# Patient Record
Sex: MALE | Race: WHITE | Employment: UNEMPLOYED | URBAN - METROPOLITAN AREA
[De-identification: names, ages, dates, MRNs, and addresses within clinical notes are randomized per-mention and may not be internally consistent; named-entity substitution may affect disease eponyms.]

---

## 2017-04-24 ENCOUNTER — ED HISTORICAL/CONVERTED ENCOUNTER (OUTPATIENT)
Dept: OTHER | Age: 57
End: 2017-04-24

## 2017-04-25 ENCOUNTER — IP HISTORICAL/CONVERTED ENCOUNTER (OUTPATIENT)
Dept: OTHER | Age: 57
End: 2017-04-25

## 2017-04-30 ENCOUNTER — OP HISTORICAL/CONVERTED ENCOUNTER (OUTPATIENT)
Dept: OTHER | Age: 57
End: 2017-04-30

## 2017-05-03 ENCOUNTER — OP HISTORICAL/CONVERTED ENCOUNTER (OUTPATIENT)
Dept: OTHER | Age: 57
End: 2017-05-03

## 2017-06-06 ENCOUNTER — OFFICE VISIT (OUTPATIENT)
Dept: HEMATOLOGY | Age: 57
End: 2017-06-06

## 2017-06-06 VITALS
WEIGHT: 211.25 LBS | TEMPERATURE: 97.3 F | DIASTOLIC BLOOD PRESSURE: 50 MMHG | HEIGHT: 73 IN | HEART RATE: 61 BPM | SYSTOLIC BLOOD PRESSURE: 101 MMHG | OXYGEN SATURATION: 98 % | BODY MASS INDEX: 28 KG/M2

## 2017-06-06 DIAGNOSIS — K74.60 CIRRHOSIS OF LIVER WITHOUT ASCITES, UNSPECIFIED HEPATIC CIRRHOSIS TYPE (HCC): Primary | ICD-10-CM

## 2017-06-06 PROBLEM — Z90.49 S/P APPENDECTOMY: Status: ACTIVE | Noted: 2017-06-06

## 2017-06-06 PROBLEM — Z87.19 H/O UMBILICAL HERNIA REPAIR: Status: ACTIVE | Noted: 2017-06-06

## 2017-06-06 PROBLEM — Z98.890 H/O UMBILICAL HERNIA REPAIR: Status: ACTIVE | Noted: 2017-06-06

## 2017-06-06 PROBLEM — E11.9 DIABETES MELLITUS (HCC): Status: ACTIVE | Noted: 2017-06-06

## 2017-06-06 PROBLEM — I85.00 ESOPHAGEAL VARICES (HCC): Status: ACTIVE | Noted: 2017-06-06

## 2017-06-06 RX ORDER — SUCRALFATE 1 G/1
TABLET ORAL
Refills: 0 | Status: ON HOLD | COMMUNITY
Start: 2017-04-29 | End: 2018-05-10

## 2017-06-06 RX ORDER — METFORMIN HYDROCHLORIDE 500 MG/1
TABLET ORAL
Refills: 0 | Status: ON HOLD | COMMUNITY
Start: 2017-05-12 | End: 2018-05-10

## 2017-06-06 RX ORDER — LANOLIN ALCOHOL/MO/W.PET/CERES
325 CREAM (GRAM) TOPICAL
Qty: 180 TAB | Refills: 1 | Status: ON HOLD | OUTPATIENT
Start: 2017-06-06 | End: 2018-05-10

## 2017-06-06 RX ORDER — NADOLOL 20 MG/1
TABLET ORAL
Refills: 3 | Status: ON HOLD | COMMUNITY
Start: 2017-06-02 | End: 2018-05-10

## 2017-06-06 NOTE — PROGRESS NOTES
[address] [facility] MD Дмитрий, [address]   April KATELYNN Hurtado PA-C Eugenie Dale, NP        at [address], [address] Patricia Gonzalez  22.     [phone]     FAX: [phone]    at [facility], [address] [place] [address], [address] [phone]     FAX: [phone]         Patient Care Team:  Orlando Narvaez MD as PCP - General (Family Practice)  Lydia Moeller MD (Gastroenterology)      Problem List  Date Reviewed: 6/6/2017          Codes Class Noted    Cirrhosis of liver without ascites [facility]) ICD-10-CM: K74.60  ICD-9-CM: 571.5  6/6/2017        Esophageal varices ([facility].) ICD-10-CM: I85.00  ICD-9-CM: 456.1  6/6/2017        Diabetes mellitus ([facility].) ICD-10-CM: E11.9  ICD-9-CM: 250.00  4/5/9516        H/O umbilical hernia repair BDA-70-WC: V31.625, Z87.19  ICD-9-CM: V15.29  6/6/2017        S/P appendectomy ICD-10-CM: Z90.49  ICD-9-CM: V45.89  6/6/2017                The physicians listed above have asked me to see Snoia Lopez in consultation regarding management of cirrhosis of undefined cause. All previous medical records were reviewed. The patient is a 65 y.o.  male who was first found to have chronic liver disease and cirrhosis in 5/2017 when he was variceal bleeding. An assessment of liver fibrosis with biopsy or elastography has not been performed. The patient has not developed ascites. The patient has not developed edema. The patient has not developed hepatic encephalopathy. The patient has had prior variceal bleeding.   Varices have been treated with beta-blockers and band ligation     The most recent laboratory studies indicate that the liver transaminases are normal, ALP is normal, tests of hepatic synthetic and metabolic function are normal, and the platelet count is normal.    The patient notes fatigue,     The patient has limitations in functional activities secondary to these symptoms. The patient has not experienced problems concentrating, swelling of the abdomen, swelling of the lower extremities, hematemesis, hematochezia. ALLERGIES  Not on File    MEDICATIONS  Current Outpatient Prescriptions   Medication Sig    metFORMIN (GLUCOPHAGE) 500 mg tablet TK 1 T PO  BID WITH MEALS    sucralfate (CARAFATE) 1 gram tablet TK 1 T PO Q 6 H    nadolol (CORGARD) 20 mg tablet TK 1 T PO  QD     No current facility-administered medications for this visit. SYSTEM REVIEW NOT RELATED TO LIVER DISEASE OR REVIEWED ABOVE:  Constitution systems: Negative for fever, chills, weight gain, weight loss. Eyes: Negative for visual changes. ENT: Negative for sore throat, painful swallowing. Respiratory: Negative for cough, hemoptysis, SOB. Cardiology: Negative for chest pain, palpitations. GI:  Negative for constipation or diarrhea. : Negative for urinary frequency, dysuria, hematuria, nocturia. Skin: Negative for rash. Hematology: Negative for easy bruising, blood clots. Musculo-skelatal: Negative for back pain, muscle pain, weakness. Neurologic: Negative for headaches, dizziness, vertigo, memory problems not related to HE. Psychology: Negative for anxiety, depression. FAMILY HISTORY:  The father  of heart disease. The mother  of MVA but had elevated liver enzymes. There is no family history of liver disease. SOCIAL HISTORY:  The patient is . The patient has no children. The patient currently smokes 1 pack of tobacco daily. The patient has never consumed significant amounts of alcohol. 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 PHYSICAL EXAMINATION:  Visit Vitals    /50    Pulse 61    Temp 97.3 °F (36.3 °C) (Tympanic)    Ht 6' 1\" (1.854 m)    Wt 211 lb 4 oz (95.8 kg)    SpO2 98%    BMI 27.87 kg/m2     General: No acute distress.    Eyes: Sclera anicteric. ENT: No oral lesions. Thyroid normal.  Nodes: No adenopathy. Skin: No spider angiomata. No jaundice. No palmar erythema. Respiratory: Lungs clear to auscultation. Cardiovascular: Regular heart rate. No murmurs. No JVD. Abdomen: Soft non-tender. Liver size normal to percussion/palpation. Spleen not palpable. No obvious ascites. Extremities: No edema. No muscle wasting. No gross arthritic changes. Neurologic: Alert and oriented. Cranial nerves grossly intact. No asterixis. LABORATORY STUDIES:  [facility] [place] of [address] [place] [address] & Units 6/6/2017   WBC 3.4 - 10.8 x10E3/uL 6.3   ANC 1.4 - 7.0 x10E3/uL 2.4   HGB 12.6 - 17.7 g/dL 11.2 (L)    - 379 x10E3/uL 112 (L)   AST 0 - 40 IU/L 46 (H)   ALT 0 - 44 IU/L 29   Alk Phos 39 - 117 IU/L 103   Bili, Total 0.0 - 1.2 mg/dL 0.7   Bili, Direct 0.00 - 0.40 mg/dL 0.29   Albumin 3.5 - 5.5 g/dL 3.0 (L)   BUN 6 - 24 mg/dL 8   Creat 0.76 - 1.27 mg/dL 0.72 (L)   Na 134 - 144 mmol/L 144   K 3.5 - 5.2 mmol/L 4.3   Cl 96 - 106 mmol/L 108 (H)   CO2 18 - 29 mmol/L 24   Glucose 65 - 99 mg/dL 96     SEROLOGIES:  5/2017. HBsurface antigen negative, anti-HBcore negative, anti-HBsurface negative, HCV RNA negative, iron saturation 29%, ASMA negative. Serologies Latest Ref Rng & Units 6/6/2017   Ferritin 30 - 400 ng/mL 39   Alpha-1 antitrypsin level 90 - 200 mg/dL 87 (L)     LIVER HISTOLOGY:  Not available or performed    ENDOSCOPIC PROCEDURES:  Not available or performed    RADIOLOGY:  Not available or performed    OTHER TESTING:  Not available or performed    ASSESSMENT AND PLAN:  Cryptogenic cirrhosis     Liver transaminases are normal.  Alkaline phosphatase is normal.  Liver function is normal.  Serum albumin is slightly depressed. The platelet count is   depressed. Will perform laboratory testing to monitor liver function and degree of liver injury. This included BMP, hepatic panel, CBC with platelet count, INR.       The patient hasnormal liver function. The CTP is 6. Child class A. The MELD score is 12. The need to perform a liver biopsy to help determine the cause and severity of the liver test abnormalities was discussed. The risks of performing the liver biopsy including pain, puncture of the lung, gallbladder, intestine or kidney and bleeding were discussed. Will defer liver biopsy for now. The need to perform an assessment of liver fibrosis was discussed with the patient. The Fibroscan can assess liver fibrosis and determine if a patient has advanced fibrosis or cirrhosis without the need for liver biopsy. The Fibroscan is currently available at [facility]. This will be performed a tthe next office visit. Esophageal varices with prior bleeding. Varices have been banded. Will schedule for EGD to assess for varices and need for banding. The patient is on a beta-blocker to reduce the risk of variceal hemorrhage. Hepatic encephalopathy has not developed to date. There is no need for treatment with lactulose and/or Xifaxan at this time. No need to restrict dietary protein at this time. The patient was directed to continue all current medications at the current dosages. There are no contraindications for the patient to take any medications that are necessary for treatment of other medical issues. The patient was counseled regarding alcohol consumption. Thrombocytopenia secondary to cirrhosis. NO treatment is needed. Neutropenia secondary to cirrhosis. There is no evidence of infection. There is no indication for GCSF at this time. Anemia from chronic GI blood loss from portal hypertension and iron deficiency. Will obtain iron panel to assess for iron stores. Will schedule for EGD to assess for UGI blood loss. Osteoporosis is common in persons with cirrhosis. Bone density to assess for osteoporosis has not been performed.       Vaccination for viral hepatitis A and B is recommended since the patient has no serologic evidence of previous exposure or vaccination with immunity. [address] screening will be performed. AFP was ordered today. Ultrasound will be scheduled prior to or the same day as the next office visit. All of the above issues were discussed with the patient. All questions were answered. The patient expressed a clear understanding of the above. [facility] 87 in 4 weeks for Fibroscan and to review all data and determine the treatment plan.     Yue Mulligan MD  Liver [place] [address] 22.  [phone]

## 2017-06-06 NOTE — MR AVS SNAPSHOT
Visit Information Date & Time Provider Department Dept. Phone Encounter #  
 6/6/2017 12:30 PM Rashid Jones MD [facility] [address] [place] [address] [id number] Follow-up Instructions Return in about 2 weeks (around 6/20/2017) for FS with MLS. Upcoming Health Maintenance Date Due Hepatitis C Screening 1960 DTaP/Tdap/Td series (1 - Tdap) 3/25/1982 FOBT Q 1 YEAR AGE 50-75 3/25/2011 INFLUENZA AGE 9 TO ADULT 8/1/2017 Allergies as of 6/6/2017  Review Complete On: 6/6/2017 By: Emory Leslie LPN Not on File Current Immunizations  Never Reviewed No immunizations on file. Not reviewed this visit You Were Diagnosed With   
  
 Codes Comments Cirrhosis of liver without ascites, unspecified hepatic cirrhosis type ([facility]    -  Primary ICD-10-CM: K74.60 ICD-9-CM: 571.5 Vitals BP Pulse Temp Height(growth percentile) Weight(growth percentile) SpO2  
 101/50 61 97.3 °F (36.3 °C) (Tympanic) 6' 1\" (1.854 m) 211 lb 4 oz (95.8 kg) 98% BMI Smoking Status 27.87 kg/m2 Current Every Day Smoker BMI and BSA Data Body Mass Index Body Surface Area  
 27.87 kg/m 2 2.22 m 2 Preferred Pharmacy Pharmacy Name Phone [facility], [address] [place] [address] [phone] Your Updated Medication List  
  
   
This list is accurate as of: 6/6/17  1:04 PM.  Always use your most recent med list.  
  
  
  
  
 ferrous sulfate 325 mg (65 mg iron) tablet Take 1 Tab by mouth three (3) times daily (with meals). metFORMIN 500 mg tablet Commonly known as:  GLUCOPHAGE  
TK 1 T PO  BID WITH MEALS  
  
 nadolol 20 mg tablet Commonly known as:  CORGARD TK 1 T PO  QD  
  
 sucralfate 1 gram tablet Commonly known as:  [place] Earthly TK 1 T PO Q 6 H Prescriptions Sent to Pharmacy Refills ferrous sulfate 325 mg (65 mg iron) tablet 1 Sig: Take 1 Tab by mouth three (3) times daily (with meals). Class: Normal  
 Pharmacy: [facility] [address] [place] [address] AT [facility] of [facility] #: [phone] Route: Oral  
  
We Performed the Following AFP WITH AFP-L3% [[id number] Custom] ALPHA-1-ANTITRYPSIN, TOTAL [66991 CPT(R)] CBC WITH AUTOMATED DIFF [44575 CPT(R)] FERRITIN [33250 CPT(R)] HEPATIC FUNCTION PANEL [77905 CPT(R)] METABOLIC PANEL, BASIC [01961 CPT(R)] Follow-up Instructions Return in about 2 weeks (around 6/20/2017) for FS with MLS. Patient Instructions FIBROSCAN PATIENT INFORMATION What is Fibroscan:? 
 
Fibroscan is an ultrasound device that measures liver stiffness by sending a pulse of vibrations through the liver. This translated into an immediate result that can help your healthcare team determine the level of damage to the liver as well as monitor the condition of various liver diseases over time. Fibroscan is helpful in the evaluation of the following conditions: 
 
Chronic Hepatitis C Chronic Hepatitis B Fatty Liver Disease Alcohol Liver Disease Chronic Cholestatic Liver Diseases What happens During the Scan? Patients receiving this exam lie flat on an examination table and raise the right arm above the head. The skin over the right lower rib cage is exposed and the examiner locates the correct area to be scanned. The prove of the scanner is placed directly on the patient and triggered to start. This fells like a gentle flick against the skin and should not be uncomfortable. At least ten (10) readings are taken and the average is calculated to score the amount of liver stiffness or scar tissue. The exam should take 10-20 minutes. What do I need to do to prepare for the scan? Please do not eat or drink anything 2-4 hours  Before your Fibroscan.   You should continue taking any prescribed medication and can take small sips of water or clear fluid to do so,  But avoid drinking large amounts of fluid. Please dress comfortably in clothes that will allow for easy access to the right side of the abdomen. Women are discouraged from wearing a dress on the day of the exam. 
 
Are there any special precautions? Patients who are pregnant or have an implantable device (for example, pacemaker or defibrillator) should not have this exam performed. Patients with a significant amount of fat tissue in the area the probe is pressed may be unable to have test performed. Introducing [facility] [place] [facility]! Shabana Ramos introduces [employer] patient portal. Now you can access parts of your medical record, email your doctor's office, and request medication refills online. 1. In your internet browser, go to [email or web address] 2. Click on the First Time User? Click Here link in the Sign In box. You will see the New Member Sign Up page. 3. Enter your [employer] Access Code exactly as it appears below. You will not need to use this code after youve completed the sign-up process. If you do not sign up before the expiration date, you must request a new code. · [employer] Access Code: [email or web address] Expires: 9/4/2017  1:04 PM 
 
4. Enter the last four digits of your Social Security Number (xxxx) and Date of Birth (mm/dd/yyyy) as indicated and click Submit. You will be taken to the next sign-up page. 5. Create a [employer] ID. This will be your [employer] login ID and cannot be changed, so think of one that is secure and easy to remember. 6. Create a [employer] password. You can change your password at any time. 7. Enter your Password Reset Question and Answer. This can be used at a later time if you forget your password. 8. Enter your e-mail address. You will receive e-mail notification when new information is available in [address] 9. Click Sign Up. You can now view and download portions of your medical record. 10. Click the Download Summary menu link to download a portable copy of your medical information. If you have questions, please visit the Frequently Asked Questions section of the [employer] website. Remember, [employer] is NOT to be used for urgent needs. For medical emergencies, dial 911. Now available from your iPhone and Android! Please provide this summary of care documentation to your next provider. Your primary care clinician is listed as Chris James. If you have any questions after today's visit, please call [phone].

## 2017-06-07 LAB
A1AT SERPL-MCNC: 87 MG/DL (ref 90–200)
AFP L3 MFR SERPL: 9.2 % (ref 0–9.9)
AFP SERPL-MCNC: 4 NG/ML (ref 0–8)
ALBUMIN SERPL-MCNC: 3 G/DL (ref 3.5–5.5)
ALP SERPL-CCNC: 103 IU/L (ref 39–117)
ALT SERPL-CCNC: 29 IU/L (ref 0–44)
AST SERPL-CCNC: 46 IU/L (ref 0–40)
BASOPHILS # BLD AUTO: 0 X10E3/UL (ref 0–0.2)
BASOPHILS NFR BLD AUTO: 1 %
BILIRUB DIRECT SERPL-MCNC: 0.29 MG/DL (ref 0–0.4)
BILIRUB SERPL-MCNC: 0.7 MG/DL (ref 0–1.2)
BUN SERPL-MCNC: 8 MG/DL (ref 6–24)
BUN/CREAT SERPL: 11 (ref 9–20)
CALCIUM SERPL-MCNC: 8.7 MG/DL (ref 8.7–10.2)
CHLORIDE SERPL-SCNC: 108 MMOL/L (ref 96–106)
CO2 SERPL-SCNC: 24 MMOL/L (ref 18–29)
CREAT SERPL-MCNC: 0.72 MG/DL (ref 0.76–1.27)
EOSINOPHIL # BLD AUTO: 0.4 X10E3/UL (ref 0–0.4)
EOSINOPHIL NFR BLD AUTO: 7 %
ERYTHROCYTE [DISTWIDTH] IN BLOOD BY AUTOMATED COUNT: 14.4 % (ref 12.3–15.4)
FERRITIN SERPL-MCNC: 39 NG/ML (ref 30–400)
GLUCOSE SERPL-MCNC: 96 MG/DL (ref 65–99)
HCT VFR BLD AUTO: 34.4 % (ref 37.5–51)
HGB BLD-MCNC: 11.2 G/DL (ref 12.6–17.7)
IMM GRANULOCYTES # BLD: 0 X10E3/UL (ref 0–0.1)
IMM GRANULOCYTES NFR BLD: 0 %
LYMPHOCYTES # BLD AUTO: 2.7 X10E3/UL (ref 0.7–3.1)
LYMPHOCYTES NFR BLD AUTO: 41 %
MCH RBC QN AUTO: 31.6 PG (ref 26.6–33)
MCHC RBC AUTO-ENTMCNC: 32.6 G/DL (ref 31.5–35.7)
MCV RBC AUTO: 97 FL (ref 79–97)
MONOCYTES # BLD AUTO: 0.8 X10E3/UL (ref 0.1–0.9)
MONOCYTES NFR BLD AUTO: 13 %
NEUTROPHILS # BLD AUTO: 2.4 X10E3/UL (ref 1.4–7)
NEUTROPHILS NFR BLD AUTO: 38 %
PLATELET # BLD AUTO: 112 X10E3/UL (ref 150–379)
POTASSIUM SERPL-SCNC: 4.3 MMOL/L (ref 3.5–5.2)
PROT SERPL-MCNC: 6 G/DL (ref 6–8.5)
RBC # BLD AUTO: 3.54 X10E6/UL (ref 4.14–5.8)
SODIUM SERPL-SCNC: 144 MMOL/L (ref 134–144)
WBC # BLD AUTO: 6.3 X10E3/UL (ref 3.4–10.8)

## 2017-06-22 ENCOUNTER — OFFICE VISIT (OUTPATIENT)
Dept: HEMATOLOGY | Age: 57
End: 2017-06-22

## 2017-06-22 VITALS
HEART RATE: 63 BPM | SYSTOLIC BLOOD PRESSURE: 90 MMHG | HEIGHT: 73 IN | WEIGHT: 213.2 LBS | BODY MASS INDEX: 28.26 KG/M2 | TEMPERATURE: 99.3 F | OXYGEN SATURATION: 98 % | DIASTOLIC BLOOD PRESSURE: 47 MMHG

## 2017-06-22 DIAGNOSIS — K74.60 CIRRHOSIS OF LIVER WITHOUT ASCITES, UNSPECIFIED HEPATIC CIRRHOSIS TYPE (HCC): Primary | ICD-10-CM

## 2017-06-22 NOTE — PROGRESS NOTES
93 Fariba Sabillon MD, [address]   KATELYNN Toledo PA-C Eileen Mcbride, NP        at [address]     [address] [place] [address], [address] Patricia Gonzalez [address] 22.     [phone]     FAX: [phone]    at [address], [address] [place] [address], [address]     [phone]     FAX: [phone]         Patient Care Team:  Sotero Lang MD as PCP - General (Family Practice)  Marcos Thacker MD (Gastroenterology)      Problem List  Date Reviewed: 6/11/2017          Codes Class Noted    Cirrhosis of liver without ascites ([facility].) ICD-10-CM: K74.60  ICD-9-CM: 571.5  6/6/2017        Esophageal varices ([facility] ICD-10-CM: I85.00  ICD-9-CM: 456.1  6/6/2017        Diabetes mellitus ([facility] ICD-10-CM: E11.9  ICD-9-CM: 250.00  2/7/3379        H/O umbilical hernia repair OKW-66-SK: Z98.890, Z87.19  ICD-9-CM: V15.29  6/6/2017        S/P appendectomy ICD-10-CM: Z90.49  ICD-9-CM: V45.89  6/6/2017              Nery Black returns to the [facility] [place] for management of cryptogenic cirrhosis. The active problem list, all pertinent past medical history, medications, endoscopic studies, radiologic findings and laboratory findings related to the liver disorder were reviewed with the patient. The patient is a 65 y.o.  male who was first found to have chronic liver disease and cirrhosis in 5/2017 when he developed variceal bleeding. Assessment of liver fibrosis was performed in the office today with Fibroscan. The result was 72.1 kPa which correlates with cirrhosis. The patient has not developed ascites. The patient has not developed edema. The patient has not developed hepatic encephalopathy. The patient has had prior variceal bleeding in 5/2017. Varices have been treated with beta-blockers and band ligation in 5/2017.      The most recent laboratory studies indicate that the liver transaminases are normal, ALP is normal, tests of hepatic synthetic and metabolic function are normal, and the platelet count is normal.    The patient notes fatigue,     The patient has limitations in functional activities secondary to these symptoms. The patient has not experienced problems concentrating, swelling of the abdomen, swelling of the lower extremities, hematemesis, hematochezia. ALLERGIES  Allergies   Allergen Reactions    Shellfish Derived Hives       MEDICATIONS  Current Outpatient Prescriptions   Medication Sig    metFORMIN (GLUCOPHAGE) 500 mg tablet TK 1 T PO  BID WITH MEALS    sucralfate (CARAFATE) 1 gram tablet TK 1 T PO Q 6 H    nadolol (CORGARD) 20 mg tablet TK 1 T PO  QD    ferrous sulfate 325 mg (65 mg iron) tablet Take 1 Tab by mouth three (3) times daily (with meals). No current facility-administered medications for this visit. SYSTEM REVIEW NOT RELATED TO LIVER DISEASE OR REVIEWED ABOVE:  Constitution systems: Negative for fever, chills, weight gain, weight loss. Eyes: Negative for visual changes. ENT: Negative for sore throat, painful swallowing. Respiratory: Negative for cough, hemoptysis, SOB. Cardiology: Negative for chest pain, palpitations. GI:  Negative for constipation or diarrhea. : Negative for urinary frequency, dysuria, hematuria, nocturia. Skin: Negative for rash. Hematology: Negative for easy bruising, blood clots. Musculo-skelatal: Negative for back pain, muscle pain, weakness. Neurologic: Negative for headaches, dizziness, vertigo, memory problems not related to HE. Psychology: Negative for anxiety, depression. FAMILY HISTORY:  The father  of heart disease. The mother  of MVA but had elevated liver enzymes. There is no family history of liver disease. SOCIAL HISTORY:  The patient is . The patient has no children. The patient currently smokes 1 pack of tobacco daily.     The patient has never consumed significant amounts of alcohol. 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 PHYSICAL EXAMINATION:  Visit Vitals    BP 90/47 (BP 1 Location: Left arm, BP Patient Position: Sitting)    Pulse 63    Temp 99.3 °F (37.4 °C) (Tympanic)    Ht 6' 1\" (1.854 m)    Wt 213 lb 3.2 oz (96.7 kg)    SpO2 98%    BMI 28.13 kg/m2     General: No acute distress. Eyes: Sclera anicteric. ENT: No oral lesions. Thyroid normal.  Nodes: No adenopathy. Skin: No spider angiomata. No jaundice. No palmar erythema. Respiratory: Lungs clear to auscultation. Cardiovascular: Regular heart rate. No murmurs. No JVD. Abdomen: Soft non-tender. Liver size normal to percussion/palpation. Spleen not palpable. No obvious ascites. Extremities: No edema. No muscle wasting. No gross arthritic changes. Neurologic: Alert and oriented. Cranial nerves grossly intact. No asterixis. LABORATORY STUDIES:  [address] & Units 6/6/2017   WBC 3.4 - 10.8 x10E3/uL 6.3   ANC 1.4 - 7.0 x10E3/uL 2.4   HGB 12.6 - 17.7 g/dL 11.2 (L)    - 379 x10E3/uL 112 (L)   AST 0 - 40 IU/L 46 (H)   ALT 0 - 44 IU/L 29   Alk Phos 39 - 117 IU/L 103   Bili, Total 0.0 - 1.2 mg/dL 0.7   Bili, Direct 0.00 - 0.40 mg/dL 0.29   Albumin 3.5 - 5.5 g/dL 3.0 (L)   BUN 6 - 24 mg/dL 8   Creat 0.76 - 1.27 mg/dL 0.72 (L)   Na 134 - 144 mmol/L 144   K 3.5 - 5.2 mmol/L 4.3   Cl 96 - 106 mmol/L 108 (H)   CO2 18 - 29 mmol/L 24   Glucose 65 - 99 mg/dL 96     SEROLOGIES:  5/2017. HBsurface antigen negative, anti-HBcore negative, anti-HBsurface negative, HCV RNA negative, iron saturation 29%, ASMA negative. Serologies Latest Ref Rng & Units 6/6/2017   Ferritin 30 - 400 ng/mL 39   Alpha-1 antitrypsin level 90 - 200 mg/dL 87 (L)     LIVER HISTOLOGY:  6/2017. FibroScan performed at [address]. EkPa was 72.1. The results suggested a fibrosis level of F4. ENDOSCOPIC PROCEDURES:  5/2017. EGD by Dr Abbey Patterson. Esophageal varices. Banding performed. RADIOLOGY:  Not available or performed    OTHER TESTING:  Not available or performed    ASSESSMENT AND PLAN:  Cryptogenic cirrhosis     Liver transaminases are normal.  Alkaline phosphatase is normal.  Liver function is normal.  Serum albumin is slightly depressed. The platelet count is   depressed. The patient has normal liver function. The CTP is 6. Child class A. The MELD score is 12. The need to perform a liver biopsy to help determine the cause and severity of the liver test abnormalities was discussed. The risks of performing the liver biopsy including pain, puncture of the lung, gallbladder, intestine or kidney and bleeding were discussed. Will defer liver biopsy for now. The need to perform an assessment of liver fibrosis was discussed with the patient. The Fibroscan can assess liver fibrosis and determine if a patient has advanced fibrosis or cirrhosis without the need for liver biopsy. The Fibroscan is currently available at [facility]. This will be performed a tthe next office visit. Esophageal varices with prior bleeding. Varices have been banded. Will schedule for EGD to assess for varices and need for banding. The patient is on a beta-blocker to reduce the risk of variceal hemorrhage. Hepatic encephalopathy has not developed to date. There is no need for treatment with lactulose and/or Xifaxan at this time. No need to restrict dietary protein at this time. The patient was directed to continue all current medications at the current dosages. There are no contraindications for the patient to take any medications that are necessary for treatment of other medical issues. The patient was counseled regarding alcohol consumption. Thrombocytopenia secondary to cirrhosis. NO treatment is needed. Neutropenia secondary to cirrhosis. There is no evidence of infection.   There is no indication for GCSF at this time. Anemia from chronic GI blood loss from portal hypertension and iron deficiency. Will obtain iron panel to assess for iron stores. Will schedule for EGD to assess for UGI blood loss. Osteoporosis is common in persons with cirrhosis. Bone density to assess for osteoporosis has not been performed. Vaccination for viral hepatitis A and B is recommended since the patient has no serologic evidence of previous exposure or vaccination with immunity. [address] screening will be performed. AFP was ordered today. Ultrasound will be scheduled prior to or the same day as the next office visit. All of the above issues were discussed with the patient. All questions were answered. The patient expressed a clear understanding of the above. [facility] 87 in 3 months for monitoring cirrhosis.     Bobbi Castelan MD  Liver [place] of [address] [place] [address] 22.  [phone]

## 2017-06-27 ENCOUNTER — HOSPITAL ENCOUNTER (OUTPATIENT)
Age: 57
Setting detail: OUTPATIENT SURGERY
Discharge: HOME | End: 2017-06-27
Attending: INTERNAL MEDICINE | Admitting: INTERNAL MEDICINE
Payer: COMMERCIAL

## 2017-06-27 VITALS
WEIGHT: 213 LBS | DIASTOLIC BLOOD PRESSURE: 74 MMHG | SYSTOLIC BLOOD PRESSURE: 141 MMHG | HEART RATE: 74 BPM | TEMPERATURE: 97.8 F | RESPIRATION RATE: 16 BRPM | BODY MASS INDEX: 28.23 KG/M2 | HEIGHT: 73 IN | OXYGEN SATURATION: 96 %

## 2017-06-27 PROCEDURE — 76040000019: Performed by: INTERNAL MEDICINE

## 2017-06-27 PROCEDURE — 74011250636 HC RX REV CODE- 250/636: Performed by: INTERNAL MEDICINE

## 2017-06-27 PROCEDURE — 77030014243 HC BND LIG VRCES BSC -D: Performed by: INTERNAL MEDICINE

## 2017-06-27 PROCEDURE — 76060000031 HC ANESTHESIA FIRST 0.5 HR: Performed by: INTERNAL MEDICINE

## 2017-06-27 RX ORDER — FENTANYL CITRATE 50 UG/ML
50-200 INJECTION, SOLUTION INTRAMUSCULAR; INTRAVENOUS
Status: DISCONTINUED | OUTPATIENT
Start: 2017-06-27 | End: 2017-06-27 | Stop reason: HOSPADM

## 2017-06-27 RX ORDER — DEXTROMETHORPHAN/PSEUDOEPHED 2.5-7.5/.8
1.2 DROPS ORAL
Status: DISCONTINUED | OUTPATIENT
Start: 2017-06-27 | End: 2017-06-27 | Stop reason: HOSPADM

## 2017-06-27 RX ORDER — MIDAZOLAM HYDROCHLORIDE 1 MG/ML
5-10 INJECTION, SOLUTION INTRAMUSCULAR; INTRAVENOUS
Status: DISCONTINUED | OUTPATIENT
Start: 2017-06-27 | End: 2017-06-27 | Stop reason: HOSPADM

## 2017-06-27 RX ORDER — FLUMAZENIL 0.1 MG/ML
0.2 INJECTION INTRAVENOUS
Status: DISCONTINUED | OUTPATIENT
Start: 2017-06-27 | End: 2017-06-27 | Stop reason: HOSPADM

## 2017-06-27 RX ORDER — ATROPINE SULFATE 0.1 MG/ML
0.5 INJECTION INTRAVENOUS
Status: DISCONTINUED | OUTPATIENT
Start: 2017-06-27 | End: 2017-06-27 | Stop reason: HOSPADM

## 2017-06-27 RX ORDER — SODIUM CHLORIDE 9 MG/ML
50 INJECTION, SOLUTION INTRAVENOUS CONTINUOUS
Status: DISCONTINUED | OUTPATIENT
Start: 2017-06-27 | End: 2017-06-27 | Stop reason: HOSPADM

## 2017-06-27 RX ORDER — NALOXONE HYDROCHLORIDE 0.4 MG/ML
0.4 INJECTION, SOLUTION INTRAMUSCULAR; INTRAVENOUS; SUBCUTANEOUS
Status: DISCONTINUED | OUTPATIENT
Start: 2017-06-27 | End: 2017-06-27 | Stop reason: HOSPADM

## 2017-06-27 RX ORDER — SODIUM CHLORIDE 0.9 % (FLUSH) 0.9 %
5-10 SYRINGE (ML) INJECTION AS NEEDED
Status: DISCONTINUED | OUTPATIENT
Start: 2017-06-27 | End: 2017-06-27 | Stop reason: HOSPADM

## 2017-06-27 RX ORDER — SODIUM CHLORIDE 0.9 % (FLUSH) 0.9 %
5-10 SYRINGE (ML) INJECTION EVERY 8 HOURS
Status: DISCONTINUED | OUTPATIENT
Start: 2017-06-27 | End: 2017-06-27 | Stop reason: HOSPADM

## 2017-06-27 RX ORDER — EPINEPHRINE 0.1 MG/ML
1 INJECTION INTRACARDIAC; INTRAVENOUS
Status: DISCONTINUED | OUTPATIENT
Start: 2017-06-27 | End: 2017-06-27 | Stop reason: HOSPADM

## 2017-06-27 RX ADMIN — MIDAZOLAM HYDROCHLORIDE 1 MG: 1 INJECTION, SOLUTION INTRAMUSCULAR; INTRAVENOUS at 14:31

## 2017-06-27 RX ADMIN — FENTANYL CITRATE 25 MCG: 50 INJECTION, SOLUTION INTRAMUSCULAR; INTRAVENOUS at 14:33

## 2017-06-27 RX ADMIN — FENTANYL CITRATE 50 MCG: 50 INJECTION, SOLUTION INTRAMUSCULAR; INTRAVENOUS at 14:24

## 2017-06-27 RX ADMIN — MIDAZOLAM HYDROCHLORIDE 1 MG: 1 INJECTION, SOLUTION INTRAMUSCULAR; INTRAVENOUS at 14:34

## 2017-06-27 RX ADMIN — MIDAZOLAM HYDROCHLORIDE 2 MG: 1 INJECTION, SOLUTION INTRAMUSCULAR; INTRAVENOUS at 14:29

## 2017-06-27 RX ADMIN — MIDAZOLAM HYDROCHLORIDE 1 MG: 1 INJECTION, SOLUTION INTRAMUSCULAR; INTRAVENOUS at 14:27

## 2017-06-27 RX ADMIN — MIDAZOLAM HYDROCHLORIDE 2 MG: 1 INJECTION, SOLUTION INTRAMUSCULAR; INTRAVENOUS at 14:24

## 2017-06-27 RX ADMIN — FENTANYL CITRATE 25 MCG: 50 INJECTION, SOLUTION INTRAMUSCULAR; INTRAVENOUS at 14:35

## 2017-06-27 RX ADMIN — FENTANYL CITRATE 25 MCG: 50 INJECTION, SOLUTION INTRAMUSCULAR; INTRAVENOUS at 14:27

## 2017-06-27 RX ADMIN — FENTANYL CITRATE 25 MCG: 50 INJECTION, SOLUTION INTRAMUSCULAR; INTRAVENOUS at 14:29

## 2017-06-27 NOTE — DISCHARGE INSTRUCTIONS
[address] [facility] MD Todd, KATELYNN Vasquez PA-C Arland Spires, NP        at [address], [address] Patricia Gonzalez  22.     [phone]     FAX: [phone]    at [facility], [id number] [place] [address], [address]     [phone]     FAX: [phone]       ENDOSCOPY WITH BANDING DISCHARGE INSTRUCTIONS    Nicolasa Fuchs  1960  Date: 6/27/2017    DISCOMFORT:  Use lozenges or warm salt water gargle for sore thoat  Apply warm compress to IV site if red. If redness or soreness persists call the office. You may experience gas and bloating. Walking and belching will help relieve this. You may experience chest pain or discomfort or feel as though food is \"sticking\" in your food pipe for a few days after the procedure. This is a normal feeling after banding of esophageal varices. DIET:  Regular food may dislodge the bands placed on the varices. For this reason you should only have liquid for the rest of today. Eat only soft food that does not need to be chewed all day tomorrow. You may advance to your regular diet in 2 days. ACTIVITY:  Spend the remainder of the day resting. Avoid any strenuous activity. You may not operate a vehicle for 12 hours. You may not engage in an occupation involving machinery or appliances for rest of today. Avoid making any critical decisions for at least 24 hour. Call the [employer] [address] if you have any of the following:  Increasing chest or abdominal pain, nausea, vomiting, vomiting blood, abdominal distension or swelling, fever or chills, bloody discharge from nose or mouth or shortness of breath. Follow-up Instructions:  Call Dr. Olvin Carlin for any questions or problems at the phone number listed above.   If a biopsy was performed, you will be contacted by the office staff or  Harvey within 1 week. If you have not heard from us by then you may call the office at the phone number listed above to inquire about the results. ENDOSCOPY FINDINGS:  Multiple varices were found in the esophagus (food tube). 7 bands were placed to seal the varices and reduce the risk of bleeding. DISCHARGE SUMMARY from the Nurse: The following personal items collected during your admission are returned to you:   Dental Appliance: Dental Appliances: None  Vision: Visual Aid: None  Hearing Aid:    Jewelry:    Clothing:    Other Valuables:    Valuables sent to safe:             Esophageal Varices: Care Instructions  Your Care Instructions    Esophageal varices (say \"ee-sof-uh-TONIE-ul CUCN-rs-ejha\") are veins in your esophagus that are bigger than normal. Your esophagus is a tube. It carries food from your throat to your stomach. These veins get big because of extra pressure. This pressure makes the walls of the veins weak. Then they can rupture and cause very serious bleeding. This problem is usually found in people who have serious liver disease. Treatments include medicines and procedures to help lower the pressure in the veins. Talk with your doctor about the best treatment for you. If you have bleeding from this problem, there is a risk that it will happen again. In this case, it's important to go back and follow up with your doctor. Follow-up care is a key part of your treatment and safety. Be sure to make and go to all appointments, and call your doctor if you are having problems. It's also a good idea to know your test results and keep a list of the medicines you take. How can you care for yourself at home? · Be safe with medicines. Take your medicines exactly as prescribed. Call your doctor if you think you are having a problem with your medicine. You will get more details on the specific medicines your doctor prescribes. · Talk to your doctor before you take any other medicines.  These include over-the-counter medicines, vitamins, and herbal products. · Avoid aspirin, ibuprofen (Advil, Motrin), and naproxen (Aleve). These can cause sores in your stomach or esophagus. · Do not drink alcohol. It increases your risk of bleeding. It can also make liver damage worse. Tell your doctor if you need help to quit. Counseling, support groups, and sometimes medicines can help you stay sober. When should you call for help? Call 911 anytime you think you may need emergency care. For example, call if:  · You vomit blood or what looks like coffee grounds. · Your stools are maroon or very bloody. · You passed out (lost consciousness). · You feel very sleepy. Call your doctor now or seek immediate medical care if:  · You are dizzy or lightheaded, or you feel like you may faint. · Your stools are black and look like tar, or they have streaks of blood. · You have trouble breathing. Watch closely for changes in your health, and be sure to contact your doctor if you have any problems. Where can you learn more? Go to http://alesia-cooper.info/. Enter R861 in the search box to learn more about \"Esophageal Varices: Care Instructions. \"  Current as of: August 9, 2016  Content Version: 11.3  © 8104-5797 [employer]. Care instructions adapted under license by [employer] (which disclaims liability or warranty for this information). If you have questions about a medical condition or this instruction, always ask your healthcare professional. [place] [address] any warranty or liability for your use of this information.

## 2017-06-27 NOTE — PROCEDURES
[address], MD, KATELYNN Andres PA-C Eileen Mcbride, NP        at [address], [address] Patricia Bryson  22.     [phone]     FAX: [phone]    at [address], [address] [place] [address], [address]     [phone]     FAX: [phone]       UPPER ENDOSCOPY PROCEDURE NOTE    Hershal Slider  1960    INDICATION: Cirrhosis. Screening for esophageal varices with variceal ligation if indicated. : Amberly Garsia MD    ANESTHESIA/SEDATION: Versed 6 mg and Fentanyl 125 mcg      PROCEDURE DESCRIPTION:  Infomed consent was obtained from the patient for the procedure. All risks and benefits of the procedure explained. The patient was taken to the endoscopy suite and placed in the left lateral decubitus position. Following sequential administration of sedation to doses as indicated above the endoscope was inserted into the mouth and advanced under direct vision to the second portion of the duodenum. Careful inspection of upper gastrointestinal tract was made as the endoscope was inserted and withdrawn. Retroflexion of the endoscope to view of the cardia of the stomach was performed. After withdrawing the endoscope the banding devise was placed on the tip of the endoscope. The scope was then reinserted under direct inspection and advanced to the esophagus. Banding of esophageal varices was performed as described below. The scope was then removed. FINDINGS:  Esophagus:    Multiple large esophageal varix was identified. Banding of esophageal varices was performed. Excellent hemostasis was achieved after banding. Stomach: Moderate portal hypertensive gastropathy of the body of the stomach. No gastric varicies identified.     Duodenum:   Normal bulb and second portion    INTERVENTION:   7 bands placed were placed on esophageal varices. COMPLICATIONS: None. The patient tolerated the procedure well. EBL: Negligible. RECOMMENDATIONS:  Observe until discharge parameters are achieved. Liquid diet today. Soft food tomorrow. Resume general diet thereafter. Repeat endoscopy to reassess varices and need for additional banding in 4-6 weeks. Follow-up Liver [facility] office as scheduled.       Donavon Vazquez MD  6/27/2017  2:41 PM

## 2017-06-27 NOTE — H&P
[address] MD, KATELYNN Bruno PA-C Raeford Azure, NP        at [address], [address] Patricia Gonzalez [address].     [phone]     FAX: [phone]    at [address], [address] [place] [address], [address]     [phone]     FAX: [phone]       PRE-PROCEDURE NOTE - EGD    H and P from last office visit reviewed. Allergies reviewed. Out-patient medicaton list reviewed. Patient Active Problem List   Diagnosis Code    Cirrhosis of liver without ascites ([facility] K74.60    Esophageal varices (HCC) I85.00    [facility] G62.1    H/O umbilical hernia repair R55.113, Z87.19    S/P appendectomy Z90.49       Allergies   Allergen Reactions    Shellfish Derived Hives       No current facility-administered medications on file prior to encounter. Current Outpatient Prescriptions on File Prior to Encounter   Medication Sig Dispense Refill    metFORMIN (GLUCOPHAGE) 500 mg tablet TK 1 T PO  BID WITH MEALS  0    sucralfate (CARAFATE) 1 gram tablet TK 1 T PO Q 6 H  0    nadolol (CORGARD) 20 mg tablet TK 1 T PO  QD  3    ferrous sulfate 325 mg (65 mg iron) tablet Take 1 Tab by mouth three (3) times daily (with meals). 180 Tab 1       For EGD to assess for esophageal and gastric varices. Plan to perform banding if indicated based upon variceal size and appearance. The risks of the procedure were discussed with the patient. These included reaction to anesthesia, pain, perforation and bleeding. All questions were answered. The patient wishes to proceed with the procedure. PHYSICAL EXAMINATION:  VS: per nursing note  General: No acute distress. Eyes: Sclera anicteric. ENT: No oral lesions. Thyroid normal.  Nodes: No adenopathy. Skin: No spider angiomata. No jaundice. No palmar erythema.   Respiratory: Lungs clear to auscultation. Cardiovascular: Regular heart rate. No murmurs. No JVD. Abdomen: Soft non-tender, liver size normal to percussion/palpation. Spleen not palpable. No obvious ascites. Extremities: No edema. No muscle wasting. No gross arthritic changes. Neurologic: Alert and oriented. Cranial nerves grossly intact. No asterixis. MOST RECENT LABORATORY STUDIES:  Lab Results   Component Value Date/Time    WBC 6.3 06/06/2017 01:10 PM    HGB 11.2 06/06/2017 01:10 PM    HCT 34.4 06/06/2017 01:10 PM    PLATELET 735 97/87/5175 01:10 PM    MCV 97 06/06/2017 01:10 PM     No results found for: INR, PTMR, PTP, PT1, PT2    ASSESSMENT AND PLAN:  EGD to assess for esophageal and/or gastric varices. Conscious sedation with fentanyl and versed.     Zoe Gomez MD  Liver [facility] of [address] [place], [place] [address]Patricia  22.  [phone]

## 2017-06-27 NOTE — PROGRESS NOTES
Feli Briones  1960  [id number]    Situation:  Verbal report received from: TIP ALCAZAR RN  Procedure: Procedure(s):  ESOPHAGOGASTRODUODENOSCOPY (EGD)  ENDOSCOPIC BANDING OR LIGATION    Background:    Preoperative diagnosis: CIRRHOSIS OF LIVER WITHOUT ASCITES  Postoperative diagnosis: Portal gastropathy  esophageal varies    :  Dr. Bryon Ortega  Assistant(s): Endoscopy Technician-1: Salome Agrawal  Endoscopy RN-1: Trip Meléndez RN    Specimens: * No specimens in log *  H. Pylori  no    Assessment:  Intra-procedure medications   Versed  yes7 mg  Fentanyl **yes*175 mcg      Anesthesia gave intra-procedure sedation and medications, see anesthesia flow sheet no    Intravenous fluids: NS@ KVO     Vital signs stable YES    Abdominal assessment: round and soft YES    Recommendation:  Discharge patient per MD order YES.   Return to floor N/A  Family or Friend YES  Permission to share finding with family or friend yes

## 2017-08-31 ENCOUNTER — HOSPITAL ENCOUNTER (OUTPATIENT)
Age: 57
Setting detail: OUTPATIENT SURGERY
Discharge: HOME | End: 2017-08-31
Attending: INTERNAL MEDICINE | Admitting: INTERNAL MEDICINE
Payer: COMMERCIAL

## 2017-08-31 ENCOUNTER — ANESTHESIA (OUTPATIENT)
Dept: ENDOSCOPY | Age: 57
End: 2017-08-31
Payer: COMMERCIAL

## 2017-08-31 ENCOUNTER — ANESTHESIA EVENT (OUTPATIENT)
Dept: ENDOSCOPY | Age: 57
End: 2017-08-31
Payer: COMMERCIAL

## 2017-08-31 VITALS
RESPIRATION RATE: 19 BRPM | OXYGEN SATURATION: 94 % | TEMPERATURE: 98.1 F | WEIGHT: 209 LBS | BODY MASS INDEX: 27.7 KG/M2 | HEIGHT: 73 IN | SYSTOLIC BLOOD PRESSURE: 99 MMHG | DIASTOLIC BLOOD PRESSURE: 61 MMHG | HEART RATE: 86 BPM

## 2017-08-31 LAB
GLUCOSE BLD STRIP.AUTO-MCNC: 90 MG/DL (ref 65–100)
SERVICE CMNT-IMP: NORMAL

## 2017-08-31 PROCEDURE — 74011250636 HC RX REV CODE- 250/636

## 2017-08-31 PROCEDURE — 74011000250 HC RX REV CODE- 250

## 2017-08-31 PROCEDURE — 76040000019: Performed by: INTERNAL MEDICINE

## 2017-08-31 PROCEDURE — 74011000258 HC RX REV CODE- 258

## 2017-08-31 PROCEDURE — 74011250636 HC RX REV CODE- 250/636: Performed by: INTERNAL MEDICINE

## 2017-08-31 PROCEDURE — 76060000031 HC ANESTHESIA FIRST 0.5 HR: Performed by: INTERNAL MEDICINE

## 2017-08-31 PROCEDURE — 82962 GLUCOSE BLOOD TEST: CPT

## 2017-08-31 RX ORDER — MIDAZOLAM HYDROCHLORIDE 1 MG/ML
5-10 INJECTION, SOLUTION INTRAMUSCULAR; INTRAVENOUS
Status: DISCONTINUED | OUTPATIENT
Start: 2017-08-31 | End: 2017-08-31 | Stop reason: HOSPADM

## 2017-08-31 RX ORDER — ATROPINE SULFATE 0.1 MG/ML
0.5 INJECTION INTRAVENOUS
Status: DISCONTINUED | OUTPATIENT
Start: 2017-08-31 | End: 2017-08-31 | Stop reason: HOSPADM

## 2017-08-31 RX ORDER — LIDOCAINE HYDROCHLORIDE 20 MG/ML
INJECTION, SOLUTION EPIDURAL; INFILTRATION; INTRACAUDAL; PERINEURAL AS NEEDED
Status: DISCONTINUED | OUTPATIENT
Start: 2017-08-31 | End: 2017-08-31 | Stop reason: HOSPADM

## 2017-08-31 RX ORDER — SODIUM CHLORIDE 0.9 % (FLUSH) 0.9 %
5-10 SYRINGE (ML) INJECTION AS NEEDED
Status: DISCONTINUED | OUTPATIENT
Start: 2017-08-31 | End: 2017-08-31 | Stop reason: HOSPADM

## 2017-08-31 RX ORDER — FENTANYL CITRATE 50 UG/ML
50-200 INJECTION, SOLUTION INTRAMUSCULAR; INTRAVENOUS
Status: DISCONTINUED | OUTPATIENT
Start: 2017-08-31 | End: 2017-08-31 | Stop reason: HOSPADM

## 2017-08-31 RX ORDER — PROPOFOL 10 MG/ML
INJECTION, EMULSION INTRAVENOUS AS NEEDED
Status: DISCONTINUED | OUTPATIENT
Start: 2017-08-31 | End: 2017-08-31 | Stop reason: HOSPADM

## 2017-08-31 RX ORDER — SODIUM CHLORIDE 9 MG/ML
INJECTION, SOLUTION INTRAVENOUS
Status: DISCONTINUED | OUTPATIENT
Start: 2017-08-31 | End: 2017-08-31 | Stop reason: HOSPADM

## 2017-08-31 RX ORDER — SODIUM CHLORIDE 9 MG/ML
50 INJECTION, SOLUTION INTRAVENOUS CONTINUOUS
Status: DISCONTINUED | OUTPATIENT
Start: 2017-08-31 | End: 2017-08-31 | Stop reason: HOSPADM

## 2017-08-31 RX ORDER — FLUMAZENIL 0.1 MG/ML
0.2 INJECTION INTRAVENOUS
Status: DISCONTINUED | OUTPATIENT
Start: 2017-08-31 | End: 2017-08-31 | Stop reason: HOSPADM

## 2017-08-31 RX ORDER — NALOXONE HYDROCHLORIDE 0.4 MG/ML
0.4 INJECTION, SOLUTION INTRAMUSCULAR; INTRAVENOUS; SUBCUTANEOUS
Status: DISCONTINUED | OUTPATIENT
Start: 2017-08-31 | End: 2017-08-31 | Stop reason: HOSPADM

## 2017-08-31 RX ORDER — EPINEPHRINE 0.1 MG/ML
1 INJECTION INTRACARDIAC; INTRAVENOUS
Status: DISCONTINUED | OUTPATIENT
Start: 2017-08-31 | End: 2017-08-31 | Stop reason: HOSPADM

## 2017-08-31 RX ORDER — DEXTROMETHORPHAN/PSEUDOEPHED 2.5-7.5/.8
1.2 DROPS ORAL
Status: DISCONTINUED | OUTPATIENT
Start: 2017-08-31 | End: 2017-08-31 | Stop reason: HOSPADM

## 2017-08-31 RX ORDER — SODIUM CHLORIDE 0.9 % (FLUSH) 0.9 %
5-10 SYRINGE (ML) INJECTION EVERY 8 HOURS
Status: DISCONTINUED | OUTPATIENT
Start: 2017-08-31 | End: 2017-08-31 | Stop reason: HOSPADM

## 2017-08-31 RX ADMIN — LIDOCAINE HYDROCHLORIDE 100 MG: 20 INJECTION, SOLUTION EPIDURAL; INFILTRATION; INTRACAUDAL; PERINEURAL at 13:25

## 2017-08-31 RX ADMIN — PROPOFOL 50 MG: 10 INJECTION, EMULSION INTRAVENOUS at 13:27

## 2017-08-31 RX ADMIN — PROPOFOL 75 MG: 10 INJECTION, EMULSION INTRAVENOUS at 13:26

## 2017-08-31 RX ADMIN — PROPOFOL 25 MG: 10 INJECTION, EMULSION INTRAVENOUS at 13:31

## 2017-08-31 RX ADMIN — PROPOFOL 25 MG: 10 INJECTION, EMULSION INTRAVENOUS at 13:28

## 2017-08-31 RX ADMIN — PROPOFOL 25 MG: 10 INJECTION, EMULSION INTRAVENOUS at 13:29

## 2017-08-31 RX ADMIN — SODIUM CHLORIDE 500 ML: 900 INJECTION, SOLUTION INTRAVENOUS at 13:01

## 2017-08-31 RX ADMIN — SODIUM CHLORIDE: 9 INJECTION, SOLUTION INTRAVENOUS at 12:41

## 2017-08-31 RX ADMIN — PROPOFOL 25 MG: 10 INJECTION, EMULSION INTRAVENOUS at 13:30

## 2017-08-31 NOTE — PROGRESS NOTES
Anesthesia reports 225mg Propofol, 100mg Lidocaine and 200mL NS given during procedure. Received report from anesthesia staff on vital signs and status of patient.

## 2017-08-31 NOTE — ANESTHESIA POSTPROCEDURE EVALUATION
Post-Anesthesia Evaluation and Assessment    Patient: Eve Lyons MRN: [id number]  SSN: [id number]    YOB: 1960  Age: 65 y.o. Sex: male       Cardiovascular Function/Vital Signs  Visit Vitals    BP 99/61    Pulse 86    Temp 36.7 °C (98.1 °F)    Resp 19    Ht 6' 1\" (1.854 m)    Wt 94.8 kg (209 lb)    SpO2 94%    BMI 27.57 kg/m2       Patient is status post MAC anesthesia for Procedure(s):  ESOPHAGOGASTRODUODENOSCOPY (EGD). Nausea/Vomiting: None    Postoperative hydration reviewed and adequate. Pain:  Pain Scale 1: Numeric (0 - 10) (08/31/17 1400)  Pain Intensity 1: 0 (08/31/17 1400)   Managed    Neurological Status: At baseline    Mental Status and Level of Consciousness: Arousable    Pulmonary Status:   O2 Device: Room air (08/31/17 1400)   Adequate oxygenation and airway patent    Complications related to anesthesia: None    Post-anesthesia assessment completed.  No concerns    Signed By: Alen Escobar MD     August 31, 2017

## 2017-08-31 NOTE — DISCHARGE INSTRUCTIONS
[address] [place], [address] [address], [facility] [place], [place]       KATELYNN Cullen PA-C Katharine Keepers, [facility]   KATELYNN Garcia NP        at [address], [address] [facility]     [facility], Patricia [address].     [phone]     FAX: [phone]    at [address], [address] [facility], [address]     [phone]     FAX: [phone]       ENDOSCOPY DISCHARGE INSTRUCTIONS      Bereket Clarke  1960  Date: 8/31/2017    DISCOMFORT:  Use lozenges or warm salt water gargle for sore thoat  Apply warm compress to IV site if red. If redness or soreness persists call the office. You may experience gas and bloating. Walking and belching will help relieve this. You may experience chest discomfort or pressure especially if banding of esophageal varices was performed. DIET:  You may resume your normal diet. ACTIVITY:  Spend the remainder of the day resting. Avoid any strenuous activity. You may not operate a vehicle for 12 hours. You may not engage in an occupation involving machinery or appliances for rest of today. Avoid making any critical or financial decisions for at least 24 hour. Call the [address] [address] [facility] if you have any of the following:  Increasing chest or abdominal pain, nausea, vomiting, vomiting blood, abdominal distension or swelling, fever or chills, bloody discharge from nose or mouth or shortness of breath. Follow-up Instructions:  Call Dr. Nati Peterson for any questions or problems at the phone number listed above. If a biopsy was performed, you will be contacted by the office staff or Dr Nati Peterson within 1 week. If you have not heard from us by then you may call the office at the phone number listed above to inquire about the results. ENDOSCOPY FINDINGS:  Your endoscopy demonstrated small varcies.   Mild swelling of the stomach from cirrhosis    DISCHARGE SUMMARY from the Nurse:   The following personal items collected during your admission are returned to you:   Dental Appliance: Dental Appliances: None  Vision: Visual Aid: None  Hearing Aid:    Jewelry:    Clothing:    Other Valuables:    Valuables sent to safe:

## 2017-08-31 NOTE — IP AVS SNAPSHOT
[address] 
[phone] Patient: Jaynie Canavan MRN: [id number] :1960 You are allergic to the following Allergen Reactions Shellfish Derived Hives Recent Documentation Height Weight BMI Smoking Status 1.854 m 94.8 kg 27.57 kg/m2 Current Every Day Smoker Emergency Contacts Name Discharge Info Relation Home Work Mobile Noemi Baird  Spouse [3] [phone] About your hospitalization You were admitted on:  2017 You last received care in the:  [facility] ENDOSCOPY You were discharged on:  2017 Unit phone number:  [phone] Why you were hospitalized Your primary diagnosis was:  Not on File Providers Seen During Your Hospitalizations Provider Role Specialty Primary office phone Brandon Ward MD Attending Provider Hepatology [phone] Your Primary Care Physician (PCP) Primary Care Physician Office Phone Office Fax Igor Bacon [phone] Follow-up Information Follow up With Details Comments Contact Info Mariah Fofana MD   [address] [place] [address] 
[phone] Your Appointments 2017  9:00 AM EDT Follow Up with DASHA Lewis Liver [address] ([facility]) [address] [place] [address] [phone] [address] [place] [address]  
[phone] Current Discharge Medication List  
  
CONTINUE these medications which have NOT CHANGED Dose & Instructions Dispensing Information Comments Morning Noon Evening Bedtime  
 ferrous sulfate 325 mg (65 mg iron) tablet Your last dose was: Your next dose is:    
   
   
 Dose:  325 mg Take 1 Tab by mouth three (3) times daily (with meals). Quantity:  180 Tab Refills:  1  
     
   
   
   
  
 metFORMIN 500 mg tablet Commonly known as:  GLUCOPHAGE Your last dose was: Your next dose is:    
   
   
 TK 1 T PO  BID WITH MEALS Refills:  0  
     
   
   
   
  
 nadolol 20 mg tablet Commonly known as:  CORGARD Your last dose was: Your next dose is:    
   
   
 TK 1 T PO  QD Refills:  3  
     
   
   
   
  
 sucralfate 1 gram tablet Commonly known as:  Sonja Geller Your last dose was: Your next dose is:    
   
   
 TK 1 T PO Q 6 H Refills:  0 Discharge Instructions [address] [facility] MD Danielle, MARIETTA Cates NP [address], PA-C Janeann Shay, [facility]   KATELYNN Shah NP  
 
   at [address] [place] [address], [facility] Tiffanieestefani [place] hernandez  22. 
   [phone] FAX: [phone]    at [address], [address] 
   [phone] FAX: [phone] ENDOSCOPY DISCHARGE INSTRUCTIONS Lisa Rakesh 1960 Date: 8/31/2017 DISCOMFORT: 
Use lozenges or warm salt water gargle for sore thoat Apply warm compress to IV site if red. If redness or soreness persists call the office. You may experience gas and bloating. Walking and belching will help relieve this. You may experience chest discomfort or pressure especially if banding of esophageal varices was performed. DIET: 
You may resume your normal diet. ACTIVITY: 
Spend the remainder of the day resting. Avoid any strenuous activity. You may not operate a vehicle for 12 hours. You may not engage in an occupation involving machinery or appliances for rest of today. Avoid making any critical or financial decisions for at least 24 hour. Call the [employer] [employer] if you have any of the following: Increasing chest or abdominal pain, nausea, vomiting, vomiting blood, abdominal distension or swelling, fever or chills, bloody discharge from nose or mouth or shortness of breath. Follow-up Instructions: 
Call Dr. Elias Araiza for any questions or problems at the phone number listed above. If a biopsy was performed, you will be contacted by the office staff or Dr Elias Araiza within 1 week. If you have not heard from us by then you may call the office at the phone number listed above to inquire about the results. ENDOSCOPY FINDINGS: 
Your endoscopy demonstrated small varcies. Mild swelling of the stomach from cirrhosis DISCHARGE SUMMARY from the Nurse: The following personal items collected during your admission are returned to you:  
Dental Appliance: Dental Appliances: None Vision: Visual Aid: None Hearing Aid:   
Jewelry:   
Clothing:   
Other Valuables:   
Valuables sent to safe:   
 
 
Discharge Orders None Introducing [facility]! Che [place] introduces [email or web address] patient portal. Now you can access parts of your medical record, email your doctor's office, and request medication refills online. 1. In your internet browser, go to [email or web address] 2. Click on the First Time User? Click Here link in the Sign In box. You will see the New Member Sign Up page. 3. Enter your JuMei.[employer] Access Code exactly as it appears below. You will not need to use this code after youve completed the sign-up process. If you do not sign up before the expiration date, you must request a new code. · [email or web address] Access Code: [email or web address] Expires: 9/4/2017  1:04 PM 
 
4. Enter the last four digits of your Social Security Number (xxxx) and Date of Birth (mm/dd/yyyy) as indicated and click Submit. You will be taken to the next sign-up page. 5. Create a JuMei.com ID. This will be your JuMei.com login ID and cannot be changed, so think of one that is secure and easy to remember. 6. Create a [employer] password. You can change your password at any time. 7. Enter your Password Reset Question and Answer. This can be used at a later time if you forget your password. 8. Enter your e-mail address. You will receive e-mail notification when new information is available in [address] 9. Click Sign Up. You can now view and download portions of your medical record. 10. Click the Download Summary menu link to download a portable copy of your medical information. If you have questions, please visit the Frequently Asked Questions section of the [employer] website. Remember, [employer] is NOT to be used for urgent needs. For medical emergencies, dial 911. Now available from your iPhone and Android! General Information Please provide this summary of care documentation to your next provider. Patient Signature:  ____________________________________________________________ Date:  ____________________________________________________________  
  
Milton Sewell Provider Signature:  ____________________________________________________________ Date:  ____________________________________________________________

## 2017-08-31 NOTE — PROCEDURES
[address], [address], [place], [place]       KATELYNN Yeung PA-C Alvira Homestead, [facility]   KATELYNN Ballard NP        at [address], [address] Patricia Gonzalez [address].     [phone]     FAX: [phone]    at [address], [address] [facility], [address]     [phone]     FAX: [phone]       UPPER ENDOSCOPY PROCEDURE NOTE    [place]  1960    INDICATION: Cirrhosis. Screening for esophageal varices with variceal ligation if  indicated. : Aaron Boothe MD    ANESTHESIA/SEDATION: Propofol was administered by anesthesia      PROCEDURE DESCRIPTION:  Infomed consent was obtained from the patient for the procedure. All risks and benefits of the procedure explained. The patient was taken to the endoscopy suite and placed in the left lateral decubitus position. Following sequential administration of sedation to doses as indicated above the endoscope was inserted into the mouth and advanced under direct vision to the second portion of the duodenum. Careful inspection of upper gastrointestinal tract was made as the endoscope was inserted and withdrawn. Retroflexion of the endoscope to view of the cardia of the stomach was performed. The findings and interventions are described below. FINDINGS:  Esophagus:    Small esophageal varices. No banding performed. Stomach:   Mild portal hypertensive gastropathy of the body of the stomach. No gastric varices identified. Duodenum:   Normal bulb and second portion    INTERVENTION:   None    COMPLICATIONS: None. The patient tolerated the procedure well. EBL: Negligible. RECOMMENDATIONS:  Observe until discharge parameters are achieved. May resume previous diet.   Repeat endoscopy to reassess varices and need for banding in 6 months. Follow-up Liver [facility] office as scheduled.       Yordy Boothe MD  8/31/2017  1:33 PM

## 2017-08-31 NOTE — ANESTHESIA PREPROCEDURE EVALUATION
Anesthetic History   No history of anesthetic complications            Review of Systems / Medical History  Patient summary reviewed, nursing notes reviewed and pertinent labs reviewed    Pulmonary  Within defined limits                 Neuro/Psych   Within defined limits           Cardiovascular                  Exercise tolerance: >4 METS     GI/Hepatic/Renal           Liver disease    Comments: Cirrhosis  Varices  Endo/Other    Diabetes         Other Findings            Physical Exam    Airway  Mallampati: I  TM Distance: > 6 cm  Neck ROM: normal range of motion   Mouth opening: Normal     Cardiovascular    Rhythm: regular  Rate: normal         Dental  No notable dental hx       Pulmonary                 Abdominal         Other Findings            Anesthetic Plan    ASA: 3  Anesthesia type: MAC          Induction: Intravenous  Anesthetic plan and risks discussed with: Patient

## 2017-08-31 NOTE — H&P
[address] MD [place], [address], [facility] [place], [place]       KATELYNN Guillen PA-C Rodolph Pair, [facility]   KATELYNN Arora NP        at [address]     [address] [place] [address], [address] [facility]     [address], [place] [address].     [phone]     FAX: [phone]    at [address], [address] [place] [address], [address] [phone]     FAX: [phone]       PRE-PROCEDURE NOTE - EGD    H and P from last office visit reviewed. Allergies reviewed. Out-patient medicaton list reviewed. Patient Active Problem List   Diagnosis Code    Cirrhosis of liver without ascites ([facility] K74.60    Esophageal varices (HCC) I85.00    Diabetes mellitus ([facility] S51.8    H/O umbilical hernia repair R87.446, Z87.19    S/P appendectomy Z90.49       Allergies   Allergen Reactions    Shellfish Derived Hives       No current facility-administered medications on file prior to encounter. Current Outpatient Prescriptions on File Prior to Encounter   Medication Sig Dispense Refill    sucralfate (CARAFATE) 1 gram tablet TK 1 T PO Q 6 H  0    nadolol (CORGARD) 20 mg tablet TK 1 T PO  QD  3    metFORMIN (GLUCOPHAGE) 500 mg tablet TK 1 T PO  BID WITH MEALS  0    ferrous sulfate 325 mg (65 mg iron) tablet Take 1 Tab by mouth three (3) times daily (with meals). 180 Tab 1       For EGD to assess for esophageal and gastric varices. Plan to perform banding if indicated based upon variceal size and appearance. The risks of the procedure were discussed with the patient. These included reaction to anesthesia, pain, perforation and bleeding. All questions were answered. The patient wishes to proceed with the procedure. PHYSICAL EXAMINATION:  VS: per nursing note  General: No acute distress. Eyes: Sclera anicteric. ENT: No oral lesions. Thyroid normal.  Nodes: No adenopathy.    Skin: No spider angiomata. No jaundice. No palmar erythema. Respiratory: Lungs clear to auscultation. Cardiovascular: Regular heart rate. No murmurs. No JVD. Abdomen: Soft non-tender, liver size normal to percussion/palpation. Spleen not palpable. No obvious ascites. Extremities: No edema. No muscle wasting. No gross arthritic changes. Neurologic: Alert and oriented. Cranial nerves grossly intact. No asterixis. MOST RECENT LABORATORY STUDIES:  Lab Results   Component Value Date/Time    WBC 6.3 06/06/2017 01:10 PM    HGB 11.2 06/06/2017 01:10 PM    HCT 34.4 06/06/2017 01:10 PM    PLATELET 283 38/17/5582 01:10 PM    MCV 97 06/06/2017 01:10 PM     No results found for: INR, PTMR, PTP, PT1, PT2    ASSESSMENT AND PLAN:  EGD to assess for esophageal and/or gastric varices. Conscious sedation with fentanyl and versed.     Nati Vale MD  Liver [place] [address] 22.  [phone]

## 2017-09-06 ENCOUNTER — OP HISTORICAL/CONVERTED ENCOUNTER (OUTPATIENT)
Dept: OTHER | Age: 57
End: 2017-09-06

## 2017-09-25 ENCOUNTER — OFFICE VISIT (OUTPATIENT)
Dept: HEMATOLOGY | Age: 57
End: 2017-09-25

## 2017-09-25 VITALS
HEART RATE: 84 BPM | DIASTOLIC BLOOD PRESSURE: 43 MMHG | SYSTOLIC BLOOD PRESSURE: 100 MMHG | WEIGHT: 210 LBS | BODY MASS INDEX: 27.71 KG/M2 | TEMPERATURE: 97.9 F

## 2017-09-25 DIAGNOSIS — K74.60 CIRRHOSIS OF LIVER WITHOUT ASCITES, UNSPECIFIED HEPATIC CIRRHOSIS TYPE (HCC): Primary | ICD-10-CM

## 2017-09-25 RX ORDER — SERTRALINE HYDROCHLORIDE 50 MG/1
50 TABLET, FILM COATED ORAL DAILY
Refills: 2 | Status: ON HOLD | COMMUNITY
Start: 2017-09-07 | End: 2018-05-10

## 2017-09-25 RX ORDER — METHOCARBAMOL 500 MG/1
500 TABLET, FILM COATED ORAL 2 TIMES DAILY
Refills: 1 | Status: ON HOLD | COMMUNITY
Start: 2017-09-07 | End: 2018-05-10

## 2017-09-25 RX ORDER — ALBUTEROL SULFATE 90 UG/1
2 AEROSOL, METERED RESPIRATORY (INHALATION)
Refills: 3 | COMMUNITY
Start: 2017-08-30 | End: 2019-05-03

## 2017-09-25 RX ORDER — ALPRAZOLAM 0.5 MG/1
0.5 TABLET ORAL
Refills: 0 | COMMUNITY
Start: 2017-09-07 | End: 2019-05-03

## 2017-09-25 NOTE — PROGRESS NOTES
[address] MD, [facility]   KATELYNN Winchester PA-C Wilfredo Carp, NP        at [address], [address] [address]  22.     [phone]     FAX: [phone]    at [address], [address] [place] [address], [address]     [phone]     FAX: [phone]       Patient Care Team:  Yin Leonard MD as PCP - General (Family Practice)  La Nena Morris MD (Gastroenterology)      Problem List  Date Reviewed: 6/11/2017          Codes Class Noted    Cirrhosis of liver without ascites ([facility] ICD-10-CM: K74.60  ICD-9-CM: 571.5  6/6/2017        Esophageal varices ([facility] ICD-10-CM: I85.00  ICD-9-CM: 456.1  6/6/2017        Diabetes mellitus ([facility] ICD-10-CM: E11.9  ICD-9-CM: 250.00  1/8/3336        H/O umbilical hernia repair [id number]: Z98.890, Z87.19  ICD-9-CM: V15.29  6/6/2017        S/P appendectomy ICD-10-CM: Z90.49  ICD-9-CM: V45.89  6/6/2017              Feli Pierce returns to the [address] for management of cryptogenic cirrhosis. The active problem list, all pertinent past medical history, medications, endoscopic studies, radiologic findings and laboratory findings related to the liver disorder were reviewed with the patient. The patient is a 65 y.o.  male who was first found to have chronic liver disease and cirrhosis in 5/2017 when he developed variceal bleeding, he has undergone additional banding. Assessment of liver fibrosis was performed at a past office today with Fibroscan. The result was 72.1 kPa which correlates with cirrhosis. The patient has not developed ascites. The patient has not developed edema. The patient has not developed overt hepatic encephalopathy. He has had some increased confusion and difficulty with focus. The patient has had prior variceal bleeding in 5/2017.   Varices have been treated with beta-blockers and band ligation in 5/2017. Reassessment in 8/2017 showed small varices without need for banding. Patient has had no overt evidence of bleeding. The most recent laboratory studies indicate that the liver transaminases are normal, ALP is normal, tests of hepatic synthetic and metabolic function are normal, and the platelet count is normal.    The patient notes fatigue, and weakness. He is concerned that he has anemia again. He has been supplementing with 1-2 oral iron daily. The patient has limitations in functional activities secondary to these symptoms. The patient has not experienced problems concentrating, swelling of the abdomen, swelling of the lower extremities, hematemesis, hematochezia. ALLERGIES  Allergies   Allergen Reactions    Shellfish Derived Hives       MEDICATIONS  Current Outpatient Prescriptions   Medication Sig    VENTOLIN HFA 90 mcg/actuation inhaler Take 2 Puffs by mouth every six (6) hours as needed.  ALPRAZolam (XANAX) 0.5 mg tablet Take 0.5 mg by mouth Before breakfast, lunch, and dinner.  methocarbamol (ROBAXIN) 500 mg tablet Take 500 mg by mouth two (2) times a day.  sertraline (ZOLOFT) 50 mg tablet Take 50 mg by mouth daily.  metFORMIN (GLUCOPHAGE) 500 mg tablet TK 1 T PO  BID WITH MEALS    nadolol (CORGARD) 20 mg tablet TK 1 T PO  QD    ferrous sulfate 325 mg (65 mg iron) tablet Take 1 Tab by mouth three (3) times daily (with meals).  sucralfate (CARAFATE) 1 gram tablet TK 1 T PO Q 6 H     No current facility-administered medications for this visit. SYSTEM REVIEW NOT RELATED TO LIVER DISEASE OR REVIEWED ABOVE:  Constitution systems: Negative for fever, chills, weight gain, weight loss. Eyes: Negative for visual changes. ENT: Negative for sore throat, painful swallowing. Respiratory: Negative for cough, hemoptysis, SOB. Cardiology: Negative for chest pain, palpitations. GI:  Negative for constipation or diarrhea.   : Negative for urinary frequency, dysuria, hematuria, nocturia. Skin: Negative for rash. Hematology: Negative for easy bruising, blood clots. Musculo-skelatal: Negative for back pain, muscle pain, weakness. Neurologic: Negative for headaches, dizziness, vertigo, memory problems not related to HE. Psychology: Negative for anxiety, depression. FAMILY HISTORY:  The father  of heart disease. The mother  of MVA but had elevated liver enzymes. There is no family history of liver disease. SOCIAL HISTORY:  The patient is . The patient has no children. The patient currently smokes 1 pack of tobacco daily. The patient has never consumed significant amounts of alcohol. 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 PHYSICAL EXAMINATION:  Visit Vitals    /43    Pulse 84    Temp 97.9 °F (36.6 °C) (Tympanic)    Wt 210 lb (95.3 kg)    BMI 27.71 kg/m2     General: No acute distress. Eyes: Sclera anicteric. ENT: No oral lesions. Thyroid normal.  Nodes: No adenopathy. Skin: No spider angiomata. No jaundice. No palmar erythema. Respiratory: Lungs clear to auscultation. Cardiovascular: Regular heart rate. No murmurs. No JVD. Abdomen: Soft, mild tender to deep palpation of the RUQ. Liver edge firm. Spleen not palpable. No obvious ascites. Extremities: No edema. No muscle wasting. No gross arthritic changes. Neurologic: Alert and oriented. Cranial nerves grossly intact. No asterixis.     LABORATORY STUDIES:  [facility] of [address] [place] [address] & Units 2017   WBC 3.4 - 10.8 x10E3/uL 6.3   ANC 1.4 - 7.0 x10E3/uL 2.4   HGB 12.6 - 17.7 g/dL 11.2 (L)    - 379 x10E3/uL 112 (L)   AST 0 - 40 IU/L 46 (H)   ALT 0 - 44 IU/L 29   Alk Phos 39 - 117 IU/L 103   Bili, Total 0.0 - 1.2 mg/dL 0.7   Bili, Direct 0.00 - 0.40 mg/dL 0.29   Albumin 3.5 - 5.5 g/dL 3.0 (L)   BUN 6 - 24 mg/dL 8   Creat 0.76 - 1.27 mg/dL 0.72 (L)   Na 134 - 144 mmol/L 144   K 3.5 - 5.2 mmol/L 4.3   Cl 96 - 106 mmol/L 108 (H)   CO2 18 - 29 mmol/L 24   Glucose 65 - 99 mg/dL 96     Cancer Screening Latest Ref Rng & Units 6/6/2017   AFP, Serum 0.0 - 8.0 ng/mL 4.0   AFP-L3% 0.0 - 9.9 % 9.2   Additional lab values drawn at today's office visit are pending at the time of documentation. SEROLOGIES:  5/2017. HBsurface antigen negative, anti-HBcore negative, anti-HBsurface negative, HCV RNA negative, iron saturation 29%, ASMA negative. Serologies Latest Ref Rng & Units 6/6/2017   Ferritin 30 - 400 ng/mL 39   Alpha-1 antitrypsin level 90 - 200 mg/dL 87 (L)     LIVER HISTOLOGY:  6/2017. FibroScan performed at [address] EkPa was 72.1. The results suggested a fibrosis level of F4. ENDOSCOPIC PROCEDURES:  5/2017. EGD by Dr Corey Kelley. Esophageal varices. Banding performed. 8/2017. EGD performed by Dr Sandie Hernandez. Small esophageal varices. No banding performed. No gastric varices. Mild portal gastropathy. RADIOLOGY:  Not available or performed    OTHER TESTING:  Not available or performed    ASSESSMENT AND PLAN:  Cryptogenic cirrhosis. Liver transaminases are normal.  Alkaline phosphatase is normal.  Liver function is normal.  Serum albumin is slightly depressed. The platelet count is   depressed. The patient has normal liver function. The CTP is 6. Child class A. The MELD score is 7. This will be recalculated on the basis of his current lab studies. Patient is not presently in need of transplant evaluation. Esophageal varices with prior bleeding. Varices have been banded in the past. Dr Sandie Hernandez recently repeated EGD to assess for varices and need for banding in 8/2017 and recommends repeat procedure in 2/2018. The patient is on a beta-blocker to reduce the risk of variceal hemorrhage. He continues to have severe fatigue. I have drawn iron indices and will make determination if iron infusion is indicated.   Patient to continue with oral iron supplementation for the time being. Overt hepatic encephalopathy has not developed to date. There is no need for treatment with lactulose and/or Xifaxan at this time. No need to restrict dietary protein at this time. I have advised him to increase the frequency of his bowel output with use of Miralax. The patient was directed to continue all current medications at the current dosages. There are no contraindications for the patient to take any medications that are necessary for treatment of other medical issues. The patient was counseled regarding alcohol consumption. Thrombocytopenia secondary to cirrhosis. NO treatment is needed. Neutropenia secondary to cirrhosis. There is no evidence of infection. There is no indication for GCSF at this time. Anemia from chronic GI blood loss from portal hypertension and iron deficiency. Will obtain iron panel to assess for iron stores. As above, may need to infuse iron if he remains low as he is currently taking oral iron. Vaccination for viral hepatitis A and B is recommended since the patient has no serologic evidence of previous exposure or vaccination with immunity. [facility] screening will be performed. AFP was ordered today. Ultrasound will be scheduled prior to or the same day as the next office visit. All of the above issues were discussed with the patient. All questions were answered. The patient expressed a clear understanding of the above. [facility] 87 in 3 months for monitoring cirrhosis, patient to have [address] [facility] [facility] in the meantime. Anushka Staff, PA-C  Liver [place] of [address] [facility] [address] [place] [address], [address] [address]  [address]  22.  [phone]    11/22/2017. US of abdomen. No liver mass/lesion.

## 2017-09-25 NOTE — MR AVS SNAPSHOT
Visit Information Date & Time Provider Department Dept. Phone Encounter #  
 9/25/2017  3:00 PM Petrona Roth, [address] [address] [facility] of [place] [address] [id number] Your Appointments 1/4/2018  2:30 PM  
Follow Up with DASHA Arenasde 75 ([address]) Appt Note: Follow up [address] [facility] [phone] [facility] [address]  
[address] [facility] Charan [address] Upcoming Health Maintenance Date Due HEMOGLOBIN A1C Q6M 1960 LIPID PANEL Q1 1961 FOOT EXAM Q1 3/25/1971 MICROALBUMIN Q1 3/25/1971 EYE EXAM RETINAL OR DILATED Q1 3/25/1971 Pneumococcal 19-64 Medium Risk (1 of 1 - PPSV23) 3/25/1980 DTaP/Tdap/Td series (1 - Tdap) 3/25/1982 FOBT Q 1 YEAR AGE 50-75 3/25/2011 INFLUENZA AGE 9 TO ADULT 8/1/2017 Allergies as of 9/25/2017  Review Complete On: 9/25/2017 By: Hilario Woods Severity Noted Reaction Type Reactions Shellfish Derived High 06/22/2017    Hives Current Immunizations  Never Reviewed No immunizations on file. Not reviewed this visit You Were Diagnosed With   
  
 Codes Comments Cirrhosis of liver without ascites, unspecified hepatic cirrhosis type ([facility]    -  Primary ICD-10-CM: K74.60 ICD-9-CM: 571.5 Vitals BP Pulse Temp Weight(growth percentile) BMI Smoking Status 100/43 84 97.9 °F (36.6 °C) (Tympanic) 210 lb (95.3 kg) 27.71 kg/m2 Current Every Day Smoker BMI and BSA Data Body Mass Index Body Surface Area  
 27.71 kg/m 2 2.22 m 2 Preferred Pharmacy Pharmacy Name Phone [facility], [address] [phone] Your Updated Medication List  
  
   
This list is accurate as of: 9/25/17  3:46 PM.  Always use your most recent med list.  
  
  
  
  
 ALPRAZolam 0.5 mg tablet Commonly known as:  Diane Ream Take 0.5 mg by mouth Before breakfast, lunch, and dinner. ferrous sulfate 325 mg (65 mg iron) tablet Take 1 Tab by mouth three (3) times daily (with meals). metFORMIN 500 mg tablet Commonly known as:  GLUCOPHAGE  
TK 1 T PO  BID WITH MEALS  
  
 methocarbamol 500 mg tablet Commonly known as:  ROBAXIN Take 500 mg by mouth two (2) times a day. nadolol 20 mg tablet Commonly known as:  CORGARD TK 1 T PO  QD  
  
 sertraline 50 mg tablet Commonly known as:  ZOLOFT Take 50 mg by mouth daily. sucralfate 1 gram tablet Commonly known as:  Ryann Constant TK 1 T PO Q 6 H  
  
 VENTOLIN HFA 90 mcg/actuation inhaler Generic drug:  albuterol Take 2 Puffs by mouth every six (6) hours as needed. We Performed the Following AFP WITH AFP-L3% [[id number] Custom] CBC W/O DIFF [44146 CPT(R)] HEPATIC FUNCTION PANEL (6) [[id number] Custom] IRON PROFILE [id number] CPT(R)] METABOLIC PANEL, BASIC [81451 CPT(R)] PROTHROMBIN TIME + INR [72243 CPT(R)] To-Do List   
 10/16/2017 Imaging:  US ABD COMP Introducing [facility] & HEALTH SERVICES! Kel Almaraz introduces Modanisa patient portal. Now you can access parts of your medical record, email your doctor's office, and request medication refills online. 1. In your internet browser, go to [email or web address] 2. Click on the First Time User? Click Here link in the Sign In box. You will see the New Member Sign Up page. 3. Enter your Modanisa Access Code exactly as it appears below. You will not need to use this code after youve completed the sign-up process. If you do not sign up before the expiration date, you must request a new code. · Modanisa Access Code: [email or web address] Expires: 12/2/2017  5:09 PM 
 
4. Enter the last four digits of your Social Security Number (xxxx) and Date of Birth (mm/dd/yyyy) as indicated and click Submit. You will be taken to the next sign-up page. 5. Create a Kids Write Network ID. This will be your Kids Write Network login ID and cannot be changed, so think of one that is secure and easy to remember. 6. Create a Kids Write Network password. You can change your password at any time. 7. Enter your Password Reset Question and Answer. This can be used at a later time if you forget your password. 8. Enter your e-mail address. You will receive e-mail notification when new information is available in [address] 9. Click Sign Up. You can now view and download portions of your medical record. 10. Click the Download Summary menu link to download a portable copy of your medical information. If you have questions, please visit the Frequently Asked Questions section of the Kids [employer] Network website. Remember, [employer] Network is NOT to be used for urgent needs. For medical emergencies, dial 911. Now available from your iPhone and Android! Please provide this summary of care documentation to your next provider. Your primary care clinician is listed as Car Campos. If you have any questions after today's visit, please call [phone].

## 2017-09-26 LAB
AFP L3 MFR SERPL: NORMAL % (ref 0–9.9)
AFP SERPL-MCNC: 3.4 NG/ML (ref 0–8)
ALBUMIN SERPL-MCNC: 3.1 G/DL (ref 3.5–5.5)
ALP SERPL-CCNC: 111 IU/L (ref 39–117)
ALT SERPL-CCNC: 32 IU/L (ref 0–44)
AST SERPL-CCNC: 41 IU/L (ref 0–40)
BILIRUB DIRECT SERPL-MCNC: 0.41 MG/DL (ref 0–0.4)
BILIRUB SERPL-MCNC: 1.2 MG/DL (ref 0–1.2)
BUN SERPL-MCNC: 7 MG/DL (ref 6–24)
BUN/CREAT SERPL: 11 (ref 9–20)
CALCIUM SERPL-MCNC: 8.6 MG/DL (ref 8.7–10.2)
CHLORIDE SERPL-SCNC: 107 MMOL/L (ref 96–106)
CO2 SERPL-SCNC: 25 MMOL/L (ref 18–29)
CREAT SERPL-MCNC: 0.66 MG/DL (ref 0.76–1.27)
ERYTHROCYTE [DISTWIDTH] IN BLOOD BY AUTOMATED COUNT: 17.1 % (ref 12.3–15.4)
GLUCOSE SERPL-MCNC: 91 MG/DL (ref 65–99)
HCT VFR BLD AUTO: 38.5 % (ref 37.5–51)
HGB BLD-MCNC: 13 G/DL (ref 12.6–17.7)
INR PPP: 1.3 (ref 0.8–1.2)
IRON SATN MFR SERPL: 40 % (ref 15–55)
IRON SERPL-MCNC: 92 UG/DL (ref 38–169)
MCH RBC QN AUTO: 32.7 PG (ref 26.6–33)
MCHC RBC AUTO-ENTMCNC: 33.8 G/DL (ref 31.5–35.7)
MCV RBC AUTO: 97 FL (ref 79–97)
PLATELET # BLD AUTO: 107 X10E3/UL (ref 150–379)
POTASSIUM SERPL-SCNC: 4 MMOL/L (ref 3.5–5.2)
PROTHROMBIN TIME: 13.8 SEC (ref 9.1–12)
RBC # BLD AUTO: 3.97 X10E6/UL (ref 4.14–5.8)
SODIUM SERPL-SCNC: 143 MMOL/L (ref 134–144)
TIBC SERPL-MCNC: 231 UG/DL (ref 250–450)
UIBC SERPL-MCNC: 139 UG/DL (ref 111–343)
WBC # BLD AUTO: 5.7 X10E3/UL (ref 3.4–10.8)

## 2017-09-26 NOTE — PROGRESS NOTES
Pt notified of stable lab functions, anemia resolved. Advised patient to withhold nadolol for improvement of energy.

## 2017-11-02 ENCOUNTER — TELEPHONE (OUTPATIENT)
Dept: HEMATOLOGY | Age: 57
End: 2017-11-02

## 2017-11-02 NOTE — TELEPHONE ENCOUNTER
Called in need of the patients return to work date. This is needed as soon as possible. If no release next appointment info needs to be called in to [facility].

## 2018-01-09 ENCOUNTER — OFFICE VISIT (OUTPATIENT)
Dept: HEMATOLOGY | Age: 58
End: 2018-01-09

## 2018-01-09 VITALS
BODY MASS INDEX: 28.84 KG/M2 | OXYGEN SATURATION: 98 % | HEART RATE: 91 BPM | DIASTOLIC BLOOD PRESSURE: 66 MMHG | TEMPERATURE: 97.8 F | SYSTOLIC BLOOD PRESSURE: 126 MMHG | WEIGHT: 218.6 LBS

## 2018-01-09 DIAGNOSIS — K74.60 CIRRHOSIS OF LIVER WITHOUT ASCITES, UNSPECIFIED HEPATIC CIRRHOSIS TYPE (HCC): Primary | ICD-10-CM

## 2018-01-09 RX ORDER — LACTULOSE 10 G/15ML
30 SOLUTION ORAL; RECTAL 2 TIMES DAILY
Qty: 1000 ML | Refills: 3 | Status: SHIPPED | OUTPATIENT
Start: 2018-01-09 | End: 2018-05-24 | Stop reason: SDUPTHER

## 2018-01-09 NOTE — MR AVS SNAPSHOT
Visit Information Date & Time Provider Department Dept. Phone Encounter #  
 1/9/2018  8:30 AM Aj Mejia, [address] of [place] [address] [id number] Upcoming Health Maintenance Date Due HEMOGLOBIN A1C Q6M 1960 LIPID PANEL Q1 1961 FOOT EXAM Q1 3/25/1971 MICROALBUMIN Q1 3/25/1971 EYE EXAM RETINAL OR DILATED Q1 3/25/1971 Pneumococcal 19-64 Medium Risk (1 of 1 - PPSV23) 3/25/1980 DTaP/Tdap/Td series (1 - Tdap) 3/25/1982 FOBT Q 1 YEAR AGE 50-75 3/25/2011 Influenza Age 5 to Adult 8/1/2017 Allergies as of 1/9/2018  Review Complete On: 1/9/2018 By: Rj Olson Severity Noted Reaction Type Reactions Shellfish Derived High 06/22/2017    Hives Current Immunizations  Never Reviewed No immunizations on file. Not reviewed this visit You Were Diagnosed With   
  
 Codes Comments Cirrhosis of liver without ascites, unspecified hepatic cirrhosis type ([facility]    -  Primary ICD-10-CM: K74.60 ICD-9-CM: 571.5 Vitals BP Pulse Temp Weight(growth percentile) SpO2 BMI  
 126/66 (BP 1 Location: Left arm, BP Patient Position: Sitting) 91 97.8 °F (36.6 °C) (Tympanic) 218 lb 9.6 oz (99.2 kg) 98% 28.84 kg/m2 Smoking Status Current Every Day Smoker Vitals History BMI and BSA Data Body Mass Index Body Surface Area  
 28.84 kg/m 2 2.26 m 2 Preferred Pharmacy Pharmacy Name Phone 99 [facility], [address] [place] [address] [phone] Your Updated Medication List  
  
   
This list is accurate as of: 1/9/18  9:14 AM.  Always use your most recent med list.  
  
  
  
  
 ALPRAZolam 0.5 mg tablet Commonly known as:  Shandra Budge Take 0.5 mg by mouth Before breakfast, lunch, and dinner. ferrous sulfate 325 mg (65 mg iron) tablet Take 1 Tab by mouth three (3) times daily (with meals). lactulose 10 gram/15 mL solution Commonly known as:  Holland Pallas Take 45 mL by mouth two (2) times a day. metFORMIN 500 mg tablet Commonly known as:  GLUCOPHAGE  
TK 1 T PO  BID WITH MEALS  
  
 methocarbamol 500 mg tablet Commonly known as:  ROBAXIN Take 500 mg by mouth two (2) times a day. nadolol 20 mg tablet Commonly known as:  CORGARD TK 1 T PO  QD  
  
 sertraline 50 mg tablet Commonly known as:  ZOLOFT Take 50 mg by mouth daily. sucralfate 1 gram tablet Commonly known as:  Carmelia Gavel TK 1 T PO Q 6 H  
  
 VENTOLIN HFA 90 mcg/actuation inhaler Generic drug:  albuterol Take 2 Puffs by mouth every six (6) hours as needed. Prescriptions Sent to Pharmacy Refills  
 lactulose (CHRONULAC) 10 gram/15 mL solution 3 Sig: Take 45 mL by mouth two (2) times a day. Class: Normal  
 Pharmacy: [facility] [address] [place] [address] AT [facility] of [address] [facility] Ph #: [phone] Route: Oral  
  
We Performed the Following AFP WITH AFP-L3% [[id number] Custom] AMMONIA [id number] CPT(R)] CBC W/O DIFF [40080 CPT(R)] FERRITIN [59431 CPT(R)] HEMOGLOBIN A1C WITH EAG [45184 CPT(R)] HEPATIC FUNCTION PANEL (6) [[id number] Custom] IRON PROFILE [id number] CPT(R)] METABOLIC PANEL, BASIC [06096 CPT(R)] PROTHROMBIN TIME + INR [67492 CPT(R)] TSH 3RD GENERATION [52672 CPT(R)] UPPER GI ENDOSCOPY,DIAGNOSIS [28557 CPT(R)] Comments:  
 Schedule with Dr. Derik Negron Introducing [facility] & HEALTH SERVICES! Judy Fuentes introduces KOEZY patient portal. Now you can access parts of your medical record, email your doctor's office, and request medication refills online. 1. In your internet browser, go to [email or web address] 2. Click on the First Time User? Click Here link in the Sign In box. You will see the New Member Sign Up page. 3. Enter your Yodle Access Code exactly as it appears below. You will not need to use this code after youve completed the sign-up process. If you do not sign up before the expiration date, you must request a new code. · Yodle Access Code: [email or web address] Expires: 4/9/2018  9:14 AM 
 
4. Enter the last four digits of your Social Security Number (xxxx) and Date of Birth (mm/dd/yyyy) as indicated and click Submit. You will be taken to the next sign-up page. 5. Create a Yodle ID. This will be your Yodle login ID and cannot be changed, so think of one that is secure and easy to remember. 6. Create a Yodle password. You can change your password at any time. 7. Enter your Password Reset Question and Answer. This can be used at a later time if you forget your password. 8. Enter your e-mail address. You will receive e-mail notification when new information is available in [address] 9. Click Sign Up. You can now view and download portions of your medical record. 10. Click the Download Summary menu link to download a portable copy of your medical information. If you have questions, please visit the Frequently Asked Questions section of the Yodle website. Remember, Yodle is NOT to be used for urgent needs. For medical emergencies, dial 911. Now available from your iPhone and Android! Please provide this summary of care documentation to your next provider. Your primary care clinician is listed as Lexy Tong. If you have any questions after today's visit, please call [phone].

## 2018-01-09 NOTE — PROGRESS NOTES
1. Have you been to the ER, urgent care clinic since your last visit? Hospitalized since your last visit? No    2. Have you seen or consulted any other health care providers outside of the [address] [place] [address] since your last visit? Include any pap smears or colon screening.  No   Chief Complaint   Patient presents with    Follow-up     Visit Vitals    /66 (BP 1 Location: Left arm, BP Patient Position: Sitting)    Pulse 91    Temp 97.8 °F (36.6 °C) (Tympanic)    Wt 218 lb 9.6 oz (99.2 kg)    SpO2 98%    BMI 28.84 kg/m2     PHQ over the last two weeks 1/9/2018   Little interest or pleasure in doing things Not at all   Feeling down, depressed or hopeless Not at all   Total Score PHQ 2 0     Learning Assessment 1/9/2018   PRIMARY LEARNER Patient   PRIMARY LANGUAGE ENGLISH   LEARNER PREFERENCE PRIMARY LISTENING   ANSWERED BY patient    RELATIONSHIP SELF

## 2018-01-10 LAB
AFP L3 MFR SERPL: 8 % (ref 0–9.9)
AFP SERPL-MCNC: 3.8 NG/ML (ref 0–8)
ALBUMIN SERPL-MCNC: 3.1 G/DL (ref 3.5–5.5)
ALP SERPL-CCNC: 107 IU/L (ref 39–117)
ALT SERPL-CCNC: 33 IU/L (ref 0–44)
AMMONIA PLAS-MCNC: NORMAL UG/DL (ref 27–102)
AST SERPL-CCNC: 48 IU/L (ref 0–40)
BILIRUB DIRECT SERPL-MCNC: 0.43 MG/DL (ref 0–0.4)
BILIRUB SERPL-MCNC: 1.1 MG/DL (ref 0–1.2)
BUN SERPL-MCNC: 10 MG/DL (ref 6–24)
BUN/CREAT SERPL: 13 (ref 9–20)
CALCIUM SERPL-MCNC: 8.4 MG/DL (ref 8.7–10.2)
CHLORIDE SERPL-SCNC: 107 MMOL/L (ref 96–106)
CO2 SERPL-SCNC: 21 MMOL/L (ref 18–29)
CREAT SERPL-MCNC: 0.77 MG/DL (ref 0.76–1.27)
ERYTHROCYTE [DISTWIDTH] IN BLOOD BY AUTOMATED COUNT: 16 % (ref 12.3–15.4)
EST. AVERAGE GLUCOSE BLD GHB EST-MCNC: 105 MG/DL
FERRITIN SERPL-MCNC: 93 NG/ML (ref 30–400)
GLUCOSE SERPL-MCNC: 253 MG/DL (ref 65–99)
HBA1C MFR BLD: 5.3 % (ref 4.8–5.6)
HCT VFR BLD AUTO: 39 % (ref 37.5–51)
HGB BLD-MCNC: 12.7 G/DL (ref 13–17.7)
INR PPP: 1.3 (ref 0.8–1.2)
IRON SATN MFR SERPL: 45 % (ref 15–55)
IRON SERPL-MCNC: 90 UG/DL (ref 38–169)
MCH RBC QN AUTO: 32.9 PG (ref 26.6–33)
MCHC RBC AUTO-ENTMCNC: 32.6 G/DL (ref 31.5–35.7)
MCV RBC AUTO: 101 FL (ref 79–97)
PLATELET # BLD AUTO: 106 X10E3/UL (ref 150–379)
POTASSIUM SERPL-SCNC: 4 MMOL/L (ref 3.5–5.2)
PROTHROMBIN TIME: 13.8 SEC (ref 9.1–12)
RBC # BLD AUTO: 3.86 X10E6/UL (ref 4.14–5.8)
SODIUM SERPL-SCNC: 142 MMOL/L (ref 134–144)
TIBC SERPL-MCNC: 202 UG/DL (ref 250–450)
TSH SERPL DL<=0.005 MIU/L-ACNC: 1.74 UIU/ML (ref 0.45–4.5)
UIBC SERPL-MCNC: 112 UG/DL (ref 111–343)
WBC # BLD AUTO: 5.4 X10E3/UL (ref 3.4–10.8)

## 2018-01-31 ENCOUNTER — ANESTHESIA EVENT (OUTPATIENT)
Dept: ENDOSCOPY | Age: 58
End: 2018-01-31
Payer: COMMERCIAL

## 2018-02-01 ENCOUNTER — HOSPITAL ENCOUNTER (OUTPATIENT)
Age: 58
Setting detail: OUTPATIENT SURGERY
Discharge: HOME | End: 2018-02-01
Attending: INTERNAL MEDICINE | Admitting: INTERNAL MEDICINE
Payer: COMMERCIAL

## 2018-02-01 ENCOUNTER — ANESTHESIA (OUTPATIENT)
Dept: ENDOSCOPY | Age: 58
End: 2018-02-01
Payer: COMMERCIAL

## 2018-02-01 VITALS
WEIGHT: 218 LBS | HEIGHT: 73 IN | BODY MASS INDEX: 28.89 KG/M2 | SYSTOLIC BLOOD PRESSURE: 153 MMHG | OXYGEN SATURATION: 100 % | RESPIRATION RATE: 13 BRPM | TEMPERATURE: 97.6 F | DIASTOLIC BLOOD PRESSURE: 77 MMHG | HEART RATE: 76 BPM

## 2018-02-01 PROCEDURE — 76060000031 HC ANESTHESIA FIRST 0.5 HR: Performed by: INTERNAL MEDICINE

## 2018-02-01 PROCEDURE — 74011250636 HC RX REV CODE- 250/636: Performed by: INTERNAL MEDICINE

## 2018-02-01 PROCEDURE — 74011000258 HC RX REV CODE- 258

## 2018-02-01 PROCEDURE — 74011250636 HC RX REV CODE- 250/636

## 2018-02-01 PROCEDURE — 76040000019: Performed by: INTERNAL MEDICINE

## 2018-02-01 PROCEDURE — 77030014243 HC BND LIG VRCES BSC -D: Performed by: INTERNAL MEDICINE

## 2018-02-01 RX ORDER — FLUMAZENIL 0.1 MG/ML
0.2 INJECTION INTRAVENOUS
Status: CANCELLED | OUTPATIENT
Start: 2018-02-01 | End: 2018-02-01

## 2018-02-01 RX ORDER — ATROPINE SULFATE 0.1 MG/ML
0.5 INJECTION INTRAVENOUS
Status: CANCELLED | OUTPATIENT
Start: 2018-02-01 | End: 2018-02-01

## 2018-02-01 RX ORDER — EPINEPHRINE 0.1 MG/ML
1 INJECTION INTRACARDIAC; INTRAVENOUS
Status: CANCELLED | OUTPATIENT
Start: 2018-02-01 | End: 2018-02-01

## 2018-02-01 RX ORDER — SODIUM CHLORIDE 9 MG/ML
50 INJECTION, SOLUTION INTRAVENOUS CONTINUOUS
Status: CANCELLED | OUTPATIENT
Start: 2018-02-01 | End: 2018-02-01

## 2018-02-01 RX ORDER — DEXTROMETHORPHAN/PSEUDOEPHED 2.5-7.5/.8
1.2 DROPS ORAL
Status: CANCELLED | OUTPATIENT
Start: 2018-02-01

## 2018-02-01 RX ORDER — NALOXONE HYDROCHLORIDE 0.4 MG/ML
0.4 INJECTION, SOLUTION INTRAMUSCULAR; INTRAVENOUS; SUBCUTANEOUS
Status: CANCELLED | OUTPATIENT
Start: 2018-02-01 | End: 2018-02-01

## 2018-02-01 RX ORDER — SODIUM CHLORIDE 0.9 % (FLUSH) 0.9 %
5-10 SYRINGE (ML) INJECTION AS NEEDED
Status: CANCELLED | OUTPATIENT
Start: 2018-02-01 | End: 2018-02-01

## 2018-02-01 RX ORDER — SODIUM CHLORIDE 9 MG/ML
INJECTION, SOLUTION INTRAVENOUS
Status: DISCONTINUED | OUTPATIENT
Start: 2018-02-01 | End: 2018-02-01 | Stop reason: HOSPADM

## 2018-02-01 RX ORDER — PROPOFOL 10 MG/ML
INJECTION, EMULSION INTRAVENOUS AS NEEDED
Status: DISCONTINUED | OUTPATIENT
Start: 2018-02-01 | End: 2018-02-01 | Stop reason: HOSPADM

## 2018-02-01 RX ORDER — SODIUM CHLORIDE 0.9 % (FLUSH) 0.9 %
5-10 SYRINGE (ML) INJECTION EVERY 8 HOURS
Status: CANCELLED | OUTPATIENT
Start: 2018-02-01 | End: 2018-02-01

## 2018-02-01 RX ORDER — MIDAZOLAM HYDROCHLORIDE 1 MG/ML
5-10 INJECTION, SOLUTION INTRAMUSCULAR; INTRAVENOUS
Status: CANCELLED | OUTPATIENT
Start: 2018-02-01 | End: 2018-02-01

## 2018-02-01 RX ORDER — FENTANYL CITRATE 50 UG/ML
25 INJECTION, SOLUTION INTRAMUSCULAR; INTRAVENOUS
Status: DISCONTINUED | OUTPATIENT
Start: 2018-02-01 | End: 2018-02-01 | Stop reason: HOSPADM

## 2018-02-01 RX ORDER — FENTANYL CITRATE 50 UG/ML
50-200 INJECTION, SOLUTION INTRAMUSCULAR; INTRAVENOUS
Status: CANCELLED | OUTPATIENT
Start: 2018-02-01 | End: 2018-02-01

## 2018-02-01 RX ADMIN — PROPOFOL 50 MG: 10 INJECTION, EMULSION INTRAVENOUS at 14:32

## 2018-02-01 RX ADMIN — PROPOFOL 100 MG: 10 INJECTION, EMULSION INTRAVENOUS at 14:27

## 2018-02-01 RX ADMIN — PROPOFOL 50 MG: 10 INJECTION, EMULSION INTRAVENOUS at 14:30

## 2018-02-01 RX ADMIN — PROPOFOL 50 MG: 10 INJECTION, EMULSION INTRAVENOUS at 14:28

## 2018-02-01 RX ADMIN — PROPOFOL 50 MG: 10 INJECTION, EMULSION INTRAVENOUS at 14:29

## 2018-02-01 RX ADMIN — PROPOFOL 50 MG: 10 INJECTION, EMULSION INTRAVENOUS at 14:35

## 2018-02-01 RX ADMIN — FENTANYL CITRATE 25 MCG: 50 INJECTION, SOLUTION INTRAMUSCULAR; INTRAVENOUS at 15:05

## 2018-02-01 RX ADMIN — SODIUM CHLORIDE: 9 INJECTION, SOLUTION INTRAVENOUS at 14:21

## 2018-02-01 NOTE — ROUTINE PROCESS
Anna Reyes  1960  [id number]    Situation:  Verbal report received from: Michael Mayberry RN  Procedure: Procedure(s):  ESOPHAGOGASTRODUODENOSCOPY (EGD)  ENDOSCOPIC BANDING OR LIGATION    Background:    Preoperative diagnosis: Paulette Clifton aneurysm with cirrhosis, pulmonary emphysema, and cerebral calcification ([facility] [I67.1, K74.60, J43.9, G93.89]  Postoperative diagnosis: Portal Gastropathy  Esophageal varices    :  Dr. Toribio Boateng  Assistant(s): Endoscopy Technician-1: Rory Agrawal  Endoscopy RN-1: Roselia Zhao RN    Specimens: * No specimens in log *  H. Pylori  no    Assessment:  Intra-procedure medications       Anesthesia gave intra-procedure sedation and medications, see anesthesia flow sheet yes    Intravenous fluids: NS@ KVO     Vital signs stable     Abdominal assessment: round and soft    Recommendation:  Discharge patient per MD order  Family or Friend  Pt wife  Permission to share finding with family or friend yes

## 2018-02-01 NOTE — IP AVS SNAPSHOT
[address] [facility] [address] [place] 
[phone] Patient: Winston Diaz MRN: [id number] :1960 About your hospitalization You were admitted on:  2018 You last received care in the:  [facility] ENDOSCOPY You were discharged on:  2018 Why you were hospitalized Your primary diagnosis was:  Not on File Follow-up Information Follow up With Details Comments Contact Info Tito Dykes MD   [address] [facility] [address] [place] [address] 
[phone] Your Scheduled Appointments   1:30 PM EST Follow Up with DASHA Shore 75 ([facility]) [address] [place] [address] At [facility] [phone] [address] [place] [address]  
[phone] Discharge Orders None A check muna indicates which time of day the medication should be taken. My Medications CONTINUE taking these medications Instructions Each Dose to Equal  
 Morning Noon Evening Bedtime ALPRAZolam 0.5 mg tablet Commonly known as:  Diana Bull Your last dose was: Your next dose is: Take 0.5 mg by mouth Before breakfast, lunch, and dinner. 0.5 mg  
    
   
   
   
  
 ferrous sulfate 325 mg (65 mg iron) tablet Your last dose was: Your next dose is: Take 1 Tab by mouth three (3) times daily (with meals). 325 mg  
    
   
   
   
  
 lactulose 10 gram/15 mL solution Commonly known as:  Wilmon Raudel Your last dose was: Your next dose is: Take 45 mL by mouth two (2) times a day. 30 g  
    
   
   
   
  
 metFORMIN 500 mg tablet Commonly known as:  GLUCOPHAGE Your last dose was: Your next dose is:    
   
   
 TK 1 T PO  BID WITH MEALS  
     
   
   
   
  
 methocarbamol 500 mg tablet Commonly known as:  ROBAXIN Your last dose was: Your next dose is: Take 500 mg by mouth two (2) times a day. 500 mg  
    
   
   
   
  
 nadolol 20 mg tablet Commonly known as:  CORGARD Your last dose was: Your next dose is:    
   
   
 TK 1 T PO  QD  
     
   
   
   
  
 sertraline 50 mg tablet Commonly known as:  ZOLOFT Your last dose was: Your next dose is: Take 50 mg by mouth daily. 50 mg  
    
   
   
   
  
 sucralfate 1 gram tablet Commonly known as:  Chet Estrella Your last dose was: Your next dose is:    
   
   
 TK 1 T PO Q 6 H  
     
   
   
   
  
 VENTOLIN HFA 90 mcg/actuation inhaler Generic drug:  albuterol Your last dose was: Your next dose is: Take 2 Puffs by mouth every six (6) hours as needed. 2 Puff Discharge Instructions [address] [facility],[address] Alanis Dozier MD, MARIETTA Cedeno \[facility] [place] [facility]\"", NP Chelsey Mcwilliams, PAOCTAVIO Brewster, ACNP-BC   Bakari [employer]aning, NP Tiffany Lechuga, KATELYNN Abraham [facility] [place] [address] 
  at [address], [address] [place], [place] [address]Patricia Nazario  22. 
  [phone] FAX: [address] [place] [address] 
  [address], [address] [facility], [address] 
  [phone] FAX: [phone] ENDOSCOPY WITH BANDING DISCHARGE INSTRUCTIONS Thom Burr 1960 Date: 2/1/2018 DISCOMFORT: 
Use lozenges or warm salt water gargle for sore thoat Apply warm compress to IV site if red. If redness or soreness persists call the office. You may experience gas and bloating. Walking and belching will help relieve this. You may experience chest pain or discomfort or feel as though food is \"sticking\" in your food pipe for a few days after the procedure.  This is a normal feeling after banding of esophageal varices. DIET: 
Regular food may dislodge the bands placed on the varices. For this reason you should only have liquid for the rest of today. Eat only soft food that does not need to be chewed all day tomorrow. You may advance to your regular diet in 2 days. ACTIVITY: 
Spend the remainder of the day resting. Avoid any strenuous activity. You may not operate a vehicle for 12 hours. You may not engage in an occupation involving machinery or appliances for rest of today. Avoid making any critical decisions for at least 24 hour. Call the [employer] [facility] [address] [email or web address] if you have any of the following: 
Increasing chest or abdominal pain, nausea, vomiting, vomiting blood, abdominal distension or swelling, fever or chills, bloody discharge from nose or mouth or shortness of breath. Follow-up Instructions: 
Call Dr. Opal Barraza for any questions or problems at the phone number listed above. If a biopsy was performed, you will be contacted by the office staff or Dr Opal Barraza within 1 week. If you have not heard from us by then you may call the office at the phone number listed above to inquire about the results. ENDOSCOPY FINDINGS: 
Multiple varices were found in the esophagus (food tube). 4 bands were placed to seal the varices and reduce the risk of bleeding. DISCHARGE SUMMARY from the Nurse: The following personal items collected during your admission are returned to you:  
Dental Appliance: Dental Appliances: None Vision: Visual Aid: None Hearing Aid:   
Jewelry:   
Clothing:   
Other Valuables:   
Valuables sent to safe:   
 
 
  
  
  
Introducing [facility]! Min Lo introduces [employer] patient portal. Now you can access parts of your medical record, email your doctor's office, and request medication refills online. 1. In your internet browser, go to [email or web address] 2. Click on the First Time User? Click Here link in the Sign In box. You will see the New Member Sign Up page. 3. Enter your [employer] Access Code exactly as it appears below. You will not need to use this code after youve completed the sign-up process. If you do not sign up before the expiration date, you must request a new code. · [employer] Access Code: [email or web address] Expires: 4/9/2018  9:14 AM 
 
4. Enter the last four digits of your Social Security Number (xxxx) and Date of Birth (mm/dd/yyyy) as indicated and click Submit. You will be taken to the next sign-up page. 5. Create a [employer] Near Me ID. This will be your [employer] Me login ID and cannot be changed, so think of one that is secure and easy to remember. 6. Create a [employer] password. You can change your password at any time. 7. Enter your Password Reset Question and Answer. This can be used at a later time if you forget your password. 8. Enter your e-mail address. You will receive e-mail notification when new information is available in [address] 9. Click Sign Up. You can now view and download portions of your medical record. 10. Click the Download Summary menu link to download a portable copy of your medical information. If you have questions, please visit the Frequently Asked Questions section of the [employer] website. Remember, [employer] is NOT to be used for urgent needs. For medical emergencies, dial 911. Now available from your iPhone and Android! Providers Seen During Your Hospitalization Provider Specialty Primary office phone Benjy Haque MD Hepatology [phone] Your Primary Care Physician (PCP) Primary Care Physician Office Phone Office Fax Igor Burks [phone] You are allergic to the following Allergen Reactions Shellfish Derived Hives Recent Documentation Height Weight BMI Smoking Status 1.854 m 98.9 kg 28.76 kg/m2 Current Every Day Smoker Emergency Contacts Name Discharge Info Relation Home Work Mobile Noemi Baird DISCHARGE CAREGIVER [3] Spouse [3] [phone] Patient Belongings The following personal items are in your possession at time of discharge: 
  Dental Appliances: None  Visual Aid: None Please provide this summary of care documentation to your next provider. Signatures-by signing, you are acknowledging that this After Visit Summary has been reviewed with you and you have received a copy. Patient Signature:  ____________________________________________________________ Date:  ____________________________________________________________  
  
[facility] Provider Signature:  ____________________________________________________________ Date:  ____________________________________________________________

## 2018-02-01 NOTE — ANESTHESIA POSTPROCEDURE EVALUATION
Post-Anesthesia Evaluation and Assessment    Patient: Rocio Mcclellan MRN: [id number]  SSN: [id number]    YOB: 1960  Age: 65 y.o. Sex: male       Cardiovascular Function/Vital Signs  Visit Vitals    /85    Pulse 98    Resp 14    Ht 6' 1\" (1.854 m)    Wt 98.9 kg (218 lb)    SpO2 97%    BMI 28.76 kg/m2       Patient is status post MAC anesthesia for Procedure(s):  ESOPHAGOGASTRODUODENOSCOPY (EGD)  ENDOSCOPIC BANDING OR LIGATION. Nausea/Vomiting: None    Postoperative hydration reviewed and adequate. Pain:  Pain Scale 1: Numeric (0 - 10) (02/01/18 1412)  Pain Intensity 1: 0 (02/01/18 1412)   Managed    Neurological Status: At baseline    Mental Status and Level of Consciousness: Arousable    Pulmonary Status:   O2 Device: CO2 nasal cannula (02/01/18 1439)   Adequate oxygenation and airway patent    Complications related to anesthesia: None    Post-anesthesia assessment completed.  No concerns    Signed By: Hollie Sam MD     February 1, 2018

## 2018-02-01 NOTE — H&P
70 Dc Bahena MD, FACP, Cite Cailin Cohn, Brandyn Nash, SYLVIE Lim, ACNP-BC   Gwendolyn Scott, KATELYNN Silva [facility] [address]    at [facility]    [address], [address] [place]  22.    [phone]    FAX: [address] [place] [address], [address]    [phone]    FAX: [phone]       PRE-PROCEDURE NOTE - EGD    H and P from last office visit reviewed. Allergies reviewed. Out-patient medicaton list reviewed. Patient Active Problem List   Diagnosis Code    Cirrhosis of liver without ascites ([facility] K74.60    Esophageal varices (HCC) I85.00    [facility] mellitus [facility] N38.1    H/O umbilical hernia repair C17.813, Z87.19    S/P appendectomy Z90.49       Allergies   Allergen Reactions    Shellfish Derived Hives       No current facility-administered medications on file prior to encounter. Current Outpatient Prescriptions on File Prior to Encounter   Medication Sig Dispense Refill    lactulose (CHRONULAC) 10 gram/15 mL solution Take 45 mL by mouth two (2) times a day. 1000 mL 3    VENTOLIN HFA 90 mcg/actuation inhaler Take 2 Puffs by mouth every six (6) hours as needed. 3    ALPRAZolam (XANAX) 0.5 mg tablet Take 0.5 mg by mouth Before breakfast, lunch, and dinner. 0    methocarbamol (ROBAXIN) 500 mg tablet Take 500 mg by mouth two (2) times a day. 1    sertraline (ZOLOFT) 50 mg tablet Take 50 mg by mouth daily.   2    metFORMIN (GLUCOPHAGE) 500 mg tablet TK 1 T PO  BID WITH MEALS  0    sucralfate (CARAFATE) 1 gram tablet TK 1 T PO Q 6 H  0    nadolol (CORGARD) 20 mg tablet TK 1 T PO  QD  3    ferrous sulfate 325 mg (65 mg iron) tablet Take 1 Tab by mouth three (3) times daily (with meals). 180 Tab 1       For EGD to assess for esophageal and gastric varices. Plan to perform banding if indicated based upon variceal size and appearance. The risks of the procedure were discussed with the patient. These included reaction to anesthesia, pain, perforation and bleeding. All questions were answered. The patient wishes to proceed with the procedure. PHYSICAL EXAMINATION:  VS: per nursing note  General: No acute distress. Eyes: Sclera anicteric. ENT: No oral lesions. Thyroid normal.  Nodes: No adenopathy. Skin: No spider angiomata. No jaundice. No palmar erythema. Respiratory: Lungs clear to auscultation. Cardiovascular: Regular heart rate. No murmurs. No JVD. Abdomen: Soft non-tender, liver size normal to percussion/palpation. Spleen not palpable. No obvious ascites. Extremities: No edema. No muscle wasting. No gross arthritic changes. Neurologic: Alert and oriented. Cranial nerves grossly intact. No asterixis. MOST RECENT LABORATORY STUDIES:  Lab Results   Component Value Date/Time    WBC 5.4 01/09/2018 09:06 AM    HGB 12.7 01/09/2018 09:06 AM    HCT 39.0 01/09/2018 09:06 AM    PLATELET 924 58/14/0809 09:06 AM     01/09/2018 09:06 AM     Lab Results   Component Value Date/Time    INR 1.3 01/09/2018 09:06 AM    INR 1.3 09/25/2017 12:00 PM    Prothrombin time 13.8 01/09/2018 09:06 AM    Prothrombin time 13.8 09/25/2017 12:00 PM       ASSESSMENT AND PLAN:  EGD to assess for esophageal and/or gastric varices. Conscious sedation with fentanyl and versed.     Matty Redmond MD  [facility] [employer] [address] [facility] [address] [facility] [address] 22.  [phone]

## 2018-02-01 NOTE — PROGRESS NOTES
\"The pain feels like bad indigestion\" Dr Sharlene Lozada was aware of pt complaint of pain when he came to assess pt

## 2018-02-01 NOTE — DISCHARGE INSTRUCTIONS
70 Dc Bahena MD, [address], [facility]ite [facility] [place]       KATELYNN Tadeo Si, PA-C Zipporah Ellen, REGINA-BC   KATELYNN De Luna NP [address] [facility] [address]    at [address], [address] [place]  22.    [phone]    FAX: [address] [place] [address]    at [address], [address] [facility], [address]    [phone]    FAX: [phone]       ENDOSCOPY WITH BANDING DISCHARGE INSTRUCTIONS    Adrianna Arnett  1960  Date: 2/1/2018    DISCOMFORT:  Use lozenges or warm salt water gargle for sore thoat  Apply warm compress to IV site if red. If redness or soreness persists call the office. You may experience gas and bloating. Walking and belching will help relieve this. You may experience chest pain or discomfort or feel as though food is \"sticking\" in your food pipe for a few days after the procedure. This is a normal feeling after banding of esophageal varices. DIET:  Regular food may dislodge the bands placed on the varices. For this reason you should only have liquid for the rest of today. Eat only soft food that does not need to be chewed all day tomorrow. You may advance to your regular diet in 2 days. ACTIVITY:  Spend the remainder of the day resting. Avoid any strenuous activity. You may not operate a vehicle for 12 hours. You may not engage in an occupation involving machinery or appliances for rest of today. Avoid making any critical decisions for at least 24 hour.     Call the [facility] [address] if you have any of the following:  Increasing chest or abdominal pain, nausea, vomiting, vomiting blood, abdominal distension or swelling, fever or chills, bloody discharge from nose or mouth or shortness of breath. Follow-up Instructions:  Call Dr. Damion Ponce for any questions or problems at the phone number listed above. If a biopsy was performed, you will be contacted by the office staff or Dr Damion Ponce within 1 week. If you have not heard from us by then you may call the office at the phone number listed above to inquire about the results. ENDOSCOPY FINDINGS:  Multiple varices were found in the esophagus (food tube). 4 bands were placed to seal the varices and reduce the risk of bleeding. DISCHARGE SUMMARY from the Nurse:   The following personal items collected during your admission are returned to you:   Dental Appliance: Dental Appliances: None  Vision: Visual Aid: None  Hearing Aid:    Jewelry:    Clothing:    Other Valuables:    Valuables sent to safe:

## 2018-02-01 NOTE — PERIOP NOTES

## 2018-02-01 NOTE — PROCEDURES
[address] Ai Hill MD, [address], Cite [facility], [place]       Ronnie Mcwilliams, SYLVIE Hernandez, ALFIEP-BC   KATELYNN Jennings NP [address] [facility] 136    at [address]    [address] [place] [address], [address] [place]  22.    [phone]    FAX: [address] [place] [address], [address] [place] [address], [address] [facility] [address]    [phone]    FAX: [phone]       UPPER ENDOSCOPY PROCEDURE NOTE    Marc Araujo  1960    INDICATION: Cirrhosis. Screening for esophageal varices with variceal ligation if indicated. : Vladislav Holman MD    ANESTHESIA/SEDATION: Propofol was administered by anesthesia      PROCEDURE DESCRIPTION:  [employer] consent was obtained from the patient for the procedure. All risks and benefits of the procedure explained. The patient was taken to the endoscopy suite and placed in the left lateral decubitus position. Following sequential administration of sedation to doses as indicated above the endoscope was inserted into the mouth and advanced under direct vision to the second portion of the duodenum. Careful inspection of upper gastrointestinal tract was made as the endoscope was inserted and withdrawn. Retroflexion of the endoscope to view of the cardia of the stomach was performed. After withdrawing the endoscope the banding devise was placed on the tip of the endoscope. The scope was then reinserted under direct inspection and advanced to the esophagus. Banding of esophageal varices was performed as described below. The scope was then removed. FINDINGS:  Esophagus:    Multiple medium esophageal varices were identified. Banding of esophageal varices was performed.   Excellent hemostasis was achieved after banding. Stomach: Moderate portal hypertensive gastropathy of the body of the, stomach. No gastric varicies identified. Duodenum:   Normal bulb and second portion    INTERVENTION:   4 bands placed were placed on esophageal varices. COMPLICATIONS: None. The patient tolerated the procedure well. EBL: Negligible. RECOMMENDATIONS:  Observe until discharge parameters are achieved. Liquid diet today. Soft food tomorrow. Resume general diet thereafter. Repeat endoscopy to reassess varices and need for additional banding in 3 months. Follow-up Liver [facility] office as scheduled.       Obdulia Thomas MD  2/1/2018  2:43 PM

## 2018-02-02 LAB — AMMONIA PLAS-MCNC: 125 UG/DL (ref 27–102)

## 2018-02-22 ENCOUNTER — OFFICE VISIT (OUTPATIENT)
Dept: HEMATOLOGY | Age: 58
End: 2018-02-22

## 2018-02-22 VITALS
TEMPERATURE: 97.9 F | OXYGEN SATURATION: 98 % | SYSTOLIC BLOOD PRESSURE: 140 MMHG | DIASTOLIC BLOOD PRESSURE: 56 MMHG | WEIGHT: 217.8 LBS | BODY MASS INDEX: 28.74 KG/M2 | HEART RATE: 82 BPM

## 2018-02-22 DIAGNOSIS — K74.60 CIRRHOSIS OF LIVER WITHOUT ASCITES, UNSPECIFIED HEPATIC CIRRHOSIS TYPE (HCC): Primary | ICD-10-CM

## 2018-02-22 NOTE — PROGRESS NOTES
1. Have you been to the ER, urgent care clinic since your last visit? Hospitalized since your last visit? No    2. Have you seen or consulted any other health care providers outside of the [address] [place] [address] since your last visit? Include any pap smears or colon screening.  No   Chief Complaint   Patient presents with    Follow-up     Visit Vitals    /56 (BP 1 Location: Left arm, BP Patient Position: Sitting)    Pulse 82    Temp 97.9 °F (36.6 °C) (Tympanic)    Wt 217 lb 12.8 oz (98.8 kg)    SpO2 98%    BMI 28.74 kg/m2     PHQ over the last two weeks 2/22/2018   Little interest or pleasure in doing things Not at all   Feeling down, depressed or hopeless Not at all   Total Score PHQ 2 0     Learning Assessment 2/22/2018   PRIMARY LEARNER Patient   PRIMARY LANGUAGE ENGLISH   LEARNER PREFERENCE PRIMARY LISTENING   ANSWERED BY patient    RELATIONSHIP SELF

## 2018-02-22 NOTE — PROGRESS NOTES
[address] [facility] MD Дмитрий, FACP, Cite Mongi Slim Tenna Sever, NP Magda Kocher, PA-C Versa Darting, NP        at [address]     [address] [place] [address], [address] Patricia Gonzalez  22.     [phone]     FAX: [phone]    at [facility], [address] [place] [address], [address]     [phone]     FAX: [phone]       Patient Care Team:  Eloise Roy MD as PCP - General (Family Practice)  Mukesh Smiley MD (Gastroenterology)      Problem List  Date Reviewed: 1/9/2018          Codes Class Noted    Cirrhosis of liver without ascites [facility]) ICD-10-CM: K74.60  ICD-9-CM: 571.5  6/6/2017        Esophageal varices ([facility] 75.) ICD-10-CM: I85.00  ICD-9-CM: 456.1  6/6/2017        Diabetes mellitus ([facility] ICD-10-CM: E11.9  ICD-9-CM: 250.00  6/9/0920        H/O umbilical hernia repair JCM-06-FC: Z98.890, Z87.19  ICD-9-CM: V15.29  6/6/2017        S/P appendectomy ICD-10-CM: Z90.49  ICD-9-CM: V45.89  6/6/2017              Vasquez Toney returns to the [address] for management of cryptogenic cirrhosis. The active problem list, all pertinent past medical history, medications, endoscopic studies, radiologic findings and laboratory findings related to the liver disorder were reviewed with the patient. The patient is a 65 y.o.  male who was first found to have chronic liver disease and cirrhosis in 5/2017 when he developed variceal bleeding, he has undergone additional banding on several occasions. Last EGD in 2/2018 showed varices and 4 additional bands were placed. In 9/2017 we had discontinued use of beta-blocker in an effort to improve his energy levels, he states that he has had no such improvement and continues to have significant limitation in stamina. Assessment of liver fibrosis was performed with Fibroscan. The result was 72.1 kPa which correlates with cirrhosis.     The patient has not developed ascites. The patient has not developed edema. The patient has not developed early hepatic encephalopathy signs over the course of the past year. He has had some increased confusion, poor memory and difficulty with focus. He reports some issues with day-night reversal and insomnia. At his last office visit, I had started him on a new prescription for lactulose. He is taking between 5-15 mL daily. Patient has had looser stool but not more volume or frequency. He is still having significant issues with memory and concentration. The most recent laboratory studies indicate that the liver transaminases are normal, ALP is normal, tests of hepatic synthetic and metabolic function are normal (slight reduction in albumin), and the platelet count is depressed. The patient notes fatigue, and weakness. He had been on iron supplementation but states that he had significant constipation with this and discontinued in 9/2017. The patient has limitations in functional activities secondary to these symptoms and has applied for disability. The patient has not experienced problems concentrating, swelling of the abdomen, swelling of the lower extremities, hematemesis, hematochezia. Of note, patient discontinued use of metformin as of ~10/2017 as he attributed abdominal discomfort and GI upset to ongoing use of this medication. He states that his GI symptoms have been better, he has not been checking his glucose levels and is not taking any other medication for management. He has no pending appointment with his PCP for DM management. ALLERGIES  Allergies   Allergen Reactions    Shellfish Derived Hives       MEDICATIONS  Current Outpatient Prescriptions   Medication Sig    lactulose (CHRONULAC) 10 gram/15 mL solution Take 45 mL by mouth two (2) times a day.  VENTOLIN HFA 90 mcg/actuation inhaler Take 2 Puffs by mouth every six (6) hours as needed.     ALPRAZolam (XANAX) 0.5 mg tablet Take 0.5 mg by mouth Before breakfast, lunch, and dinner.  methocarbamol (ROBAXIN) 500 mg tablet Take 500 mg by mouth two (2) times a day.  sertraline (ZOLOFT) 50 mg tablet Take 50 mg by mouth daily.  metFORMIN (GLUCOPHAGE) 500 mg tablet TK 1 T PO  BID WITH MEALS    sucralfate (CARAFATE) 1 gram tablet TK 1 T PO Q 6 H    nadolol (CORGARD) 20 mg tablet TK 1 T PO  QD    ferrous sulfate 325 mg (65 mg iron) tablet Take 1 Tab by mouth three (3) times daily (with meals). No current facility-administered medications for this visit. SYSTEM REVIEW NOT RELATED TO LIVER DISEASE OR REVIEWED ABOVE:  Constitution systems: Negative for fever, chills. Weight gain of 8-9# in the past 6 months. Eyes: Negative for visual changes. ENT: Negative for sore throat, painful swallowing. Respiratory: Negative for cough, hemoptysis. Positive for SOB, patient is inconsistent with administration of Ventolin. Cardiology: Negative for chest pain, palpitations. GI:  Negative for constipation or diarrhea. Occasional complaint of RUQ dull achy bruise feeling, helped with changes in position. : Negative for urinary frequency, dysuria, hematuria, nocturia. Skin: Negative for rash. Hematology: Negative for easy bruising, blood clots. Musculo-skeletal: Negative for back pain, muscle pain, weakness. Neurologic: Negative for headaches, dizziness, vertigo. Memory problems ? related to HE. Psychology: Negative for anxiety, depression. FAMILY HISTORY:  The father  of heart disease. The mother  of MVA but had elevated liver enzymes. There is no family history of liver disease. SOCIAL HISTORY:  The patient is . The patient has no children. The patient currently smokes 1 pack of tobacco daily. The patient has never consumed significant amounts of alcohol. 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       PHYSICAL EXAMINATION:  Visit Vitals    BP 140/56 (BP 1 Location: Left arm, BP Patient Position: Sitting)    Pulse 82    Temp 97.9 °F (36.6 °C) (Tympanic)    Wt 217 lb 12.8 oz (98.8 kg)    SpO2 98%    BMI 28.74 kg/m2     General: No acute distress. Eyes: Sclera anicteric. ENT: No oral lesions. Thyroid normal.  Nodes: No adenopathy. Skin: No spider angiomata. No jaundice. No palmar erythema. Respiratory: Lungs clear to auscultation. Cardiovascular: Regular heart rate. No murmurs. No JVD. Abdomen: Soft, mild tender to deep palpation of the LUQ, spleen tip palpable. Liver edge firm, easily palpable 2-3 CM BCM nontender. No obvious ascites. Extremities: No edema. No muscle wasting. No gross arthritic changes. Neurologic: Alert and oriented. Cranial nerves grossly intact. No asterixis.     LABORATORY STUDIES:  [facility] of [address] [place] [address] & Units 1/9/2018 9/25/2017   WBC 3.4 - 10.8 x10E3/uL 5.4 5.7   ANC 1.4 - 7.0 x10E3/uL     HGB 13.0 - 17.7 g/dL 12.7 (L) 13.0    - 379 x10E3/uL 106 (L) 107 (L)   INR 0.8 - 1.2 1.3 (H) 1.3 (H)   AST 0 - 40 IU/L 48 (H) 41 (H)   ALT 0 - 44 IU/L 33 32   Alk Phos 39 - 117 IU/L 107 111   Bili, Total 0.0 - 1.2 mg/dL 1.1 1.2   Bili, Direct 0.00 - 0.40 mg/dL 0.43 (H) 0.41 (H)   Albumin 3.5 - 5.5 g/dL 3.1 (L) 3.1 (L)   BUN 6 - 24 mg/dL 10 7   Creat 0.76 - 1.27 mg/dL 0.77 0.66 (L)   Na 134 - 144 mmol/L 142 143   K 3.5 - 5.2 mmol/L 4.0 4.0   Cl 96 - 106 mmol/L 107 (H) 107 (H)   CO2 18 - 29 mmol/L 21 25   Glucose 65 - 99 mg/dL 253 (H) 91   Ammonia 27 - 102 ug/dL CANCELED      [facility] [place] of [address] [place] [address] Ref Rng & Units 6/6/2017   WBC 3.4 - 10.8 x10E3/uL 6.3   ANC 1.4 - 7.0 x10E3/uL 2.4   HGB 13.0 - 17.7 g/dL 11.2 (L)    - 379 x10E3/uL 112 (L)   INR 0.8 - 1.2    AST 0 - 40 IU/L 46 (H)   ALT 0 - 44 IU/L 29   Alk Phos 39 - 117 IU/L 103   Bili, Total 0.0 - 1.2 mg/dL 0.7   Bili, Direct 0.00 - 0.40 mg/dL 0.29   Albumin 3.5 - 5.5 g/dL 3.0 (L)   BUN 6 - 24 mg/dL 8   Creat 0.76 - 1.27 mg/dL 0.72 (L)   Na 134 - 144 mmol/L 144   K 3.5 - 5.2 mmol/L 4.3   Cl 96 - 106 mmol/L 108 (H)   CO2 18 - 29 mmol/L 24   Glucose 65 - 99 mg/dL 96   Ammonia 27 - 102 ug/dL      Cancer Screening Latest Ref Rng & Units 1/9/2018 9/25/2017 6/6/2017   AFP, Serum 0.0 - 8.0 ng/mL 3.8 3.4 4.0   AFP-L3% 0.0 - 9.9 % 8.0 Comment 9.2     Additional lab values drawn at today's office visit are pending at the time of documentation. SEROLOGIES:  5/2017. HBsurface antigen negative, anti-HBcore negative, anti-HBsurface negative, HCV RNA negative, iron saturation 29%, ASMA negative. Serologies Latest Ref Rng & Units 6/6/2017   Ferritin 30 - 400 ng/mL 39   Alpha-1 antitrypsin level 90 - 200 mg/dL 87 (L)     LIVER HISTOLOGY:  6/2017. [facility]. EkPa was 72.1. The results suggested a fibrosis level of F4. ENDOSCOPIC PROCEDURES:  5/2017. EGD by Dr Abida Brooks. Esophageal varices. Banding performed. 8/2017. EGD performed by Dr Toribio Boateng. Small esophageal varices. No banding performed. No gastric varices. Mild portal gastropathy. Repeat in 6 months.  2/2018. EGD performed by Dr Toribio Boateng. Medium esophageal varices. Banding performed x 4. No gastric varices. Moderate portal gastropathy. RADIOLOGY:  11/22/2017. US of abdomen. No liver mass/lesion. OTHER TESTING:  Not available or performed    ASSESSMENT AND PLAN:  Cryptogenic cirrhosis. Liver transaminases are variable/normal.  AST has been high. Alkaline phosphatase is normal.  Liver function is normal.  Serum albumin is slightly depressed. The platelet count is depressed. The patient has stable but depressed liver function. The CTP is 7-8. Child class B. The MELD score is 10. This will be recalculated on the basis of his current lab studies. Patient is not presently in need of transplant evaluation.   I have reviewed in detail the natural history and progression of cirrhosis and the goals of follow-up and monitoring studies. Esophageal varices with prior bleeding. Varices have been banded in the past. Dr Aniceto Mcwilliams recently repeated EGD to assess for varices and need for banding in 2/2018 and recommends repeat procedure in 5/2018. We had stopped the beta-blocker to reduce the symptoms of fatigue, patient has not noted much improvement in this regard. He denies evidence of bleeding at this time. Minimal hepatic encephalopathy has developed over the course of the past year. I have discussed with him the relationship to bowel output and we will start a trial of lactulose to titrate a goal of 2-3 easy to pass bowel movements daily. He is not taking enough of a dose to achieve this output and will increase his dosing. He reports generally has been having <1. No need to restrict dietary protein at this time. DM. Patient was advised that he will need to reconnect with his PCP for alternative medication if metformin is not tolerable. I am concerned that some of his ongoing fatigue and stamina issues may be related to uncontrolled glucose levels. I have drawn a HGB A 1C and will forward this result to the patient and PCP for evaluation. COPD. Patient is inconsistent with inhaler use and continues to smoke 1 PPD. I have asked him to discuss with PCP and stressed the importance of optimizing his other medical issues in the setting of his chronic liver disease. The patient was directed to continue all current medications at the current dosages. There are no contraindications for the patient to take any medications that are necessary for treatment of other medical issues. The patient was counseled regarding alcohol consumption. Thrombocytopenia secondary to cirrhosis. No treatment is needed. Anemia from chronic GI blood loss from portal hypertension and iron deficiency. Psat iron stores were adequate and we will continue to monitor CBC.     Vaccination for viral hepatitis A and B is recommended since the patient has no serologic evidence of previous exposure or vaccination with immunity. [address] screening will continue to be performed. AFP was ordered today. Ultrasound will be scheduled again in 5/2018 in conjunction with his next office visit. All of the above issues were discussed with the patient. All questions were answered. The patient expressed a clear understanding of the above. [facility] 87 in 3-4 months for monitoring cirrhosis with same day US of the liver. EGD for surveillance in the meantime.     Iain Stratton PA-C  Liver [place] [address], [address] Patricia Gonzalez  22.  [phone]

## 2018-02-22 NOTE — MR AVS SNAPSHOT
[address] [facility] Charan .[phone] [address] [place] [address] 
[phone] Patient: Cuba Corral MRN: [id number] :1960 Visit Information Date & Time Provider Department Dept. Phone Encounter #  
 2018  1:30 PM Eamon Romero, [address] [facility] [address] of [place] [address] [id number] Follow-up Instructions Return in about 3 months (around 2018) for Surya Tsai. Your Appointments 5/10/2018  1:45 PM  
PROCEDURE with MD Ross Rodrigues 75 [id number]) Appt Note: EGD  
 [address] [place] [facility] .[phone] [facility] [address]  
[address] [facility] [address] Upcoming Health Maintenance Date Due  
 LIPID PANEL Q1 1960 FOOT EXAM Q1 3/25/1971 MICROALBUMIN Q1 3/25/1971 EYE EXAM RETINAL OR DILATED Q1 3/25/1971 Pneumococcal 19-64 Medium Risk (1 of 1 - PPSV23) 3/25/1980 DTaP/Tdap/Td series (1 - Tdap) 3/25/1982 FOBT Q 1 YEAR AGE 50-75 3/25/2011 Influenza Age 5 to Adult 2017 HEMOGLOBIN A1C Q6M 2018 Allergies as of 2018  Review Complete On: 2018 By: Randi Flanagan Severity Noted Reaction Type Reactions Shellfish Derived High 2017    Hives Current Immunizations  Never Reviewed No immunizations on file. Not reviewed this visit You Were Diagnosed With   
  
 Codes Comments Cirrhosis of liver without ascites, unspecified hepatic cirrhosis type ([facility]    -  Primary ICD-10-CM: K74.60 ICD-9-CM: 571.5 Vitals BP Pulse Temp Weight(growth percentile) SpO2 BMI  
 140/56 (BP 1 Location: Left arm, BP Patient Position: Sitting) 82 97.9 °F (36.6 °C) (Tympanic) 217 lb 12.8 oz (98.8 kg) 98% 28.74 kg/m2 Smoking Status Current Every Day Smoker BMI and BSA Data Body Mass Index Body Surface Area 28.74 kg/m 2 2.26 m 2 Preferred Pharmacy Pharmacy Name Phone [address] [place] [address], [address] [place] [address] Skye Melendez [phone] Your Updated Medication List  
  
   
This list is accurate as of 2/22/18  2:15 PM.  Always use your most recent med list.  
  
  
  
  
 ALPRAZolam 0.5 mg tablet Commonly known as:  Elray Bald Take 0.5 mg by mouth Before breakfast, lunch, and dinner. ferrous sulfate 325 mg (65 mg iron) tablet Take 1 Tab by mouth three (3) times daily (with meals). lactulose 10 gram/15 mL solution Commonly known as:  Samuel Mixer Take 45 mL by mouth two (2) times a day. metFORMIN 500 mg tablet Commonly known as:  GLUCOPHAGE  
TK 1 T PO  BID WITH MEALS  
  
 methocarbamol 500 mg tablet Commonly known as:  ROBAXIN Take 500 mg by mouth two (2) times a day. nadolol 20 mg tablet Commonly known as:  CORGARD TK 1 T PO  QD  
  
 sertraline 50 mg tablet Commonly known as:  ZOLOFT Take 50 mg by mouth daily. sucralfate 1 gram tablet Commonly known as:  [place] TK 1 T PO Q 6 H  
  
 VENTOLIN HFA 90 mcg/actuation inhaler Generic drug:  albuterol Take 2 Puffs by mouth every six (6) hours as needed. We Performed the Following AFP WITH AFP-L3% [[id number] Custom] AMMONIA E050420 CPT(R)] CBC W/O DIFF [99872 CPT(R)] HEMOGLOBIN A1C WITH EAG [55743 CPT(R)] HEPATIC FUNCTION PANEL (6) [[id number] Custom] METABOLIC PANEL, BASIC [49894 CPT(R)] PROTHROMBIN TIME + INR [37401 CPT(R)] UPPER GI ENDOSCOPY,DIAGNOSIS [20110 CPT(R)] Comments:  
 Schedule with Dr. Tr Wallace in May or June 2018 Follow-up Instructions Return in about 3 months (around 5/22/2018) for Malou Gomez. To-Do List   
 05/22/2018 Imaging:  US ABD COMP Introducing hospitals & HEALTH SERVICES!    
 [address] introduces [employer] patient portal. Now you can access parts of your medical record, email your doctor's office, and request medication refills online. 1. In your internet browser, go to [email or web address] 2. Click on the First Time User? Click Here link in the Sign In box. You will see the New Member Sign Up page. 3. Enter your [employer] Access Code exactly as it appears below. You will not need to use this code after youve completed the sign-up process. If you do not sign up before the expiration date, you must request a new code. · [employer] Access Code: [email or web address] Expires: 4/9/2018  9:14 AM 
 
4. Enter the last four digits of your Social Security Number (xxxx) and Date of Birth (mm/dd/yyyy) as indicated and click Submit. You will be taken to the next sign-up page. 5. Create a [employer] ID. This will be your [employer] login ID and cannot be changed, so think of one that is secure and easy to remember. 6. Create a [employer] password. You can change your password at any time. 7. Enter your Password Reset Question and Answer. This can be used at a later time if you forget your password. 8. Enter your e-mail address. You will receive e-mail notification when new information is available in [address] 9. Click Sign Up. You can now view and download portions of your medical record. 10. Click the Download Summary menu link to download a portable copy of your medical information. If you have questions, please visit the Frequently Asked Questions section of the [employer] website. Remember, [employer] is NOT to be used for urgent needs. For medical emergencies, dial 911. Now available from your iPhone and Android! Please provide this summary of care documentation to your next provider. Your primary care clinician is listed as Vilma Varela. If you have any questions after today's visit, please call [phone].

## 2018-02-23 LAB
AFP L3 MFR SERPL: 8.8 % (ref 0–9.9)
AFP SERPL-MCNC: 4.3 NG/ML (ref 0–8)
ALBUMIN SERPL-MCNC: 3.1 G/DL (ref 3.5–5.5)
ALP SERPL-CCNC: 106 IU/L (ref 39–117)
ALT SERPL-CCNC: 32 IU/L (ref 0–44)
AMMONIA PLAS-MCNC: 179 UG/DL (ref 27–102)
AST SERPL-CCNC: 52 IU/L (ref 0–40)
BILIRUB DIRECT SERPL-MCNC: 0.45 MG/DL (ref 0–0.4)
BILIRUB SERPL-MCNC: 1.1 MG/DL (ref 0–1.2)
BUN SERPL-MCNC: 10 MG/DL (ref 6–24)
BUN/CREAT SERPL: 13 (ref 9–20)
CALCIUM SERPL-MCNC: 8.6 MG/DL (ref 8.7–10.2)
CHLORIDE SERPL-SCNC: 108 MMOL/L (ref 96–106)
CO2 SERPL-SCNC: 20 MMOL/L (ref 18–29)
CREAT SERPL-MCNC: 0.77 MG/DL (ref 0.76–1.27)
ERYTHROCYTE [DISTWIDTH] IN BLOOD BY AUTOMATED COUNT: 15.6 % (ref 12.3–15.4)
EST. AVERAGE GLUCOSE BLD GHB EST-MCNC: 105 MG/DL
GFR SERPLBLD CREATININE-BSD FMLA CKD-EPI: 101 ML/MIN/{1.73_M2}
GFR SERPLBLD CREATININE-BSD FMLA CKD-EPI: 117 ML/MIN/{1.73_M2}
GLUCOSE SERPL-MCNC: 119 MG/DL (ref 65–99)
HBA1C MFR BLD: 5.3 % (ref 4.8–5.6)
HCT VFR BLD AUTO: 38.8 % (ref 37.5–51)
HGB BLD-MCNC: 13.1 G/DL (ref 13–17.7)
INR PPP: 1.2 (ref 0.8–1.2)
MCH RBC QN AUTO: 33 PG (ref 26.6–33)
MCHC RBC AUTO-ENTMCNC: 33.8 G/DL (ref 31.5–35.7)
MCV RBC AUTO: 98 FL (ref 79–97)
MORPHOLOGY BLD-IMP: ABNORMAL
PLATELET # BLD AUTO: 89 X10E3/UL (ref 150–379)
POTASSIUM SERPL-SCNC: 4.3 MMOL/L (ref 3.5–5.2)
PROTHROMBIN TIME: 12.8 SEC (ref 9.1–12)
RBC # BLD AUTO: 3.97 X10E6/UL (ref 4.14–5.8)
SODIUM SERPL-SCNC: 138 MMOL/L (ref 134–144)
WBC # BLD AUTO: 5 X10E3/UL (ref 3.4–10.8)

## 2018-02-26 NOTE — PROGRESS NOTES
Pt notified of stable liver functions, elevation of ammonia level. Reminded him of need to increase dosing of lactulose for management as discussed. Follow-up as scheduled.

## 2018-05-10 ENCOUNTER — HOSPITAL ENCOUNTER (OUTPATIENT)
Age: 58
Setting detail: OUTPATIENT SURGERY
Discharge: HOME | End: 2018-05-10
Attending: INTERNAL MEDICINE | Admitting: INTERNAL MEDICINE
Payer: COMMERCIAL

## 2018-05-10 ENCOUNTER — ANESTHESIA (OUTPATIENT)
Dept: ENDOSCOPY | Age: 58
End: 2018-05-10
Payer: COMMERCIAL

## 2018-05-10 ENCOUNTER — ANESTHESIA EVENT (OUTPATIENT)
Dept: ENDOSCOPY | Age: 58
End: 2018-05-10
Payer: COMMERCIAL

## 2018-05-10 VITALS
HEART RATE: 99 BPM | WEIGHT: 217 LBS | SYSTOLIC BLOOD PRESSURE: 127 MMHG | OXYGEN SATURATION: 97 % | DIASTOLIC BLOOD PRESSURE: 75 MMHG | TEMPERATURE: 97.6 F | BODY MASS INDEX: 28.63 KG/M2 | RESPIRATION RATE: 12 BRPM

## 2018-05-10 PROCEDURE — 76040000019: Performed by: INTERNAL MEDICINE

## 2018-05-10 PROCEDURE — 74011000250 HC RX REV CODE- 250

## 2018-05-10 PROCEDURE — 74011250636 HC RX REV CODE- 250/636

## 2018-05-10 PROCEDURE — 76060000031 HC ANESTHESIA FIRST 0.5 HR: Performed by: INTERNAL MEDICINE

## 2018-05-10 RX ORDER — NALOXONE HYDROCHLORIDE 0.4 MG/ML
0.4 INJECTION, SOLUTION INTRAMUSCULAR; INTRAVENOUS; SUBCUTANEOUS
Status: DISCONTINUED | OUTPATIENT
Start: 2018-05-10 | End: 2018-05-10 | Stop reason: HOSPADM

## 2018-05-10 RX ORDER — SODIUM CHLORIDE 0.9 % (FLUSH) 0.9 %
5-10 SYRINGE (ML) INJECTION EVERY 8 HOURS
Status: DISCONTINUED | OUTPATIENT
Start: 2018-05-10 | End: 2018-05-10 | Stop reason: HOSPADM

## 2018-05-10 RX ORDER — FENTANYL CITRATE 50 UG/ML
50-200 INJECTION, SOLUTION INTRAMUSCULAR; INTRAVENOUS
Status: DISCONTINUED | OUTPATIENT
Start: 2018-05-10 | End: 2018-05-10 | Stop reason: HOSPADM

## 2018-05-10 RX ORDER — PROPOFOL 10 MG/ML
INJECTION, EMULSION INTRAVENOUS AS NEEDED
Status: DISCONTINUED | OUTPATIENT
Start: 2018-05-10 | End: 2018-05-10 | Stop reason: HOSPADM

## 2018-05-10 RX ORDER — MIDAZOLAM HYDROCHLORIDE 1 MG/ML
5-10 INJECTION, SOLUTION INTRAMUSCULAR; INTRAVENOUS
Status: DISCONTINUED | OUTPATIENT
Start: 2018-05-10 | End: 2018-05-10 | Stop reason: HOSPADM

## 2018-05-10 RX ORDER — EPINEPHRINE 0.1 MG/ML
1 INJECTION INTRACARDIAC; INTRAVENOUS
Status: DISCONTINUED | OUTPATIENT
Start: 2018-05-10 | End: 2018-05-10 | Stop reason: HOSPADM

## 2018-05-10 RX ORDER — DEXTROMETHORPHAN/PSEUDOEPHED 2.5-7.5/.8
1.2 DROPS ORAL
Status: DISCONTINUED | OUTPATIENT
Start: 2018-05-10 | End: 2018-05-10 | Stop reason: HOSPADM

## 2018-05-10 RX ORDER — LIDOCAINE HYDROCHLORIDE 20 MG/ML
INJECTION, SOLUTION EPIDURAL; INFILTRATION; INTRACAUDAL; PERINEURAL AS NEEDED
Status: DISCONTINUED | OUTPATIENT
Start: 2018-05-10 | End: 2018-05-10 | Stop reason: HOSPADM

## 2018-05-10 RX ORDER — SODIUM CHLORIDE 0.9 % (FLUSH) 0.9 %
5-10 SYRINGE (ML) INJECTION AS NEEDED
Status: DISCONTINUED | OUTPATIENT
Start: 2018-05-10 | End: 2018-05-10 | Stop reason: HOSPADM

## 2018-05-10 RX ORDER — SODIUM CHLORIDE 9 MG/ML
50 INJECTION, SOLUTION INTRAVENOUS CONTINUOUS
Status: DISCONTINUED | OUTPATIENT
Start: 2018-05-10 | End: 2018-05-10 | Stop reason: HOSPADM

## 2018-05-10 RX ORDER — SODIUM CHLORIDE 9 MG/ML
INJECTION, SOLUTION INTRAVENOUS
Status: DISCONTINUED | OUTPATIENT
Start: 2018-05-10 | End: 2018-05-10 | Stop reason: HOSPADM

## 2018-05-10 RX ORDER — FLUMAZENIL 0.1 MG/ML
0.2 INJECTION INTRAVENOUS
Status: DISCONTINUED | OUTPATIENT
Start: 2018-05-10 | End: 2018-05-10 | Stop reason: HOSPADM

## 2018-05-10 RX ORDER — CHOLECALCIFEROL (VITAMIN D3) 125 MCG
CAPSULE ORAL
COMMUNITY
End: 2019-05-03

## 2018-05-10 RX ORDER — ATROPINE SULFATE 0.1 MG/ML
0.5 INJECTION INTRAVENOUS
Status: DISCONTINUED | OUTPATIENT
Start: 2018-05-10 | End: 2018-05-10 | Stop reason: HOSPADM

## 2018-05-10 RX ADMIN — SODIUM CHLORIDE: 9 INJECTION, SOLUTION INTRAVENOUS at 13:14

## 2018-05-10 RX ADMIN — PROPOFOL 100 MG: 10 INJECTION, EMULSION INTRAVENOUS at 13:29

## 2018-05-10 RX ADMIN — LIDOCAINE HYDROCHLORIDE 100 MG: 20 INJECTION, SOLUTION EPIDURAL; INFILTRATION; INTRACAUDAL; PERINEURAL at 13:29

## 2018-05-10 RX ADMIN — PROPOFOL 50 MG: 10 INJECTION, EMULSION INTRAVENOUS at 13:31

## 2018-05-10 NOTE — IP AVS SNAPSHOT
[address] 
[phone] Patient: Shlomo Mcwilliams MRN: [id number] :1960 About your hospitalization You were admitted on:  May 10, 2018 You last received care in the:  [facility] ENDOSCOPY You were discharged on:  May 10, 2018 Why you were hospitalized Your primary diagnosis was:  Not on File Follow-up Information Follow up With Details Comments Contact Info Renaldo Tolentino MD   [address] [address] 
[phone] Your Scheduled Appointments Thursday May 10, 2018 ESOPHAGOGASTRODUODENOSCOPY (EGD) with Rancho Escobar MD  
[facility] ENDOSCOPY (RI OR PRE ASSESSMENT) [address]  
[phone] OP Registration: [facility] Telephone: [phone] Fax: [phone] ** Pt will need to arrive 30 min prior unless appointment prep instructions indicate otherwise** **** Send All ENDO pt to the MAIN Admitting Department, off the [facility] main lobby at [place]. ****  
  
    
OP Registration: [facility] Telephone: [phone] Fax: [phone] ** Pt will need to arrive 30 min prior unless appointment prep instructions indicate otherwise** **** Send All ENDO pt to the MAIN Admitting Department, off the [facility] main lobby at [facility]. **** Thursday May 24, 2018 11:00 AM EDT  
US ABD COMP with [facility] OP 1 [address] Department ([address]) [address]  
[phone] General  NPO DIET RESTICTIONS Please be NPO (nothing by mouth) for 6- 8 hours prior to procedure. GENERAL INSTRUCTIONS 1. Bring any non [facility] facility films/reports pertaining to the area being studied with you on the day of appointment. 2. A written order with a valid diagnosis and Physicians signature is required for all scheduled tests.  3. Check in at registration 30 minutes before your appointment time unless you were instructed to do otherwise. Patient should report to outpatient registration ([address] [place] [address]) 30 minutes prior to the appointment time unless instructed otherwise. (NOT FOR MRI) Thursday May 24, 2018  2:30 PM EDT Follow Up with DASHA Lundberg 75 ([facility]) 15[facility] [phone] [address] [place] [address]  
[phone] Discharge Orders None A check muna indicates which time of day the medication should be taken. My Medications CONTINUE taking these medications Instructions Each Dose to Equal  
 Morning Noon Evening Bedtime ALPRAZolam 0.5 mg tablet Commonly known as:  Sean Tuttle Your last dose was: Your next dose is: Take 0.5 mg by mouth Before breakfast, lunch, and dinner. 0.5 mg  
    
   
   
   
  
 lactulose 10 gram/15 mL solution Commonly known as:  Maria Del Carmen Keiry Your last dose was: Your next dose is: Take 45 mL by mouth two (2) times a day. 30 g VENTOLIN HFA 90 mcg/actuation inhaler Generic drug:  albuterol Your last dose was: Your next dose is: Take 2 Puffs by mouth every six (6) hours as needed. 2 Puff VITAMIN D3 2,000 unit Tab Generic drug:  cholecalciferol (vitamin D3) Your last dose was: Your next dose is: Take  by mouth. Discharge Instructions [facility] 59 5[address] [facility] Floor Shwetha Thomas MD, Ivett Concepcion, MICHELLELD KATELYNN Donato PA-C Willodean Belch, ACNP-KATELYNN Denton NP [facility] 
  at [address] 
 217 [facility], [address] [facility] [address] 
  [phone] FAX: [address] [place] [address] 
  [address], [address] 98 Jennifer Wu, [address] 
  [phone] FAX: [phone] ENDOSCOPY DISCHARGE INSTRUCTIONS Mann Ricketts 1960 Date: 5/10/2018 DISCOMFORT: 
Use lozenges or warm salt water gargle for sore thoat Apply warm compress to IV site if red. If redness or soreness persists call the office. You may experience gas and bloating. Walking and belching will help relieve this. You may experience chest discomfort or pressure especially if banding of esophageal varices was performed. DIET: 
You may resume your normal diet. ACTIVITY: 
Spend the remainder of the day resting. Avoid any strenuous activity. You may not operate a vehicle for 12 hours. You may not engage in an occupation involving machinery or appliances for rest of today. Avoid making any critical or financial decisions for at least 24 hour. Call the [employer] [place] [address] [facility] if you have any of the following: 
Increasing chest or abdominal pain, nausea, vomiting, vomiting blood, abdominal distension or swelling, fever or chills, bloody discharge from nose or mouth or shortness of breath. Follow-up Instructions: 
Call Dr. Ginger Castle for any questions or problems at the phone number listed above. If a biopsy was performed, you will be contacted by the office staff or Dr Ginger Castle within 1 week. If you have not heard from us by then you may call the office at the phone number listed above to inquire about the results. ENDOSCOPY FINDINGS: 
Your endoscopy demonstrated swelling of the stomach from cirrhosis No more esophageal varices DISCHARGE SUMMARY from the Nurse: The following personal items collected during your admission are returned to you:  
Dental Appliance: Dental Appliances: None Vision: Visual Aid: Glasses Hearing Aid:   
Jewelry:   
Clothing:   
Other Valuables:   
Valuables sent to safe:   
 
 
  
  
  
Introducing [facility]! [employer] introduces Feedbooks patient portal. Now you can access parts of your medical record, email your doctor's office, and request medication refills online. 1. In your internet browser, go to [email or web address] 2. Click on the First Time User? Click Here link in the Sign In box. You will see the New Member Sign Up page. 3. Enter your Feedbooks Access Code exactly as it appears below. You will not need to use this code after youve completed the sign-up process. If you do not sign up before the expiration date, you must request a new code. · Feedbooks Access Code: [email or web address] Expires: 8/8/2018  1:46 PM 
 
4. Enter the last four digits of your Social Security Number (xxxx) and Date of Birth (mm/dd/yyyy) as indicated and click Submit. You will be taken to the next sign-up page. 5. Create a Feedbooks ID. This will be your Feedbooks login ID and cannot be changed, so think of one that is secure and easy to remember. 6. Create a Feedbooks password. You can change your password at any time. 7. Enter your Password Reset Question and Answer. This can be used at a later time if you forget your password. 8. Enter your e-mail address. You will receive e-mail notification when new information is available in [address] 9. Click Sign Up. You can now view and download portions of your medical record. 10. Click the Download Summary menu link to download a portable copy of your medical information. If you have questions, please visit the Frequently Asked Questions section of the Feedbooks website. Remember, Feedbooks is NOT to be used for urgent needs. For medical emergencies, dial 911. Now available from your iPhone and Android! Introducing [facility] As a [facility] patient, I wanted to make you aware of our electronic visit tool called River Barrientosfin. [facility] 24/Moolta allows you to connect within minutes with a medical provider 24 hours a day, seven days a week via a mobile device or tablet or logging into a secure website from your computer. You can access River NanoRacksfabiennefin from anywhere in the [place]. A virtual visit might be right for you when you have a simple condition and feel like you just dont want to get out of bed, or cant get away from work for an appointment, when your regular [facility] provider is not available (evenings, weekends or holidays), or when youre out of town and need minor care. Electronic visits cost only $49 and if the [facility] imSpendCrowd/7 provider determines a prescription is needed to treat your condition, one can be electronically transmitted to a nearby pharmacy*. Please take a moment to enroll today if you have not already done so. The enrollment process is free and takes just a few minutes. To enroll, please download the [facility] 24/Moolta myriam to your tablet or phone, or visit [email or web address] to enroll on your computer. And, as an [address] [place] [address] patient with a [employer] account, the results of your visits will be scanned into your electronic medical record and your primary care provider will be able to view the scanned results. We urge you to continue to see your regular [facility] provider for your ongoing medical care. And while your primary care provider may not be the one available when you seek a [facility] virtual visit, the peace of mind you get from getting a real diagnosis real time can be priceless. For more information on River Deanfabiennefin, view our Frequently Asked Questions (FAQs) at [email or web address]. Sincerely, 
 
Misa Bartlett MD 
Chief Medical Officer [facility] *:  certain medications cannot be prescribed via [facility] Providers Seen During Your Hospitalization Provider Specialty Primary office phone Nicole Smith MD Hepatology [phone] Your Primary Care Physician (PCP) Primary Care Physician Office Phone Office Fax Igor Burris [phone] You are allergic to the following Allergen Reactions Shellfish Derived Hives Recent Documentation Weight BMI Smoking Status 98.4 kg 28.63 kg/m2 Current Every Day Smoker Emergency Contacts Name Discharge Info Relation Home Work Mobile Noemi Baird DISCHARGE CAREGIVER [3] Spouse [3] [phone] Patient Belongings The following personal items are in your possession at time of discharge: 
  Dental Appliances: None  Visual Aid: Glasses Please provide this summary of care documentation to your next provider. Signatures-by signing, you are acknowledging that this After Visit Summary has been reviewed with you and you have received a copy. Patient Signature:  ____________________________________________________________ Date:  ____________________________________________________________  
  
Tari Santana Provider Signature:  ____________________________________________________________ Date:  ____________________________________________________________

## 2018-05-10 NOTE — ANESTHESIA PREPROCEDURE EVALUATION
Anesthetic History   No history of anesthetic complications            Review of Systems / Medical History  Patient summary reviewed, nursing notes reviewed and pertinent labs reviewed    Pulmonary  Within defined limits  COPD               Neuro/Psych   Within defined limits           Cardiovascular  Within defined limits                     GI/Hepatic/Renal  Within defined limits         Liver disease     Endo/Other  Within defined limits  Diabetes         Other Findings              Physical Exam    Airway  Mallampati: II  TM Distance: > 6 cm  Neck ROM: normal range of motion   Mouth opening: Normal     Cardiovascular  Regular rate and rhythm,  S1 and S2 normal,  no murmur, click, rub, or gallop             Dental  No notable dental hx       Pulmonary  Breath sounds clear to auscultation               Abdominal  GI exam deferred       Other Findings            Anesthetic Plan    ASA: 3  Anesthesia type: MAC          Induction: Intravenous  Anesthetic plan and risks discussed with: Patient

## 2018-05-10 NOTE — PROCEDURES
[address] Ai Hill MD, Katalina Pizarro, Radha Tera Lalit, [place]       KATELYNN Guillen PA-C Rodolph Pair, [facility]   KATELYNN Arora NP [facility]    at [facility]    217 [facility], [address] [facility]    1400 [facility] 22.    [phone]    FAX: [address], [address] [facility], [address] [facility] [address]    [phone]    FAX: [phone]       UPPER ENDOSCOPY PROCEDURE NOTE    Agnieszka Flores  1960    INDICATION: Cirrhosis. Screening for esophageal varices with variceal ligation if  indicated. : Yordy Boothe MD    ANESTHESIA/SEDATION: Propofol was administered by anesthesia      PROCEDURE DESCRIPTION:  Infomed consent was obtained from the patient for the procedure. All risks and benefits of the procedure explained. The patient was taken to the endoscopy suite and placed in the left lateral decubitus position. Following sequential administration of sedation to doses as indicated above the endoscope was inserted into the mouth and advanced under direct vision to the second portion of the duodenum. Careful inspection of upper gastrointestinal tract was made as the endoscope was inserted and withdrawn. Retroflexion of the endoscope to view of the cardia of the stomach was performed. The findings and interventions are described below. FINDINGS:  Esophagus:    No esophageal varices. Scars of prior banding    Stomach: Moderate portal hypertensive gastropathy of the body of the stomach. No gastric varices identified. Duodenum:   Normal bulb and second portion    INTERVENTION:   None    COMPLICATIONS: None. The patient tolerated the procedure well. EBL: Negligible. RECOMMENDATIONS:  Observe until discharge parameters are achieved. May resume previous diet. Repeat endoscopy to reassess varices and need for banding in 1 year. Follow-up [facility] office as scheduled.       Brandon Ward MD  5/10/2018  1:36 PM

## 2018-05-10 NOTE — ANESTHESIA POSTPROCEDURE EVALUATION
Post-Anesthesia Evaluation and Assessment    Patient: Dillon Mann MRN: [id number]  SSN: [id number]    YOB: 1960  Age: 63 y.o. Sex: male       Cardiovascular Function/Vital Signs  Visit Vitals    /75    Pulse 99    Temp 36.4 °C (97.6 °F)    Resp 12    Wt 98.4 kg (217 lb)    SpO2 97%    BMI 28.63 kg/m2       Patient is status post MAC anesthesia for Procedure(s):  ESOPHAGOGASTRODUODENOSCOPY (EGD). Nausea/Vomiting: None    Postoperative hydration reviewed and adequate. Pain:  Pain Scale 1: Numeric (0 - 10) (05/10/18 1356)  Pain Intensity 1: 0 (05/10/18 1356)   Managed    Neurological Status: At baseline    Mental Status and Level of Consciousness: Arousable    Pulmonary Status:   O2 Device: Room air (05/10/18 1356)   Adequate oxygenation and airway patent    Complications related to anesthesia: None    Post-anesthesia assessment completed.  No concerns    Signed By: Ton Hale MD     May 10, 2018

## 2018-05-10 NOTE — PERIOP NOTES

## 2018-05-10 NOTE — DISCHARGE INSTRUCTIONS
[address] MD, Sydnie Martinez, Cite Tera Lalit, [place]       Arlene Marina, KATELYNN Palomino, SYLVIE Simpson, [facility]   KATELYNN Headley NP [address] [facility] 136    at [address], [address] Patricia Gonzalez  22.    [phone]    FAX: [address] [place] [address], [address]    [phone]    FAX: [phone]       ENDOSCOPY DISCHARGE INSTRUCTIONS      Kyle Delacruz  1960  Date: 5/10/2018    DISCOMFORT:  Use lozenges or warm salt water gargle for sore thoat  Apply warm compress to IV site if red. If redness or soreness persists call the office. You may experience gas and bloating. Walking and belching will help relieve this. You may experience chest discomfort or pressure especially if banding of esophageal varices was performed. DIET:  You may resume your normal diet. ACTIVITY:  Spend the remainder of the day resting. Avoid any strenuous activity. You may not operate a vehicle for 12 hours. You may not engage in an occupation involving machinery or appliances for rest of today. Avoid making any critical or financial decisions for at least 24 hour. Call the [address] [facility] if you have any of the following:  Increasing chest or abdominal pain, nausea, vomiting, vomiting blood, abdominal distension or swelling, fever or chills, bloody discharge from nose or mouth or shortness of breath. Follow-up Instructions:  Call Dr. Elizabeth Maria for any questions or problems at the phone number listed above. If a biopsy was performed, you will be contacted by the office staff or Dr Elizabeth Maria within 1 week.    If you have not heard from us by then you may call the office at the phone number listed above to inquire about the results. ENDOSCOPY FINDINGS:  Your endoscopy demonstrated swelling of the stomach from cirrhosis  No more esophageal varices    DISCHARGE SUMMARY from the Nurse:   The following personal items collected during your admission are returned to you:   Dental Appliance: Dental Appliances: None  Vision: Visual Aid: Glasses  Hearing Aid:    Jewelry:    Clothing:    Other Valuables:    Valuables sent to safe:

## 2018-05-10 NOTE — PROGRESS NOTES
[address] Bahena MD, FACP, Cite Tera Sheng, [place]       KATELYNN Valencia PA-C Recardo Lorenzo, ACNP-BC   KATELYNN Villasenor NP [address] [facility] 136    at [address], [address] Patricia Gonzalez  22.    [phone]    FAX: [address] [place] [address]    at [address], [address]    [phone]    FAX: [phone]       PRE-PROCEDURE NOTE - EGD    H and P from last office visit reviewed. Allergies reviewed. Out-patient medicaton list reviewed. Patient Active Problem List   Diagnosis Code    Cirrhosis of liver without ascites ([facility] [address] [facility] K74.60    Esophageal varices (HCC) I85.00    Diabetes mellitus ([facility] [address] S66.5    H/O umbilical hernia repair S88.980, Z87.19    S/P appendectomy Z90.49       Allergies   Allergen Reactions    Shellfish Derived Hives       No current facility-administered medications on file prior to encounter. Current Outpatient Prescriptions on File Prior to Encounter   Medication Sig Dispense Refill    lactulose (CHRONULAC) 10 gram/15 mL solution Take 45 mL by mouth two (2) times a day. 1000 mL 3    VENTOLIN HFA 90 mcg/actuation inhaler Take 2 Puffs by mouth every six (6) hours as needed. 3    ALPRAZolam (XANAX) 0.5 mg tablet Take 0.5 mg by mouth Before breakfast, lunch, and dinner. 0       For EGD to assess for esophageal and gastric varices. Plan to perform banding if indicated based upon variceal size and appearance. The risks of the procedure were discussed with the patient. These included reaction to anesthesia, pain, perforation and bleeding. All questions were answered. The patient wishes to proceed with the procedure.     PHYSICAL EXAMINATION:  VS: per nursing note  General: No acute distress. Eyes: Sclera anicteric. ENT: No oral lesions. Thyroid normal.  Nodes: No adenopathy. Skin: No spider angiomata. No jaundice. No palmar erythema. Respiratory: Lungs clear to auscultation. Cardiovascular: Regular heart rate. No murmurs. No JVD. Abdomen: Soft non-tender, liver size normal to percussion/palpation. Spleen not palpable. No obvious ascites. Extremities: No edema. No muscle wasting. No gross arthritic changes. Neurologic: Alert and oriented. Cranial nerves grossly intact. No asterixis. MOST RECENT LABORATORY STUDIES:  Lab Results   Component Value Date/Time    WBC 5.0 02/22/2018 12:00 AM    HGB 13.1 02/22/2018 12:00 AM    HCT 38.8 02/22/2018 12:00 AM    PLATELET 89 (LL) 92/02/9571 12:00 AM    MCV 98 (H) 02/22/2018 12:00 AM     Lab Results   Component Value Date/Time    INR 1.2 02/22/2018 12:00 AM    INR 1.3 (H) 01/09/2018 09:06 AM    INR 1.3 (H) 09/25/2017 12:00 PM    Prothrombin time 12.8 (H) 02/22/2018 12:00 AM    Prothrombin time 13.8 (H) 01/09/2018 09:06 AM    Prothrombin time 13.8 (H) 09/25/2017 12:00 PM       ASSESSMENT AND PLAN:  EGD to assess for esophageal and/or gastric varices. Conscious sedation with fentanyl and versed.     Mayco Gar MD  Liver [place] [address] 22. [phone]

## 2018-05-10 NOTE — ROUTINE PROCESS
Mansoor Antrim  1960  [id number]    Situation:  Verbal report received from: Renaldo Mahan RN  Procedure: Procedure(s):  ESOPHAGOGASTRODUODENOSCOPY (EGD)    Background:    Preoperative diagnosis: Advanced cirrhosis of liver ([facility] [K74.60]  Postoperative diagnosis: Portal Gastropathy    :  Dr. Michelle Matos  Assistant(s): Endoscopy Technician-1: Remi Mahan IV  Endoscopy RN-1: Luis Brown RN    Specimens: * No specimens in log *  H. Pylori  no    Assessment:  Intra-procedure medications         Anesthesia gave intra-procedure sedation and medications, see anesthesia flow sheet yes    Intravenous fluids: NS@ KVO     Vital signs stable     Abdominal assessment: round and soft     Recommendation:  Discharge patient per MD order.     Family or Friend   Permission to share finding with family or friend yes

## 2018-05-24 ENCOUNTER — OFFICE VISIT (OUTPATIENT)
Dept: HEMATOLOGY | Age: 58
End: 2018-05-24

## 2018-05-24 ENCOUNTER — HOSPITAL ENCOUNTER (OUTPATIENT)
Dept: ULTRASOUND IMAGING | Age: 58
Discharge: HOME | End: 2018-05-24
Attending: PHYSICIAN ASSISTANT
Payer: COMMERCIAL

## 2018-05-24 VITALS
BODY MASS INDEX: 30.35 KG/M2 | HEIGHT: 73 IN | RESPIRATION RATE: 14 BRPM | TEMPERATURE: 97.4 F | HEART RATE: 87 BPM | DIASTOLIC BLOOD PRESSURE: 61 MMHG | SYSTOLIC BLOOD PRESSURE: 142 MMHG | OXYGEN SATURATION: 99 % | WEIGHT: 229 LBS

## 2018-05-24 DIAGNOSIS — K74.60 CIRRHOSIS OF LIVER WITHOUT ASCITES, UNSPECIFIED HEPATIC CIRRHOSIS TYPE (HCC): ICD-10-CM

## 2018-05-24 DIAGNOSIS — K74.60 CIRRHOSIS OF LIVER WITHOUT ASCITES, UNSPECIFIED HEPATIC CIRRHOSIS TYPE (HCC): Primary | ICD-10-CM

## 2018-05-24 PROCEDURE — 76700 US EXAM ABDOM COMPLETE: CPT

## 2018-05-24 RX ORDER — SPIRONOLACTONE 50 MG/1
50 TABLET, FILM COATED ORAL DAILY
Qty: 30 TAB | Refills: 5 | Status: SHIPPED | OUTPATIENT
Start: 2018-05-24 | End: 2018-09-12 | Stop reason: SDUPTHER

## 2018-05-24 RX ORDER — FUROSEMIDE 20 MG/1
20 TABLET ORAL DAILY
Qty: 30 TAB | Refills: 5 | Status: SHIPPED | OUTPATIENT
Start: 2018-05-24 | End: 2018-09-12 | Stop reason: SDUPTHER

## 2018-05-24 RX ORDER — FUROSEMIDE 20 MG/1
20 TABLET ORAL DAILY
Qty: 30 TAB | Refills: 5 | Status: SHIPPED | OUTPATIENT
Start: 2018-05-24 | End: 2018-05-24 | Stop reason: SDUPTHER

## 2018-05-24 RX ORDER — LACTULOSE 10 G/15ML
30 SOLUTION ORAL; RECTAL 2 TIMES DAILY
Qty: 1000 ML | Refills: 11 | Status: SHIPPED | OUTPATIENT
Start: 2018-05-24 | End: 2018-09-12 | Stop reason: SDUPTHER

## 2018-05-24 RX ORDER — SPIRONOLACTONE 50 MG/1
50 TABLET, FILM COATED ORAL DAILY
Qty: 30 TAB | Refills: 5 | Status: SHIPPED | OUTPATIENT
Start: 2018-05-24 | End: 2018-05-24 | Stop reason: SDUPTHER

## 2018-05-24 NOTE — PROGRESS NOTES
1. Have you been to the ER, urgent care clinic since your last visit? Hospitalized since your last visit? No    2. Have you seen or consulted any other health care providers outside of the [address] [place] [address] since your last visit? Include any pap smears or colon screening.  No     Chief Complaint   Patient presents with    Follow-up

## 2018-05-24 NOTE — MR AVS SNAPSHOT
[address] [facility] [phone] Casey Escobar 13 
[phone] Patient: Sudheer Saxena MRN: [id number] :1960 Visit Information Date & Time Provider Department Dept. Phone Encounter #  
 2018  2:30 PM Abelardo Arreola, [address] [facility] [address] [place] [address] [id number] Follow-up Instructions Return in about 3 months (around 2018) for Slick Simple. Upcoming Health Maintenance Date Due  
 LIPID PANEL Q1 1960 FOOT EXAM Q1 3/25/1971 MICROALBUMIN Q1 3/25/1971 EYE EXAM RETINAL OR DILATED Q1 3/25/1971 Pneumococcal 19-64 Medium Risk (1 of 1 - PPSV23) 3/25/1980 DTaP/Tdap/Td series (1 - Tdap) 3/25/1982 FOBT Q 1 YEAR AGE 50-75 3/25/2011 Influenza Age 5 to Adult 2018 HEMOGLOBIN A1C Q6M 2018 Allergies as of 2018  Review Complete On: 2018 By: [place] Severity Noted Reaction Type Reactions Shellfish Derived High 2017    Hives Current Immunizations  Never Reviewed No immunizations on file. Not reviewed this visit You Were Diagnosed With   
  
 Codes Comments Cirrhosis of liver without ascites, unspecified hepatic cirrhosis type ([facility]    -  Primary ICD-10-CM: K74.60 ICD-9-CM: 571.5 Vitals BP Pulse Temp Resp Height(growth percentile) 142/61 (BP 1 Location: Left arm, BP Patient Position: Sitting) 87 97.4 °F (36.3 °C) (Tympanic) 14 6' 1\" (1.854 m) Weight(growth percentile) SpO2 BMI Smoking Status 229 lb (103.9 kg) 99% 30.21 kg/m2 Current Every Day Smoker Vitals History BMI and BSA Data Body Mass Index Body Surface Area  
 30.21 kg/m 2 2.31 m 2 Preferred Pharmacy Pharmacy Name Phone [facility], [address] [place] [address] [phone] Your Updated Medication List  
  
   
 This list is accurate as of 5/24/18  3:19 PM.  Always use your most recent med list.  
  
  
  
  
 ALPRAZolam 0.5 mg tablet Commonly known as:  Cinda Corti Take 0.5 mg by mouth Before breakfast, lunch, and dinner. furosemide 20 mg tablet Commonly known as:  LASIX Take 1 Tab by mouth daily. lactulose 10 gram/15 mL solution Commonly known as:  Mandi Jungling Take 45 mL by mouth two (2) times a day. spironolactone 50 mg tablet Commonly known as:  ALDACTONE Take 1 Tab by mouth daily. VENTOLIN HFA 90 mcg/actuation inhaler Generic drug:  albuterol Take 2 Puffs by mouth every six (6) hours as needed. VITAMIN D3 2,000 unit Tab Generic drug:  cholecalciferol (vitamin D3) Take  by mouth. Prescriptions Printed Refills  
 lactulose (CHRONULAC) 10 gram/15 mL solution 11 Sig: Take 45 mL by mouth two (2) times a day. Class: Print Route: Oral  
 furosemide (LASIX) 20 mg tablet 5 Sig: Take 1 Tab by mouth daily. Class: Print Route: Oral  
 spironolactone (ALDACTONE) 50 mg tablet 5 Sig: Take 1 Tab by mouth daily. Class: Print Route: Oral  
  
We Performed the Following AFP WITH AFP-L3% [[id number] Custom] CBC W/O DIFF [96082 CPT(R)] HEPATIC FUNCTION PANEL (6) [[id number] Custom] METABOLIC PANEL, BASIC [05164 CPT(R)] PROTHROMBIN TIME + INR [11424 CPT(R)] Follow-up Instructions Return in about 3 months (around 8/24/2018) for Marion Bailey. Introducing [facility]! Sveta Love introduces [employer] patient portal. Now you can access parts of your medical record, email your doctor's office, and request medication refills online. 1. In your internet browser, go to [email or web address] 2. Click on the First Time User? Click Here link in the Sign In box. You will see the New Member Sign Up page. 3. Enter your [employer] Access Code exactly as it appears below.  You will not need to use this code after youve completed the sign-up process. If you do not sign up before the expiration date, you must request a new code. · [employer] Access Code: [email or web address] Expires: 8/8/2018  1:46 PM 
 
4. Enter the last four digits of your Social Security Number (xxxx) and Date of Birth (mm/dd/yyyy) as indicated and click Submit. You will be taken to the next sign-up page. 5. Create a [employer] ID. This will be your [employer] login ID and cannot be changed, so think of one that is secure and easy to remember. 6. Create a [employer] password. You can change your password at any time. 7. Enter your Password Reset Question and Answer. This can be used at a later time if you forget your password. 8. Enter your e-mail address. You will receive e-mail notification when new information is available in [address] 9. Click Sign Up. You can now view and download portions of your medical record. 10. Click the Download Summary menu link to download a portable copy of your medical information. If you have questions, please visit the Frequently Asked Questions section of the [employer] website. Remember, [employer] is NOT to be used for urgent needs. For medical emergencies, dial 911. Now available from your iPhone and Android! Please provide this summary of care documentation to your next provider. Your primary care clinician is listed as Renetta Crowder. If you have any questions after today's visit, please call [phone].

## 2018-05-24 NOTE — PROGRESS NOTES
[address] [facility] MD Дмитрий, FACP, KATELYNN Cain PA-C Gerarda Mani, NP        at [address], [address] [facility], [address]     [phone]     FAX: [phone]    at [address], [address], [address]     [phone]     FAX: [phone]       Patient Care Team:  Marie Rogers MD as PCP - General (Family Practice)  Fermin Dillon MD (Gastroenterology)      Problem List  Date Reviewed: 2/22/2018          Codes Class Noted    Cirrhosis of liver without ascites [facility]) ICD-10-CM: K74.60  ICD-9-CM: 571.5  6/6/2017        Esophageal varices ([facility] ICD-10-CM: I85.00  ICD-9-CM: 456.1  6/6/2017        Diabetes mellitus ([facility] ICD-10-CM: E11.9  ICD-9-CM: 250.00  0/2/5534        H/O umbilical hernia repair VFR-54-LJ: Z98.890, Z87.19  ICD-9-CM: V15.29  6/6/2017        S/P appendectomy ICD-10-CM: Z90.49  ICD-9-CM: V45.89  6/6/2017              Elza Jenkins returns to the [address] for management of cryptogenic cirrhosis. The active problem list, all pertinent past medical history, medications, endoscopic studies, radiologic findings and laboratory findings related to the liver disorder were reviewed with the patient. The patient is a 63 y.o.  male who was first found to have chronic liver disease and cirrhosis in 5/2017 when he developed variceal bleeding, he has undergone additional banding on several occasions. Last EGD in 5/2018 showed no additional varices and no additional bands were placed. Assessment of liver fibrosis was performed with Fibroscan. The result was 72.1 kPa which correlates with cirrhosis. Patient has had routine screening ultrasound of the liver this morning and was found to have trace ascites around the liver. He has also had ongoing recent swelling of the LE.     The patient has also developed early hepatic encephalopathy signs over the course of the past year. He has had some increased confusion, poor memory and difficulty with focus. He reports some issues with day-night reversal and insomnia. Earlier this year, I had started him on a new prescription for lactulose. He is taking between 30-45 mL daily. Patient has had looser stool with output of 1+bowel movements daily. He is still having some issues with memory and concentration. The most recent laboratory studies indicate that the liver transaminases are normal, ALP is normal, tests of hepatic synthetic and metabolic function are normal (slight reduction in albumin), and the platelet count is depressed. The patient notes fatigue, and weakness. The patient has limitations in functional activities secondary to these symptoms and has applied for disability. The patient has not experienced problems concentrating, swelling of the abdomen, swelling of the lower extremities, hematemesis, hematochezia. Of note, patient discontinued use of metformin as of ~10/2017 as he attributed abdominal discomfort and GI upset to ongoing use of this medication. He states that his GI symptoms have been better, he has not been checking his glucose levels but recent A1c with his PCP was 5.5-6% and no additional medications for DM have been started. ALLERGIES  Allergies   Allergen Reactions    Shellfish Derived Hives       MEDICATIONS  Current Outpatient Prescriptions   Medication Sig    cholecalciferol, vitamin D3, (VITAMIN D3) 2,000 unit tab Take  by mouth.  lactulose (CHRONULAC) 10 gram/15 mL solution Take 45 mL by mouth two (2) times a day.  VENTOLIN HFA 90 mcg/actuation inhaler Take 2 Puffs by mouth every six (6) hours as needed.  ALPRAZolam (XANAX) 0.5 mg tablet Take 0.5 mg by mouth Before breakfast, lunch, and dinner. No current facility-administered medications for this visit.         SYSTEM REVIEW NOT RELATED TO LIVER DISEASE OR REVIEWED ABOVE:  Constitution systems: Negative for fever, chills. Weight gain of 8-9# in the past 4 months. Eyes: Negative for visual changes. ENT: Negative for sore throat, painful swallowing. Respiratory: Negative for cough, hemoptysis. Positive for SOB, patient is inconsistent with administration of Ventolin. Cardiology: Negative for chest pain, palpitations. GI:  Negative for constipation or diarrhea. Occasional complaint of RUQ dull achy bruise feeling, helped with changes in position. Occasional post-prandial nausea  : Negative for urinary frequency, dysuria, hematuria, nocturia. Skin: Negative for rash. Hematology: Negative for easy bruising, blood clots. Musculo-skeletal: Negative for back pain, muscle pain, weakness. Neurologic: Negative for headaches, dizziness, vertigo. Memory problems ? related to HE. Psychology: Negative for anxiety, depression. FAMILY HISTORY:  The father  of heart disease. The mother  of MVA but had elevated liver enzymes. There is no family history of liver disease. SOCIAL HISTORY:  The patient is . The patient has no children. The patient currently smokes 1 pack of tobacco daily. The patient has never consumed significant amounts of alcohol. 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 PHYSICAL EXAMINATION:  Visit Vitals    /61 (BP 1 Location: Left arm, BP Patient Position: Sitting)    Pulse 87    Temp 97.4 °F (36.3 °C) (Tympanic)    Resp 14    Ht 6' 1\" (1.854 m)    Wt 229 lb (103.9 kg)    SpO2 99%    BMI 30.21 kg/m2     General: No acute distress. Eyes: Sclera anicteric. ENT: No oral lesions. Thyroid normal.  Nodes: No adenopathy. Skin: No spider angiomata. No jaundice. No palmar erythema. Respiratory: Lungs clear to auscultation. Cardiovascular: Regular heart rate. No murmurs. No JVD.   Abdomen: Soft, mild tender to deep palpation of the LUQ, spleen tip palpable. Liver edge firm, easily palpable 2-3 CM BCM nontender. No obvious ascites. Extremities: Trace to 1+ edema. No muscle wasting. No gross arthritic changes. Neurologic: Alert and oriented. Cranial nerves grossly intact. No asterixis.     LABORATORY STUDIES:  [facility] [place] of [address] [place] [address] & Units 2/22/2018   WBC 3.4 - 10.8 x10E3/uL 5.0   ANC 1.4 - 7.0 x10E3/uL    HGB 13.0 - 17.7 g/dL 13.1    - 379 x10E3/uL 89 (LL)   INR 0.8 - 1.2 1.2   AST 0 - 40 IU/L 52 (H)   ALT 0 - 44 IU/L 32   Alk Phos 39 - 117 IU/L 106   Bili, Total 0.0 - 1.2 mg/dL 1.1   Bili, Direct 0.00 - 0.40 mg/dL 0.45 (H)   Albumin 3.5 - 5.5 g/dL 3.1 (L)   BUN 6 - 24 mg/dL 10   Creat 0.76 - 1.27 mg/dL 0.77   Na 134 - 144 mmol/L 138   K 3.5 - 5.2 mmol/L 4.3   Cl 96 - 106 mmol/L 108 (H)   CO2 18 - 29 mmol/L 20   Glucose 65 - 99 mg/dL 119 (H)   Ammonia 27 - 102 ug/dL 179 (HH)     [facility] [place] of [address] [place] [address] Ref Rng & Units 1/9/2018 9/25/2017   WBC 3.4 - 10.8 x10E3/uL 5.4 5.7   ANC 1.4 - 7.0 x10E3/uL     HGB 13.0 - 17.7 g/dL 12.7 (L) 13.0    - 379 x10E3/uL 106 (L) 107 (L)   INR 0.8 - 1.2 1.3 (H) 1.3 (H)   AST 0 - 40 IU/L 48 (H) 41 (H)   ALT 0 - 44 IU/L 33 32   Alk Phos 39 - 117 IU/L 107 111   Bili, Total 0.0 - 1.2 mg/dL 1.1 1.2   Bili, Direct 0.00 - 0.40 mg/dL 0.43 (H) 0.41 (H)   Albumin 3.5 - 5.5 g/dL 3.1 (L) 3.1 (L)   BUN 6 - 24 mg/dL 10 7   Creat 0.76 - 1.27 mg/dL 0.77 0.66 (L)   Na 134 - 144 mmol/L 142 143   K 3.5 - 5.2 mmol/L 4.0 4.0   Cl 96 - 106 mmol/L 107 (H) 107 (H)   CO2 18 - 29 mmol/L 21 25   Glucose 65 - 99 mg/dL 253 (H) 91   Ammonia 27 - 102 ug/dL CANCELED      [facility] [place] [facility] [address] [place] [address] Ref Rng & Units 6/6/2017   WBC 3.4 - 10.8 x10E3/uL 6.3   ANC 1.4 - 7.0 x10E3/uL 2.4   HGB 13.0 - 17.7 g/dL 11.2 (L)    - 379 x10E3/uL 112 (L)   INR 0.8 - 1.2    AST 0 - 40 IU/L 46 (H)   ALT 0 - 44 IU/L 29   Alk Phos 39 - 117 IU/L 103   Bili, Total 0.0 - 1.2 mg/dL 0.7   Bili, Direct 0.00 - 0.40 mg/dL 0.29   Albumin 3.5 - 5.5 g/dL 3.0 (L)   BUN 6 - 24 mg/dL 8   Creat 0.76 - 1.27 mg/dL 0.72 (L)   Na 134 - 144 mmol/L 144   K 3.5 - 5.2 mmol/L 4.3   Cl 96 - 106 mmol/L 108 (H)   CO2 18 - 29 mmol/L 24   Glucose 65 - 99 mg/dL 96   Ammonia 27 - 102 ug/dL      Cancer Screening Latest Ref Rng & Units 2/22/2018 1/9/2018 9/25/2017   AFP, Serum 0.0 - 8.0 ng/mL 4.3 3.8 3.4   AFP-L3% 0.0 - 9.9 % 8.8 8.0 Comment     Additional lab values drawn at today's office visit are pending at the time of documentation. SEROLOGIES:  5/2017. HBsurface antigen negative, anti-HBcore negative, anti-HBsurface negative, HCV RNA negative, iron saturation 29%, ASMA negative. Serologies Latest Ref Rng & Units 6/6/2017   Ferritin 30 - 400 ng/mL 39   Alpha-1 antitrypsin level 90 - 200 mg/dL 87 (L)     LIVER HISTOLOGY:  6/2017. [facility]. EkPa was 72.1. The results suggested a fibrosis level of F4. ENDOSCOPIC PROCEDURES:  5/2017. EGD by Dr Krystle Ruff. Esophageal varices. Banding performed. 8/2017. EGD performed by Dr Melchor Durbin. Small esophageal varices. No banding performed. No gastric varices. Mild portal gastropathy. Repeat in 6 months.  2/2018. EGD performed by Dr Melchor Durbin. Medium esophageal varices. Banding performed x 4. No gastric varices. Moderate portal gastropathy. 5/2018. EGD performed by Dr Melchor Durbin. No esophageal varices. No gastric varices. Moderate portal gastropathy. Repeat 5/2019. RADIOLOGY:  11/22/2017. US of abdomen. No liver mass/lesion. 5/2018. Ultrasound of liver. Echogenic consistent with cirrhosis. No liver mass lesions. No dilated bile ducts. No ascites. OTHER TESTING:  Not available or performed    ASSESSMENT AND PLAN:  Cryptogenic cirrhosis. Liver transaminases are variable/normal.  AST has been high. Alkaline phosphatase is normal.  Liver function is normal.  Serum albumin is slightly depressed.   The platelet count is depressed. The patient has stable but depressed liver function. The CTP is 7-8. Child class B. The MELD score is 10. This will be recalculated on the basis of his current lab studies. Patient is not presently in need of transplant evaluation. I have reviewed in detail the natural history and progression of cirrhosis and the goals of follow-up and monitoring studies. Esophageal varices with prior bleeding. Varices have been banded in the past. Dr Paresh Arroyo recently repeated EGD to assess for varices and need for banding in 5/2018 and recommends repeat procedure in 5/2019. We had stopped the beta-blocker to reduce the symptoms of fatigue, patient has not noted much improvement in this regard. He denies evidence of bleeding at this time. Minimal hepatic encephalopathy has developed over the course of the past year. I have discussed with him the relationship to bowel output and he will continue with lactulose to titrate a goal of 2+ easy to pass bowel movements daily. Edema and radiologic trace ascites. Patient's weight is up and there is defiinately trace to 1+ LE edema. I have reviewed the trace ascites on US today and he does complain of abdominal distention. I have given him new prescription for furosemide 20 mg and spironolactone 50 mg for management of fluid overloads and we will continue to monitor. I have also suggested a low-sodium diet as there appears to be significant room for change in this regard after discussing his dietary habits. DM. Patient was advised that he will need to reconnect with his PCP for alternative medication if metformin is not tolerable. I am concerned that some of his ongoing fatigue and stamina issues may be related to uncontrolled glucose levels. I have drawn a HGB A 1C and will forward this result to the patient and PCP for evaluation. COPD. Patient is inconsistent with inhaler use and continues to smoke 1 PPD.   I have asked him to discuss with PCP and stressed the importance of optimizing his other medical issues in the setting of his chronic liver disease. The patient was directed to continue all current medications at the current dosages. There are no contraindications for the patient to take any medications that are necessary for treatment of other medical issues. The patient was counseled regarding alcohol consumption. Thrombocytopenia secondary to cirrhosis. No treatment is needed. Anemia from chronic GI blood loss from portal hypertension and iron deficiency. Psat iron stores were adequate and we will continue to monitor CBC. Vaccination for viral hepatitis A and B is recommended since the patient has no serologic evidence of previous exposure or vaccination with immunity. [address] screening will continue to be performed. AFP was ordered today. Ultrasound will be scheduled again in 11/2018. All of the above issues were discussed with the patient. All questions were answered. The patient expressed a clear understanding of the above. [address] [place] [address] in 3-4 months for monitoring cirrhosis.     Jenna Pineda PA-C  [facility] [place] [address], [address] [place]  [address].  [phone]

## 2018-05-25 LAB
ALBUMIN SERPL-MCNC: 3.1 G/DL (ref 3.5–5.5)
ALP SERPL-CCNC: 145 IU/L (ref 39–117)
ALT SERPL-CCNC: 42 IU/L (ref 0–44)
AST SERPL-CCNC: 66 IU/L (ref 0–40)
BILIRUB DIRECT SERPL-MCNC: 0.48 MG/DL (ref 0–0.4)
BILIRUB SERPL-MCNC: 1.2 MG/DL (ref 0–1.2)
BUN SERPL-MCNC: 8 MG/DL (ref 6–24)
BUN/CREAT SERPL: 11 (ref 9–20)
CALCIUM SERPL-MCNC: 8.6 MG/DL (ref 8.7–10.2)
CHLORIDE SERPL-SCNC: 111 MMOL/L (ref 96–106)
CO2 SERPL-SCNC: 24 MMOL/L (ref 18–29)
CREAT SERPL-MCNC: 0.75 MG/DL (ref 0.76–1.27)
ERYTHROCYTE [DISTWIDTH] IN BLOOD BY AUTOMATED COUNT: 15.2 % (ref 12.3–15.4)
GFR SERPLBLD CREATININE-BSD FMLA CKD-EPI: 102 ML/MIN/1.73
GFR SERPLBLD CREATININE-BSD FMLA CKD-EPI: 118 ML/MIN/1.73
GLUCOSE SERPL-MCNC: 235 MG/DL (ref 65–99)
HCT VFR BLD AUTO: 37.7 % (ref 37.5–51)
HGB BLD-MCNC: 12.4 G/DL (ref 13–17.7)
INR PPP: 1.3 (ref 0.8–1.2)
MCH RBC QN AUTO: 33 PG (ref 26.6–33)
MCHC RBC AUTO-ENTMCNC: 32.9 G/DL (ref 31.5–35.7)
MCV RBC AUTO: 100 FL (ref 79–97)
MORPHOLOGY BLD-IMP: ABNORMAL
PLATELET # BLD AUTO: 93 X10E3/UL (ref 150–379)
POTASSIUM SERPL-SCNC: 4.6 MMOL/L (ref 3.5–5.2)
PROTHROMBIN TIME: 13.7 SEC (ref 9.1–12)
RBC # BLD AUTO: 3.76 X10E6/UL (ref 4.14–5.8)
SODIUM SERPL-SCNC: 144 MMOL/L (ref 134–144)
WBC # BLD AUTO: 5.1 X10E3/UL (ref 3.4–10.8)

## 2018-05-29 LAB
AFP L3 MFR SERPL: 9.9 % (ref 0–9.9)
AFP SERPL-MCNC: 3.9 NG/ML (ref 0–8)

## 2018-05-29 NOTE — PROGRESS NOTES
Pt notified of findings, stable liver and continued variation in glucose which he is following carefully. Follow-up as scheduled.

## 2018-09-12 ENCOUNTER — OFFICE VISIT (OUTPATIENT)
Dept: HEMATOLOGY | Age: 58
End: 2018-09-12

## 2018-09-12 VITALS
DIASTOLIC BLOOD PRESSURE: 58 MMHG | SYSTOLIC BLOOD PRESSURE: 128 MMHG | OXYGEN SATURATION: 98 % | WEIGHT: 224 LBS | BODY MASS INDEX: 29.55 KG/M2 | TEMPERATURE: 97.4 F | HEART RATE: 87 BPM

## 2018-09-12 DIAGNOSIS — K74.60 CIRRHOSIS OF LIVER WITHOUT ASCITES, UNSPECIFIED HEPATIC CIRRHOSIS TYPE (HCC): Primary | ICD-10-CM

## 2018-09-12 RX ORDER — FUROSEMIDE 20 MG/1
20 TABLET ORAL DAILY
Qty: 30 TAB | Refills: 5 | Status: SHIPPED | OUTPATIENT
Start: 2018-09-12 | End: 2019-02-22 | Stop reason: SDUPTHER

## 2018-09-12 RX ORDER — LACTULOSE 10 G/15ML
30 SOLUTION ORAL; RECTAL 2 TIMES DAILY
Qty: 1000 ML | Refills: 11 | Status: SHIPPED | OUTPATIENT
Start: 2018-09-12 | End: 2019-06-13 | Stop reason: DRUGHIGH

## 2018-09-12 RX ORDER — SPIRONOLACTONE 50 MG/1
50 TABLET, FILM COATED ORAL DAILY
Qty: 30 TAB | Refills: 5 | Status: SHIPPED | OUTPATIENT
Start: 2018-09-12 | End: 2019-02-22 | Stop reason: SDUPTHER

## 2018-09-12 NOTE — PROGRESS NOTES
1. Have you been to the ER, urgent care clinic since your last visit? Hospitalized since your last visit? No    2. Have you seen or consulted any other health care providers outside of the [address] [place] [address] since your last visit? Include any pap smears or colon screening. No     Chief Complaint   Patient presents with    Follow-up     Visit Vitals    /58 (BP 1 Location: Left arm, BP Patient Position: Sitting)    Pulse 87    Temp 97.4 °F (36.3 °C) (Tympanic)    Wt 224 lb (101.6 kg)    SpO2 98%    BMI 29.55 kg/m2     PHQ over the last two weeks 9/12/2018   Little interest or pleasure in doing things Several days   Feeling down, depressed, irritable, or hopeless Several days   Total Score PHQ 2 2     Learning Assessment 5/24/2018   PRIMARY LEARNER Patient   PRIMARY LANGUAGE ENGLISH   LEARNER PREFERENCE PRIMARY DEMONSTRATION   ANSWERED BY patient   RELATIONSHIP SELF     Abuse Screening Questionnaire 9/12/2018   Do you ever feel afraid of your partner? N   Are you in a relationship with someone who physically or mentally threatens you? N   Is it safe for you to go home?  Santos Gerardo

## 2018-09-12 NOTE — MR AVS SNAPSHOT
[address] [facility] Charan .[phone] [facility] 
[phone] Patient: Edith Moralez MRN: [id number] :1960 Visit Information Date & Time Provider Department Dept. Phone Encounter #  
 2018 10:00 AM Roger Pichardo, [address] [place] [address] [id number] Follow-up Instructions Return in about 3 months (around 2018) for Rahul Zheng. Your Appointments 2019 12:45 PM  
Follow Up with DASHA Titus 75 ([facility]) Appt Note: f/up 6 months; Follow up [address] [facility] .[phone] [facility] [address]  
[address] [facility] [address] Upcoming Health Maintenance Date Due  
 LIPID PANEL Q1 1960 FOOT EXAM Q1 3/25/1971 MICROALBUMIN Q1 3/25/1971 EYE EXAM RETINAL OR DILATED Q1 3/25/1971 Pneumococcal 19-64 Medium Risk (1 of 1 - PPSV23) 3/25/1980 DTaP/Tdap/Td series (1 - Tdap) 3/25/1982 FOBT Q 1 YEAR AGE 50-75 3/25/2011 Influenza Age 5 to Adult 2018 HEMOGLOBIN A1C Q6M 2018 Allergies as of 2018  Review Complete On: 2018 By: Pieter Villanueva Severity Noted Reaction Type Reactions Shellfish Derived High 2017    Hives Current Immunizations  Never Reviewed No immunizations on file. Not reviewed this visit You Were Diagnosed With   
  
 Codes Comments Cirrhosis of liver without ascites, unspecified hepatic cirrhosis type ([facility] [address]    -  Primary ICD-10-CM: K74.60 ICD-9-CM: 571.5 Vitals BP Pulse Temp Weight(growth percentile) SpO2 BMI  
 128/58 (BP 1 Location: Left arm, BP Patient Position: Sitting) 87 97.4 °F (36.3 °C) (Tympanic) 224 lb (101.6 kg) 98% 29.55 kg/m2 Smoking Status Current Every Day Smoker BMI and BSA Data Body Mass Index Body Surface Area  
 29.55 kg/m 2 2.29 m 2 Preferred Pharmacy Pharmacy Name Phone [facility], [address] [place] [address] [phone] Your Updated Medication List  
  
   
This list is accurate as of 9/12/18 10:44 AM.  Always use your most recent med list.  
  
  
  
  
 ALPRAZolam 0.5 mg tablet Commonly known as:  Micheal Seagraves Take 0.5 mg by mouth Before breakfast, lunch, and dinner. furosemide 20 mg tablet Commonly known as:  LASIX Take 1 Tab by mouth daily. lactulose 10 gram/15 mL solution Commonly known as:  Kang Quiver Take 45 mL by mouth two (2) times a day. spironolactone 50 mg tablet Commonly known as:  ALDACTONE Take 1 Tab by mouth daily. VENTOLIN HFA 90 mcg/actuation inhaler Generic drug:  albuterol Take 2 Puffs by mouth every six (6) hours as needed. VITAMIN D3 2,000 unit Tab Generic drug:  cholecalciferol (vitamin D3) Take  by mouth. Prescriptions Sent to Pharmacy Refills  
 lactulose (CHRONULAC) 10 gram/15 mL solution 11 Sig: Take 45 mL by mouth two (2) times a day. Class: Normal  
 Pharmacy: [facility] [address], [place] [address] Ph #: [phone] Route: Oral  
 furosemide (LASIX) 20 mg tablet 5 Sig: Take 1 Tab by mouth daily. Class: Normal  
 Pharmacy: [facility] [address], [place] [address] Ph #: [phone] Route: Oral  
 spironolactone (ALDACTONE) 50 mg tablet 5 Sig: Take 1 Tab by mouth daily. Class: Normal  
 Pharmacy: [facility] [address], [place] [address] Ph #: [phone] Route: Oral  
  
We Performed the Following AFP WITH AFP-L3% [[id number] Custom] CBC W/O DIFF [54723 CPT(R)] HEPATIC FUNCTION PANEL (6) [[id number] Custom] METABOLIC PANEL, BASIC [12937 CPT(R)] PROTHROMBIN TIME + INR [55613 CPT(R)] Follow-up Instructions Return in about 3 months (around 12/12/2018) for Uma Valentin. To-Do List   
 12/12/2018 Imaging:  US ABD COMP Introducing [facility] & HEALTH SERVICES! [facility] introduces [employer] patient portal. Now you can access parts of your medical record, email your doctor's office, and request medication refills online. 1. In your internet browser, go to [email or web address] 2. Click on the First Time User? Click Here link in the Sign In box. You will see the New Member Sign Up page. 3. Enter your [employer] Access Code exactly as it appears below. You will not need to use this code after youve completed the sign-up process. If you do not sign up before the expiration date, you must request a new code. · [employer] Access Code: [email or web address] Expires: 12/11/2018 10:44 AM 
 
4. Enter the last four digits of your Social Security Number (xxxx) and Date of Birth (mm/dd/yyyy) as indicated and click Submit. You will be taken to the next sign-up page. 5. Create a [employer] ID. This will be your [employer] login ID and cannot be changed, so think of one that is secure and easy to remember. 6. Create a [employer] password. You can change your password at any time. 7. Enter your Password Reset Question and Answer. This can be used at a later time if you forget your password. 8. Enter your e-mail address. You will receive e-mail notification when new information is available in [address] 9. Click Sign Up. You can now view and download portions of your medical record. 10. Click the Download Summary menu link to download a portable copy of your medical information. If you have questions, please visit the Frequently Asked Questions section of the [employer] website. Remember, [employer] is NOT to be used for urgent needs. For medical emergencies, dial 911. Now available from your iPhone and Android! Please provide this summary of care documentation to your next provider. Your primary care clinician is listed as Yusra Kimball. If you have any questions after today's visit, please call [phone].

## 2018-09-12 NOTE — PROGRESS NOTES
[address] [facility] MD Дмитрий, FACP, KATELYNN Sandy PA-C Valri Hughs, NP        at [address], [address] [address]     [phone]     FAX: [phone]    at [address], [address]     [phone]     FAX: [phone]       Patient Care Team:  Mariaa Mendoza MD as PCP - General (Family Practice)  Evelyne Soriano MD (Gastroenterology)      Problem List  Date Reviewed: 5/24/2018          Codes Class Noted    Cirrhosis of liver without ascites [facility]) ICD-10-CM: K74.60  ICD-9-CM: 571.5  6/6/2017        Esophageal varices ([facility] ICD-10-CM: I85.00  ICD-9-CM: 456.1  6/6/2017        Diabetes mellitus ([facility] ICD-10-CM: E11.9  ICD-9-CM: 250.00  8/9/6036        H/O umbilical hernia repair INB-42-RITA: Z98.890, Z87.19  ICD-9-CM: V15.29  6/6/2017        S/P appendectomy ICD-10-CM: Z90.49  ICD-9-CM: V45.89  6/6/2017              Li Elias returns to the [address] for management of cryptogenic cirrhosis. The active problem list, all pertinent past medical history, medications, endoscopic studies, radiologic findings and laboratory findings related to the liver disorder were reviewed with the patient. The patient is a 63 y.o.  male who was first found to have chronic liver disease and cirrhosis in 5/2017 when he developed variceal bleeding, he has undergone additional banding on several occasions. Last EGD in 5/2018 showed no additional varices and no additional bands were placed. He has had no additional bleeding signs. Assessment of liver fibrosis was performed with Fibroscan. The result was 72.1 kPa which correlates with cirrhosis. Patient has had last routine screening ultrasound of the liver 5/2018 and was found to have trace ascites around the liver.       At his last office visit, he had developed ongoing recent swelling of the LE. We had started him on 1/2 step diuretics and he has responded well to these medications  He has had much less swelling and is continuing to wear compression hose. Patient has had increase in cramping symptoms and this is his greatest ongoing complaint in addition to ongoing fatigue. The patient has also developed early hepatic encephalopathy signs over the course of the past year. He has had some increased confusion, poor memory and difficulty with focus. He reports some issues with day-night reversal and insomnia. Earlier this year, I had started him on a new prescription for lactulose. He is taking between 30-45 mL daily. Patient has had looser stool with output of 1+bowel movements daily. He is still having some issues with memory and concentration. The most recent laboratory studies indicate that the liver transaminases are normal, ALP is normal, tests of hepatic synthetic and metabolic function are normal (slight reduction in albumin), and the platelet count is depressed. The patient notes fatigue, and weakness. The patient has limitations in functional activities secondary to these symptoms and has applied for disability. The patient has not experienced problems concentrating, swelling of the abdomen, swelling of the lower extremities, hematemesis, hematochezia. Of note, patient discontinued use of metformin as of ~10/2017 as he attributed abdominal discomfort and GI upset to ongoing use of this medication. He states that his GI symptoms have been better, he has not been checking his glucose levels and reports that he is soon to see his PCP for additional evaluation. Also of note, patient is now without health insurance and is concerned regarding future coverage as he waits for disability determination.     ALLERGIES  Allergies   Allergen Reactions    Shellfish Derived Hives       MEDICATIONS  Current Outpatient Prescriptions   Medication Sig    lactulose (CHRONULAC) 10 gram/15 mL solution Take 45 mL by mouth two (2) times a day.  furosemide (LASIX) 20 mg tablet Take 1 Tab by mouth daily.  spironolactone (ALDACTONE) 50 mg tablet Take 1 Tab by mouth daily.  cholecalciferol, vitamin D3, (VITAMIN D3) 2,000 unit tab Take  by mouth.  VENTOLIN HFA 90 mcg/actuation inhaler Take 2 Puffs by mouth every six (6) hours as needed.  ALPRAZolam (XANAX) 0.5 mg tablet Take 0.5 mg by mouth Before breakfast, lunch, and dinner. No current facility-administered medications for this visit. SYSTEM REVIEW NOT RELATED TO LIVER DISEASE OR REVIEWED ABOVE:  Constitution systems: Negative for fever, chills. Weight loss of 5 # in the past 3 months. Eyes: Negative for visual changes. ENT: Negative for sore throat, painful swallowing. Respiratory: Negative for cough, hemoptysis. Positive for SOB, patient is inconsistent with administration of Ventolin. Cardiology: Negative for chest pain, palpitations. GI:  Negative for constipation or diarrhea. Occasional complaint of RUQ dull achy bruise feeling, helped with changes in position. Occasional post-prandial nausea  : Negative for urinary frequency, dysuria, hematuria, nocturia. Skin: Negative for rash. Hematology: Negative for easy bruising, blood clots. Musculo-skeletal: Negative for back pain, muscle pain, weakness. Neurologic: Negative for headaches, dizziness, vertigo. Memory problems ? related to HE. Psychology: Negative for anxiety, depression. FAMILY HISTORY:  The father  of heart disease. The mother  of MVA but had elevated liver enzymes. There is no family history of liver disease. SOCIAL HISTORY:  The patient is . The patient has no children. The patient currently smokes 1 pack of tobacco daily. The patient has never consumed significant amounts of alcohol.     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 PHYSICAL EXAMINATION:  Visit Vitals    /58 (BP 1 Location: Left arm, BP Patient Position: Sitting)    Pulse 87    Temp 97.4 °F (36.3 °C) (Tympanic)    Wt 224 lb (101.6 kg)    SpO2 98%    BMI 29.55 kg/m2     General: No acute distress. Eyes: Sclera anicteric. ENT: No oral lesions. Thyroid normal.  Nodes: No adenopathy. Skin: No spider angiomata. No jaundice. Positive for palmar erythema. Respiratory: Lungs clear to auscultation. Cardiovascular: Regular heart rate. No murmurs. No JVD. Abdomen: Soft, mild tender to deep palpation of the LUQ, spleen tip palpable. Liver edge firm, easily palpable 2-3 CM BCM nontender. No obvious ascites. Extremities: Trace edema. No muscle wasting. No gross arthritic changes. Neurologic: Alert and oriented. Cranial nerves grossly intact. Fine asterixis. LABORATORY STUDIES:  [facility] [place] of [address] Units 5/24/2018 2/22/2018   WBC 3.4 - 10.8 x10E3/uL 5.1 5.0   ANC 1.4 - 7.0 x10E3/uL     HGB 13.0 - 17.7 g/dL 12.4 (L) 13.1    - 379 x10E3/uL 93 (LL) 89 (LL)   INR 0.8 - 1.2 1.3 (H) 1.2   AST 0 - 40 IU/L 66 (H) 52 (H)   ALT 0 - 44 IU/L 42 32   Alk Phos 39 - 117 IU/L 145 (H) 106   Bili, Total 0.0 - 1.2 mg/dL 1.2 1.1   Bili, Direct 0.00 - 0.40 mg/dL 0.48 (H) 0.45 (H)   Albumin 3.5 - 5.5 g/dL 3.1 (L) 3.1 (L)   BUN 6 - 24 mg/dL 8 10   Creat 0.76 - 1.27 mg/dL 0.75 (L) 0.77   Na 134 - 144 mmol/L 144 138   K 3.5 - 5.2 mmol/L 4.6 4.3   Cl 96 - 106 mmol/L 111 (H) 108 (H)   CO2 18 - 29 mmol/L 24 20   Glucose 65 - 99 mg/dL 235 (H) 119 (H)   Ammonia 27 - 102 ug/dL  179 (HH)     Cancer Screening Latest Ref Rng & Units 5/24/2018 2/22/2018 1/9/2018   AFP, Serum 0.0 - 8.0 ng/mL 3.9 4.3 3.8   AFP-L3% 0.0 - 9.9 % 9.9 8.8 8.0   Additional lab values drawn at today's office visit are pending at the time of documentation. SEROLOGIES:  5/2017.   HBsurface antigen negative, anti-HBcore negative, anti-HBsurface negative, HCV RNA negative, iron saturation 29%, ASMA negative. Serologies Latest Ref Rng & Units 6/6/2017   Ferritin 30 - 400 ng/mL 39   Alpha-1 antitrypsin level 90 - 200 mg/dL 87 (L)     LIVER HISTOLOGY:  6/2017. [facility]. EkPa was 72.1. The results suggested a fibrosis level of F4. ENDOSCOPIC PROCEDURES:  5/2017. EGD by Dr Lauren Martinez. Esophageal varices. Banding performed. 8/2017. EGD performed by Dr Veronica Jerry. Small esophageal varices. No banding performed. No gastric varices. Mild portal gastropathy. Repeat in 6 months.  2/2018. EGD performed by Dr Veronica Jerry. Medium esophageal varices. Banding performed x 4. No gastric varices. Moderate portal gastropathy. 5/2018. EGD performed by Dr Veronica Jerry. No esophageal varices. No gastric varices. Moderate portal gastropathy. Repeat 5/2019. RADIOLOGY:  11/22/2017. US of abdomen. No liver mass/lesion. 5/2018. Ultrasound of liver. Echogenic consistent with cirrhosis. No liver mass lesions. No dilated bile ducts. No ascites. OTHER TESTING:  Not available or performed    ASSESSMENT AND PLAN:  Cryptogenic cirrhosis. Liver transaminases are variable/normal.  AST has been high. Alkaline phosphatase is normal.  Liver function is normal.  Serum albumin is slightly depressed. The platelet count is depressed. The patient has stable but depressed liver function. The CTP is 7-8. Child class B. The MELD score is 10. This will be recalculated on the basis of his current lab studies. Patient is not presently in need of transplant evaluation. I have reviewed in detail the natural history and progression of cirrhosis and the goals of follow-up and monitoring studies. Esophageal varices with prior bleeding. Varices have been banded in the past. Dr Veronica Jerry recently repeated EGD to assess for varices and need for banding in 5/2018 and recommends repeat procedure in 5/2019.   We had stopped the beta-blocker to reduce the symptoms of fatigue, patient has not noted much improvement in this regard. He denies evidence of bleeding at this time. Minimal hepatic encephalopathy has developed over the course of the past year. I have discussed with him the relationship to bowel output and he will continue with lactulose to titrate a goal of 2+ easy to pass bowel movements daily. He has not been having anywhere near this amount of output. I have asked him to really target this as a goal as I think that some of his mentation, somnolence, muscular pain/cramping may be due to elevation in ammonia levels. Edema and radiologic trace ascites. Patient's weight is down and he has responded well to 1/2 step diuretics. I will have him continue with this administration and continue to watch his intake of sodium. Will monitor his electrolytes for assessment of his cramping and I have advised him to take a trial of tonic water containing quinine. DM. Patient was advised that he will need to reconnect with his PCP for monitoring. He has not been on any medication for management of DM, he is due to follow with his PCP in the near future. COPD. Patient is inconsistent with inhaler use and continues to smoke 1 PPD. I have asked him to discuss with PCP and stressed the importance of optimizing his other medical issues in the setting of his chronic liver disease. The patient was directed to continue all current medications at the current dosages. There are no contraindications for the patient to take any medications that are necessary for treatment of other medical issues. The patient was counseled regarding alcohol consumption. Thrombocytopenia secondary to cirrhosis. No treatment is needed. Anemia from chronic GI blood loss from portal hypertension and iron deficiency. Past iron stores were adequate and we will continue to monitor CBC.     Vaccination for viral hepatitis A and B is recommended since the patient has no serologic evidence of previous exposure or vaccination with immunity. [address] screening will continue to be performed. AFP was ordered today. Ultrasound will be scheduled again in 12/2018 in conjunction with his next office visit. All of the above issues were discussed with the patient. All questions were answered. The patient expressed a clear understanding of the above. [address] [place] [address] in 3 months with same day US of the liver.      Jacques Squires PA-C  [facility] [place] [address], [address] [place]  [address].  [phone]

## 2018-09-13 LAB
AFP L3 MFR SERPL: 8.4 % (ref 0–9.9)
AFP SERPL-MCNC: 4.3 NG/ML (ref 0–8)
ALBUMIN SERPL-MCNC: 3 G/DL (ref 3.5–5.5)
ALP SERPL-CCNC: 145 IU/L (ref 39–117)
ALT SERPL-CCNC: 39 IU/L (ref 0–44)
AST SERPL-CCNC: 65 IU/L (ref 0–40)
BILIRUB DIRECT SERPL-MCNC: 0.6 MG/DL (ref 0–0.4)
BILIRUB SERPL-MCNC: 2.1 MG/DL (ref 0–1.2)
BUN SERPL-MCNC: 9 MG/DL (ref 6–24)
BUN/CREAT SERPL: 11 (ref 9–20)
CALCIUM SERPL-MCNC: 8.6 MG/DL (ref 8.7–10.2)
CHLORIDE SERPL-SCNC: 107 MMOL/L (ref 96–106)
CO2 SERPL-SCNC: 20 MMOL/L (ref 20–29)
CREAT SERPL-MCNC: 0.81 MG/DL (ref 0.76–1.27)
ERYTHROCYTE [DISTWIDTH] IN BLOOD BY AUTOMATED COUNT: 14.8 % (ref 12.3–15.4)
GLUCOSE SERPL-MCNC: 118 MG/DL (ref 65–99)
HCT VFR BLD AUTO: 39.6 % (ref 37.5–51)
HGB BLD-MCNC: 13.3 G/DL (ref 13–17.7)
INR PPP: 1.4 (ref 0.8–1.2)
MCH RBC QN AUTO: 33.7 PG (ref 26.6–33)
MCHC RBC AUTO-ENTMCNC: 33.6 G/DL (ref 31.5–35.7)
MCV RBC AUTO: 100 FL (ref 79–97)
MORPHOLOGY BLD-IMP: ABNORMAL
PLATELET # BLD AUTO: 88 X10E3/UL (ref 150–379)
POTASSIUM SERPL-SCNC: 4.1 MMOL/L (ref 3.5–5.2)
PROTHROMBIN TIME: 14 SEC (ref 9.1–12)
RBC # BLD AUTO: 3.95 X10E6/UL (ref 4.14–5.8)
SODIUM SERPL-SCNC: 141 MMOL/L (ref 134–144)
WBC # BLD AUTO: 5.2 X10E3/UL (ref 3.4–10.8)

## 2018-09-17 DIAGNOSIS — K74.60 CIRRHOSIS OF LIVER WITHOUT ASCITES, UNSPECIFIED HEPATIC CIRRHOSIS TYPE (HCC): Primary | ICD-10-CM

## 2018-09-17 NOTE — PROGRESS NOTES
TC placed to pt LVM. Labs stable with exception of bili - new calculated MELD is 13. plan repeat labs locally in one month, lab req sent to patient.

## 2018-10-26 ENCOUNTER — OP HISTORICAL/CONVERTED ENCOUNTER (OUTPATIENT)
Dept: OTHER | Age: 58
End: 2018-10-26

## 2018-12-31 ENCOUNTER — TELEPHONE (OUTPATIENT)
Dept: HEMATOLOGY | Age: 58
End: 2018-12-31

## 2019-01-01 ENCOUNTER — APPOINTMENT (OUTPATIENT)
Dept: MAMMOGRAPHY | Age: 59
End: 2019-01-01
Attending: INTERNAL MEDICINE
Payer: MEDICARE

## 2019-01-01 ENCOUNTER — TELEPHONE (OUTPATIENT)
Dept: HEMATOLOGY | Age: 59
End: 2019-01-01

## 2019-01-01 ENCOUNTER — OFFICE VISIT (OUTPATIENT)
Dept: HEMATOLOGY | Age: 59
End: 2019-01-01

## 2019-01-01 ENCOUNTER — HOSPITAL ENCOUNTER (OUTPATIENT)
Dept: MRI IMAGING | Age: 59
Discharge: HOME | End: 2019-11-08
Attending: INTERNAL MEDICINE
Payer: MEDICARE

## 2019-01-01 ENCOUNTER — HOSPITAL ENCOUNTER (OUTPATIENT)
Dept: PULMONOLOGY | Age: 59
Discharge: HOME | End: 2019-10-29
Attending: INTERNAL MEDICINE
Payer: MEDICARE

## 2019-01-01 ENCOUNTER — HOSPITAL ENCOUNTER (OUTPATIENT)
Dept: MAMMOGRAPHY | Age: 59
Discharge: HOME | End: 2019-11-08
Attending: INTERNAL MEDICINE
Payer: MEDICARE

## 2019-01-01 ENCOUNTER — HOSPITAL ENCOUNTER (OUTPATIENT)
Dept: NON INVASIVE DIAGNOSTICS | Age: 59
Discharge: HOME | End: 2019-11-12
Attending: INTERNAL MEDICINE
Payer: MEDICARE

## 2019-01-01 ENCOUNTER — HOSPITAL ENCOUNTER (OUTPATIENT)
Dept: NUCLEAR MEDICINE | Age: 59
Discharge: HOME | End: 2019-11-12
Attending: INTERNAL MEDICINE
Payer: MEDICARE

## 2019-01-01 VITALS
TEMPERATURE: 99 F | HEIGHT: 73 IN | WEIGHT: 198.8 LBS | SYSTOLIC BLOOD PRESSURE: 115 MMHG | HEART RATE: 77 BPM | BODY MASS INDEX: 26.35 KG/M2 | DIASTOLIC BLOOD PRESSURE: 51 MMHG | OXYGEN SATURATION: 99 %

## 2019-01-01 VITALS
DIASTOLIC BLOOD PRESSURE: 56 MMHG | OXYGEN SATURATION: 99 % | WEIGHT: 183 LBS | SYSTOLIC BLOOD PRESSURE: 117 MMHG | HEIGHT: 73 IN | BODY MASS INDEX: 24.25 KG/M2 | HEART RATE: 80 BPM | RESPIRATION RATE: 16 BRPM | TEMPERATURE: 97.8 F

## 2019-01-01 VITALS
SYSTOLIC BLOOD PRESSURE: 132 MMHG | DIASTOLIC BLOOD PRESSURE: 57 MMHG | TEMPERATURE: 97.4 F | HEIGHT: 73 IN | HEART RATE: 81 BPM | BODY MASS INDEX: 26.24 KG/M2 | RESPIRATION RATE: 16 BRPM | OXYGEN SATURATION: 99 % | WEIGHT: 198 LBS

## 2019-01-01 VITALS — BODY MASS INDEX: 24.25 KG/M2 | WEIGHT: 183 LBS | HEIGHT: 73 IN

## 2019-01-01 DIAGNOSIS — K74.60 CIRRHOSIS OF LIVER WITHOUT ASCITES, UNSPECIFIED HEPATIC CIRRHOSIS TYPE (HCC): ICD-10-CM

## 2019-01-01 DIAGNOSIS — R35.1 NOCTURIA: ICD-10-CM

## 2019-01-01 DIAGNOSIS — Z01.810 ENCOUNTER FOR PREPROCEDURAL CARDIOVASCULAR EXAMINATION: ICD-10-CM

## 2019-01-01 DIAGNOSIS — K70.30 ALCOHOLIC CIRRHOSIS OF LIVER WITHOUT ASCITES (HCC): Primary | ICD-10-CM

## 2019-01-01 DIAGNOSIS — K74.60 CIRRHOSIS OF LIVER WITHOUT ASCITES, UNSPECIFIED HEPATIC CIRRHOSIS TYPE (HCC): Primary | ICD-10-CM

## 2019-01-01 DIAGNOSIS — M85.9 DISORDER OF BONE DENSITY AND STRUCTURE, UNSPECIFIED: ICD-10-CM

## 2019-01-01 LAB
ABO GROUP BLD: NORMAL
AFP L3 MFR SERPL: 9.9 % (ref 0–9.9)
AFP L3 MFR SERPL: NORMAL % (ref 0–9.9)
AFP SERPL-MCNC: 2.5 NG/ML (ref 0–8)
AFP SERPL-MCNC: 3.6 NG/ML (ref 0–8)
ALBUMIN SERPL-MCNC: 2.7 G/DL (ref 3.5–5.5)
ALBUMIN SERPL-MCNC: 2.7 G/DL (ref 3.5–5.5)
ALBUMIN SERPL-MCNC: 3.4 G/DL (ref 3.5–5.5)
ALP SERPL-CCNC: 138 IU/L (ref 39–117)
ALP SERPL-CCNC: 150 IU/L (ref 39–117)
ALP SERPL-CCNC: 151 IU/L (ref 39–117)
ALT SERPL-CCNC: 32 IU/L (ref 0–44)
ALT SERPL-CCNC: 33 IU/L (ref 0–44)
ALT SERPL-CCNC: 39 IU/L (ref 0–44)
ARTERIAL PATENCY WRIST A: YES
ARTERIAL PATENCY WRIST A: YES
AST SERPL-CCNC: 53 IU/L (ref 0–40)
AST SERPL-CCNC: 54 IU/L (ref 0–40)
AST SERPL-CCNC: 59 IU/L (ref 0–40)
BASE DEFICIT BLD-SCNC: 1 MMOL/L
BASE EXCESS BLD CALC-SCNC: 1 MMOL/L
BASOPHILS # BLD AUTO: 0 X10E3/UL (ref 0–0.2)
BASOPHILS # BLD AUTO: 0 X10E3/UL (ref 0–0.2)
BASOPHILS # BLD AUTO: 0.1 X10E3/UL (ref 0–0.2)
BASOPHILS NFR BLD AUTO: 1 %
BDY SITE: ABNORMAL
BDY SITE: NORMAL
BILIRUB DIRECT SERPL-MCNC: 0.9 MG/DL (ref 0–0.4)
BILIRUB DIRECT SERPL-MCNC: 0.95 MG/DL (ref 0–0.4)
BILIRUB DIRECT SERPL-MCNC: 1.03 MG/DL (ref 0–0.4)
BILIRUB SERPL-MCNC: 2.2 MG/DL (ref 0–1.2)
BILIRUB SERPL-MCNC: 2.3 MG/DL (ref 0–1.2)
BILIRUB SERPL-MCNC: 2.9 MG/DL (ref 0–1.2)
BUN SERPL-MCNC: 10 MG/DL (ref 6–24)
BUN SERPL-MCNC: 11 MG/DL (ref 6–24)
BUN SERPL-MCNC: 12 MG/DL (ref 6–24)
BUN/CREAT SERPL: 11 (ref 9–20)
BUN/CREAT SERPL: 12 (ref 9–20)
BUN/CREAT SERPL: 14 (ref 9–20)
CALCIUM SERPL-MCNC: 10.3 MG/DL (ref 8.7–10.2)
CALCIUM SERPL-MCNC: 8.4 MG/DL (ref 8.7–10.2)
CALCIUM SERPL-MCNC: 8.5 MG/DL (ref 8.7–10.2)
CHLORIDE SERPL-SCNC: 106 MMOL/L (ref 96–106)
CHLORIDE SERPL-SCNC: 107 MMOL/L (ref 96–106)
CHLORIDE SERPL-SCNC: 108 MMOL/L (ref 96–106)
CMV IGG SERPL IA-ACNC: 1.7 U/ML (ref 0–0.59)
CMV IGM SERPL IA-ACNC: <30 AU/ML (ref 0–29.9)
CO2 SERPL-SCNC: 22 MMOL/L (ref 20–29)
CO2 SERPL-SCNC: 23 MMOL/L (ref 20–29)
CO2 SERPL-SCNC: 26 MMOL/L (ref 20–29)
CREAT SERPL-MCNC: 0.81 MG/DL (ref 0.76–1.27)
CREAT SERPL-MCNC: 0.87 MG/DL (ref 0.76–1.27)
CREAT SERPL-MCNC: 0.98 MG/DL (ref 0.76–1.27)
EBV EA IGG SER-ACNC: <9 U/ML (ref 0–8.9)
EBV NA IGG SER IA-ACNC: >600 U/ML (ref 0–17.9)
EBV VCA IGG SER IA-ACNC: >600 U/ML (ref 0–17.9)
EBV VCA IGM SER IA-ACNC: <36 U/ML (ref 0–35.9)
EOSINOPHIL # BLD AUTO: 0.4 X10E3/UL (ref 0–0.4)
EOSINOPHIL NFR BLD AUTO: 7 %
ERYTHROCYTE [DISTWIDTH] IN BLOOD BY AUTOMATED COUNT: 14 % (ref 12.3–15.4)
ERYTHROCYTE [DISTWIDTH] IN BLOOD BY AUTOMATED COUNT: 14.6 % (ref 12.3–15.4)
ERYTHROCYTE [DISTWIDTH] IN BLOOD BY AUTOMATED COUNT: 15.7 % (ref 12.3–15.4)
ETHANOL BLD GC-MCNC: NEGATIVE %
GAMMA INTERFERON BACKGROUND BLD IA-ACNC: 0.03 IU/ML
GAS FLOW.O2 O2 DELIVERY SYS: ABNORMAL L/MIN
GAS FLOW.O2 O2 DELIVERY SYS: NORMAL L/MIN
GAS FLOW.O2 SETTING OXYMISER: 12 L/M
GLUCOSE SERPL-MCNC: 145 MG/DL (ref 65–99)
GLUCOSE SERPL-MCNC: 170 MG/DL (ref 65–99)
GLUCOSE SERPL-MCNC: 73 MG/DL (ref 65–99)
HCO3 BLD-SCNC: 23.3 MMOL/L (ref 22–26)
HCO3 BLD-SCNC: 25.4 MMOL/L (ref 22–26)
HCT VFR BLD AUTO: 32.3 % (ref 37.5–51)
HCT VFR BLD AUTO: 32.8 % (ref 37.5–51)
HCT VFR BLD AUTO: 34.1 % (ref 37.5–51)
HGB BLD-MCNC: 11.3 G/DL (ref 13–17.7)
HGB BLD-MCNC: 11.4 G/DL (ref 13–17.7)
HGB BLD-MCNC: 12.4 G/DL (ref 13–17.7)
HIV 1+2 AB+HIV1 P24 AG SERPL QL IA: NON REACTIVE
IMM GRANULOCYTES # BLD AUTO: 0 X10E3/UL (ref 0–0.1)
IMM GRANULOCYTES NFR BLD AUTO: 0 %
INR PPP: 1.4 (ref 0.8–1.2)
INR PPP: 1.4 (ref 0.8–1.2)
INR PPP: 1.5 (ref 0.8–1.2)
LYMPHOCYTES # BLD AUTO: 1.5 X10E3/UL (ref 0.7–3.1)
LYMPHOCYTES # BLD AUTO: 1.6 X10E3/UL (ref 0.7–3.1)
LYMPHOCYTES # BLD AUTO: 1.7 X10E3/UL (ref 0.7–3.1)
LYMPHOCYTES NFR BLD AUTO: 27 %
LYMPHOCYTES NFR BLD AUTO: 29 %
LYMPHOCYTES NFR BLD AUTO: 29 %
M TB IFN-G BLD-IMP: NEGATIVE
M TB IFN-G CD4+ BCKGRND COR BLD-ACNC: 0.04 IU/ML
MCH RBC QN AUTO: 34 PG (ref 26.6–33)
MCH RBC QN AUTO: 34.3 PG (ref 26.6–33)
MCH RBC QN AUTO: 34.7 PG (ref 26.6–33)
MCHC RBC AUTO-ENTMCNC: 34.5 G/DL (ref 31.5–35.7)
MCHC RBC AUTO-ENTMCNC: 35.3 G/DL (ref 31.5–35.7)
MCHC RBC AUTO-ENTMCNC: 36.4 G/DL (ref 31.5–35.7)
MCV RBC AUTO: 95 FL (ref 79–97)
MCV RBC AUTO: 98 FL (ref 79–97)
MCV RBC AUTO: 99 FL (ref 79–97)
MITOGEN IGNF BLD-ACNC: >10 IU/ML
MONOCYTES # BLD AUTO: 0.7 X10E3/UL (ref 0.1–0.9)
MONOCYTES # BLD AUTO: 0.8 X10E3/UL (ref 0.1–0.9)
MONOCYTES # BLD AUTO: 0.9 X10E3/UL (ref 0.1–0.9)
MONOCYTES NFR BLD AUTO: 12 %
MONOCYTES NFR BLD AUTO: 14 %
MONOCYTES NFR BLD AUTO: 16 %
MORPHOLOGY BLD-IMP: ABNORMAL
NEUTROPHILS # BLD AUTO: 2.6 X10E3/UL (ref 1.4–7)
NEUTROPHILS # BLD AUTO: 2.9 X10E3/UL (ref 1.4–7)
NEUTROPHILS # BLD AUTO: 2.9 X10E3/UL (ref 1.4–7)
NEUTROPHILS NFR BLD AUTO: 47 %
NEUTROPHILS NFR BLD AUTO: 51 %
NEUTROPHILS NFR BLD AUTO: 51 %
O2/TOTAL GAS SETTING VFR VENT: 100 %
O2/TOTAL GAS SETTING VFR VENT: 21 %
PCO2 BLD: 35.5 MMHG (ref 35–45)
PCO2 BLD: 39.7 MMHG (ref 35–45)
PH BLD: 7.41 [PH] (ref 7.35–7.45)
PH BLD: 7.43 [PH] (ref 7.35–7.45)
PLATELET # BLD AUTO: 101 X10E3/UL (ref 150–450)
PLATELET # BLD AUTO: 74 X10E3/UL (ref 150–450)
PLATELET # BLD AUTO: 77 X10E3/UL (ref 150–450)
PO2 BLD: 125 MMHG (ref 80–100)
PO2 BLD: 83 MMHG (ref 80–100)
POTASSIUM SERPL-SCNC: 4 MMOL/L (ref 3.5–5.2)
POTASSIUM SERPL-SCNC: 4.5 MMOL/L (ref 3.5–5.2)
POTASSIUM SERPL-SCNC: 4.7 MMOL/L (ref 3.5–5.2)
PROT SERPL-MCNC: 5.3 G/DL (ref 6–8.5)
PROT SERPL-MCNC: 5.4 G/DL (ref 6–8.5)
PROT SERPL-MCNC: 5.9 G/DL (ref 6–8.5)
PROTHROMBIN TIME: 14.3 SEC (ref 9.1–12)
PROTHROMBIN TIME: 14.7 SEC (ref 9.1–12)
PROTHROMBIN TIME: 14.8 SEC (ref 9.1–12)
PSA SERPL-MCNC: 0.1 NG/ML (ref 0–4)
QUANTIFERON INCUBATION, QF1T: NORMAL
QUANTIFERON TB2 AG: 0.04 IU/ML
RBC # BLD AUTO: 3.29 X10E6/UL (ref 4.14–5.8)
RBC # BLD AUTO: 3.32 X10E6/UL (ref 4.14–5.8)
RBC # BLD AUTO: 3.61 X10E6/UL (ref 4.14–5.8)
RH BLD: POSITIVE
SAO2 % BLD: 97 % (ref 92–97)
SAO2 % BLD: 99 % (ref 92–97)
SERVICE CMNT-IMP: ABNORMAL
SERVICE CMNT-IMP: NORMAL
SODIUM SERPL-SCNC: 139 MMOL/L (ref 134–144)
SODIUM SERPL-SCNC: 140 MMOL/L (ref 134–144)
SODIUM SERPL-SCNC: 143 MMOL/L (ref 134–144)
SPECIMEN TYPE: ABNORMAL
SPECIMEN TYPE: NORMAL
STRESS BASELINE HR: 79 BPM
STRESS ESTIMATED WORKLOAD: 1 METS
STRESS EXERCISE DUR MIN: NORMAL
STRESS PEAK DIAS BP: 65 MMHG
STRESS PEAK SYS BP: 132 MMHG
STRESS PERCENT HR ACHIEVED: 58 %
STRESS POST PEAK HR: 94 BPM
STRESS RATE PRESSURE PRODUCT: NORMAL BPM*MMHG
STRESS ST DEPRESSION: 0 MM
STRESS ST ELEVATION: 0 MM
STRESS TARGET HR: 162 BPM
TOTAL RESP. RATE, ITRR: 12
TOTAL RESP. RATE, ITRR: 12
WBC # BLD AUTO: 5.6 X10E3/UL (ref 3.4–10.8)
WBC # BLD AUTO: 5.6 X10E3/UL (ref 3.4–10.8)
WBC # BLD AUTO: 5.9 X10E3/UL (ref 3.4–10.8)

## 2019-01-01 PROCEDURE — A9585 GADOBUTROL INJECTION: HCPCS | Performed by: INTERNAL MEDICINE

## 2019-01-01 PROCEDURE — 36600 WITHDRAWAL OF ARTERIAL BLOOD: CPT

## 2019-01-01 PROCEDURE — 78452 HT MUSCLE IMAGE SPECT MULT: CPT

## 2019-01-01 PROCEDURE — 94750 HC PULMONARY COMPLIANCE STUDY: CPT

## 2019-01-01 PROCEDURE — 77080 DXA BONE DENSITY AXIAL: CPT

## 2019-01-01 PROCEDURE — 94010 BREATHING CAPACITY TEST: CPT

## 2019-01-01 PROCEDURE — 74183 MRI ABD W/O CNTR FLWD CNTR: CPT

## 2019-01-01 PROCEDURE — 74011250636 HC RX REV CODE- 250/636: Performed by: INTERNAL MEDICINE

## 2019-01-01 PROCEDURE — 93017 CV STRESS TEST TRACING ONLY: CPT

## 2019-01-01 PROCEDURE — 82803 BLOOD GASES ANY COMBINATION: CPT

## 2019-01-01 PROCEDURE — 74011000258 HC RX REV CODE- 258: Performed by: INTERNAL MEDICINE

## 2019-01-01 RX ORDER — FUROSEMIDE 40 MG/1
TABLET ORAL
Refills: 4 | COMMUNITY
Start: 2019-01-01 | End: 2020-01-01 | Stop reason: SDUPTHER

## 2019-01-01 RX ORDER — IBUPROFEN 200 MG
TABLET ORAL
Qty: 42 PATCH | Refills: 0 | Status: SHIPPED | OUTPATIENT
Start: 2019-01-01

## 2019-01-01 RX ORDER — NICOTINE 7MG/24HR
PATCH, TRANSDERMAL 24 HOURS TRANSDERMAL
Qty: 7 PATCH | Refills: 0 | Status: SHIPPED | OUTPATIENT
Start: 2019-01-01

## 2019-01-01 RX ORDER — SODIUM CHLORIDE 0.9 % (FLUSH) 0.9 %
20 SYRINGE (ML) INJECTION
Status: COMPLETED | OUTPATIENT
Start: 2019-01-01 | End: 2019-01-01

## 2019-01-01 RX ORDER — SPIRONOLACTONE 100 MG/1
TABLET, FILM COATED ORAL
Refills: 4 | COMMUNITY
Start: 2019-01-01 | End: 2020-01-01 | Stop reason: SDUPTHER

## 2019-01-01 RX ORDER — SODIUM CHLORIDE 0.9 % (FLUSH) 0.9 %
10 SYRINGE (ML) INJECTION
Status: COMPLETED | OUTPATIENT
Start: 2019-01-01 | End: 2019-01-01

## 2019-01-01 RX ADMIN — GADOBUTROL 8.5 ML: 604.72 INJECTION INTRAVENOUS at 11:35

## 2019-01-01 RX ADMIN — Medication 20 ML: at 09:53

## 2019-01-01 RX ADMIN — Medication 10 ML: at 11:35

## 2019-01-01 RX ADMIN — REGADENOSON 0.4 MG: 0.08 INJECTION, SOLUTION INTRAVENOUS at 09:53

## 2019-01-01 RX ADMIN — SODIUM CHLORIDE 100 ML: 900 INJECTION, SOLUTION INTRAVENOUS at 11:35

## 2019-02-22 ENCOUNTER — OFFICE VISIT (OUTPATIENT)
Dept: HEMATOLOGY | Age: 59
End: 2019-02-22

## 2019-02-22 VITALS
WEIGHT: 227 LBS | DIASTOLIC BLOOD PRESSURE: 67 MMHG | SYSTOLIC BLOOD PRESSURE: 140 MMHG | HEART RATE: 98 BPM | TEMPERATURE: 98 F | BODY MASS INDEX: 29.95 KG/M2 | OXYGEN SATURATION: 98 %

## 2019-02-22 DIAGNOSIS — K74.60 CIRRHOSIS OF LIVER WITHOUT ASCITES, UNSPECIFIED HEPATIC CIRRHOSIS TYPE (HCC): Primary | ICD-10-CM

## 2019-02-22 RX ORDER — SPIRONOLACTONE 50 MG/1
50 TABLET, FILM COATED ORAL DAILY
Qty: 30 TAB | Refills: 5 | Status: SHIPPED | OUTPATIENT
Start: 2019-02-22 | End: 2019-03-22 | Stop reason: SDUPTHER

## 2019-02-22 RX ORDER — FUROSEMIDE 20 MG/1
20 TABLET ORAL DAILY
Qty: 30 TAB | Refills: 5 | Status: SHIPPED | OUTPATIENT
Start: 2019-02-22 | End: 2019-03-22 | Stop reason: SDUPTHER

## 2019-02-22 NOTE — PROGRESS NOTES
1. Have you been to the ER, urgent care clinic since your last visit? Hospitalized since your last visit? No    2. Have you seen or consulted any other health care providers outside of the [address] [place] [address] since your last visit? Include any pap smears or colon screening. No   Chief Complaint   Patient presents with    Follow-up     Visit Vitals  /67 (BP 1 Location: Left arm, BP Patient Position: Sitting)   Pulse 98   Temp 98 °F (36.7 °C) (Tympanic)   Wt 227 lb (103 kg)   SpO2 98%   BMI 29.95 kg/m²     3 most recent PHQ Screens 2/22/2019   Little interest or pleasure in doing things -   Feeling down, depressed, irritable, or hopeless Not at all   Total Score PHQ 2 -     Learning Assessment 2/22/2019   PRIMARY LEARNER Patient   BARRIERS PRIMARY LEARNER NONE   CO-LEARNER CAREGIVER No   PRIMARY LANGUAGE ENGLISH   LEARNER PREFERENCE PRIMARY LISTENING   ANSWERED BY patient    RELATIONSHIP SELF     Abuse Screening Questionnaire 2/22/2019   Do you ever feel afraid of your partner? N   Are you in a relationship with someone who physically or mentally threatens you? N   Is it safe for you to go home? Y     Fall Risk Assessment, last 12 mths 2/22/2019   Able to walk? Yes   Fall in past 12 months?  No

## 2019-02-22 NOTE — PROGRESS NOTES
[address] [facility] MD Дмитрий, FACP, KATELYNN Smallwood PA-C Marlea Campanile, NP        at [address], [address] Patricia Gonzalez  22.     [phone]     FAX: [phone]    at [address], [address]     [phone]     FAX: [phone]       Patient Care Team:  Nina Del Rio MD as PCP - General (Family Practice)  Pinead Schafer MD (Gastroenterology)      Problem List  Date Reviewed: 2/22/2019          Codes Class Noted    Cirrhosis of liver without ascites [facility]) ICD-10-CM: K74.60  ICD-9-CM: 571.5  6/6/2017        Esophageal varices ([facility].) ICD-10-CM: I85.00  ICD-9-CM: 456.1  6/6/2017        Diabetes mellitus ([facility] ICD-10-CM: E11.9  ICD-9-CM: 250.00  4/2/2435        H/O umbilical hernia repair [id number]: Z98.890, Z87.19  ICD-9-CM: V15.29  6/6/2017        S/P appendectomy ICD-10-CM: Z90.49  ICD-9-CM: V45.89  6/6/2017              Jaynie Canavan returns to the [facility] for management of cryptogenic cirrhosis. The active problem list, all pertinent past medical history, medications, endoscopic studies, radiologic findings and laboratory findings related to the liver disorder were reviewed with the patient. The patient is a 63 y.o.  male who was first found to have chronic liver disease and cirrhosis in 5/2017 when he developed variceal bleeding, he has undergone additional banding on several occasions. Last EGD in 5/2018 showed no additional varices and no additional bands were placed. He has had no additional bleeding signs. Assessment of liver fibrosis was performed with Fibroscan. The result was 72.1 kPa which correlates with cirrhosis. Patient has had last routine screening ultrasound of the liver 5/2018 and was found to have trace ascites around the liver.   We have ordered additional studies, this has not been scheduled. Over the past year, he has developed ongoing recent swelling of the LE. We had started him on 1/2 step diuretics and he has responded well to these medications  He has had much less swelling and is continuing to wear compression hose. The patient has also developed early hepatic encephalopathy signs over the course of the past year. He has had some increased confusion, poor memory and difficulty with focus. He reports some issues with day-night reversal and insomnia. Earlier this year, I had started him on a new prescription for lactulose. He is taking ~120 mL daily. Patient reports that this is just causing \"grumbling\" of the stomach without much output and he has largely stopped taking this. He has a bowel movement once every 3-4 days. He is still having some issues with memory and concentration. The most recent laboratory studies indicate that the liver transaminases are normal, ALP is normal, tests of hepatic synthetic and metabolic function are normal (slight reduction in albumin), and the platelet count is depressed. The patient notes fatigue, and weakness. The patient has limitations in functional activities secondary to these symptoms and has applied for disability. The patient has not experienced problems concentrating, swelling of the abdomen, swelling of the lower extremities, hematemesis, hematochezia. Of note, patient discontinued use of metformin as of ~10/2017 as he attributed abdominal discomfort and GI upset to ongoing use of this medication. He states that his GI symptoms have been better, he has not been checking his glucose levels. He has had increasing vision issues and significant fatigue. He has very little physical activity in a day. Also of note, patient is without health insurance and is concerned regarding future coverage.  He has been granted disability and has made application for financial assistance with the hospital. ALLERGIES  Allergies   Allergen Reactions    Shellfish Derived Hives       MEDICATIONS  Current Outpatient Medications   Medication Sig    lactulose (CHRONULAC) 10 gram/15 mL solution Take 45 mL by mouth two (2) times a day.  furosemide (LASIX) 20 mg tablet Take 1 Tab by mouth daily.  spironolactone (ALDACTONE) 50 mg tablet Take 1 Tab by mouth daily.  cholecalciferol, vitamin D3, (VITAMIN D3) 2,000 unit tab Take  by mouth.  VENTOLIN HFA 90 mcg/actuation inhaler Take 2 Puffs by mouth every six (6) hours as needed.  ALPRAZolam (XANAX) 0.5 mg tablet Take 0.5 mg by mouth Before breakfast, lunch, and dinner. No current facility-administered medications for this visit. SYSTEM REVIEW NOT RELATED TO LIVER DISEASE OR REVIEWED ABOVE:  Constitution systems: Negative for fever, chills. Weight stable since last office visit. Eyes: Negative for visual changes. ENT: Negative for sore throat, painful swallowing. Respiratory: Negative for cough, hemoptysis. Positive for SOB, patient is inconsistent with administration of Ventolin. Cardiology: Negative for chest pain, palpitations. GI:  Negative for constipation or diarrhea. Occasional complaint of RUQ dull achy bruise feeling, helped with changes in position. Occasional post-prandial nausea  : Negative for urinary frequency, dysuria, hematuria, nocturia. Skin: Negative for rash. Hematology: Negative for easy bruising, blood clots. Musculo-skeletal: Negative for back pain, muscle pain, weakness. Neurologic: Negative for headaches, dizziness, vertigo. Memory problems ? related to HE. Psychology: Negative for anxiety, depression. FAMILY HISTORY:  The father  of heart disease. The mother  of MVA but had elevated liver enzymes. There is no family history of liver disease. SOCIAL HISTORY:  The patient is . The patient has no children. The patient currently smokes 1 pack of tobacco daily.     The patient has never consumed significant amounts of alcohol. 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 PHYSICAL EXAMINATION:  Visit Vitals  /67 (BP 1 Location: Left arm, BP Patient Position: Sitting)   Pulse 98   Temp 98 °F (36.7 °C) (Tympanic)   Wt 227 lb (103 kg)   SpO2 98%   BMI 29.95 kg/m²     General: No acute distress. Eyes: Sclera anicteric. ENT: No oral lesions. Thyroid normal.  Nodes: No adenopathy. Skin: No spider angiomata. No jaundice. Positive for palmar erythema. Respiratory: Lungs clear to auscultation. Cardiovascular: Regular heart rate. No murmurs. No JVD. Abdomen: Soft, mild tender to deep palpation of the LUQ, spleen tip palpable. Liver edge firm, easily palpable 2-3 CM BCM nontender. No obvious ascites. Extremities: Trace edema. No muscle wasting. No gross arthritic changes. Neurologic: Alert and oriented. Cranial nerves grossly intact. Fine asterixis.     LABORATORY STUDIES:  [facility] [place] of [address] [place] [address] & Units 9/12/2018 5/24/2018   WBC 3.4 - 10.8 x10E3/uL 5.2 5.1   ANC 1.4 - 7.0 x10E3/uL     HGB 13.0 - 17.7 g/dL 13.3 12.4 (L)    - 379 x10E3/uL 88 (LL) 93 (LL)   INR 0.8 - 1.2 1.4 (H) 1.3 (H)   AST 0 - 40 IU/L 65 (H) 66 (H)   ALT 0 - 44 IU/L 39 42   Alk Phos 39 - 117 IU/L 145 (H) 145 (H)   Bili, Total 0.0 - 1.2 mg/dL 2.1 (H) 1.2   Bili, Direct 0.00 - 0.40 mg/dL 0.60 (H) 0.48 (H)   Albumin 3.5 - 5.5 g/dL 3.0 (L) 3.1 (L)   BUN 6 - 24 mg/dL 9 8   Creat 0.76 - 1.27 mg/dL 0.81 0.75 (L)   Na 134 - 144 mmol/L 141 144   K 3.5 - 5.2 mmol/L 4.1 4.6   Cl 96 - 106 mmol/L 107 (H) 111 (H)   CO2 20 - 29 mmol/L 20 24   Glucose 65 - 99 mg/dL 118 (H) 235 (H)   Ammonia 27 - 102 ug/dL       [facility] Latest Ref Rng & Units 2/22/2018   WBC 3.4 - 10.8 x10E3/uL 5.0   ANC 1.4 - 7.0 x10E3/uL    HGB 13.0 - 17.7 g/dL 13.1    - 379 x10E3/uL 89 (LL)   INR 0.8 - 1.2 1.2   AST 0 - 40 IU/L 52 (H)   ALT 0 - 44 IU/L 32   Alk Phos 39 - 117 IU/L 106   Bili, Total 0.0 - 1.2 mg/dL 1.1   Bili, Direct 0.00 - 0.40 mg/dL 0.45 (H)   Albumin 3.5 - 5.5 g/dL 3.1 (L)   BUN 6 - 24 mg/dL 10   Creat 0.76 - 1.27 mg/dL 0.77   Na 134 - 144 mmol/L 138   K 3.5 - 5.2 mmol/L 4.3   Cl 96 - 106 mmol/L 108 (H)   CO2 20 - 29 mmol/L 20   Glucose 65 - 99 mg/dL 119 (H)   Ammonia 27 - 102 ug/dL 179 (HH)     Cancer Screening Latest Ref Rng & Units 9/12/2018 5/24/2018 2/22/2018   AFP, Serum 0.0 - 8.0 ng/mL 4.3 3.9 4.3   AFP-L3% 0.0 - 9.9 % 8.4 9.9 8.8     Additional lab values drawn at today's office visit are pending at the time of documentation. SEROLOGIES:  5/2017. HBsurface antigen negative, anti-HBcore negative, anti-HBsurface negative, HCV RNA negative, iron saturation 29%, ASMA negative. Serologies Latest Ref Rng & Units 6/6/2017   Ferritin 30 - 400 ng/mL 39   Alpha-1 antitrypsin level 90 - 200 mg/dL 87 (L)     LIVER HISTOLOGY:  6/2017. FibroScan performed at [address]. EkPa was 72.1. The results suggested a fibrosis level of F4. ENDOSCOPIC PROCEDURES:  5/2017. EGD by Dr Anita Ramos. Esophageal varices. Banding performed. 8/2017. EGD performed by Dr Mickeal Aase. Small esophageal varices. No banding performed. No gastric varices. Mild portal gastropathy. Repeat in 6 months.  2/2018. EGD performed by Dr Mickeal Aase. Medium esophageal varices. Banding performed x 4. No gastric varices. Moderate portal gastropathy. 5/2018. EGD performed by Dr Mickeal Aase. No esophageal varices. No gastric varices. Moderate portal gastropathy. Repeat 5/2019. RADIOLOGY:  11/22/2017. US of abdomen. No liver mass/lesion. 5/2018. Ultrasound of liver. Echogenic consistent with cirrhosis. No liver mass lesions. No dilated bile ducts. No ascites. OTHER TESTING:  Not available or performed    ASSESSMENT AND PLAN:  Cryptogenic cirrhosis. Liver transaminases are variable/normal.  AST has been high.   Alkaline phosphatase is normal.  Liver function is normal.  Serum albumin is slightly depressed. The platelet count is depressed. The patient has stable but depressed liver function. The CTP is 7-8. Child class B. The last calculated MELD score was 13 in 9/2018. This will be recalculated on the basis of his current lab studies. Patient is not presently in need of transplant evaluation. I have reviewed in detail the natural history and progression of cirrhosis and the goals of follow-up and monitoring studies. Esophageal varices with prior bleeding. Varices have been banded in the past. Dr Karol Diaz recently repeated EGD to assess for varices and need for banding in 5/2018 and recommends repeat procedure in 5/2019. We had stopped the beta-blocker to reduce the symptoms of fatigue, patient has not noted much improvement in this regard. He denies evidence of bleeding at this time and I have ordered this repeat study. Minimal hepatic encephalopathy has developed over the course of the past year. I have discussed with him the relationship to bowel output and he will continue with lactulose to titrate a goal of at least one bowel movement daily. He has not been having anywhere near this amount of output. I have asked him to really target this as a goal as I think that some of his mentation, somnolence, muscular pain/cramping may be due to elevation in ammonia levels and we have discussed how to take smaller, more frequent doses of lactulose to try to achieve this. Edema and radiologic trace ascites. Patient's weight is down and he has responded well to 1/2 step diuretics. I will have him continue with this administration and continue to watch his intake of sodium. Will monitor his electrolytes for assessment of his cramping and I have advised him to take a trial of tonic water containing quinine. DM. Will obtain Hgb A1c in the office today and forward to PCP.   Patient was advised that he will need to reconnect with his PCP for monitoring. He has not been on any medication for management of DM, he is due to follow with his PCP in the near future. COPD. Patient is inconsistent with inhaler use and continues to smoke 1 PPD. I have asked him to discuss with PCP and stressed the importance of optimizing his other medical issues in the setting of his chronic liver disease. The patient was directed to continue all current medications at the current dosages. There are no contraindications for the patient to take any medications that are necessary for treatment of other medical issues. The patient was counseled regarding alcohol consumption. Thrombocytopenia secondary to cirrhosis. No treatment is needed. Anemia from chronic GI blood loss from portal hypertension and iron deficiency. Past iron stores were adequate and we will continue to monitor CBC. Vaccination for viral hepatitis A and B is recommended since the patient has no serologic evidence of previous exposure or vaccination with immunity. [facility]. screening will continue to be performed. AFP was ordered today. Ultrasound has been recommended and patient provided with contact information to schedule this procedure. All of the above issues were discussed with the patient. All questions were answered. The patient expressed a clear understanding of the above. [facility] 87 in 4 months with US of the liver and EGD in the meantime.     Jenna Pineda PA-C  [facility] [place] of [facility], [address] Patricia Gonzalez  22.  [phone]

## 2019-02-23 LAB
ALBUMIN SERPL-MCNC: 2.8 G/DL (ref 3.5–5.5)
ALP SERPL-CCNC: 139 IU/L (ref 39–117)
ALT SERPL-CCNC: 31 IU/L (ref 0–44)
AMMONIA PLAS-MCNC: 212 UG/DL (ref 27–102)
AST SERPL-CCNC: 53 IU/L (ref 0–40)
BILIRUB DIRECT SERPL-MCNC: 0.94 MG/DL (ref 0–0.4)
BILIRUB SERPL-MCNC: 2.7 MG/DL (ref 0–1.2)
BUN SERPL-MCNC: 6 MG/DL (ref 6–24)
BUN/CREAT SERPL: 7 (ref 9–20)
CALCIUM SERPL-MCNC: 8.4 MG/DL (ref 8.7–10.2)
CHLORIDE SERPL-SCNC: 107 MMOL/L (ref 96–106)
CO2 SERPL-SCNC: 21 MMOL/L (ref 20–29)
CREAT SERPL-MCNC: 0.91 MG/DL (ref 0.76–1.27)
ERYTHROCYTE [DISTWIDTH] IN BLOOD BY AUTOMATED COUNT: 15.8 % (ref 12.3–15.4)
EST. AVERAGE GLUCOSE BLD GHB EST-MCNC: 94 MG/DL
GLUCOSE SERPL-MCNC: 155 MG/DL (ref 65–99)
HBA1C MFR BLD: 4.9 % (ref 4.8–5.6)
HCT VFR BLD AUTO: 37.8 % (ref 37.5–51)
HGB BLD-MCNC: 13.2 G/DL (ref 13–17.7)
INR PPP: 1.5 (ref 0.8–1.2)
MCH RBC QN AUTO: 33.4 PG (ref 26.6–33)
MCHC RBC AUTO-ENTMCNC: 34.9 G/DL (ref 31.5–35.7)
MCV RBC AUTO: 96 FL (ref 79–97)
MORPHOLOGY BLD-IMP: ABNORMAL
PLATELET # BLD AUTO: 97 X10E3/UL (ref 150–379)
POTASSIUM SERPL-SCNC: 3.9 MMOL/L (ref 3.5–5.2)
PROTHROMBIN TIME: 15.2 SEC (ref 9.1–12)
RBC # BLD AUTO: 3.95 X10E6/UL (ref 4.14–5.8)
SODIUM SERPL-SCNC: 141 MMOL/L (ref 134–144)
WBC # BLD AUTO: 5.4 X10E3/UL (ref 3.4–10.8)

## 2019-02-25 LAB
AFP L3 MFR SERPL: 8.3 % (ref 0–9.9)
AFP SERPL-MCNC: 3.6 NG/ML (ref 0–8)

## 2019-02-25 NOTE — PROGRESS NOTES
Pt notified of elevations in bili and ALP, urged him to call for scheduling of imaging study, ultrasound - as previously scheduled. Also significant elevation of ammonia, advised increased frequency of bowel output and titration of lactulose dosing to achieve.

## 2019-03-22 ENCOUNTER — DOCUMENTATION ONLY (OUTPATIENT)
Dept: HEMATOLOGY | Age: 59
End: 2019-03-22

## 2019-03-22 DIAGNOSIS — K74.60 CIRRHOSIS OF LIVER WITHOUT ASCITES, UNSPECIFIED HEPATIC CIRRHOSIS TYPE (HCC): ICD-10-CM

## 2019-03-22 RX ORDER — FUROSEMIDE 40 MG/1
40 TABLET ORAL DAILY
Qty: 30 TAB | Refills: 6 | Status: SHIPPED | OUTPATIENT
Start: 2019-03-22 | End: 2019-04-18 | Stop reason: SDUPTHER

## 2019-03-22 RX ORDER — SPIRONOLACTONE 100 MG/1
100 TABLET, FILM COATED ORAL DAILY
Qty: 30 TAB | Refills: 6 | Status: SHIPPED | OUTPATIENT
Start: 2019-03-22 | End: 2019-04-18

## 2019-03-22 NOTE — PROGRESS NOTES
Pt called in to complain of increased fluid retention of the legs, he has been taking higher dosing of the diuretics and requests if this could be elevated. Will increase from 1/2 step to full step diuretics and this was sent into his local pharmacy.

## 2019-04-10 ENCOUNTER — DOCUMENTATION ONLY (OUTPATIENT)
Dept: HEMATOLOGY | Age: 59
End: 2019-04-10

## 2019-04-10 NOTE — PROGRESS NOTES
Rec'd call from pt he was taken by EMS to [facility] for 10 day admission for mental status changes. Found to have HE and ascites. Underwent LVP of 4.5 L and then 1.5 L. Patient was discharged on furosemide 120 mg, spironolactone 100 mg, and KCl 20 mEq. He is still having LE edema and was not able to afford $2000 OOP cost of prescribed rifaxamin. I have advised that we will contact hospital for records of his stay, have him return to office next week for assessment and adjustment in medications, and I have requested help from pharmacy in securing assistance for cost of rifaxamin. In meantime, patient advised to increase dosing of lactulose to achieve 2-3 bowel movements daily.

## 2019-04-18 ENCOUNTER — OFFICE VISIT (OUTPATIENT)
Dept: HEMATOLOGY | Age: 59
End: 2019-04-18

## 2019-04-18 VITALS
BODY MASS INDEX: 26.9 KG/M2 | HEART RATE: 104 BPM | OXYGEN SATURATION: 97 % | RESPIRATION RATE: 16 BRPM | WEIGHT: 203 LBS | HEIGHT: 73 IN | SYSTOLIC BLOOD PRESSURE: 117 MMHG | DIASTOLIC BLOOD PRESSURE: 58 MMHG | TEMPERATURE: 98.2 F

## 2019-04-18 DIAGNOSIS — R18.8 CIRRHOSIS OF LIVER WITH ASCITES, UNSPECIFIED HEPATIC CIRRHOSIS TYPE (HCC): Primary | ICD-10-CM

## 2019-04-18 DIAGNOSIS — K74.60 CIRRHOSIS OF LIVER WITH ASCITES, UNSPECIFIED HEPATIC CIRRHOSIS TYPE (HCC): Primary | ICD-10-CM

## 2019-04-18 DIAGNOSIS — K74.60 CIRRHOSIS OF LIVER WITHOUT ASCITES, UNSPECIFIED HEPATIC CIRRHOSIS TYPE (HCC): ICD-10-CM

## 2019-04-18 RX ORDER — POTASSIUM CHLORIDE 20 MEQ/1
TABLET, EXTENDED RELEASE ORAL
Refills: 0 | COMMUNITY
Start: 2019-04-08 | End: 2019-05-03

## 2019-04-18 RX ORDER — ONDANSETRON 4 MG/1
4 TABLET, ORALLY DISINTEGRATING ORAL
Qty: 90 TAB | Refills: 6 | Status: SHIPPED | OUTPATIENT
Start: 2019-04-18 | End: 2020-01-01 | Stop reason: SDUPTHER

## 2019-04-18 RX ORDER — SPIRONOLACTONE 100 MG/1
200 TABLET, FILM COATED ORAL DAILY
Qty: 60 TAB | Refills: 6 | Status: SHIPPED | OUTPATIENT
Start: 2019-04-18 | End: 2019-06-13 | Stop reason: DRUGHIGH

## 2019-04-18 RX ORDER — FUROSEMIDE 40 MG/1
80 TABLET ORAL DAILY
Qty: 60 TAB | Refills: 6 | Status: SHIPPED | OUTPATIENT
Start: 2019-04-18 | End: 2019-06-13 | Stop reason: DRUGHIGH

## 2019-04-18 NOTE — PROGRESS NOTES
Rosa Bland is a 63 y.o. male    Chief Complaint   Patient presents with   [facility] Follow Up     [facility]  3/31/19-4/6/19 - high ammonia levels, confusion. 1. Have you been to the ER, urgent care clinic since your last visit? Hospitalized since your last visit? See Chief complaint. 2. Have you seen or consulted any other health care providers outside of the [address] [place] [address] since your last visit? Include any pap smears or colon screening.  No     Visit Vitals  /58 (BP 1 Location: Left arm, BP Patient Position: Sitting)   Pulse (!) 104   Temp 98.2 °F (36.8 °C) (Oral)   Resp 16   Ht 6' 1\" (1.854 m)   Wt 203 lb (92.1 kg)   SpO2 97%   BMI 26.78 kg/m²     Agustina Bauer, LPN

## 2019-04-18 NOTE — PROGRESS NOTES
[address] [facility], MD, [address], [place], [place]      SYLVIE Orellana, [facility]     April Slava Bro, KATELYNN Guardado, NP Cosme Duverney, NP [address] [facility] 136    at [address], 81 [facility], [facility] 22.    [phone]    FAX: [address] [place] [address]    at [address], [address], [address]    [phone]    FAX: [phone]       Patient Care Team:  Jaclyn Mak MD as PCP - [facility])  Ghassan Fischer MD (Gastroenterology)      Problem List  Date Reviewed: 2/22/2019          Codes Class Noted    Cirrhosis of liver without ascites [facility]) ICD-10-CM: K74.60  ICD-9-CM: 571.5  6/6/2017        Esophageal varices ([facility] 75.) ICD-10-CM: I85.00  ICD-9-CM: 456.1  6/6/2017        Diabetes mellitus ([facility] ICD-10-CM: E11.9  ICD-9-CM: 250.00  1/1/0956        H/O umbilical hernia repair H-62-ZO: D17.520, Z87.19  ICD-9-CM: V15.29  6/6/2017        S/P appendectomy ICD-10-CM: Z90.49  ICD-9-CM: V45.89  6/6/2017              Henry Almeida returns to the [address] for management of cryptogenic cirrhosis. The active problem list, all pertinent past medical history, medications, endoscopic studies, radiologic findings and laboratory findings related to the liver disorder were reviewed with the patient. The patient is a 63 y.o.  male who was found to have chronic liver disease and cirrhosis in 5/2017 when he developed variceal bleeding. The patient has developed the following complications of cirrhosis: esophageal varices, variceal bleeding, edema,   hepatic encephalopathy,     Since the last office appointment the patient has:  Been hospitalization at [facility] for SOB.   Lasix was changed to TID    The patient notes fatigue, swelling of the abdomen,     The patient has not experienced yellowing of the eyes or skin, problems concentrating, swelling of the abdomen,   hematemesis, hematochezia. The patient has limitations in functional activities which can be attributed to the liver disease and to other medical problems that are not related to the liver disease. ASSESSMENT AND PLAN:  Cirrhosis  Cryptogenic cirrhosis. The diagnosis of cirrhosis is based upon Fiboscan, imaging, laboratory studies, complications of cirrhosis. AST is elevated. ALT is normal.  ALP is elevated. Liver function is normal.  The platelet count is depressed. The CTP is 7. Child class B. The MELD score is 16. Lower extremity edema  Edema has resolved with current dose of diuretics. He had been taking lasix 40 mg TID and aldactone 100 mg every day after discharge. Will change the current dose of diuretics to step 2. The renal function is normal and edema should be able to be controled with diuretics. Screening for Esophageal varices   The patient has esophageal varices with prior bleeding. Varices have been banded   The last EGD to assess for varices was performed in 5/218. Will schedule for EGD to assess for varices and need for banding in 5/2019. Hepatic encephalopathy   The patient may have had covert HE in the past.  He was on lactulose in the past but has not taken this recently with no decline in mental status. There is no need to restrict dietary protein at this time. Thrombocytopenia   This is secondary to cirrhosis. There is no evidence of overt bleeding. No treatment is required. The platelet count is adequate for the patient to undergo procedures without the need for platelet transfusion or platelet growth factors. Screening for Hepatocellular Carcinoma  HCC screening has recently been performed and does not suggest [address]   The next liver imaging study will be performed in 9/2019. DM. This is being managed by his PCP. COPD. Patient is inconsistent with inhaler use and continues to smoke 1 PPD. Treatment of other medical problems in patients with chronic liver disease  There are no contraindications for the patient to take most medications that are necessary for treatment of other medical issues. The patient has cirrhrosis and should avoid the following medications:  NSAIDs which are associated with a higher rate of developing ZOEY. Counseling for alcohol in patients with chronic liver disease  The patient was counseled regarding alcohol consumption and the effect of alcohol on chronic liver disease. The patient does not consume any significant amount of alcohol. Vaccinations   Vaccination for viral hepatitis A and B is recommended since the patient has no serologic evidence of previous exposure or vaccination with immunity. Routine vaccinations against other bacterial and viral agents can be performed as indicated. Annual flu vaccination should be administered if indicated. ALLERGIES  Allergies   Allergen Reactions    Shellfish Derived Hives       MEDICATIONS  Current Outpatient Medications   Medication Sig    potassium chloride (K-DUR, KLOR-CON) 20 mEq tablet TAKE ONE TABLET BY MOUTH EVERY DAY    furosemide (LASIX) 40 mg tablet Take 1 Tab by mouth daily. (Patient taking differently: Take 40 mg by mouth three (3) times daily.)    spironolactone (ALDACTONE) 100 mg tablet Take 1 Tab by mouth daily.  lactulose (CHRONULAC) 10 gram/15 mL solution Take 45 mL by mouth two (2) times a day.  rifAXIMin (XIFAXAN) 550 mg tablet Take 1 Tab by mouth two (2) times a day.  cholecalciferol, vitamin D3, (VITAMIN D3) 2,000 unit tab Take  by mouth.  VENTOLIN HFA 90 mcg/actuation inhaler Take 2 Puffs by mouth every six (6) hours as needed.  ALPRAZolam (XANAX) 0.5 mg tablet Take 0.5 mg by mouth Before breakfast, lunch, and dinner.      No current facility-administered medications for this visit. SYSTEM REVIEW NOT RELATED TO LIVER DISEASE OR REVIEWED ABOVE:  Constitution systems: Negative for fever, chills. Weight stable since last office visit. Eyes: Negative for visual changes. ENT: Negative for sore throat, painful swallowing. Respiratory: Negative for cough, hemoptysis. Positive for SOB, patient is inconsistent with administration of Ventolin. Cardiology: Negative for chest pain, palpitations. GI:  Negative for constipation or diarrhea. Occasional complaint of RUQ dull achy bruise feeling, helped with changes in position. Occasional post-prandial nausea  : Negative for urinary frequency, dysuria, hematuria, nocturia. Skin: Negative for rash. Hematology: Negative for easy bruising, blood clots. Musculo-skeletal: Negative for back pain, muscle pain, weakness. Neurologic: Negative for headaches, dizziness, vertigo. Memory problems ? related to HE. Psychology: Negative for anxiety, depression. FAMILY HISTORY:  The father  of heart disease. The mother  of MVA but had elevated liver enzymes. There is no family history of liver disease. SOCIAL HISTORY:  The patient is . The patient has no children. The patient currently smokes 1 pack of tobacco daily. The patient has never consumed significant amounts of alcohol. 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 PHYSICAL EXAMINATION:  Visit Vitals  /58 (BP 1 Location: Left arm, BP Patient Position: Sitting)   Pulse (!) 104   Temp 98.2 °F (36.8 °C) (Oral)   Resp 16   Ht 6' 1\" (1.854 m)   Wt 203 lb (92.1 kg)   SpO2 97%   BMI 26.78 kg/m²     General: No acute distress. Eyes: Sclera anicteric. ENT: No oral lesions. Thyroid normal.  Nodes: No adenopathy. Skin: No spider angiomata. No jaundice. Positive for palmar erythema. Respiratory: Lungs clear to auscultation.    Cardiovascular: Regular heart rate. No murmurs. No JVD. Abdomen: Soft, mild tender to deep palpation of the LUQ, spleen tip palpable. Liver edge firm, easily palpable 2-3 CM BCM nontender. No obvious ascites. Extremities: Trace edema. No muscle wasting. No gross arthritic changes. Neurologic: Alert and oriented. Cranial nerves grossly intact. Fine asterixis. LABORATORY STUDIES:  Liver [place] of [address] & Units 9/12/2018 5/24/2018   WBC 3.4 - 10.8 x10E3/uL 5.2 5.1   ANC 1.4 - 7.0 x10E3/uL     HGB 13.0 - 17.7 g/dL 13.3 12.4 (L)    - 379 x10E3/uL 88 (LL) 93 (LL)   INR 0.8 - 1.2 1.4 (H) 1.3 (H)   AST 0 - 40 IU/L 65 (H) 66 (H)   ALT 0 - 44 IU/L 39 42   Alk Phos 39 - 117 IU/L 145 (H) 145 (H)   Bili, Total 0.0 - 1.2 mg/dL 2.1 (H) 1.2   Bili, Direct 0.00 - 0.40 mg/dL 0.60 (H) 0.48 (H)   Albumin 3.5 - 5.5 g/dL 3.0 (L) 3.1 (L)   BUN 6 - 24 mg/dL 9 8   Creat 0.76 - 1.27 mg/dL 0.81 0.75 (L)   Na 134 - 144 mmol/L 141 144   K 3.5 - 5.2 mmol/L 4.1 4.6   Cl 96 - 106 mmol/L 107 (H) 111 (H)   CO2 20 - 29 mmol/L 20 24   Glucose 65 - 99 mg/dL 118 (H) 235 (H)   Ammonia 27 - 102 ug/dL       Cancer Screening Latest Ref Rng & Units 5/3/2019 2/22/2019 9/12/2018   AFP, Serum 0.0 - 8.0 ng/mL 3.4 3.6 4.3   AFP-L3% 0.0 - 9.9 % 9.7 8.3 8.4     SEROLOGIES:  5/2017. HBsurface antigen negative, anti-HBcore negative, anti-HBsurface negative, HCV RNA negative, iron saturation 29%, ASMA negative. Serologies Latest Ref Rng & Units 6/6/2017   Ferritin 30 - 400 ng/mL 39   Alpha-1 antitrypsin level 90 - 200 mg/dL 87 (L)     LIVER HISTOLOGY:  6/2017. [facility]. EkPa was 72.1. The results suggested a fibrosis level of F4. ENDOSCOPIC PROCEDURES:  5/2017. EGD by Dr Trudy Nielsen. Esophageal varices. Banding performed. 8/2017. EGD performed by Dr Vikki Anthony. Small esophageal varices. No banding performed. No gastric varices. Mild portal gastropathy. Repeat in 6 months.  2/2018.   EGD performed by Dr Rhonda Newton. Medium esophageal varices. Banding performed x 4. No gastric varices. Moderate portal gastropathy. 5/2018. EGD performed by Dr Rhonda Newton. No esophageal varices. No gastric varices. Moderate portal gastropathy. Repeat 5/2019. RADIOLOGY:  11/22/2017. US of abdomen. No liver mass/lesion. 5/2018. Ultrasound of liver. Echogenic consistent with cirrhosis. No liver mass lesions. No dilated bile ducts. No ascites. OTHER TESTING:  Not available or performed    FOLLOW-UP:  All of the issues listed above in the Assessment and Plan were discussed with the patient. All questions were answered. The patient expressed a clear understanding of the above. [address] [place] [address] in 8 weeks for routine monitoring.       Louann Geiger MD  [address] [place] [address] 22.  [phone]  [address] [place] [address]

## 2019-04-18 NOTE — Clinical Note
6/1/19 Patient: Lady Wilhelm YOB: 1960 Date of Visit: 4/18/2019 Gabe Araujo MD 
[address] [address] VIA Facsimile: [phone] Dear Gabe Araujo MD, Thank you for referring Mr. Estelle Alvarado to [address] [place] [address] for evaluation. My notes for this consultation are attached. If you have questions, please do not hesitate to call me. I look forward to following your patient along with you. Sincerely, Watson Mendosa MD

## 2019-04-19 ENCOUNTER — TELEPHONE (OUTPATIENT)
Dept: HEMATOLOGY | Age: 59
End: 2019-04-19

## 2019-04-22 ENCOUNTER — TELEPHONE (OUTPATIENT)
Dept: HEMATOLOGY | Age: 59
End: 2019-04-22

## 2019-04-22 NOTE — TELEPHONE ENCOUNTER
----- Message from Alida Baig sent at 4/19/2019  9:59 AM EDT -----  Regarding: FW: Dr Paul serra      ----- Message -----  From: Cali Carpenter  Sent: 4/19/2019   8:32 AM  To: [facility] Office  Subject: Dr Gabriel/ ponce                                Ian Lorenz, wife, is requesting a callback to verify if the pt is supposed to take all of the \"spironolactone\" and \"furosemide\" this morning or on separate days.      Best contact number is [phone]

## 2019-04-22 NOTE — TELEPHONE ENCOUNTER
Spoke with Mrs Maria Victoria Ty (on [facility]). Informed Ms. Hydee Dwayne about the [facility] Patient Assistance form for [place]. Mrs. Baird said they will be at the hospital on 4/25/19 and will pick the forms at that time. Informed Ms. Baird that if they had time to complete the forms in the office. If they are not able to, the form will be with the .   Mrs. Baird repeated back information and had no further questions.

## 2019-04-22 NOTE — TELEPHONE ENCOUNTER
Informed Ms. Baird that Furosemide and Spironolactone are to be taken on a daily basis. Ms. Arlen Centeno expressed an understanding and had no further questions.

## 2019-04-25 ENCOUNTER — HOSPITAL ENCOUNTER (OUTPATIENT)
Dept: ULTRASOUND IMAGING | Age: 59
Discharge: HOME | End: 2019-04-25
Attending: INTERNAL MEDICINE
Payer: SELF-PAY

## 2019-04-25 VITALS
BODY MASS INDEX: 26.9 KG/M2 | OXYGEN SATURATION: 100 % | HEIGHT: 73 IN | DIASTOLIC BLOOD PRESSURE: 49 MMHG | WEIGHT: 203 LBS | SYSTOLIC BLOOD PRESSURE: 140 MMHG | HEART RATE: 90 BPM | RESPIRATION RATE: 16 BRPM

## 2019-04-25 DIAGNOSIS — K74.60 CIRRHOSIS OF LIVER WITH ASCITES, UNSPECIFIED HEPATIC CIRRHOSIS TYPE (HCC): ICD-10-CM

## 2019-04-25 DIAGNOSIS — R18.8 CIRRHOSIS OF LIVER WITH ASCITES, UNSPECIFIED HEPATIC CIRRHOSIS TYPE (HCC): ICD-10-CM

## 2019-04-25 PROCEDURE — 76705 ECHO EXAM OF ABDOMEN: CPT

## 2019-04-25 RX ORDER — SODIUM BICARBONATE 42 MG/ML
INJECTION, SOLUTION INTRAVENOUS
Status: DISCONTINUED
Start: 2019-04-25 | End: 2019-04-25 | Stop reason: WASHOUT

## 2019-04-25 RX ORDER — SODIUM BICARBONATE 42 MG/ML
2 INJECTION, SOLUTION INTRAVENOUS
Status: DISCONTINUED | OUTPATIENT
Start: 2019-04-25 | End: 2019-04-26 | Stop reason: HOSPADM

## 2019-04-25 NOTE — PROGRESS NOTES
Mark Jerez in to talk with pt. And family regarding abdominal ultrasound showing \"not enough fluid to drain\".  called and notified. Pt. Discharged to home, transported to parking [facility] via w/c.

## 2019-05-03 ENCOUNTER — OFFICE VISIT (OUTPATIENT)
Dept: HEMATOLOGY | Age: 59
End: 2019-05-03

## 2019-05-03 ENCOUNTER — DOCUMENTATION ONLY (OUTPATIENT)
Dept: HEMATOLOGY | Age: 59
End: 2019-05-03

## 2019-05-03 VITALS
OXYGEN SATURATION: 96 % | HEART RATE: 105 BPM | BODY MASS INDEX: 25.37 KG/M2 | SYSTOLIC BLOOD PRESSURE: 132 MMHG | TEMPERATURE: 97.7 F | DIASTOLIC BLOOD PRESSURE: 68 MMHG | WEIGHT: 191.4 LBS | HEIGHT: 73 IN

## 2019-05-03 DIAGNOSIS — K74.60 CIRRHOSIS OF LIVER WITHOUT ASCITES, UNSPECIFIED HEPATIC CIRRHOSIS TYPE (HCC): Primary | ICD-10-CM

## 2019-05-03 RX ORDER — PROCHLORPERAZINE MALEATE 10 MG
5 TABLET ORAL
Qty: 60 TAB | Refills: 2 | Status: SHIPPED | OUTPATIENT
Start: 2019-05-03 | End: 2019-05-18

## 2019-05-03 NOTE — Clinical Note
6/1/19 Patient: Alka Fitzpatrick YOB: 1960 Date of Visit: 5/3/2019 Darío Lay MD 
[address] [address] VIA Facsimile: [phone] Dear Darío Lay MD, Thank you for referring Mr. Vasquez Jimenez to [address] [place] [address] for evaluation. My notes for this consultation are attached. If you have questions, please do not hesitate to call me. I look forward to following your patient along with you. Sincerely, Lorie Riggins MD

## 2019-05-03 NOTE — PROGRESS NOTES
Chief Complaint   Patient presents with    Follow-up     Patient states he has nausea/vomiting usually after he eats. He has not been able to eat much over the past few weeks. \"The pills Dr. Young Goes gave me are not working. \"    Visit Vitals  /68 (BP 1 Location: Left arm, BP Patient Position: Sitting)   Pulse (!) 105   Temp 97.7 °F (36.5 °C) (Tympanic)   Ht 6' 1\" (1.854 m)   Wt 191 lb 6.4 oz (86.8 kg)   SpO2 96%   BMI 25.25 kg/m²     3 most recent PHQ Screens 2/22/2019   Little interest or pleasure in doing things -   Feeling down, depressed, irritable, or hopeless Not at all   Total Score PHQ 2 -     Abuse Screening Questionnaire 2/22/2019   Do you ever feel afraid of your partner? N   Are you in a relationship with someone who physically or mentally threatens you? N   Is it safe for you to go home? Y     1. Have you been to the ER, urgent care clinic since your last visit? Hospitalized since your last visit? No    2. Have you seen or consulted any other health care providers outside of the [address] [place] [address] since your last visit? Include any pap smears or colon screening.  No

## 2019-05-03 NOTE — PROGRESS NOTES
Patient brought in completed paperwork for Malini KAMARA. Dr. Nalini Hussein discontinued Xifaxan. Paperwork placed in shred box. Patient made aware.

## 2019-05-04 LAB
ALBUMIN SERPL-MCNC: 2.9 G/DL (ref 3.5–5.5)
ALP SERPL-CCNC: 143 IU/L (ref 39–117)
ALT SERPL-CCNC: 42 IU/L (ref 0–44)
AST SERPL-CCNC: 61 IU/L (ref 0–40)
BASOPHILS # BLD AUTO: 0 X10E3/UL (ref 0–0.2)
BASOPHILS NFR BLD AUTO: 1 %
BILIRUB DIRECT SERPL-MCNC: 1.19 MG/DL (ref 0–0.4)
BILIRUB SERPL-MCNC: 2.8 MG/DL (ref 0–1.2)
BUN SERPL-MCNC: 12 MG/DL (ref 6–24)
BUN/CREAT SERPL: 10 (ref 9–20)
CALCIUM SERPL-MCNC: 9.4 MG/DL (ref 8.7–10.2)
CHLORIDE SERPL-SCNC: 99 MMOL/L (ref 96–106)
CO2 SERPL-SCNC: 24 MMOL/L (ref 20–29)
CREAT SERPL-MCNC: 1.22 MG/DL (ref 0.76–1.27)
EOSINOPHIL # BLD AUTO: 0.1 X10E3/UL (ref 0–0.4)
EOSINOPHIL NFR BLD AUTO: 2 %
ERYTHROCYTE [DISTWIDTH] IN BLOOD BY AUTOMATED COUNT: 16.7 % (ref 12.3–15.4)
GLUCOSE SERPL-MCNC: 138 MG/DL (ref 65–99)
HCT VFR BLD AUTO: 37.4 % (ref 37.5–51)
HGB BLD-MCNC: 13.1 G/DL (ref 13–17.7)
IMM GRANULOCYTES # BLD AUTO: 0 X10E3/UL (ref 0–0.1)
IMM GRANULOCYTES NFR BLD AUTO: 0 %
INR PPP: 1.4 (ref 0.8–1.2)
LYMPHOCYTES # BLD AUTO: 1.7 X10E3/UL (ref 0.7–3.1)
LYMPHOCYTES NFR BLD AUTO: 23 %
MCH RBC QN AUTO: 32.9 PG (ref 26.6–33)
MCHC RBC AUTO-ENTMCNC: 35 G/DL (ref 31.5–35.7)
MCV RBC AUTO: 94 FL (ref 79–97)
MONOCYTES # BLD AUTO: 1.6 X10E3/UL (ref 0.1–0.9)
MONOCYTES NFR BLD AUTO: 20 %
NEUTROPHILS # BLD AUTO: 4.2 X10E3/UL (ref 1.4–7)
NEUTROPHILS NFR BLD AUTO: 54 %
PLATELET # BLD AUTO: 117 X10E3/UL (ref 150–379)
POTASSIUM SERPL-SCNC: 3.9 MMOL/L (ref 3.5–5.2)
PROT SERPL-MCNC: 6.6 G/DL (ref 6–8.5)
PROTHROMBIN TIME: 14.6 SEC (ref 9.1–12)
RBC # BLD AUTO: 3.98 X10E6/UL (ref 4.14–5.8)
SODIUM SERPL-SCNC: 137 MMOL/L (ref 134–144)
WBC # BLD AUTO: 7.7 X10E3/UL (ref 3.4–10.8)

## 2019-05-06 LAB
AFP L3 MFR SERPL: 9.7 % (ref 0–9.9)
AFP SERPL-MCNC: 3.4 NG/ML (ref 0–8)

## 2019-05-26 ENCOUNTER — IP HISTORICAL/CONVERTED ENCOUNTER (OUTPATIENT)
Dept: OTHER | Age: 59
End: 2019-05-26

## 2019-06-01 NOTE — PROGRESS NOTES
[address] [facility], MD, [address], [place], [place]      SYLVIE Last, [facility]-BC     April Christiana Ledbetter, KATELYNN Leal NP [address] [facility] 136    at [address], [address] [place] [address], [facility] 22.    [phone]    FAX: [address] [place] [address]    at [address], [address]    [phone]    FAX: [phone]       Patient Care Team:  Catalina Burkett MD as PCP - [facility])  Kayleen Cruz MD (Gastroenterology)      Problem List  Date Reviewed: 6/1/2019          Codes Class Noted    Cirrhosis of liver without ascites [facility]) ICD-10-CM: K74.60  ICD-9-CM: 571.5  6/6/2017        Esophageal varices ([facility] ICD-10-CM: I85.00  ICD-9-CM: 456.1  6/6/2017        Diabetes mellitus ([facility] ICD-10-CM: E11.9  ICD-9-CM: 250.00  2/7/7155        H/O umbilical hernia repair VUX-55-EO: K24.623, Z87.19  ICD-9-CM: V15.29  6/6/2017        S/P appendectomy ICD-10-CM: Z90.49  ICD-9-CM: V45.89  6/6/2017              Rosa Bland returns to the [address] for management of cryptogenic cirrhosis. The active problem list, all pertinent past medical history, medications, endoscopic studies, radiologic findings and laboratory findings related to the liver disorder were reviewed with the patient. The patient is a 63 y.o.  male who was found to have chronic liver disease and cirrhosis in 5/2017 when he developed variceal bleeding.     The patient has developed the following complications of cirrhosis: esophageal varices, variceal bleeding, edema,   hepatic encephalopathy,     The patient notes fatigue, swelling of the abdomen, increased confusion    The patient has not experienced yellowing of the eyes or skin, problems concentrating, swelling of the abdomen,   hematemesis, hematochezia. The patient has limitations in functional activities which can be attributed to the liver disease and to other medical problems that are not related to the liver disease. ASSESSMENT AND PLAN:  Cirrhosis  Cryptogenic cirrhosis. The diagnosis of cirrhosis is based upon Fiboscan, imaging, laboratory studies, complications of cirrhosis. AST is elevated. ALT is normal.  ALP is elevated. Liver function is normal.  The platelet count is depressed. The CTP is 7. Child class B. The MELD score is 16. Lower extremity edema  Edema has resolved with current dose of diuretics. Will continue the current dose of diuretics at step 2. The renal function is normal and edema should be able to be controled with diuretics. Screening for Esophageal varices   The patient has esophageal varices with prior bleeding. Varices have been banded   The last EGD to assess for varices was performed in 5/218. Will schedule for EGD to assess for varices and need for banding in 5/2019. Hepatic encephalopathy   The patient has developed mild intermittent HE. He was on lactulose in the past but will not tke this again because of diarrhea. Will start Xifaxan 550 BID. Will complete patient assistance forms. There is no need to restrict dietary protein at this time. Thrombocytopenia   This is secondary to cirrhosis. There is no evidence of overt bleeding. No treatment is required. The platelet count is adequate for the patient to undergo procedures without the need for platelet transfusion or platelet growth factors. Screening for Hepatocellular Carcinoma  HCC screening has recently been performed and does not suggest [address] The next liver imaging study will be performed in 9/2019. DM. This is being managed by his PCP. COPD. Patient is inconsistent with inhaler use and continues to smoke 1 PPD.       Treatment of other medical problems in patients with chronic liver disease  There are no contraindications for the patient to take most medications that are necessary for treatment of other medical issues. The patient has cirrhrosis and should avoid the following medications:  NSAIDs which are associated with a higher rate of developing ZOEY. Counseling for alcohol in patients with chronic liver disease  The patient was counseled regarding alcohol consumption and the effect of alcohol on chronic liver disease. The patient does not consume any significant amount of alcohol. Vaccinations   Vaccination for viral hepatitis A and B is recommended since the patient has no serologic evidence of previous exposure or vaccination with immunity. Routine vaccinations against other bacterial and viral agents can be performed as indicated. Annual flu vaccination should be administered if indicated. ALLERGIES  Allergies   Allergen Reactions    Shellfish Derived Hives       MEDICATIONS  Current Outpatient Medications   Medication Sig    furosemide (LASIX) 40 mg tablet Take 2 Tabs by mouth daily.  spironolactone (ALDACTONE) 100 mg tablet Take 2 Tabs by mouth daily.  ondansetron (ZOFRAN ODT) 4 mg disintegrating tablet Take 1 Tab by mouth every eight (8) hours as needed for Nausea.  lactulose (CHRONULAC) 10 gram/15 mL solution Take 45 mL by mouth two (2) times a day. No current facility-administered medications for this visit. SYSTEM REVIEW NOT RELATED TO LIVER DISEASE OR REVIEWED ABOVE:  Constitution systems: Negative for fever, chills. Weight stable since last office visit. Eyes: Negative for visual changes. ENT: Negative for sore throat, painful swallowing. Respiratory: Negative for cough, hemoptysis. Positive for SOB, patient is inconsistent with administration of Ventolin. Cardiology: Negative for chest pain, palpitations. GI:  Negative for constipation or diarrhea.   Occasional complaint of RUQ dull achy bruise feeling, helped with changes in position. Occasional post-prandial nausea  : Negative for urinary frequency, dysuria, hematuria, nocturia. Skin: Negative for rash. Hematology: Negative for easy bruising, blood clots. Musculo-skeletal: Negative for back pain, muscle pain, weakness. Neurologic: Negative for headaches, dizziness, vertigo. Memory problems ? related to HE. Psychology: Negative for anxiety, depression. FAMILY HISTORY:  The father  of heart disease. The mother  of MVA but had elevated liver enzymes. There is no family history of liver disease. SOCIAL HISTORY:  The patient is . The patient has no children. The patient currently smokes 1 pack of tobacco daily. The patient has never consumed significant amounts of alcohol. 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 PHYSICAL EXAMINATION:  Visit Vitals  /68 (BP 1 Location: Left arm, BP Patient Position: Sitting)   Pulse (!) 105   Temp 97.7 °F (36.5 °C) (Tympanic)   Ht 6' 1\" (1.854 m)   Wt 191 lb 6.4 oz (86.8 kg)   SpO2 96%   BMI 25.25 kg/m²     General: No acute distress. Eyes: Sclera anicteric. ENT: No oral lesions. Thyroid normal.  Nodes: No adenopathy. Skin: No spider angiomata. No jaundice. Positive for palmar erythema. Respiratory: Lungs clear to auscultation. Cardiovascular: Regular heart rate. No murmurs. No JVD. Abdomen: Soft, mild tender to deep palpation of the LUQ, spleen tip palpable. Liver edge firm, easily palpable 2-3 CM BCM nontender. No obvious ascites. Extremities: Trace edema. No muscle wasting. No gross arthritic changes. Neurologic: Alert and oriented. Cranial nerves grossly intact. Fine asterixis.     LABORATORY STUDIES:  Liver [place] of [address] Units 5/3/2019 2019   WBC 3.4 - 10.8 x10E3/uL 7.7 5.4   ANC 1.4 - 7.0 x10E3/uL 4.2    HGB 13.0 - 17.7 g/dL 13.1 13.2    - 379 x10E3/uL 117 (L) 97 (LL)   INR 0.8 - 1.2 1.4 (H) 1.5 (H)   AST 0 - 40 IU/L 61 (H) 53 (H)   ALT 0 - 44 IU/L 42 31   Alk Phos 39 - 117 IU/L 143 (H) 139 (H)   Bili, Total 0.0 - 1.2 mg/dL 2.8 (H) 2.7 (H)   Bili, Direct 0.00 - 0.40 mg/dL 1.19 (H) 0.94 (H)   Albumin 3.5 - 5.5 g/dL 2.9 (L) 2.8 (L)   BUN 6 - 24 mg/dL 12 6   Creat 0.76 - 1.27 mg/dL 1.22 0.91   Na 134 - 144 mmol/L 137 141   K 3.5 - 5.2 mmol/L 3.9 3.9   Cl 96 - 106 mmol/L 99 107 (H)   CO2 20 - 29 mmol/L 24 21   Glucose 65 - 99 mg/dL 138 (H) 155 (H)   Ammonia 27 - 102 ug/dL  212 (HH)     Cancer Screening Latest Ref Rng & Units 5/3/2019 2/22/2019 9/12/2018   AFP, Serum 0.0 - 8.0 ng/mL 3.4 3.6 4.3   AFP-L3% 0.0 - 9.9 % 9.7 8.3 8.4     SEROLOGIES:  5/2017. HBsurface antigen negative, anti-HBcore negative, anti-HBsurface negative, HCV RNA negative, iron saturation 29%, ASMA negative. Serologies Latest Ref Rng & Units 6/6/2017   Ferritin 30 - 400 ng/mL 39   Alpha-1 antitrypsin level 90 - 200 mg/dL 87 (L)     LIVER HISTOLOGY:  6/2017. FibroScan performed at [facility]. EkPa was 72.1. The results suggested a fibrosis level of F4. ENDOSCOPIC PROCEDURES:  5/2017. EGD by Dr Chavo Hinson. Esophageal varices. Banding performed. 8/2017. EGD performed by Dr Khushboo Best. Small esophageal varices. No banding performed. No gastric varices. Mild portal gastropathy. Repeat in 6 months.  2/2018. EGD performed by Dr Khushboo Best. Medium esophageal varices. Banding performed x 4. No gastric varices. Moderate portal gastropathy. 5/2018. EGD performed by Dr Khushboo Best. No esophageal varices. No gastric varices. Moderate portal gastropathy. Repeat 5/2019. RADIOLOGY:  11/22/2017. US of abdomen. No liver mass/lesion. 5/2018. Ultrasound of liver. Echogenic consistent with cirrhosis. No liver mass lesions. No dilated bile ducts. No ascites.     OTHER TESTING:  Not available or performed    FOLLOW-UP:  All of the issues listed above in the Assessment and Plan were discussed with the patient. All questions were answered. The patient expressed a clear understanding of the above. [address] [place] [address] in 4 weeks for routine monitoring.       Watson Mendosa MD  [address] 22.  [phone]  [address] [place] [address]

## 2019-06-13 ENCOUNTER — APPOINTMENT (OUTPATIENT)
Dept: CT IMAGING | Age: 59
DRG: 433 | End: 2019-06-13
Attending: EMERGENCY MEDICINE
Payer: SELF-PAY

## 2019-06-13 ENCOUNTER — APPOINTMENT (OUTPATIENT)
Dept: GENERAL RADIOLOGY | Age: 59
DRG: 433 | End: 2019-06-13
Attending: EMERGENCY MEDICINE
Payer: SELF-PAY

## 2019-06-13 ENCOUNTER — HOSPITAL ENCOUNTER (INPATIENT)
Age: 59
LOS: 3 days | Discharge: HOME | DRG: 433 | End: 2019-06-16
Attending: EMERGENCY MEDICINE | Admitting: HOSPITALIST
Payer: SELF-PAY

## 2019-06-13 DIAGNOSIS — I85.10 SECONDARY ESOPHAGEAL VARICES WITHOUT BLEEDING (HCC): ICD-10-CM

## 2019-06-13 DIAGNOSIS — J18.9 COMMUNITY ACQUIRED PNEUMONIA OF RIGHT LOWER LOBE OF LUNG: ICD-10-CM

## 2019-06-13 DIAGNOSIS — J90 PLEURAL EFFUSION: Primary | ICD-10-CM

## 2019-06-13 DIAGNOSIS — K76.9 PLEURAL EFFUSION ASSOCIATED WITH HEPATIC DISORDER: ICD-10-CM

## 2019-06-13 DIAGNOSIS — R18.8 OTHER ASCITES: ICD-10-CM

## 2019-06-13 DIAGNOSIS — J91.8 PLEURAL EFFUSION ASSOCIATED WITH HEPATIC DISORDER: ICD-10-CM

## 2019-06-13 DIAGNOSIS — K76.82 HEPATIC ENCEPHALOPATHY: ICD-10-CM

## 2019-06-13 DIAGNOSIS — D69.6 THROMBOCYTOPENIA (HCC): ICD-10-CM

## 2019-06-13 DIAGNOSIS — N17.9 AKI (ACUTE KIDNEY INJURY) (HCC): ICD-10-CM

## 2019-06-13 DIAGNOSIS — K74.60 CIRRHOSIS OF LIVER WITH ASCITES, UNSPECIFIED HEPATIC CIRRHOSIS TYPE (HCC): ICD-10-CM

## 2019-06-13 DIAGNOSIS — R18.8 CIRRHOSIS OF LIVER WITH ASCITES, UNSPECIFIED HEPATIC CIRRHOSIS TYPE (HCC): ICD-10-CM

## 2019-06-13 LAB
ALBUMIN SERPL-MCNC: 2.4 G/DL (ref 3.5–5)
ALBUMIN/GLOB SERPL: 0.6 {RATIO} (ref 1.1–2.2)
ALP SERPL-CCNC: 142 U/L (ref 45–117)
ALT SERPL-CCNC: 57 U/L (ref 12–78)
ANION GAP SERPL CALC-SCNC: 6 MMOL/L (ref 5–15)
AST SERPL-CCNC: 59 U/L (ref 15–37)
ATRIAL RATE: 88 BPM
BASOPHILS # BLD: 0 K/UL (ref 0–0.1)
BASOPHILS NFR BLD: 0 % (ref 0–1)
BILIRUB SERPL-MCNC: 3.6 MG/DL (ref 0.2–1)
BNP SERPL-MCNC: 118 PG/ML
BUN SERPL-MCNC: 16 MG/DL (ref 6–20)
BUN/CREAT SERPL: 13 (ref 12–20)
CALCIUM SERPL-MCNC: 8.5 MG/DL (ref 8.5–10.1)
CALCULATED P AXIS, ECG09: 50 DEGREES
CALCULATED R AXIS, ECG10: 1 DEGREES
CALCULATED T AXIS, ECG11: 0 DEGREES
CHLORIDE SERPL-SCNC: 105 MMOL/L (ref 97–108)
CO2 SERPL-SCNC: 24 MMOL/L (ref 21–32)
COMMENT, HOLDF: NORMAL
CREAT SERPL-MCNC: 1.27 MG/DL (ref 0.7–1.3)
DIAGNOSIS, 93000: NORMAL
DIFFERENTIAL METHOD BLD: ABNORMAL
EOSINOPHIL # BLD: 0.2 K/UL (ref 0–0.4)
EOSINOPHIL NFR BLD: 2 % (ref 0–7)
ERYTHROCYTE [DISTWIDTH] IN BLOOD BY AUTOMATED COUNT: 16 % (ref 11.5–14.5)
GLOBULIN SER CALC-MCNC: 3.8 G/DL (ref 2–4)
GLUCOSE SERPL-MCNC: 109 MG/DL (ref 65–100)
HCT VFR BLD AUTO: 36.4 % (ref 36.6–50.3)
HGB BLD-MCNC: 12.4 G/DL (ref 12.1–17)
IMM GRANULOCYTES # BLD AUTO: 0 K/UL (ref 0–0.04)
IMM GRANULOCYTES NFR BLD AUTO: 0 % (ref 0–0.5)
INR PPP: 1.4 (ref 0.9–1.1)
LIPASE SERPL-CCNC: 267 U/L (ref 73–393)
LYMPHOCYTES # BLD: 1.8 K/UL (ref 0.8–3.5)
LYMPHOCYTES NFR BLD: 22 % (ref 12–49)
MCH RBC QN AUTO: 33.2 PG (ref 26–34)
MCHC RBC AUTO-ENTMCNC: 34.1 G/DL (ref 30–36.5)
MCV RBC AUTO: 97.3 FL (ref 80–99)
MONOCYTES # BLD: 1.6 K/UL (ref 0–1)
MONOCYTES NFR BLD: 20 % (ref 5–13)
NEUTS SEG # BLD: 4.5 K/UL (ref 1.8–8)
NEUTS SEG NFR BLD: 55 % (ref 32–75)
NRBC # BLD: 0 K/UL (ref 0–0.01)
NRBC BLD-RTO: 0 PER 100 WBC
P-R INTERVAL, ECG05: 134 MS
PLATELET # BLD AUTO: 110 K/UL (ref 150–400)
PMV BLD AUTO: 10.5 FL (ref 8.9–12.9)
POTASSIUM SERPL-SCNC: 4 MMOL/L (ref 3.5–5.1)
PROT SERPL-MCNC: 6.2 G/DL (ref 6.4–8.2)
PROTHROMBIN TIME: 13.8 SEC (ref 9–11.1)
Q-T INTERVAL, ECG07: 400 MS
QRS DURATION, ECG06: 76 MS
QTC CALCULATION (BEZET), ECG08: 484 MS
RBC # BLD AUTO: 3.74 M/UL (ref 4.1–5.7)
SAMPLES BEING HELD,HOLD: NORMAL
SODIUM SERPL-SCNC: 135 MMOL/L (ref 136–145)
TROPONIN I SERPL-MCNC: <0.05 NG/ML
VENTRICULAR RATE, ECG03: 88 BPM
WBC # BLD AUTO: 8.2 K/UL (ref 4.1–11.1)

## 2019-06-13 PROCEDURE — 84484 ASSAY OF TROPONIN QUANT: CPT

## 2019-06-13 PROCEDURE — 77030029684 HC NEB SM VOL KT MONA -A

## 2019-06-13 PROCEDURE — 94664 DEMO&/EVAL PT USE INHALER: CPT

## 2019-06-13 PROCEDURE — 99285 EMERGENCY DEPT VISIT HI MDM: CPT

## 2019-06-13 PROCEDURE — 71046 X-RAY EXAM CHEST 2 VIEWS: CPT

## 2019-06-13 PROCEDURE — 85610 PROTHROMBIN TIME: CPT

## 2019-06-13 PROCEDURE — 74011000258 HC RX REV CODE- 258: Performed by: EMERGENCY MEDICINE

## 2019-06-13 PROCEDURE — 74011250636 HC RX REV CODE- 250/636: Performed by: EMERGENCY MEDICINE

## 2019-06-13 PROCEDURE — 83690 ASSAY OF LIPASE: CPT

## 2019-06-13 PROCEDURE — 74011000258 HC RX REV CODE- 258: Performed by: RADIOLOGY

## 2019-06-13 PROCEDURE — 74011250637 HC RX REV CODE- 250/637: Performed by: HOSPITALIST

## 2019-06-13 PROCEDURE — 93005 ELECTROCARDIOGRAM TRACING: CPT

## 2019-06-13 PROCEDURE — 80053 COMPREHEN METABOLIC PANEL: CPT

## 2019-06-13 PROCEDURE — 71275 CT ANGIOGRAPHY CHEST: CPT

## 2019-06-13 PROCEDURE — 94640 AIRWAY INHALATION TREATMENT: CPT

## 2019-06-13 PROCEDURE — 74011000250 HC RX REV CODE- 250: Performed by: HOSPITALIST

## 2019-06-13 PROCEDURE — 83880 ASSAY OF NATRIURETIC PEPTIDE: CPT

## 2019-06-13 PROCEDURE — 74011636320 HC RX REV CODE- 636/320: Performed by: RADIOLOGY

## 2019-06-13 PROCEDURE — 94760 N-INVAS EAR/PLS OXIMETRY 1: CPT

## 2019-06-13 PROCEDURE — 85025 COMPLETE CBC W/AUTO DIFF WBC: CPT

## 2019-06-13 PROCEDURE — 65270000029 HC RM PRIVATE

## 2019-06-13 RX ORDER — SPIRONOLACTONE 100 MG/1
100 TABLET, FILM COATED ORAL DAILY
COMMUNITY
End: 2019-07-23 | Stop reason: SDUPTHER

## 2019-06-13 RX ORDER — ZOLPIDEM TARTRATE 5 MG/1
5 TABLET ORAL
Status: CANCELLED | OUTPATIENT
Start: 2019-06-13

## 2019-06-13 RX ORDER — FUROSEMIDE 10 MG/ML
40 INJECTION INTRAMUSCULAR; INTRAVENOUS EVERY 12 HOURS
Status: DISCONTINUED | OUTPATIENT
Start: 2019-06-13 | End: 2019-06-14

## 2019-06-13 RX ORDER — LACTULOSE 10 G/15ML
30 SOLUTION ORAL; RECTAL 3 TIMES DAILY
COMMUNITY
End: 2019-06-16

## 2019-06-13 RX ORDER — FUROSEMIDE 40 MG/1
40 TABLET ORAL 2 TIMES DAILY
Status: ON HOLD | COMMUNITY
End: 2019-06-16 | Stop reason: SDUPTHER

## 2019-06-13 RX ORDER — LEVOFLOXACIN 5 MG/ML
750 INJECTION, SOLUTION INTRAVENOUS
Status: COMPLETED | OUTPATIENT
Start: 2019-06-13 | End: 2019-06-13

## 2019-06-13 RX ORDER — ALBUTEROL SULFATE 90 UG/1
1 AEROSOL, METERED RESPIRATORY (INHALATION)
COMMUNITY

## 2019-06-13 RX ORDER — FUROSEMIDE 40 MG/1
40 TABLET ORAL 2 TIMES DAILY
Status: DISCONTINUED | OUTPATIENT
Start: 2019-06-13 | End: 2019-06-13 | Stop reason: ALTCHOICE

## 2019-06-13 RX ORDER — ALBUMIN HUMAN 250 G/1000ML
12.5 SOLUTION INTRAVENOUS EVERY 12 HOURS
Status: DISCONTINUED | OUTPATIENT
Start: 2019-06-13 | End: 2019-06-13

## 2019-06-13 RX ORDER — SPIRONOLACTONE 100 MG/1
100 TABLET, FILM COATED ORAL DAILY
Status: DISCONTINUED | OUTPATIENT
Start: 2019-06-14 | End: 2019-06-14

## 2019-06-13 RX ORDER — IPRATROPIUM BROMIDE AND ALBUTEROL SULFATE 2.5; .5 MG/3ML; MG/3ML
3 SOLUTION RESPIRATORY (INHALATION)
Status: DISCONTINUED | OUTPATIENT
Start: 2019-06-13 | End: 2019-06-14

## 2019-06-13 RX ORDER — SODIUM CHLORIDE 0.9 % (FLUSH) 0.9 %
5-40 SYRINGE (ML) INJECTION AS NEEDED
Status: DISCONTINUED | OUTPATIENT
Start: 2019-06-13 | End: 2019-06-16 | Stop reason: HOSPADM

## 2019-06-13 RX ORDER — ALBUMIN HUMAN 250 G/1000ML
25 SOLUTION INTRAVENOUS
Status: DISCONTINUED | OUTPATIENT
Start: 2019-06-13 | End: 2019-06-14

## 2019-06-13 RX ORDER — SODIUM CHLORIDE 0.9 % (FLUSH) 0.9 %
10 SYRINGE (ML) INJECTION
Status: COMPLETED | OUTPATIENT
Start: 2019-06-13 | End: 2019-06-13

## 2019-06-13 RX ORDER — NALOXONE HYDROCHLORIDE 0.4 MG/ML
0.4 INJECTION, SOLUTION INTRAMUSCULAR; INTRAVENOUS; SUBCUTANEOUS AS NEEDED
Status: DISCONTINUED | OUTPATIENT
Start: 2019-06-13 | End: 2019-06-16 | Stop reason: HOSPADM

## 2019-06-13 RX ORDER — SODIUM CHLORIDE 0.9 % (FLUSH) 0.9 %
5-40 SYRINGE (ML) INJECTION EVERY 8 HOURS
Status: DISCONTINUED | OUTPATIENT
Start: 2019-06-13 | End: 2019-06-16 | Stop reason: HOSPADM

## 2019-06-13 RX ORDER — ONDANSETRON 2 MG/ML
4 INJECTION INTRAMUSCULAR; INTRAVENOUS
Status: DISCONTINUED | OUTPATIENT
Start: 2019-06-13 | End: 2019-06-16 | Stop reason: HOSPADM

## 2019-06-13 RX ADMIN — LEVOFLOXACIN 750 MG: 5 INJECTION, SOLUTION INTRAVENOUS at 15:50

## 2019-06-13 RX ADMIN — IPRATROPIUM BROMIDE AND ALBUTEROL SULFATE 3 ML: .5; 3 SOLUTION RESPIRATORY (INHALATION) at 19:19

## 2019-06-13 RX ADMIN — LACTULOSE 45 ML: 20 SOLUTION ORAL at 21:12

## 2019-06-13 RX ADMIN — IOPAMIDOL 80 ML: 755 INJECTION, SOLUTION INTRAVENOUS at 15:35

## 2019-06-13 RX ADMIN — Medication 10 ML: at 18:12

## 2019-06-13 RX ADMIN — PIPERACILLIN AND TAZOBACTAM 3.38 G: 3; .375 INJECTION, POWDER, FOR SOLUTION INTRAVENOUS at 17:45

## 2019-06-13 RX ADMIN — LACTULOSE 45 ML: 20 SOLUTION ORAL at 18:54

## 2019-06-13 RX ADMIN — Medication 10 ML: at 15:35

## 2019-06-13 RX ADMIN — SODIUM CHLORIDE 100 ML: 900 INJECTION, SOLUTION INTRAVENOUS at 15:35

## 2019-06-13 NOTE — CONSULTS
[address] 137 [facility] Adela Lorenzo MD, Estela Dumont, FAASLD Zenobia Maki, MD Hazle Seip, PA-C Lori Parrot, [facility]-BC April Kevin Pradhan, KATELYNN Kessler, KATELYNN Morel, KATELYNN Abraham [facility] [address] 136 
  at [facility] 
  217 [facility], [address] Patricia Gonzalez  22. 
  [phone] FAX: [address] [place] [address] Avenue 
  [facility] 
  [address], [address] [facility], [address] 
  [phone] FAX: [phone] HEPATOLOGY CONSULT NOTE The patient is well known to and regularly cared for at [facility]. He is a 63year old  male with cryptogenic cirrhosis. He found to have cirrhosis in 5/2017 when he developed variceal bleeding. Other complications of cirrhosis include edema, hepatic encephalopathy. He came to the ED today because of increasing SOB. 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 I have reviewed the Emergency room note, Hospital admission note, Notes by all other physicians who have seen the patient during this hospitalization to date. I have reviewed the problem list and the reason for this hospitalization. I have reviewed the allergies and the medications the patient was taking at home prior to this hospitalization. In his room this evening he has mild SOB. He is oriented with some mild forgetfulness. ASSESSMENT AND PLAN: 
Cirrhosis Cryptogenic cirrhosis. The diagnosis of cirrhosis is based upon Fiboscan, imaging, laboratory studies, complications of cirrhosis. The CTP is 11. Child class C. The MELD score is 19. The patient has a MELD score greater than 15 and has developed complications of cirrhosis. He was recently granted Medicare but this does not take effect until 10/2019. Will initiate liver transplant evaluation testing at that time. Ascites/Hydrothorax Ascites developd for the first time in 4/2019. He was hospitalized at [address] [place] [address] 2 weeks for ascites and had a paracentesis. Now has recurrent ascites and hydrothorax. Paracentesis and thoracentesis will be performed in AM 
Will place on IV lasix 40 mg BID and aldactone 100 mg every day, Lower extremity edema Edema has resolved with current dose of diuretics. Acute kidney injury ZOEY is secondary to the effects of cirrhosis and diuretics. Will start IV albumin 25 GM Q6H Ok to continue diuretics for now. Esophageal varices The patient has esophageal varices with prior bleeding. Varices have been banded The last EGD to assess for varices was performed in 5/2018. Will perform EGD during the current hospitalization. 
  
Hepatic encephalopathy The patient has developed mild intermittent HE. He was on lactulose in the past but will not take this again because of diarrhea. Will start Xifaxan 550 BID. There is no need to restrict dietary protein at this time.   
  
Thrombocytopenia This is secondary to cirrhosis. There is no evidence of overt bleeding. No treatment is required. The platelet count is adequate for the patient to undergo procedures without the need for platelet transfusion or platelet growth factors. 
  
SYSTEM REVIEW: 
Constitution systems: Negative for fever, chills, weight gain, weight loss. Eyes: Negative for visual changes. ENT: Negative for sore throat, painful swallowing. Respiratory: Negative for cough, hemoptysis, SOB. Cardiology: Negative for chest pain, palpitations. GI:  Negative for constipation or diarrhea. : Negative for urinary frequency, dysuria, hematuria, nocturia. Skin: Negative for rash. Hematology: Negative for easy bruising, blood clots. Musculo-skelatal: Negative for back pain, muscle pain, weakness. Neurologic: Negative for headaches, dizziness, vertigo, memory problems not related to HE. Psychology: Negative for anxiety, depression. FAMILY HISTORY: 
The father  of heart disease. The mother  of MVA but had elevated liver enzymes. There is no family history of liver disease.   
  
SOCIAL HISTORY: 
The patient is . The patient has no children. The patient currently smokes 1 pack of tobacco daily. The patient has never consumed significant amounts of alcohol. 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   
  
PHYSICAL EXAMINATION: 
VS: per nursing note General:  No acute distress. Eyes:  Sclera anicteric. ENT:  No oral lesions. Thyroid normal. 
Nodes:  No adenopathy. Skin:  Spider angiomata on chest.  No jaundice. Respiratory:  Reduced breath sounds at right base Cardiovascular:  Regular heart rate. Abdomen:  Soft non-tender, Some ascites is present. Extremities:  No lower extremity edema. Neurologic:  Generally alert and oriented. Cranial nerves grossly intact. No asterixis. LABORATORY: 
Results for Delonte Celis (MRN [id number]) as of 2019 18:14 Ref. Range 5/3/2019 00:00 2019 11:15 WBC Latest Ref Range: 4.1 - 11.1 K/uL 7.7 8.2 HGB Latest Ref Range: 12.1 - 17.0 g/dL 13.1 12.4 PLATELET Latest Ref Range: 150 - 400 K/uL 117 (L) 110 (L) INR Latest Ref Range: 0.9 - 1.1   1.4 (H) 1.4 (H) Sodium Latest Ref Range: 136 - 145 mmol/L 137 135 (L) Potassium Latest Ref Range: 3.5 - 5.1 mmol/L 3.9 4.0 Chloride Latest Ref Range: 97 - 108 mmol/L 99 105 CO2 Latest Ref Range: 21 - 32 mmol/L 24 24 Glucose Latest Ref Range: 65 - 100 mg/dL 138 (H) 109 (H) BUN Latest Ref Range: 6 - 20 MG/DL 12 16 Creatinine Latest Ref Range: 0.70 - 1.30 MG/DL 1.22 1.27 Bilirubin, total Latest Ref Range: 0.2 - 1.0 MG/DL 2.8 (H) 3.6 (H) Albumin Latest Ref Range: 3.5 - 5.0 g/dL 2.9 (L) 2.4 (L) ALT (SGPT) Latest Ref Range: 12 - 78 U/L 42 57 AST Latest Ref Range: 15 - 37 U/L 61 (H) 59 (H) Alk. phosphatase Latest Ref Range: 45 - 117 U/L 143 (H) 142 (H) RADIOLOGY: 
CT scan chest.  Changes consistent with COPD. Large right effusion. Nodular liver c/w cirrhosis. Ascites. MD Jarad [facility]endoza [facility] [place] [address], [address]  22. 
[phone] [address] [place] [address]

## 2019-06-13 NOTE — ED PROVIDER NOTES
63 y.o. male with past medical history significant for diabetes, COPD, and liver cirrhosis who presents from home via private vehicle with chief complaint of SOB. Patient reports a h/o liver cirrhosis with ascites requiring paracentesis. He follows with hepatology for this, and reports he is supposed to be on the liver transplant list. Patient's last paracentesis was 6 days ago at [facility], had 4L drained. Patient states since then, he has had gradually worsening SOB and increasing abdominal distention. He also endorses LLE swelling, dry cough, nausea, left sided abdominal pain, and lack of appetite. He has \"a little bit\" of chest pain. Sx not exerted by deep inspiration. Patient denies any fever or seizures. There are no other acute medical concerns at this time. Social hx: Current smoker; No current EtOH use PCP: Carmelita Hankins MD 
Hepatologist: MD Maude Henderson MD 
 
Note written by Elvie Quintero, as dictated by Alex Ga,  12:38 PM 
 
The history is provided by the patient, the spouse, a relative and medical records (Brother). No  was used. Past Medical History:  
Diagnosis Date  Chronic obstructive pulmonary disease ([address]  Diabetes ([facility]  Liver disease Past Surgical History:  
Procedure Laterality Date [facility]  HX GI Esophageal Varices, banded  HX HERNIA REPAIR  2015 Family History:  
Problem Relation Age of Onset  Diabetes Mother  Heart Disease Father Social History Socioeconomic History  Marital status:  Spouse name: Not on file  Number of children: Not on file  Years of education: Not on file  Highest education level: Not on file Occupational History  Not on file Social Needs  Financial resource strain: Not on file  Food insecurity:  
  Worry: Not on file Inability: Not on file  Transportation needs: Medical: Not on file Non-medical: Not on file Tobacco Use  Smoking status: Current Every Day Smoker Packs/day: 0.25  Smokeless tobacco: Never Used Substance and Sexual Activity  Alcohol use: No  
 Drug use: No  
 Sexual activity: Not on file Lifestyle  Physical activity:  
  Days per week: Not on file Minutes per session: Not on file  Stress: Not on file Relationships  Social connections:  
  Talks on phone: Not on file Gets together: Not on file Attends Baptism service: Not on file Active member of club or organization: Not on file Attends meetings of clubs or organizations: Not on file Relationship status: Not on file  Intimate partner violence:  
  Fear of current or ex partner: Not on file Emotionally abused: Not on file Physically abused: Not on file Forced sexual activity: Not on file Other Topics Concern  Not on file Social History Narrative  Not on file ALLERGIES: Shellfish derived Review of Systems Constitutional: Positive for appetite change. Negative for activity change, chills and fever. HENT: Negative for congestion, rhinorrhea, sinus pain, sneezing and sore throat. Eyes: Negative for photophobia and visual disturbance. Respiratory: Positive for cough and shortness of breath. Cardiovascular: Positive for chest pain and leg swelling. Gastrointestinal: Positive for abdominal pain and nausea. Negative for blood in stool, constipation, diarrhea and vomiting. Genitourinary: Negative for difficulty urinating, dysuria, flank pain, hematuria, penile pain and testicular pain. Musculoskeletal: Negative for arthralgias, back pain, myalgias and neck pain. Skin: Negative for rash and wound. Neurological: Negative for seizures, syncope, weakness, light-headedness, numbness and headaches. Psychiatric/Behavioral: Negative for self-injury and suicidal ideas. All other systems reviewed and are negative. Vitals:  
 06/13/19 1059 06/13/19 1233 BP:  114/56 Pulse: 81 82 Resp:  19 SpO2: 97% 97% Physical Exam  
Constitutional: He is oriented to person, place, and time. He appears well-developed and well-nourished. No distress. Pleasant, NAD. HENT:  
Head: Normocephalic and atraumatic. Eyes: Pupils are equal, round, and reactive to light. Conjunctivae and EOM are normal.  
Neck: Neck supple. Cardiovascular: Normal rate, regular rhythm and normal heart sounds. Pulmonary/Chest: Effort normal. He has decreased breath sounds in the right lower field. Abdominal: Soft. He exhibits distension and fluid wave. There is no tenderness. Distended abdomen with fluid wave. No tenderness. Musculoskeletal: He exhibits edema. He exhibits no tenderness. Trace pitting edema to LLE. Neurological: He is alert and oriented to person, place, and time. Skin: Skin is warm and dry. He is not diaphoretic. Nursing note and vitals reviewed. Note written by Oral Elvie Paez, as dictated by Fazal Hill DO 12:38 PM 
 
MDM 
  63 y.o. male with cirrhosis presents with increased SOB. Decreased BS on right. Concern or possible PNA vs effusion. Labs returned showing T bili 3.6, alk phos 152, INR 1.4. Neg trop and BNP 
 
CXR viewed by myself and read by radiology showing right pleural effusion with possible right LL consolidation concerning for possible PNA. Given IV abx. CT chest ordered to further evaluate. Procedures Hospitalist Altagracia for Admission 2:59 PM 
 
ED Room Number: HB17/18 Patient Name and age:  Ryan Winston 63 y.o.  male Working Diagnosis: 1. Pleural effusion 2. Community acquired pneumonia of right lower lobe of lung ([address] 3. Cirrhosis of liver with ascites, unspecified hepatic cirrhosis type ([facility] Readmission: no 
Isolation Requirements:  no 
Recommended Level of Care:  telemetry Code Status:  full Other:  Cirrhotic with right pleural effusion and possible PNA on CXR, with increased SOB, cough. Afebrile and VSS. CT chest pending.

## 2019-06-13 NOTE — PROGRESS NOTES
Admission Medication Reconciliation: 
Information obtained from:  patient, patient's family, patient's medication list, Miguel Aceves Comments/Recommendations:  
 
Spoke with patient and family regarding Casey Baird's medications. They had a list of medications and the family members were able to speak to how the patient takes each medication. The following changes were made to the PTA medication list: 1. Added albuterol HFA 2. Adjusted the following: - Furosemide 80 mg daily --> furosemide 40 mg BID 
- Lactulose 30 g BID --> 30 mg TID 
- Spironolactone 200 mg daily --> 100 mg daily Allergies and reactions were verified with the patient. Patient's pharmacy: [facility] [place] Allergies:  Shellfish derived Chief Complaint for this Admission: Chief Complaint Patient presents with Shortness of Breath Chest Pain Significant PMH/Disease States:  
Past Medical History:  
Diagnosis Date Chronic obstructive pulmonary disease (HCC) Diabetes ([address] Liver disease Prior to Admission Medications:  
Prior to Admission Medications Prescriptions Last Dose Informant Patient Reported? Taking? albuterol (PROVENTIL HFA, VENTOLIN HFA, PROAIR HFA) 90 mcg/actuation inhaler   Yes Yes Sig: Take  by inhalation as needed for Wheezing. furosemide (LASIX) 40 mg tablet   Yes Yes Sig: Take 40 mg by mouth two (2) times a day. lactulose (CHRONULAC) 10 gram/15 mL solution   Yes Yes Sig: Take 30 g by mouth three (3) times daily. ondansetron (ZOFRAN ODT) 4 mg disintegrating tablet   No No  
Sig: Take 1 Tab by mouth every eight (8) hours as needed for Nausea. spironolactone (ALDACTONE) 100 mg tablet   Yes Yes Sig: Take 100 mg by mouth daily. Facility-Administered Medications: None Thank you for allowing me to participate in this patient's care. Please call the main pharmacy at  or the [facility] pharmacist at  with any questions. Monisha Powers, KristinD

## 2019-06-13 NOTE — ED TRIAGE NOTES
Pt arrives via private vehicle c/o SOB and states he was at [facility] 6/6/19 and had 4L of fluid drained from his abdomen for a \"bad liver\". Pt states he was also told he had \"fluid on his lungs\" and was \"placed on a transplant list\". Pt arrives AOx4, mildly labored breathing, mild LLE edema, and VSS. Pt denies chest pain.

## 2019-06-13 NOTE — ROUTINE PROCESS
TRANSFER - OUT REPORT: 
 
Verbal report given to LINDA Ngo(name) on Riana Noriega  being transferred to [facility](unit) for routine progression of care Report consisted of patients Situation, Background, Assessment and  
Recommendations(SBAR). Information from the following report(s) SBAR, Kardex, ED Summary, [facility] and Recent Results was reviewed with the receiving nurse. Lines:  
Peripheral IV 06/13/19 Right Hand (Active) Site Assessment Clean, dry, & intact 6/13/2019 11:14 AM  
Phlebitis Assessment 0 6/13/2019 11:14 AM  
Infiltration Assessment 0 6/13/2019 11:14 AM  
Dressing Status Clean, dry, & intact 6/13/2019 11:14 AM  
Dressing Type Transparent 6/13/2019 11:14 AM  
Hub Color/Line Status Blue 6/13/2019 11:14 AM  
Alcohol Cap Used No 6/13/2019 11:14 AM  
   
Peripheral IV 06/13/19 Right Antecubital (Active) Site Assessment Clean, dry, & intact 6/13/2019  2:33 PM  
Phlebitis Assessment 0 6/13/2019  2:33 PM  
Infiltration Assessment 0 6/13/2019  2:33 PM  
Dressing Status Clean, dry, & intact 6/13/2019  2:33 PM  
Dressing Type Transparent 6/13/2019  2:33 PM  
Hub Color/Line Status Pink;Flushed;Patent 6/13/2019  2:33 PM  
Action Taken Catheter retaped;Blood drawn 6/13/2019  2:33 PM  
  
 
Opportunity for questions and clarification was provided. Patient transported with: 
 [facility]

## 2019-06-14 ENCOUNTER — ANESTHESIA (OUTPATIENT)
Dept: ENDOSCOPY | Age: 59
DRG: 433 | End: 2019-06-14
Payer: SELF-PAY

## 2019-06-14 ENCOUNTER — APPOINTMENT (OUTPATIENT)
Dept: GENERAL RADIOLOGY | Age: 59
DRG: 433 | End: 2019-06-14
Attending: RADIOLOGY
Payer: SELF-PAY

## 2019-06-14 ENCOUNTER — ANESTHESIA EVENT (OUTPATIENT)
Dept: ENDOSCOPY | Age: 59
DRG: 433 | End: 2019-06-14
Payer: SELF-PAY

## 2019-06-14 ENCOUNTER — APPOINTMENT (OUTPATIENT)
Dept: ULTRASOUND IMAGING | Age: 59
DRG: 433 | End: 2019-06-14
Attending: HOSPITALIST
Payer: SELF-PAY

## 2019-06-14 LAB
ALBUMIN SERPL-MCNC: 2 G/DL (ref 3.5–5)
ALBUMIN/GLOB SERPL: 0.6 {RATIO} (ref 1.1–2.2)
ALP SERPL-CCNC: 130 U/L (ref 45–117)
ALT SERPL-CCNC: 46 U/L (ref 12–78)
ANION GAP SERPL CALC-SCNC: 10 MMOL/L (ref 5–15)
AST SERPL-CCNC: 53 U/L (ref 15–37)
BASOPHILS # BLD: 0 K/UL (ref 0–0.1)
BASOPHILS NFR BLD: 1 % (ref 0–1)
BILIRUB SERPL-MCNC: 3.3 MG/DL (ref 0.2–1)
BUN SERPL-MCNC: 16 MG/DL (ref 6–20)
BUN/CREAT SERPL: 12 (ref 12–20)
CALCIUM SERPL-MCNC: 8.4 MG/DL (ref 8.5–10.1)
CHLORIDE SERPL-SCNC: 105 MMOL/L (ref 97–108)
CO2 SERPL-SCNC: 22 MMOL/L (ref 21–32)
COMMENT, HOLDF: NORMAL
COMMENT, HOLDF: NORMAL
CREAT SERPL-MCNC: 1.33 MG/DL (ref 0.7–1.3)
DIFFERENTIAL METHOD BLD: ABNORMAL
EOSINOPHIL # BLD: 0.1 K/UL (ref 0–0.4)
EOSINOPHIL NFR BLD: 2 % (ref 0–7)
ERYTHROCYTE [DISTWIDTH] IN BLOOD BY AUTOMATED COUNT: 15.9 % (ref 11.5–14.5)
GLOBULIN SER CALC-MCNC: 3.6 G/DL (ref 2–4)
GLUCOSE SERPL-MCNC: 90 MG/DL (ref 65–100)
HCT VFR BLD AUTO: 33.8 % (ref 36.6–50.3)
HGB BLD-MCNC: 11.4 G/DL (ref 12.1–17)
IMM GRANULOCYTES # BLD AUTO: 0 K/UL (ref 0–0.04)
IMM GRANULOCYTES NFR BLD AUTO: 1 % (ref 0–0.5)
LYMPHOCYTES # BLD: 1.5 K/UL (ref 0.8–3.5)
LYMPHOCYTES NFR BLD: 23 % (ref 12–49)
MAGNESIUM SERPL-MCNC: 2 MG/DL (ref 1.6–2.4)
MCH RBC QN AUTO: 32.8 PG (ref 26–34)
MCHC RBC AUTO-ENTMCNC: 33.7 G/DL (ref 30–36.5)
MCV RBC AUTO: 97.1 FL (ref 80–99)
MONOCYTES # BLD: 1.3 K/UL (ref 0–1)
MONOCYTES NFR BLD: 20 % (ref 5–13)
NEUTS SEG # BLD: 3.5 K/UL (ref 1.8–8)
NEUTS SEG NFR BLD: 53 % (ref 32–75)
NRBC # BLD: 0.02 K/UL (ref 0–0.01)
NRBC BLD-RTO: 0.3 PER 100 WBC
PHOSPHATE SERPL-MCNC: 2.7 MG/DL (ref 2.6–4.7)
PLATELET # BLD AUTO: 94 K/UL (ref 150–400)
PMV BLD AUTO: 9.9 FL (ref 8.9–12.9)
POTASSIUM SERPL-SCNC: 4 MMOL/L (ref 3.5–5.1)
PROT SERPL-MCNC: 5.6 G/DL (ref 6.4–8.2)
RBC # BLD AUTO: 3.48 M/UL (ref 4.1–5.7)
SAMPLES BEING HELD,HOLD: NORMAL
SAMPLES BEING HELD,HOLD: NORMAL
SODIUM SERPL-SCNC: 137 MMOL/L (ref 136–145)
WBC # BLD AUTO: 6.4 K/UL (ref 4.1–11.1)

## 2019-06-14 PROCEDURE — 74011250636 HC RX REV CODE- 250/636: Performed by: INTERNAL MEDICINE

## 2019-06-14 PROCEDURE — 0W9G30Z DRAINAGE OF PERITONEAL CAVITY WITH DRAINAGE DEVICE, PERCUTANEOUS APPROACH: ICD-10-PCS | Performed by: RADIOLOGY

## 2019-06-14 PROCEDURE — 74011000250 HC RX REV CODE- 250

## 2019-06-14 PROCEDURE — 94640 AIRWAY INHALATION TREATMENT: CPT

## 2019-06-14 PROCEDURE — 36415 COLL VENOUS BLD VENIPUNCTURE: CPT

## 2019-06-14 PROCEDURE — 76060000031 HC ANESTHESIA FIRST 0.5 HR: Performed by: INTERNAL MEDICINE

## 2019-06-14 PROCEDURE — 71045 X-RAY EXAM CHEST 1 VIEW: CPT

## 2019-06-14 PROCEDURE — 49083 ABD PARACENTESIS W/IMAGING: CPT

## 2019-06-14 PROCEDURE — 77030014243 HC BND LIG VRCES BSC -D: Performed by: INTERNAL MEDICINE

## 2019-06-14 PROCEDURE — 74011000250 HC RX REV CODE- 250: Performed by: HOSPITALIST

## 2019-06-14 PROCEDURE — 83735 ASSAY OF MAGNESIUM: CPT

## 2019-06-14 PROCEDURE — 94664 DEMO&/EVAL PT USE INHALER: CPT

## 2019-06-14 PROCEDURE — 77030005208 HC CATH HLDR GLWC -A

## 2019-06-14 PROCEDURE — 74011250636 HC RX REV CODE- 250/636

## 2019-06-14 PROCEDURE — P9047 ALBUMIN (HUMAN), 25%, 50ML: HCPCS | Performed by: INTERNAL MEDICINE

## 2019-06-14 PROCEDURE — 32555 ASPIRATE PLEURA W/ IMAGING: CPT

## 2019-06-14 PROCEDURE — 77030010545

## 2019-06-14 PROCEDURE — 85025 COMPLETE CBC W/AUTO DIFF WBC: CPT

## 2019-06-14 PROCEDURE — 94760 N-INVAS EAR/PLS OXIMETRY 1: CPT

## 2019-06-14 PROCEDURE — 77030002996 HC SUT SLK J&J -A

## 2019-06-14 PROCEDURE — 65270000029 HC RM PRIVATE

## 2019-06-14 PROCEDURE — 80053 COMPREHEN METABOLIC PANEL: CPT

## 2019-06-14 PROCEDURE — 74011250637 HC RX REV CODE- 250/637: Performed by: HOSPITALIST

## 2019-06-14 PROCEDURE — 0W993ZZ DRAINAGE OF RIGHT PLEURAL CAVITY, PERCUTANEOUS APPROACH: ICD-10-PCS | Performed by: RADIOLOGY

## 2019-06-14 PROCEDURE — C1729 CATH, DRAINAGE: HCPCS

## 2019-06-14 PROCEDURE — 84100 ASSAY OF PHOSPHORUS: CPT

## 2019-06-14 PROCEDURE — 76040000019: Performed by: INTERNAL MEDICINE

## 2019-06-14 PROCEDURE — 06L38CZ OCCLUSION OF ESOPHAGEAL VEIN WITH EXTRALUMINAL DEVICE, VIA NATURAL OR ARTIFICIAL OPENING ENDOSCOPIC: ICD-10-PCS | Performed by: INTERNAL MEDICINE

## 2019-06-14 RX ORDER — EPINEPHRINE 0.1 MG/ML
1 INJECTION INTRACARDIAC; INTRAVENOUS
Status: DISCONTINUED | OUTPATIENT
Start: 2019-06-14 | End: 2019-06-14 | Stop reason: HOSPADM

## 2019-06-14 RX ORDER — MIDAZOLAM HYDROCHLORIDE 1 MG/ML
5-10 INJECTION, SOLUTION INTRAMUSCULAR; INTRAVENOUS
Status: DISCONTINUED | OUTPATIENT
Start: 2019-06-14 | End: 2019-06-14 | Stop reason: HOSPADM

## 2019-06-14 RX ORDER — SODIUM CHLORIDE 9 MG/ML
50 INJECTION, SOLUTION INTRAVENOUS CONTINUOUS
Status: DISPENSED | OUTPATIENT
Start: 2019-06-14 | End: 2019-06-14

## 2019-06-14 RX ORDER — SODIUM CHLORIDE 0.9 % (FLUSH) 0.9 %
5-40 SYRINGE (ML) INJECTION EVERY 8 HOURS
Status: DISCONTINUED | OUTPATIENT
Start: 2019-06-14 | End: 2019-06-16 | Stop reason: HOSPADM

## 2019-06-14 RX ORDER — LIDOCAINE HYDROCHLORIDE 20 MG/ML
INJECTION, SOLUTION EPIDURAL; INFILTRATION; INTRACAUDAL; PERINEURAL AS NEEDED
Status: DISCONTINUED | OUTPATIENT
Start: 2019-06-14 | End: 2019-06-14 | Stop reason: HOSPADM

## 2019-06-14 RX ORDER — FENTANYL CITRATE 50 UG/ML
50-200 INJECTION, SOLUTION INTRAMUSCULAR; INTRAVENOUS
Status: DISCONTINUED | OUTPATIENT
Start: 2019-06-14 | End: 2019-06-14 | Stop reason: HOSPADM

## 2019-06-14 RX ORDER — ATROPINE SULFATE 0.1 MG/ML
0.5 INJECTION INTRAVENOUS
Status: DISCONTINUED | OUTPATIENT
Start: 2019-06-14 | End: 2019-06-14 | Stop reason: HOSPADM

## 2019-06-14 RX ORDER — SODIUM CHLORIDE 0.9 % (FLUSH) 0.9 %
5-40 SYRINGE (ML) INJECTION AS NEEDED
Status: DISCONTINUED | OUTPATIENT
Start: 2019-06-14 | End: 2019-06-16 | Stop reason: HOSPADM

## 2019-06-14 RX ORDER — NALOXONE HYDROCHLORIDE 0.4 MG/ML
0.4 INJECTION, SOLUTION INTRAMUSCULAR; INTRAVENOUS; SUBCUTANEOUS
Status: DISCONTINUED | OUTPATIENT
Start: 2019-06-14 | End: 2019-06-14 | Stop reason: HOSPADM

## 2019-06-14 RX ORDER — SODIUM CHLORIDE 9 MG/ML
INJECTION, SOLUTION INTRAVENOUS
Status: DISCONTINUED | OUTPATIENT
Start: 2019-06-14 | End: 2019-06-14 | Stop reason: HOSPADM

## 2019-06-14 RX ORDER — IPRATROPIUM BROMIDE AND ALBUTEROL SULFATE 2.5; .5 MG/3ML; MG/3ML
3 SOLUTION RESPIRATORY (INHALATION)
Status: DISCONTINUED | OUTPATIENT
Start: 2019-06-15 | End: 2019-06-15

## 2019-06-14 RX ORDER — DEXTROMETHORPHAN/PSEUDOEPHED 2.5-7.5/.8
1.2 DROPS ORAL
Status: DISCONTINUED | OUTPATIENT
Start: 2019-06-14 | End: 2019-06-14 | Stop reason: HOSPADM

## 2019-06-14 RX ORDER — ACETAMINOPHEN 325 MG/1
650 TABLET ORAL ONCE
Status: COMPLETED | OUTPATIENT
Start: 2019-06-14 | End: 2019-06-14

## 2019-06-14 RX ORDER — PROPOFOL 10 MG/ML
INJECTION, EMULSION INTRAVENOUS AS NEEDED
Status: DISCONTINUED | OUTPATIENT
Start: 2019-06-14 | End: 2019-06-14 | Stop reason: HOSPADM

## 2019-06-14 RX ORDER — ALBUMIN HUMAN 250 G/1000ML
25 SOLUTION INTRAVENOUS EVERY 6 HOURS
Status: DISCONTINUED | OUTPATIENT
Start: 2019-06-14 | End: 2019-06-16 | Stop reason: HOSPADM

## 2019-06-14 RX ORDER — FLUMAZENIL 0.1 MG/ML
0.2 INJECTION INTRAVENOUS
Status: DISCONTINUED | OUTPATIENT
Start: 2019-06-14 | End: 2019-06-14 | Stop reason: HOSPADM

## 2019-06-14 RX ADMIN — ALBUMIN (HUMAN) 25 G: 0.25 INJECTION, SOLUTION INTRAVENOUS at 15:45

## 2019-06-14 RX ADMIN — SODIUM CHLORIDE: 9 INJECTION, SOLUTION INTRAVENOUS at 17:06

## 2019-06-14 RX ADMIN — PROPOFOL 50 MG: 10 INJECTION, EMULSION INTRAVENOUS at 17:26

## 2019-06-14 RX ADMIN — ACETAMINOPHEN 650 MG: 325 TABLET ORAL at 14:55

## 2019-06-14 RX ADMIN — Medication 10 ML: at 23:06

## 2019-06-14 RX ADMIN — LIDOCAINE HYDROCHLORIDE 100 MG: 20 INJECTION, SOLUTION EPIDURAL; INFILTRATION; INTRACAUDAL; PERINEURAL at 17:17

## 2019-06-14 RX ADMIN — PROPOFOL 50 MG: 10 INJECTION, EMULSION INTRAVENOUS at 17:18

## 2019-06-14 RX ADMIN — IPRATROPIUM BROMIDE AND ALBUTEROL SULFATE 3 ML: .5; 3 SOLUTION RESPIRATORY (INHALATION) at 07:55

## 2019-06-14 RX ADMIN — PROPOFOL 50 MG: 10 INJECTION, EMULSION INTRAVENOUS at 17:17

## 2019-06-14 RX ADMIN — LACTULOSE 45 ML: 20 SOLUTION ORAL at 10:20

## 2019-06-14 RX ADMIN — IPRATROPIUM BROMIDE AND ALBUTEROL SULFATE 3 ML: .5; 3 SOLUTION RESPIRATORY (INHALATION) at 19:20

## 2019-06-14 RX ADMIN — IPRATROPIUM BROMIDE AND ALBUTEROL SULFATE 3 ML: .5; 3 SOLUTION RESPIRATORY (INHALATION) at 01:12

## 2019-06-14 RX ADMIN — ALBUMIN (HUMAN) 25 G: 0.25 INJECTION, SOLUTION INTRAVENOUS at 23:05

## 2019-06-14 RX ADMIN — LACTULOSE 45 ML: 20 SOLUTION ORAL at 15:45

## 2019-06-14 RX ADMIN — PROPOFOL 50 MG: 10 INJECTION, EMULSION INTRAVENOUS at 17:20

## 2019-06-14 RX ADMIN — ALBUMIN (HUMAN) 25 G: 0.25 INJECTION, SOLUTION INTRAVENOUS at 10:20

## 2019-06-14 RX ADMIN — Medication 10 ML: at 13:56

## 2019-06-14 RX ADMIN — PROPOFOL 50 MG: 10 INJECTION, EMULSION INTRAVENOUS at 17:23

## 2019-06-14 RX ADMIN — IPRATROPIUM BROMIDE AND ALBUTEROL SULFATE 3 ML: .5; 3 SOLUTION RESPIRATORY (INHALATION) at 13:16

## 2019-06-14 NOTE — ANESTHESIA PREPROCEDURE EVALUATION
Relevant Problems No relevant active problems Anesthetic History No history of anesthetic complications Review of Systems / Medical History Patient summary reviewed, nursing notes reviewed and pertinent labs reviewed Pulmonary COPD: severe Comments: Large R pleural effusion w/ R basilar atelectasis Neuro/Psych Within defined limits Cardiovascular Within defined limits GI/Hepatic/Renal 
  
 
 
Renal disease: CRI Liver disease Endo/Other Diabetes Blood dyscrasia and anemia Other Findings Physical Exam 
 
Airway Mallampati: II 
TM Distance: > 6 cm Neck ROM: normal range of motion Mouth opening: Normal 
 
 Cardiovascular Regular rate and rhythm,  S1 and S2 normal,  no murmur, click, rub, or gallop Dental 
 
Dentition: Poor dentition Pulmonary Breath sounds clear to auscultation Abdominal 
GI exam deferred Other Findings Anesthetic Plan ASA: 3 Anesthesia type: MAC Anesthetic plan and risks discussed with: Patient

## 2019-06-14 NOTE — PERIOP NOTES
TRANSFER - IN REPORT: 
 
Verbal report received from [facility] on Dinorah Espino  being received from [phone] for ordered procedure Report consisted of patients Situation, Background, Assessment and  
Recommendations(SBAR). Information from the following report(s) SBAR was reviewed with the receiving nurse. Opportunity for questions and clarification was provided. Assessment completed upon patients arrival to unit and care assumed.

## 2019-06-14 NOTE — ANESTHESIA POSTPROCEDURE EVALUATION
Post-Anesthesia Evaluation and Assessment Patient: Riana Noriega MRN: [id number]  SSN: [id number] YOB: 1960  Age: 63 y.o. Sex: male I have evaluated the patient and they are stable and ready for discharge from the PACU. Cardiovascular Function/Vital Signs Visit Vitals /59 Pulse 89 Temp 36.9 °C (98.5 °F) Resp 16 Ht 6' 1\" (1.854 m) Wt 83.3 kg (183 lb 11.2 oz) SpO2 97% BMI 24.24 kg/m² Patient is status post MAC anesthesia for Procedure(s) with comments: 
ESOPHAGOGASTRODUODENOSCOPY (EGD) ENDOSCOPIC BANDING OR LIGATION - 2 Bands in lower esophagus. Nausea/Vomiting: None Postoperative hydration reviewed and adequate. Pain: 
Pain Scale 1: Numeric (0 - 10) (06/14/19 1752) Pain Intensity 1: 0 (06/14/19 1752) Managed Neurological Status:  
Neuro Neurologic State: Appropriate for age (06/14/19 1017) Orientation Level: Oriented X4 (06/14/19 1017) LUE Motor Response: Purposeful;Tingling (06/14/19 1017) LLE Motor Response: Purposeful (06/14/19 1017) RUE Motor Response: Purposeful (06/14/19 1017) RLE Motor Response: Purposeful (06/14/19 1017) At baseline Mental Status, Level of Consciousness: Alert and  oriented to person, place, and time Pulmonary Status:  
O2 Device: Room air (06/14/19 1752) Adequate oxygenation and airway patent Complications related to anesthesia: None Post-anesthesia assessment completed. No concerns Signed By: Robles Rodriguez MD   
 June 14, 2019 Procedure(s): ESOPHAGOGASTRODUODENOSCOPY (EGD) ENDOSCOPIC BANDING OR LIGATION. MAC 
 
<BSHSIANPOST> Vitals Value Taken Time /69 6/14/2019  6:10 PM  
Temp 36.9 °C (98.5 °F) 6/14/2019  5:42 PM  
Pulse 109 6/14/2019  6:11 PM  
Resp 23 6/14/2019  6:11 PM  
SpO2 90 % 6/14/2019  6:11 PM  
Vitals shown include unvalidated device data.

## 2019-06-14 NOTE — PROGRESS NOTES
Bedside shift change report given to [address] [place] [address] Drive (oncoming nurse) by Epi Hector RN (offgoing nurse). Report included the following information SBAR, Kardex, Intake/Output, MAR and Recent Results.

## 2019-06-14 NOTE — PROGRESS NOTES
1000 - TRANSFER - OUT REPORT: 
 
Verbal report given to LINDA Sadler(name) on Lady Wilhelm  being transferred to room 611(unit) for routine post - op Report consisted of patients Situation, Background, Assessment and  
Recommendations(SBAR). Information from the following report(s) Procedure Summary was reviewed with the receiving nurse. Lines:  
Peripheral IV 06/13/19 Right Hand (Active) Site Assessment Clean, dry, & intact 6/13/2019  8:51 PM  
Phlebitis Assessment 0 6/13/2019  8:51 PM  
Infiltration Assessment 0 6/13/2019  8:51 PM  
Dressing Status Clean, dry, & intact 6/13/2019  8:51 PM  
Dressing Type Transparent;Tape 6/13/2019  8:51 PM  
Hub Color/Line Status Blue;Capped 6/13/2019  8:51 PM  
Alcohol Cap Used Yes 6/13/2019  8:51 PM  
   
Peripheral IV 06/13/19 Right Antecubital (Active) Site Assessment Clean, dry, & intact 6/13/2019  8:51 PM  
Phlebitis Assessment 0 6/13/2019  8:51 PM  
Infiltration Assessment 0 6/13/2019  8:51 PM  
Dressing Status Clean, dry, & intact 6/13/2019  8:51 PM  
Dressing Type Transparent;Tape 6/13/2019  8:51 PM  
Hub Color/Line Status Capped 6/13/2019  9:12 PM  
Action Taken Open ports on tubing capped 6/13/2019  9:12 PM  
Alcohol Cap Used Yes 6/13/2019  9:12 PM  
  
 
Opportunity for questions and clarification was provided. Patient transported with: 
 nurse

## 2019-06-14 NOTE — PROGRESS NOTES

## 2019-06-14 NOTE — PROGRESS NOTES
TRANSFER - OUT REPORT: 
 
Verbal report given to Lydia(name) on Alecia Hooker  being transferred to [facility](unit) for routine progression of care Report consisted of patients Situation, Background, Assessment and  
Recommendations(SBAR). Information from the following report(s) SBAR was reviewed with the receiving nurse. Lines:  
Peripheral IV 06/13/19 Right Hand (Active) Site Assessment Clean, dry, & intact 6/14/2019 10:30 AM  
Phlebitis Assessment 0 6/14/2019 10:30 AM  
Infiltration Assessment 0 6/14/2019 10:30 AM  
Dressing Status Clean, dry, & intact 6/14/2019 10:30 AM  
Dressing Type Transparent 6/14/2019 10:30 AM  
Hub Color/Line Status Blue;Capped 6/14/2019 10:30 AM  
Alcohol Cap Used Yes 6/14/2019 10:30 AM  
   
Peripheral IV 06/13/19 Right Antecubital (Active) Site Assessment Clean, dry, & intact 6/14/2019 10:30 AM  
Phlebitis Assessment 0 6/14/2019 10:30 AM  
Infiltration Assessment 0 6/14/2019 10:30 AM  
Dressing Status Clean, dry, & intact 6/14/2019 10:30 AM  
Dressing Type Transparent 6/14/2019 10:30 AM  
Hub Color/Line Status Capped 6/14/2019 10:30 AM  
Action Taken Open ports on tubing capped 6/14/2019 10:30 AM  
Alcohol Cap Used Yes 6/14/2019 10:30 AM  
  
 
Opportunity for questions and clarification was provided. Patient transported with: 
 transport

## 2019-06-14 NOTE — PROCEDURES
[address] Stanley MD, Irma Dykes [facility] MD Leandro Arauz, SYLVIE Pike, [facility] April [facility] Caitie, KATELYNN Reveles, NP Rin Hall, KATELYNN Salgado [facility] 136 
  at [address], [address] [place] [address] 22. 
  [phone] FAX: [address] [place] [address] 
  [address], [address] [facility], [address] [place] [address] - [address] 
  [phone] FAX: [phone] UPPER ENDOSCOPY PROCEDURE NOTE Orlin Javed 1960 INDICATION: Cirrhosis. Screening for esophageal varices with variceal ligation if indicated. : Lane Stanley MD 
 
ANESTHESIA/SEDATION: Propofol was administered by anesthesia PROCEDURE DESCRIPTION: 
Infomed consent was obtained from the patient for the procedure. All risks and benefits of the procedure explained. The patient was taken to the endoscopy suite and placed in the left lateral decubitus position. Following sequential administration of sedation to doses as indicated above the endoscope was inserted into the mouth and advanced under direct vision to the second portion of the duodenum. Careful inspection of upper gastrointestinal tract was made as the endoscope was inserted and withdrawn. Retroflexion of the endoscope to view of the cardia of the stomach was performed. After withdrawing the endoscope the banding devise was placed on the tip of the endoscope. The scope was then reinserted under direct inspection and advanced to the esophagus. Banding of esophageal varices was performed as described below. The scope was then removed. FINDINGS: 
Esophagus: Two medium esophageal varices were identified. BOth with red spots. Banding of esophageal varices was performed. Excellent hemostasis was achieved after banding. Stomach: Moderate portal hypertensive gastropathy of the entire stomach No gastric varices identified. Duodenum:  
Normal bulb and second portion INTERVENTION:  
2 bands placed were placed on esophageal varices. COMPLICATIONS: None. The patient tolerated the procedure well. EBL: Negligible. RECOMMENDATIONS: 
Observe until discharge parameters are achieved. Liquid diet today. Soft food tomorrow. Resume general diet thereafter. Repeat endoscopy to reassess varices and need for additional banding in 3 months. Follow-up Liver [facility] office as scheduled. MD Jarad Cosme Deputado [facility] De [facility]ricks [address] [place] [address], [address] [facility] 22. 
[phone] [address] [place] [address]

## 2019-06-14 NOTE — ROUTINE PROCESS
Lizzie Root 1960 
[id number] Situation: 
Verbal report received from: [employer] Procedure: Procedure(s) with comments: 
ESOPHAGOGASTRODUODENOSCOPY (EGD) ENDOSCOPIC BANDING OR LIGATION - 2 Bands in lower esophagus Background: 
 
Preoperative diagnosis: Cirrhosis Postoperative diagnosis: 1. - Portral Gastropathy 2. - Small Varices :  Dr. Rhonda Newton Assistant(s): Endoscopy RN-1: Kamari Rodgers RN Endoscopy RN-2: Quentin Luong RN Specimens: * No specimens in log * H. Pylori  no Assessment: 
Intra-procedure medications Anesthesia gave intra-procedure sedation and medications, see anesthesia flow sheet yes Intravenous fluids: NS@ Juan Awkward Vital signs stable Abdominal assessment: round and soft Recommendation: 
 
Return to floor Family or Friend Permission to share finding with family or friend yes

## 2019-06-14 NOTE — PROGRESS NOTES
Hospitalist Progress Note Noemi Ellis MD 
Answering service: [phone] from in house phone Cell: [phone] Date of Service:  2019 NAME:  Ryan Winston :  1960 MRN:  [id number] Admission Summary:  
Ryan Winston is a 63 y.o. male who presents with sob 
  
63 y.o. male with past medical history significant for diabetes, COPD, and liver cirrhosis who presents from home via private vehicle with chief complaint of SOB. Patient reports a h/o liver cirrhosis with ascites requiring intermittent paracentesis. He follows with hepatologist Dr. Saloni Weiss for this, and reports he is supposed to be on the liver transplant list. Patient's last paracentesis was 6 days ago at [facility], had 4L drained. Patient states since then, he has had gradually worsening SOB and increasing abdominal distention. He also endorses LLE swelling, dry cough, nausea, left sided abdominal pain, and lack of appetite. He has \"a little bit\" of chest pain. Sx not exerted by deep inspiration. Patient denies any fever or seizures. There are no other acute medical concerns at this time. Interval history / Subjective:  
  F/u SOB Feels much better Assessment & Plan:  
 
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  
-CT chest  Severe emphysema. Large right pleural effusion with right basilar subsegmental atelectasis. Cirrhosis with ascites. No evidence for pulmonary embolism 
-S/p thoracentesis, follow studies Ascites 
-last paracentesis , per patient at [facility] 
-s/p paracentesis , follow studies 
-on IV lasix/aldactone 
 -Appreciate hepatology Cirrhosis:  
-Cryptogenic cirrhosis 
-continue lactulose  
-Per Dr Saloni Weiss, initiating liver transplant Mild renal dysfunction 
-on albumin 
-Monitor Esophageal varices 
-Plans noted for EGD  Thrombocytopenia 
-sec to cirrhosis -Monitor Npo for now for EGD Code status: FULL CODE 
DVT prophylaxis: scd PTA: home Plan: Follow Hepatology Care Plan discussed with: Patient/Family Disposition: TBD Hospital Problems  Date Reviewed: 6/1/2019 Codes Class Noted POA * (Principal) Large pleural effusion ICD-10-CM: J90 ICD-9-CM: 511.9  6/13/2019 Yes Review of Systems: A comprehensive review of systems was negative except for that written in the HPI. Vital Signs:  
 Last 24hrs VS reviewed since prior progress note. Most recent are: 
Visit Vitals /61 (BP 1 Location: Left arm, BP Patient Position: At rest) Pulse 83 Temp 97.7 °F (36.5 °C) Resp 17 Ht 6' 1\" (1.854 m) Wt 83.3 kg (183 lb 11.2 oz) SpO2 97% BMI 24.24 kg/m² Intake/Output Summary (Last 24 hours) at 6/14/2019 1148 Last data filed at 6/14/2019 1041 Gross per 24 hour Intake 208 ml Output 5000 ml Net -4792 ml Physical Examination:  
 
 
     
Constitutional:  No acute distress, cooperative, pleasant   
ENT:  Oral mucous moist, oropharynx benign. Neck supple, Resp:  CTA bilaterally. No wheezing/rhonchi/rales. No accessory muscle use CV:  Regular rhythm, normal rate, no murmurs, gallops, rubs GI:  Soft, non distended, non tender. normoactive bowel sounds, no hepatosplenomegaly Musculoskeletal:  No edema, warm, 2+ pulses throughout Neurologic:  Moves all extremities. AAOx3, CN II-XII reviewed Skin:  Good turgor, no rashes or ulcers Data Review:  
 Review and/or order of clinical lab test 
 
 
Labs:  
 
Recent Labs  
  06/14/19 
0418 06/13/19 
1115 WBC 6.4 8.2 HGB 11.4* 12.4 HCT 33.8* 36.4*  
PLT 94* 110* Recent Labs  
  06/14/19 
0418 06/13/19 
1115  135* K 4.0 4.0  
 105 CO2 22 24 BUN 16 16 CREA 1.33* 1.27  
GLU 90 109* CA 8.4* 8.5 MG 2.0  --   
PHOS 2.7  --   
 
Recent Labs  
  06/14/19 
0418 06/13/19 
1115 SGOT 53* 59* ALT 46 57 * 142* TBILI 3.3* 3.6* TP 5.6* 6.2* ALB 2.0* 2.4*  
GLOB 3.6 3.8 LPSE  --  267 Recent Labs  
  06/13/19 
1115 INR 1.4* PTP 13.8* No results for input(s): FE, TIBC, PSAT, FERR in the last 72 hours. No results found for: FOL, RBCF No results for input(s): PH, PCO2, PO2 in the last 72 hours. Recent Labs  
  06/13/19 
1115 TROIQ <0.05 No results found for: CHOL, CHOLX, CHLST, CHOLV, HDL, LDL, LDLC, DLDLP, TGLX, TRIGL, TRIGP, CHHD, CHHDX Lab Results Component Value Date/Time Glucose (POC) 90 08/31/2017 01:03 PM  
 
No results found for: COLOR, APPRN, SPGRU, REFSG, CARLENE, PROTU, GLUCU, KETU, BILU, UROU, NANY, LEUKU, GLUKE, EPSU, BACTU, WBCU, RBCU, CASTS, UCRY Medications Reviewed:  
 
Current Facility-Administered Medications Medication Dose Route Frequency  albumin human 25% (BUMINATE) solution 25 g  25 g IntraVENous Q6H  
 lactulose (CHRONULAC) 10 gram/15 mL solution 45 mL  30 g Oral TID  sodium chloride (NS) flush 5-40 mL  5-40 mL IntraVENous Q8H  
 sodium chloride (NS) flush 5-40 mL  5-40 mL IntraVENous PRN  
 naloxone (NARCAN) injection 0.4 mg  0.4 mg IntraVENous PRN  
 ondansetron (ZOFRAN) injection 4 mg  4 mg IntraVENous Q4H PRN  
 albuterol-ipratropium (DUO-NEB) 2.5 MG-0.5 MG/3 ML  3 mL Nebulization Q6H RT  
 
______________________________________________________________________ EXPECTED LENGTH OF STAY: 2d 14h ACTUAL LENGTH OF STAY:          1 Slade Tyson MD

## 2019-06-14 NOTE — PROGRESS NOTES
[address] [facility] 
  
  Cristal Ricketts MD, [place], Cite Tera Sheng, [place] MD Keren Winter PA-C Louetta Peace, [facility]-BC April Emi Mcqueen, KATELYNN Diaz, KATELYNN Bell, KATELYNN Abraham [facility] De Hendricks 136 
  at [address], [address] [place] [address] [address] [facility] 22. 
  [phone] FAX: [address] 
  [facility] 
  [address], [address] 98 estefani Wu, [address] 
  [phone] FAX: [phone]  
  
  
HEPATOLOGY CONSULT NOTE The patient is well known to and regularly cared for at [address] [address]. He is a 63year old  male with cryptogenic cirrhosis. He found to have cirrhosis in 5/2017 when he developed variceal bleeding. Other complications of cirrhosis include edema, hepatic encephalopathy. He came to the ED today because of increasing SOB. 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 He slept well without O2 last night. For throracentesis and paracentesis today. I will do EGD at tend of day. Keep NPO today until after EGD 
  
  
ASSESSMENT AND PLAN: 
Cirrhosis Cryptogenic cirrhosis.    
The diagnosis of cirrhosis is based upon Fiboscan, imaging, laboratory studies, complications of cirrhosis. The CTP is 11.  Child class C.  The MELD score is 21. 
  
The patient has a MELD score greater than 15 and has developed complications of cirrhosis. He was recently granted Medicare but this does not take effect until 10/2019. Will initiate liver transplant evaluation testing at that time. 
  
Ascites/Hydrothorax Ascites developd for the first time in 4/2019. He was hospitalized at [facility] 2 weeks for ascites and had a paracentesis. Now has recurrent ascites and hydrothorax. Paracentesis and thoracentesis will be performed today. Will place on IV lasix 40 mg BID and aldactone 100 mg every day, 
  
Lower extremity edema Edema has resolved with current dose of diuretics. 
  
Acute kidney injury ZOEY is secondary to the effects of cirrhosis and diuretics. He is on IV albumin 25 GM Q6H Screat up this AM.  Will hold diuretics until Scr starts to come down. 
  
Esophageal varices The patient has esophageal varices with prior bleeding.   
Varices have been banded The last EGD to assess for varices was performed in 5/2018. Will perform EGD at end of the day. Keep NPO all day for procedures. 
  
Hepatic encephalopathy The patient has developed mild intermittent HE. He was on lactulose in the past but will not take this again because of diarrhea. Will start Xifaxan 550 BID. There is no need to restrict dietary protein at this time.   
  
Thrombocytopenia This is secondary to cirrhosis. There is no evidence of overt bleeding.   
No treatment is required. The platelet count is adequate for the patient to undergo procedures without the need for platelet transfusion or platelet growth factors. 
  
 
PHYSICAL EXAMINATION: 
VS: per nursing note General:  No acute distress. Eyes:  Sclera anicteric. ENT:  No oral lesions. Thyroid normal. 
Nodes:  No adenopathy. Skin:  Spider angiomata on chest.  No jaundice. Respiratory:  Reduced breath sounds at right base Cardiovascular:  Regular heart rate. Abdomen:  Soft non-tender, Some ascites is present. Extremities:  No lower extremity edema. Neurologic:  Generally alert and oriented. Cranial nerves grossly intact. No asterixis. 
  
LABORATORY: 
Results for Sole Pierce (MRN [id number]) as of 6/14/2019 07:05 Ref. Range 5/3/2019 00:00 6/13/2019 11:15 6/14/2019 04:18 WBC Latest Ref Range: 4.1 - 11.1 K/uL 7.7 8.2 6.4 HGB Latest Ref Range: 12.1 - 17.0 g/dL 13.1 12.4 11.4 (L) PLATELET Latest Ref Range: 150 - 400 K/uL 117 (L) 110 (L) 94 (L) INR Latest Ref Range: 0.9 - 1.1   1.4 (H) 1.4 (H) Sodium Latest Ref Range: 136 - 145 mmol/L 137 135 (L) 137 Potassium Latest Ref Range: 3.5 - 5.1 mmol/L 3.9 4.0 4.0 Chloride Latest Ref Range: 97 - 108 mmol/L 99 105 105 CO2 Latest Ref Range: 21 - 32 mmol/L 24 24 22 Glucose Latest Ref Range: 65 - 100 mg/dL 138 (H) 109 (H) 90 BUN Latest Ref Range: 6 - 20 MG/DL 12 16 16 Creatinine Latest Ref Range: 0.70 - 1.30 MG/DL 1.22 1.27 1.33 (H) Bilirubin, total Latest Ref Range: 0.2 - 1.0 MG/DL 2.8 (H) 3.6 (H) 3.3 (H) Albumin Latest Ref Range: 3.5 - 5.0 g/dL 2.9 (L) 2.4 (L) 2.0 (L) ALT (SGPT) Latest Ref Range: 12 - 78 U/L 42 57 46 AST Latest Ref Range: 15 - 37 U/L 61 (H) 59 (H) 53 (H) Alk. phosphatase Latest Ref Range: 45 - 117 U/L 143 (H) 142 (H) 130 (H) MD Jarad Garner [facility] 136 [address] [place] [address], [address] [facility] 22. 
[phone] [address] [place] [address]

## 2019-06-15 LAB
ALBUMIN SERPL-MCNC: 2.7 G/DL (ref 3.5–5)
ALBUMIN/GLOB SERPL: 0.9 {RATIO} (ref 1.1–2.2)
ALP SERPL-CCNC: 106 U/L (ref 45–117)
ALT SERPL-CCNC: 43 U/L (ref 12–78)
ANION GAP SERPL CALC-SCNC: 5 MMOL/L (ref 5–15)
AST SERPL-CCNC: 42 U/L (ref 15–37)
BASOPHILS # BLD: 0 K/UL (ref 0–0.1)
BASOPHILS NFR BLD: 1 % (ref 0–1)
BILIRUB SERPL-MCNC: 3 MG/DL (ref 0.2–1)
BUN SERPL-MCNC: 11 MG/DL (ref 6–20)
BUN/CREAT SERPL: 10 (ref 12–20)
CALCIUM SERPL-MCNC: 8.5 MG/DL (ref 8.5–10.1)
CHLORIDE SERPL-SCNC: 108 MMOL/L (ref 97–108)
CO2 SERPL-SCNC: 23 MMOL/L (ref 21–32)
CREAT SERPL-MCNC: 1.06 MG/DL (ref 0.7–1.3)
DIFFERENTIAL METHOD BLD: ABNORMAL
EOSINOPHIL # BLD: 0.2 K/UL (ref 0–0.4)
EOSINOPHIL NFR BLD: 3 % (ref 0–7)
ERYTHROCYTE [DISTWIDTH] IN BLOOD BY AUTOMATED COUNT: 16 % (ref 11.5–14.5)
GLOBULIN SER CALC-MCNC: 2.9 G/DL (ref 2–4)
GLUCOSE SERPL-MCNC: 79 MG/DL (ref 65–100)
HCT VFR BLD AUTO: 31.3 % (ref 36.6–50.3)
HGB BLD-MCNC: 10.4 G/DL (ref 12.1–17)
IMM GRANULOCYTES # BLD AUTO: 0 K/UL (ref 0–0.04)
IMM GRANULOCYTES NFR BLD AUTO: 0 % (ref 0–0.5)
LYMPHOCYTES # BLD: 1.5 K/UL (ref 0.8–3.5)
LYMPHOCYTES NFR BLD: 25 % (ref 12–49)
MCH RBC QN AUTO: 32.7 PG (ref 26–34)
MCHC RBC AUTO-ENTMCNC: 33.2 G/DL (ref 30–36.5)
MCV RBC AUTO: 98.4 FL (ref 80–99)
MONOCYTES # BLD: 1 K/UL (ref 0–1)
MONOCYTES NFR BLD: 17 % (ref 5–13)
NEUTS SEG # BLD: 3.3 K/UL (ref 1.8–8)
NEUTS SEG NFR BLD: 54 % (ref 32–75)
NRBC # BLD: 0 K/UL (ref 0–0.01)
NRBC BLD-RTO: 0 PER 100 WBC
PLATELET # BLD AUTO: 85 K/UL (ref 150–400)
PMV BLD AUTO: 9.8 FL (ref 8.9–12.9)
POTASSIUM SERPL-SCNC: 3.9 MMOL/L (ref 3.5–5.1)
PROT SERPL-MCNC: 5.6 G/DL (ref 6.4–8.2)
RBC # BLD AUTO: 3.18 M/UL (ref 4.1–5.7)
SODIUM SERPL-SCNC: 136 MMOL/L (ref 136–145)
WBC # BLD AUTO: 6 K/UL (ref 4.1–11.1)

## 2019-06-15 PROCEDURE — 85025 COMPLETE CBC W/AUTO DIFF WBC: CPT

## 2019-06-15 PROCEDURE — 36415 COLL VENOUS BLD VENIPUNCTURE: CPT

## 2019-06-15 PROCEDURE — 74011250636 HC RX REV CODE- 250/636: Performed by: INTERNAL MEDICINE

## 2019-06-15 PROCEDURE — P9047 ALBUMIN (HUMAN), 25%, 50ML: HCPCS | Performed by: INTERNAL MEDICINE

## 2019-06-15 PROCEDURE — 65270000029 HC RM PRIVATE

## 2019-06-15 PROCEDURE — 80053 COMPREHEN METABOLIC PANEL: CPT

## 2019-06-15 PROCEDURE — 74011000250 HC RX REV CODE- 250: Performed by: HOSPITALIST

## 2019-06-15 PROCEDURE — 94640 AIRWAY INHALATION TREATMENT: CPT

## 2019-06-15 RX ORDER — IPRATROPIUM BROMIDE AND ALBUTEROL SULFATE 2.5; .5 MG/3ML; MG/3ML
3 SOLUTION RESPIRATORY (INHALATION)
Status: DISCONTINUED | OUTPATIENT
Start: 2019-06-15 | End: 2019-06-16 | Stop reason: HOSPADM

## 2019-06-15 RX ADMIN — ALBUMIN (HUMAN) 25 G: 0.25 INJECTION, SOLUTION INTRAVENOUS at 15:20

## 2019-06-15 RX ADMIN — Medication 10 ML: at 21:25

## 2019-06-15 RX ADMIN — Medication 10 ML: at 12:22

## 2019-06-15 RX ADMIN — ALBUMIN (HUMAN) 25 G: 0.25 INJECTION, SOLUTION INTRAVENOUS at 03:55

## 2019-06-15 RX ADMIN — ALBUMIN (HUMAN) 25 G: 0.25 INJECTION, SOLUTION INTRAVENOUS at 21:23

## 2019-06-15 RX ADMIN — Medication 10 ML: at 12:21

## 2019-06-15 RX ADMIN — ALBUMIN (HUMAN) 25 G: 0.25 INJECTION, SOLUTION INTRAVENOUS at 08:44

## 2019-06-15 RX ADMIN — Medication 10 ML: at 06:18

## 2019-06-15 RX ADMIN — IPRATROPIUM BROMIDE AND ALBUTEROL SULFATE 3 ML: .5; 3 SOLUTION RESPIRATORY (INHALATION) at 09:14

## 2019-06-15 NOTE — PROGRESS NOTES
ADULT PROTOCOL: JET AEROSOL  REASSESSMENT Patient  Robert Bolivar     63 y.o.   male     6/15/2019  10:29 AM 
 
Breath Sounds Pre Procedure: Right Breath Sounds: Diminished Left Breath Sounds: Diminished Breath Sounds Post Procedure:   
                                 
 
Breathing pattern: Pre procedure Breathing Pattern: Regular Post procedure Breathing Pattern: Regular Heart Rate: Pre procedure Pulse: 80 
         Post procedure Pulse: 80 Resp Rate: Pre procedure Respirations: 16 Post procedure Respirations: 18 Peak Flow: Pre bronchodilator Post bronchodilator Incentive Spirometry:    
     
 
Cough: Pre procedure Post procedure Suctioned: NO Sputum: Pre procedure Post procedure Oxygen: O2 Device: Room air Changed: NO SpO2: Pre procedure SpO2: 97 %   without oxygen Post procedure SpO2: 97 %  without oxygen Nebulizer Therapy: Current medications Aerosolized Medications: DuoNeb Problem List:  
Patient Active Problem List  
Diagnosis Code  Cirrhosis of liver without ascites [facility] K74.60  Esophageal varices (HCC) I85.00  
 Diabetes mellitus (HCC) A62.9  
 H/O umbilical hernia repair B48.887, Z87.19  
 S/P appendectomy Z90.49  Large pleural effusion J90 He has no wheezing, SOB or respiratory distress. He said he does not take scheduled nebs at home. Per protocol I will change his to prn neb treatments. Respiratory Therapist: Ashlee Sahu

## 2019-06-15 NOTE — PROGRESS NOTES
Hospitalist Progress Note Jeanmarie Trejo MD 
Answering service: [phone] from in house phone Cell: [phone] Date of Service:  6/15/2019 NAME:  Jennifer Swan :  1960 MRN:  [id number] Admission Summary:  
Jennifer Swan is a 63 y.o. male who presents with sob 
  
63 y.o. male with past medical history significant for diabetes, COPD, and liver cirrhosis who presents from home via private vehicle with chief complaint of SOB. Patient reports a h/o liver cirrhosis with ascites requiring intermittent paracentesis. He follows with hepatologist Dr. Mely Hernandez for this, and reports he is supposed to be on the liver transplant list. Patient's last paracentesis was 6 days ago at [facility], had 4L drained. Patient states since then, he has had gradually worsening SOB and increasing abdominal distention. He also endorses LLE swelling, dry cough, nausea, left sided abdominal pain, and lack of appetite. He has \"a little bit\" of chest pain. Sx not exerted by deep inspiration. Patient denies any fever or seizures. There are no other acute medical concerns at this time. Interval history / Subjective:  
  F/u SOB Feels much better Assessment & Plan:  
 
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  
-CT chest  Severe emphysema. Large right pleural effusion with right basilar subsegmental atelectasis. Cirrhosis with ascites. No evidence for pulmonary embolism 
-S/p thoracentesis, fluid not sent for any studies Recurrent ascites 
-last paracentesis , per patient at [facility] 
-s/p paracentesis , fluid not send for any studies 
-on IV lasix/aldactone 
 -Appreciate hepatology 
-Not sure if the patient would benefit from TIPS Cirrhosis:  
-Cryptogenic cirrhosis 
-continue lactulose  
-Per Dr Mely Hernandez, initiating liver transplant Mild renal dysfunction 
-on albumin 
-Monitor Esophageal varices -s/p EGD 6/14 with 2 esophageal varices now s/p banding 
-Per Dr Marci Holman Repeat endoscopy to reassess varices and need for additional banding in 3 months 
-Tolerated liquid diet, now on soft diet Thrombocytopenia 
-sec to cirrhosis -Monitor Mechanical soft diet Code status: FULL CODE 
DVT prophylaxis: scd PTA: home Plan: If tolerates diet, ?discharge. Per patient, Dr Marci Holman might be able to get discharged Sunday/Monday Care Plan discussed with: Patient/Family Disposition: TBD Hospital Problems  Date Reviewed: 6/14/2019 Codes Class Noted POA * (Principal) Large pleural effusion ICD-10-CM: J90 ICD-9-CM: 511.9  6/13/2019 Yes Review of Systems: A comprehensive review of systems was negative except for that written in the HPI. Vital Signs:  
 Last 24hrs VS reviewed since prior progress note. Most recent are: 
Visit Vitals /57 (BP 1 Location: Right arm, BP Patient Position: At rest) Pulse 87 Temp 98.3 °F (36.8 °C) Resp 17 Ht 6' 1\" (1.854 m) Wt 83.3 kg (183 lb 11.2 oz) SpO2 97% BMI 24.24 kg/m² Intake/Output Summary (Last 24 hours) at 6/15/2019 1557 Last data filed at 6/15/2019 3402 Gross per 24 hour Intake  Output 6025 ml Net -6025 ml Physical Examination:  
 
 
     
Constitutional:  No acute distress, cooperative, pleasant   
ENT:  Oral mucous moist, oropharynx benign. Neck supple, Resp:  CTA bilaterally. No wheezing/rhonchi/rales. No accessory muscle use CV:  Regular rhythm, normal rate, no murmurs, gallops, rubs GI:  Soft, non distended, non tender. normoactive bowel sounds, no hepatosplenomegaly Musculoskeletal:  No edema, warm, 2+ pulses throughout Neurologic:  Moves all extremities. AAOx3, CN II-XII reviewed Skin:  Good turgor, no rashes or ulcers Data Review:  
 Review and/or order of clinical lab test 
 
 
Labs:  
 
Recent Labs  
  06/15/19 
0343 06/14/19 
0418 WBC 6.0 6.4 HGB 10.4* 11.4* HCT 31.3* 33.8* PLT 85* 94* Recent Labs  
  06/15/19 
0343 06/14/19 0418 06/13/19 
1115  137 135* K 3.9 4.0 4.0  
 105 105 CO2 23 22 24 BUN 11 16 16 CREA 1.06 1.33* 1.27  
GLU 79 90 109* CA 8.5 8.4* 8.5 MG  --  2.0  --   
PHOS  --  2.7  --   
 
Recent Labs  
  06/15/19 
0343 06/14/19 
0418 06/13/19 
1115 SGOT 42* 53* 59* ALT 43 46 57  130* 142* TBILI 3.0* 3.3* 3.6* TP 5.6* 5.6* 6.2* ALB 2.7* 2.0* 2.4*  
GLOB 2.9 3.6 3.8 LPSE  --   --  267 Recent Labs  
  06/13/19 
1115 INR 1.4* PTP 13.8* No results for input(s): FE, TIBC, PSAT, FERR in the last 72 hours. No results found for: FOL, RBCF No results for input(s): PH, PCO2, PO2 in the last 72 hours. Recent Labs  
  06/13/19 
1115 TROIQ <0.05 No results found for: CHOL, CHOLX, CHLST, CHOLV, HDL, LDL, LDLC, DLDLP, TGLX, TRIGL, TRIGP, CHHD, CHHDX Lab Results Component Value Date/Time Glucose (POC) 90 08/31/2017 01:03 PM  
 
No results found for: COLOR, APPRN, SPGRU, REFSG, CARLENE, PROTU, GLUCU, KETU, BILU, UROU, NANY, LEUKU, GLUKE, EPSU, BACTU, WBCU, RBCU, CASTS, UCRY Medications Reviewed:  
 
Current Facility-Administered Medications Medication Dose Route Frequency  albumin human 25% (BUMINATE) solution 25 g  25 g IntraVENous Q6H  
 sodium chloride (NS) flush 5-40 mL  5-40 mL IntraVENous Q8H  
 sodium chloride (NS) flush 5-40 mL  5-40 mL IntraVENous PRN  
 albuterol-ipratropium (DUO-NEB) 2.5 MG-0.5 MG/3 ML  3 mL Nebulization BID RT  
 lactulose (CHRONULAC) 10 gram/15 mL solution 45 mL  30 g Oral TID  sodium chloride (NS) flush 5-40 mL  5-40 mL IntraVENous Q8H  
 sodium chloride (NS) flush 5-40 mL  5-40 mL IntraVENous PRN  
 naloxone (NARCAN) injection 0.4 mg  0.4 mg IntraVENous PRN  
 ondansetron (ZOFRAN) injection 4 mg  4 mg IntraVENous Q4H PRN  
 
______________________________________________________________________ EXPECTED LENGTH OF STAY: 2d 14h ACTUAL LENGTH OF STAY:          2 Rashad Gómez MD

## 2019-06-15 NOTE — PROGRESS NOTES
[address] Rupesh Liu MD, [address], SYLVIE Reyes, [facility]   
Danley Gaucher, KATELYNN Dash, KATELYNN Goddard, KATELYNN [address] of [facility] 
[address] Formerly named [facility], [address] [address] 
[place], [address] 
  [phone] 
  FAX: [phone] [address] [facility] 
[address] [facility] 
[address] [facility], [address] 
[place], [address] [facility] 
  [phone] 
  FAX: [phone]  
  
  
HEPATOLOGY CONSULT NOTE The patient is well known to and regularly cared for at [address] [place] [address] Makenzie is a 63year old  male with cryptogenic cirrhosis.   He found to have cirrhosis in 5/2017 when he developed variceal bleeding.  Other complications of cirrhosis include edema, hepatic encephalopathy. 
  
He came to the ED today because of increasing SOB.  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 
  
Feels better this AM. Breathing easier since thoracentesis and paracentesis. Can probably go home tomorrow. Will pull paracentesis catheter in AM.  
Will switch over to PO diuretics in AM. 
Can probably go home Sunday 
  
ASSESSMENT AND PLAN: 
Cirrhosis Cryptogenic cirrhosis.    
The diagnosis of cirrhosis is based upon Fiboscan, imaging, laboratory studies, complications of cirrhosis. The CTP is 11.  Child class C.  The MELD score is 21. 
  
The patient has a MELD score greater than 15 and has developed complications of cirrhosis.  He was recently granted Medicare but this does not take effect until 10/2019.   
Will initiate liver transplant evaluation testing at that time. 
  
Ascites/Hydrothorax Ascites developd for the first time in 4/2019.   
He was hospitalized at [facility] 2 weeks for ascites and had a paracentesis. Now has recurrent ascites and hydrothorax. Paracentesis and thoracentesis yesterday. Shaneka Johnsonon over to PO lasix 40 mg every day and aldactone 100 mg every day, 
  
Lower extremity edema Edema has resolved with current dose of diuretics. 
  
Acute kidney injury THis has resolved. Scr down to 1.06 mg 
Continue IV albumin 25 GM Q6H 
  
Esophageal varices He had 2 small varices but each had red spots yesterday on EGD. \ 
Banding was performed. 
  
Hepatic encephalopathy He has resolved with Xifaxan 550 BID. There is no need to restrict dietary protein at this time.   
  
Thrombocytopenia This is secondary to cirrhosis. There is no evidence of overt bleeding.   
No treatment is required. The platelet count is adequate for the patient to undergo procedures without the need for platelet transfusion or platelet growth factors. 
  
  
PHYSICAL EXAMINATION: 
VS: per nursing note General:  No acute distress. Eyes:  Sclera anicteric. ENT:  No oral lesions.  Thyroid normal. 
Nodes:  No adenopathy. Skin:  Spider angiomata on chest.  No jaundice. Respiratory:  Reduced breath sounds at right base Cardiovascular:  Regular heart rate. Abdomen:  Soft non-tender, Some ascites is present. Extremities:  No lower extremity edema.   
Neurologic:  Generally alert and oriented.  Cranial nerves grossly intact. No asterixis. 
  
LABORATORY: 
Results for Steward Cooks (MRN [id number]) as of 6/15/2019 15:12 Ref. Range 6/13/2019 11:15 6/14/2019 04:18 6/15/2019 03:43 WBC Latest Ref Range: 4.1 - 11.1 K/uL 8.2 6.4 6.0 HGB Latest Ref Range: 12.1 - 17.0 g/dL 12.4 11.4 (L) 10.4 (L) PLATELET Latest Ref Range: 150 - 400 K/uL 110 (L) 94 (L) 85 (L) INR Latest Ref Range: 0.9 - 1.1   1.4 (H) Sodium Latest Ref Range: 136 - 145 mmol/L 135 (L) 137 136 Potassium Latest Ref Range: 3.5 - 5.1 mmol/L 4.0 4.0 3.9 Chloride Latest Ref Range: 97 - 108 mmol/L 105 105 108 CO2 Latest Ref Range: 21 - 32 mmol/L 24 22 23 Glucose Latest Ref Range: 65 - 100 mg/dL 109 (H) 90 79 BUN Latest Ref Range: 6 - 20 MG/DL 16 16 11 Creatinine Latest Ref Range: 0.70 - 1.30 MG/DL 1.27 1.33 (H) 1.06 Bilirubin, total Latest Ref Range: 0.2 - 1.0 MG/DL 3.6 (H) 3.3 (H) 3.0 (H) Albumin Latest Ref Range: 3.5 - 5.0 g/dL 2.4 (L) 2.0 (L) 2.7 (L) ALT (SGPT) Latest Ref Range: 12 - 78 U/L 57 46 43 AST Latest Ref Range: 15 - 37 U/L 59 (H) 53 (H) 42 (H) Alk. phosphatase Latest Ref Range: 45 - 117 U/L 142 (H) 130 (H) 106 MD Jarad Real Deputado [facility] [address] [address], [address] [facility] [place] [place] 22. 
[phone] [address] [place] [address]

## 2019-06-16 VITALS
HEART RATE: 84 BPM | TEMPERATURE: 98.3 F | WEIGHT: 183.7 LBS | SYSTOLIC BLOOD PRESSURE: 105 MMHG | OXYGEN SATURATION: 97 % | RESPIRATION RATE: 17 BRPM | HEIGHT: 73 IN | BODY MASS INDEX: 24.34 KG/M2 | DIASTOLIC BLOOD PRESSURE: 54 MMHG

## 2019-06-16 PROCEDURE — 74011250636 HC RX REV CODE- 250/636: Performed by: INTERNAL MEDICINE

## 2019-06-16 PROCEDURE — P9047 ALBUMIN (HUMAN), 25%, 50ML: HCPCS | Performed by: INTERNAL MEDICINE

## 2019-06-16 PROCEDURE — 74011250637 HC RX REV CODE- 250/637: Performed by: INTERNAL MEDICINE

## 2019-06-16 PROCEDURE — 74011250637 HC RX REV CODE- 250/637: Performed by: HOSPITALIST

## 2019-06-16 RX ORDER — FUROSEMIDE 40 MG/1
40 TABLET ORAL DAILY
Qty: 30 TAB | Refills: 1 | Status: SHIPPED | OUTPATIENT
Start: 2019-06-16 | End: 2019-07-23 | Stop reason: SDUPTHER

## 2019-06-16 RX ORDER — SPIRONOLACTONE 100 MG/1
100 TABLET, FILM COATED ORAL DAILY
Status: DISCONTINUED | OUTPATIENT
Start: 2019-06-16 | End: 2019-06-16 | Stop reason: HOSPADM

## 2019-06-16 RX ORDER — FUROSEMIDE 40 MG/1
40 TABLET ORAL DAILY
Status: DISCONTINUED | OUTPATIENT
Start: 2019-06-16 | End: 2019-06-16 | Stop reason: HOSPADM

## 2019-06-16 RX ADMIN — ALBUMIN (HUMAN) 25 G: 0.25 INJECTION, SOLUTION INTRAVENOUS at 09:16

## 2019-06-16 RX ADMIN — ALBUMIN (HUMAN) 25 G: 0.25 INJECTION, SOLUTION INTRAVENOUS at 03:09

## 2019-06-16 RX ADMIN — Medication 10 ML: at 05:49

## 2019-06-16 RX ADMIN — LACTULOSE 45 ML: 20 SOLUTION ORAL at 08:43

## 2019-06-16 RX ADMIN — SPIRONOLACTONE 100 MG: 100 TABLET, FILM COATED ORAL at 14:41

## 2019-06-16 RX ADMIN — FUROSEMIDE 40 MG: 40 TABLET ORAL at 14:42

## 2019-06-16 NOTE — DISCHARGE SUMMARY
Discharge Summary PATIENT ID: Mike Lopez MRN: [id number] YOB: 1960 DATE OF ADMISSION: 6/13/2019 12:14 PM   
DATE OF DISCHARGE: 6/16/2019 PRIMARY CARE PROVIDER: Master Falk MD  
 
ATTENDING PHYSICIAN: Dr Lizbeth Goyal DISCHARGING PROVIDER: Lizbeth Goyal MD   
To contact this individual call [phone] and ask the  to page. If unavailable ask to be transferred the Adult Hospitalist Department. CONSULTATIONS: IP CONSULT TO HOSPITALIST 
IP CONSULT TO HEPATOLOGY PROCEDURES/SURGERIES: Procedure(s) with comments: 
ESOPHAGOGASTRODUODENOSCOPY (EGD) ENDOSCOPIC BANDING OR LIGATION - 2 Bands in lower esophagus ADMITTING DIAGNOSES & HOSPITAL COURSE:  
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  
-CT chest 6/13 Severe emphysema. Large right pleural effusion with right basilar subsegmental atelectasis. Cirrhosis with ascites. No evidence for pulmonary embolism 
-S/p thoracentesis, fluid not sent for any studies 
  
Recurrent ascites 
-last paracentesis 6/7, per patient at [facility] 
-s/p paracentesis 6/14, fluid not send for any studies 
-Was on IV lasix/aldactone 
-Currently the patient is not on IV/oral lasix aldactone, BP is borderline 
-Not sure if the patient can be discharged 6/16 
 -Appreciate discussion with Dr Veronica Jerry, recommended to discharge the patient on step 1 diuretics -Not sure if the patient would benefit from TIPS 
  
Cirrhosis:  
-Cryptogenic cirrhosis 
-continue lactulose  
-Per Dr Veronica Jerry, initiating liver transplant 
  
Mild renal dysfunction 
-on albumin 
-Monitor 
  
Esophageal varices -s/p EGD 6/14 with 2 esophageal varices now s/p banding 
-Per Dr Veronica Jerry Repeat endoscopy to reassess varices and need for additional banding in 3 months 
-Tolerated liquid diet, now on soft diet 
  
Severe emphysema/COPD 
-The patient is not in exacerbation 
  
Thrombocytopenia 
-sec to cirrhosis -Monitor 
  
Mechanical soft diet 
  
 Code status: FULL CODE 
DVT prophylaxis: scd PTA: home DISCHARGE DIAGNOSES / PLAN:   
 
1. Pleural effusion 2. Ascites ADDITIONAL CARE RECOMMENDATIONS: Follow up with PMD, Dr Mauro Valverde PENDING TEST RESULTS:  
At the time of discharge the following test results are still pending: none FOLLOW UP APPOINTMENTS:   
Follow-up Information Follow up With Specialties Details Why Contact Info Adelita Berry MD Family Practice In 1 week  [address] [facility] [address] [place] [address] 
[phone] Pierre Umaña MD Hepatology, Liver Disease, Internal Medicine In 2 weeks  [address] [facility] [address] 
[phone] DIET: Regular Diet ACTIVITY: Activity as tolerated DISCHARGE MEDICATIONS: 
Current Discharge Medication List  
  
CONTINUE these medications which have CHANGED Details  
furosemide (LASIX) 40 mg tablet Take 1 Tab by mouth daily. Qty: 30 Tab, Refills: 1  
  
lactulose (CHRONULAC) 10 gram/15 mL solution Take 45 mL by mouth three (3) times daily. Qty: 1 Bottle, Refills: 1 CONTINUE these medications which have NOT CHANGED Details  
spironolactone (ALDACTONE) 100 mg tablet Take 100 mg by mouth daily. albuterol (PROVENTIL HFA, VENTOLIN HFA, PROAIR HFA) 90 mcg/actuation inhaler Take  by inhalation as needed for Wheezing. ondansetron (ZOFRAN ODT) 4 mg disintegrating tablet Take 1 Tab by mouth every eight (8) hours as needed for Nausea. Qty: 90 Tab, Refills: 6 NOTIFY YOUR PHYSICIAN FOR ANY OF THE FOLLOWING:  
Fever over 101 degrees for 24 hours. Chest pain, shortness of breath, fever, chills, nausea, vomiting, diarrhea, change in mentation, falling, weakness, bleeding. Severe pain or pain not relieved by medications. Or, any other signs or symptoms that you may have questions about. DISPOSITION: 
x  Home With: 
 OT  PT  HH  RN  
  
 Long term SNF/Inpatient Rehab Independent/assisted living Hospice Other: PATIENT CONDITION AT DISCHARGE:  
 
Functional status Poor Deconditioned   
x Independent Cognition  
x  Lucid Forgetful Dementia Catheters/lines (plus indication) Teresa PICC   
 PEG   
x None Code status  
x  Full code DNR   
 
PHYSICAL EXAMINATION AT DISCHARGE: 
Please see progress note CHRONIC MEDICAL DIAGNOSES: 
Problem List as of 6/16/2019 Date Reviewed: 6/14/2019 Codes Class Noted - Resolved * (Principal) Large pleural effusion ICD-10-CM: J90 ICD-9-CM: 511.9  6/13/2019 - Present Cirrhosis of liver without ascites (HCC) ICD-10-CM: K74.60 ICD-9-CM: 571.5  6/6/2017 - Present Esophageal varices (HCC) ICD-10-CM: I85.00 ICD-9-CM: 456.1  6/6/2017 - Present [facility] mellitus [facility] ICD-10-CM: E11.9 ICD-9-CM: 250.00  6/6/2017 - Present H/O umbilical hernia repair BRV-77-WC: Z98.890, Z87.19 ICD-9-CM: V15.29  6/6/2017 - Present S/P appendectomy ICD-10-CM: Z90.49 ICD-9-CM: V45.89  6/6/2017 - Present Greater than 36 minutes were spent with the patient on counseling and coordination of care Signed:  
Windy Diego MD 
6/16/2019 
2:20 PM

## 2019-06-16 NOTE — PROGRESS NOTES
Hospitalist Progress Note Bianca Acosta MD 
Answering service: [phone] from in house phone Cell: [phone] Date of Service:  2019 NAME:  Chio Stacy :  1960 MRN:  [id number] Admission Summary:  
Chio Stacy is a 63 y.o. male who presents with sob 
  
63 y.o. male with past medical history significant for diabetes, COPD, and liver cirrhosis who presents from home via private vehicle with chief complaint of SOB. Patient reports a h/o liver cirrhosis with ascites requiring intermittent paracentesis. He follows with hepatologist Dr. Jose Spencer for this, and reports he is supposed to be on the liver transplant list. Patient's last paracentesis was 6 days ago at [facility], had 4L drained. Patient states since then, he has had gradually worsening SOB and increasing abdominal distention. He also endorses LLE swelling, dry cough, nausea, left sided abdominal pain, and lack of appetite. He has \"a little bit\" of chest pain. Sx not exerted by deep inspiration. Patient denies any fever or seizures. There are no other acute medical concerns at this time. Interval history / Subjective:  
  F/u SOB Feels much better Assessment & Plan:  
 
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  
-CT chest  Severe emphysema. Large right pleural effusion with right basilar subsegmental atelectasis. Cirrhosis with ascites. No evidence for pulmonary embolism 
-S/p thoracentesis, fluid not sent for any studies Recurrent ascites 
-last paracentesis , per patient at [facility] 
-s/p paracentesis , fluid not send for any studies 
-Was on IV lasix/aldactone 
-Currently the patient is not on IV/oral lasix aldactone, BP is borderline 
-Not sure if the patient can be discharged  
 -Appreciate discussion with Dr Jose Spencer 
-Not sure if the patient would benefit from TIPS Cirrhosis: -Cryptogenic cirrhosis 
-continue lactulose  
-Per Dr Mitzy Hardy, initiating liver transplant Mild renal dysfunction 
-on albumin 
-Monitor Esophageal varices -s/p EGD 6/14 with 2 esophageal varices now s/p banding 
-Per Dr Mitzy Hardy Repeat endoscopy to reassess varices and need for additional banding in 3 months 
-Tolerated liquid diet, now on soft diet Severe emphysema/COPD 
-The patient is not in exacerbation Thrombocytopenia 
-sec to cirrhosis -Monitor Mechanical soft diet Code status: FULL CODE 
DVT prophylaxis: scd PTA: home Plan: Follow Dr Mitzy Hardy Care Plan discussed with: Patient/Family Disposition: TBD Hospital Problems  Date Reviewed: 6/14/2019 Codes Class Noted POA * (Principal) Large pleural effusion ICD-10-CM: J90 ICD-9-CM: 511.9  6/13/2019 Yes Review of Systems: A comprehensive review of systems was negative except for that written in the HPI. Vital Signs:  
 Last 24hrs VS reviewed since prior progress note. Most recent are: 
Visit Vitals /45 (BP 1 Location: Right arm, BP Patient Position: At rest) Pulse 85 Temp 98.5 °F (36.9 °C) Resp 17 Ht 6' 1\" (1.854 m) Wt 83.3 kg (183 lb 11.2 oz) SpO2 97% BMI 24.24 kg/m² Intake/Output Summary (Last 24 hours) at 6/16/2019 1249 Last data filed at 6/16/2019 4277 Gross per 24 hour Intake  Output 1200 ml Net -1200 ml Physical Examination:  
 
 
     
Constitutional:  No acute distress, cooperative, pleasant   
ENT:  Oral mucous moist, oropharynx benign. Neck supple, Resp:  CTA bilaterally. No wheezing/rhonchi/rales. No accessory muscle use CV:  Regular rhythm, normal rate, no murmurs, gallops, rubs GI:  Soft, non distended, non tender. normoactive bowel sounds, no hepatosplenomegaly Musculoskeletal:  No edema, warm, 2+ pulses throughout Neurologic:  Moves all extremities. AAOx3, CN II-XII reviewed Skin:  Good turgor, no rashes or ulcers Data Review:  
 Review and/or order of clinical lab test 
 
 
Labs:  
 
Recent Labs  
  06/15/19 
0343 06/14/19 
0418 WBC 6.0 6.4 HGB 10.4* 11.4* HCT 31.3* 33.8* PLT 85* 94* Recent Labs  
  06/15/19 
0343 06/14/19 
0418  137  
K 3.9 4.0  
 105 CO2 23 22 BUN 11 16 CREA 1.06 1.33* GLU 79 90  
CA 8.5 8.4* MG  --  2.0 PHOS  --  2.7 Recent Labs  
  06/15/19 
0343 06/14/19 
0418 SGOT 42* 53* ALT 43 46  130* TBILI 3.0* 3.3* TP 5.6* 5.6* ALB 2.7* 2.0*  
GLOB 2.9 3.6 No results for input(s): INR, PTP, APTT in the last 72 hours. No lab exists for component: INREXT, INREXT No results for input(s): FE, TIBC, PSAT, FERR in the last 72 hours. No results found for: FOL, RBCF No results for input(s): PH, PCO2, PO2 in the last 72 hours. No results for input(s): CPK, CKNDX, TROIQ in the last 72 hours. No lab exists for component: CPKMB No results found for: CHOL, CHOLX, CHLST, CHOLV, HDL, LDL, LDLC, DLDLP, TGLX, TRIGL, TRIGP, CHHD, CHHDX Lab Results Component Value Date/Time Glucose (POC) 90 08/31/2017 01:03 PM  
 
No results found for: COLOR, APPRN, SPGRU, REFSG, CARLENE, PROTU, GLUCU, KETU, BILU, UROU, NANY, LEUKU, GLUKE, EPSU, BACTU, WBCU, RBCU, CASTS, UCRY Medications Reviewed:  
 
Current Facility-Administered Medications Medication Dose Route Frequency  albuterol-ipratropium (DUO-NEB) 2.5 MG-0.5 MG/3 ML  3 mL Nebulization Q4H PRN  
 albumin human 25% (BUMINATE) solution 25 g  25 g IntraVENous Q6H  
 sodium chloride (NS) flush 5-40 mL  5-40 mL IntraVENous Q8H  
 sodium chloride (NS) flush 5-40 mL  5-40 mL IntraVENous PRN  
 lactulose (CHRONULAC) 10 gram/15 mL solution 45 mL  30 g Oral TID  sodium chloride (NS) flush 5-40 mL  5-40 mL IntraVENous Q8H  
 sodium chloride (NS) flush 5-40 mL  5-40 mL IntraVENous PRN  
 naloxone (NARCAN) injection 0.4 mg  0.4 mg IntraVENous PRN  
  ondansetron (ZOFRAN) injection 4 mg  4 mg IntraVENous Q4H PRN  
 
______________________________________________________________________ EXPECTED LENGTH OF STAY: 2d 14h ACTUAL LENGTH OF STAY:          3 Selene Abbott MD

## 2019-06-16 NOTE — PROGRESS NOTES
[facility] [address] [facility] Joy Lin MD, Irma Dykes, [address], MD Reeves Postal, SYLVIE Pike, [facility]   
KATELYNN Wilson, KATELYNN [address] of [facility] 
[address] [facility], [address] 
[place], [address] 
  [phone] 
  FAX: [phone] 225 [facility] 
219 [facility] 
6001 [facility], [address] 
[place], [address] [facility] 
  [phone] 
  FAX: [phone]  
  
  
HEPATOLOGY PROGRESS NOTE The patient is well known to and regularly cared for at [address] Makenzie is a 63year old  male with cryptogenic cirrhosis. Main Core found to have cirrhosis in 5/2017 when he developed variceal bleeding.  Other complications of cirrhosis include edema, hepatic encephalopathy. 
  
He came to the ED because of increasing SOB.  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 He underwent thoracentesis and paracentesis. He had EGD and badning of esophageal varices. 
  
I pulled paracentesis drain today. I gave him a dose of step 1 diuretics now. He should go home on this dose. He can be discharged today. I will see him in the office in 2 weeks. ASSESSMENT AND PLAN: 
Cirrhosis Cryptogenic cirrhosis.    
The diagnosis of cirrhosis is based upon Fiboscan, imaging, laboratory studies, complications of cirrhosis. The CTP is 11.  Child class C.  The MELD score is 19. 
  
The patient has a MELD score greater than 15 and has developed complications of cirrhosis.  He was recently granted Medicare but this does not take effect until 10/2019.   
Will initiate liver transplant evaluation testing at that time. 
  
Ascites/Hydrothorax Ascites developd for the first time in 4/2019.   
 He was hospitalized at [facility] for ascites and had a paracentesis. Now has recurrent ascites and hydrothorax. He had paracentesis and thoracentesis. To discharge on step 1 diuretics. Lower extremity edema Edema has resolved with current dose of diuretics. 
  
Acute kidney injury THis has resolved. Scr down to 1.06 mg 
Continue IV albumin 25 GM Q6H until DC. 
  
Esophageal varices He had 2 small varices but each had red spots yesterday on EGD. \ 
Banding was performed. 
  
Hepatic encephalopathy He takes lactulose TID at home. He will resume this. He is not on Xifaxan 550 BID at home There is no need to restrict dietary protein at this time.   
  
Thrombocytopenia This is secondary to cirrhosis. There is no evidence of overt bleeding.   
No treatment is required. The platelet count is adequate for the patient to undergo procedures without the need for platelet transfusion or platelet growth factors. 
  
  
PHYSICAL EXAMINATION: 
VS: per nursing note General:  No acute distress. Eyes:  Sclera anicteric. ENT:  No oral lesions.  Thyroid normal. 
Nodes:  No adenopathy. Skin:  Spider angiomata on chest.  No jaundice. Respiratory:  Reduced breath sounds at right base Cardiovascular:  Regular heart rate. Abdomen:  Soft non-tender, Some ascites is present. Extremities:  No lower extremity edema.   
Neurologic:  Generally alert and oriented.  Cranial nerves grossly intact. No asterixis. 
  
LABORATORY: 
Results for Natali Ponce (MRN [id number]) as of 6/16/2019 13:57 Ref. Range 6/13/2019 11:15 6/14/2019 04:18 6/15/2019 03:43 WBC Latest Ref Range: 4.1 - 11.1 K/uL 8.2 6.4 6.0 HGB Latest Ref Range: 12.1 - 17.0 g/dL 12.4 11.4 (L) 10.4 (L) PLATELET Latest Ref Range: 150 - 400 K/uL 110 (L) 94 (L) 85 (L) INR Latest Ref Range: 0.9 - 1.1   1.4 (H) Sodium Latest Ref Range: 136 - 145 mmol/L 135 (L) 137 136 Potassium Latest Ref Range: 3.5 - 5.1 mmol/L 4.0 4.0 3.9 Chloride Latest Ref Range: 97 - 108 mmol/L 105 105 108 CO2 Latest Ref Range: 21 - 32 mmol/L 24 22 23 Glucose Latest Ref Range: 65 - 100 mg/dL 109 (H) 90 79 BUN Latest Ref Range: 6 - 20 MG/DL 16 16 11 Creatinine Latest Ref Range: 0.70 - 1.30 MG/DL 1.27 1.33 (H) 1.06 Bilirubin, total Latest Ref Range: 0.2 - 1.0 MG/DL 3.6 (H) 3.3 (H) 3.0 (H) Albumin Latest Ref Range: 3.5 - 5.0 g/dL 2.4 (L) 2.0 (L) 2.7 (L) ALT (SGPT) Latest Ref Range: 12 - 78 U/L 57 46 43 AST Latest Ref Range: 15 - 37 U/L 59 (H) 53 (H) 42 (H) Alk. phosphatase Latest Ref Range: 45 - 117 U/L 142 (H) 130 (H) 106 MD Jarad Benjamin Deputado [facility] 136 [address] [address], [address] [facility] 22. 
[phone] [address] [place] [address]

## 2019-06-16 NOTE — DISCHARGE INSTRUCTIONS
Discharge Instructions       PATIENT ID: Orlin Javed  MRN: [id number]   YOB: 1960    DATE OF ADMISSION: 6/13/2019 12:14 PM    DATE OF DISCHARGE: 6/16/2019    PRIMARY CARE PROVIDER: Jimbo Meyer MD     ATTENDING PHYSICIAN: Hillary Heath MD  DISCHARGING PROVIDER: Anton Spears MD    To contact this individual call [phone] and ask the  to page. If unavailable ask to be transferred the Adult Hospitalist Department. DISCHARGE DIAGNOSES   Ascites  Pleural effusion    CONSULTATIONS: IP CONSULT TO HOSPITALIST  IP CONSULT TO HEPATOLOGY    PROCEDURES/SURGERIES: Procedure(s) with comments:  ESOPHAGOGASTRODUODENOSCOPY (EGD)  ENDOSCOPIC BANDING OR LIGATION - 2 Bands in lower esophagus    PENDING TEST RESULTS:   At the time of discharge the following test results are still pending: none    FOLLOW UP APPOINTMENTS:   Follow-up Information     Follow up With Specialties Details Why Contact Info    Jimbo Meyer MD Family Practice In 1 week  [address] [place] [address]      Kaia Mar MD Hepatology, Liver Disease, Internal Medicine In 2 weeks  [address] [place] [address]  [phone]             ADDITIONAL CARE RECOMMENDATIONS:   Follow up with PMD  Follow up with Dr Amanda Soares: Cardiac Diet    ACTIVITY: Activity as tolerated      DISCHARGE MEDICATIONS:   See Medication Reconciliation Form    · It is important that you take the medication exactly as they are prescribed. · Keep your medication in the bottles provided by the pharmacist and keep a list of the medication names, dosages, and times to be taken in your wallet. · Do not take other medications without consulting your doctor. NOTIFY YOUR PHYSICIAN FOR ANY OF THE FOLLOWING:   Fever over 101 degrees for 24 hours. Chest pain, shortness of breath, fever, chills, nausea, vomiting, diarrhea, change in mentation, falling, weakness, bleeding.  Severe pain or pain not relieved by medications. Or, any other signs or symptoms that you may have questions about.       DISPOSITION:  x  Home With:   OT  PT  HH  RN       SNF/Inpatient Rehab/LTAC    Independent/assisted living    Hospice    Other:     CDMP Checked:   Yes x     PROBLEM LIST Updated:  Yes x       Signed:   Martínez Hendricks MD  6/16/2019  2:19 PM

## 2019-06-27 ENCOUNTER — OFFICE VISIT (OUTPATIENT)
Dept: HEMATOLOGY | Age: 59
End: 2019-06-27

## 2019-06-27 VITALS
HEART RATE: 85 BPM | BODY MASS INDEX: 24.6 KG/M2 | TEMPERATURE: 99.5 F | WEIGHT: 185.6 LBS | HEIGHT: 73 IN | DIASTOLIC BLOOD PRESSURE: 49 MMHG | OXYGEN SATURATION: 96 % | SYSTOLIC BLOOD PRESSURE: 108 MMHG

## 2019-06-27 DIAGNOSIS — K74.60 CIRRHOSIS OF LIVER WITHOUT ASCITES, UNSPECIFIED HEPATIC CIRRHOSIS TYPE (HCC): Primary | ICD-10-CM

## 2019-06-27 NOTE — Clinical Note
7/4/19 Patient: Hector Kaufman YOB: 1960 Date of Visit: 6/27/2019 Anthony Yarbrough MD 
[address] [address] VIA Facsimile: [phone] Dear Anthony Yarbrough MD, Thank you for referring Mr. Blas Guthrie to [address] [facility] for evaluation. My notes for this consultation are attached. If you have questions, please do not hesitate to call me. I look forward to following your patient along with you. Sincerely, Esa Rojas MD

## 2019-06-27 NOTE — PROGRESS NOTES
[address] [place] [address], Marilou MCKEON, Radha Tera Sheng, [place]      SYLVIE Billingsley, [facility]     April Jerry Palafox, KATELYNN Youssef, KATELYNN [facility] 136    at [address]    217 [facility], [address] [place] [address], [facility] 22.    [phone]    FAX: [address] [place] [address], [address]    [phone]    FAX: [phone]       Patient Care Team:  Alberto Olson MD as PCP - [facility])  Nadir Velez MD (Gastroenterology)      Problem List  Date Reviewed: 6/14/2019          Codes Class Noted    Large pleural effusion ICD-10-CM: J90  ICD-9-CM: 511.9  6/13/2019        Cirrhosis of liver without ascites ([facility] ICD-10-CM: K74.60  ICD-9-CM: 571.5  6/6/2017        Esophageal varices ([facility] ICD-10-CM: I85.00  ICD-9-CM: 456.1  6/6/2017        Diabetes mellitus ([facility] ICD-10-CM: E11.9  ICD-9-CM: 250.00  3/9/6069        H/O umbilical hernia repair CAJ-87-VC: Z98.890, Z87.19  ICD-9-CM: V15.29  6/6/2017        S/P appendectomy ICD-10-CM: Z90.49  ICD-9-CM: V45.89  6/6/2017              Hugh Bautista returns to the [address] for management of cryptogenic cirrhosis. The active problem list, all pertinent past medical history, medications, endoscopic studies, radiologic findings and laboratory findings related to the liver disorder were reviewed with the patient. The patient is a 63 y.o.  male who was found to have chronic liver disease and cirrhosis in 5/2017 when he developed variceal bleeding.     The patient has developed the following complications of cirrhosis: esophageal varices, variceal bleeding, edema, hepatic encephalopathy,     Since the last office appointment the patient has:  Been hospitalized at [facility] for ascites and hydrothorax. He had paracentesis and thoracentesis. The patient notes fatigue, swelling of the abdomen,     The patient has not experienced yellowing of the eyes or skin, problems concentrating, swelling of the abdomen, hematemesis, hematochezia. The patient has limitations in functional activities which can be attributed to the liver disease and to other medical problems that are not related to the liver disease. ASSESSMENT AND PLAN:  Cirrhosis  Cryptogenic cirrhosis. The diagnosis of cirrhosis is based upon Fiboscan, imaging, laboratory studies, complications of cirrhosis. AST is elevated. ALT is normal.  ALP is elevated. Liver function is normal.  The platelet count is depressed. The CTP is 7. Child class B. The MELD score is 16. Ascites   Ascites has recently developed. Paracentesis will be scheduled. Will continue the current dose of diuretics at step 1. The renal function is normal and ascites should be able to be controled with diuretics. The patient was counseled regarding the need to maintain sodium restriction and the types of foods containing high amounts of sodium to be avoided. Lower extremity edema  Edema has resolved with current dose of diuretics. He had been taking lasix 40 mg TID and aldactone 100 mg every day after discharge. Will change the current dose of diuretics to step 2. The renal function is normal and edema should be able to be controled with diuretics. Screening for Esophageal varices   The patient has esophageal varices with prior bleeding. Varices have been banded   The last EGD to assess for varices was performed in 5/218. Will schedule for EGD to assess for varices and need for banding in 5/2019. Hepatic encephalopathy   Overt HE has recently developed. This is now controlled on current dose of lactulose TID. He is not having an diarrhea. There is no need to restrict dietary protein at this time. Thrombocytopenia   This is secondary to cirrhosis. There is no evidence of overt bleeding. No treatment is required. The platelet count is adequate for the patient to undergo procedures without the need for platelet transfusion or platelet growth factors. Screening for Hepatocellular Carcinoma  HCC screening has recently been performed and does not suggest [facility] The next liver imaging study will be performed in 9/2019. DM. This is being managed by his PCP. COPD. Patient is inconsistent with inhaler use and continues to smoke 1 PPD. Treatment of other medical problems in patients with chronic liver disease  There are no contraindications for the patient to take most medications that are necessary for treatment of other medical issues. The patient has cirrhrosis and should avoid the following medications:  NSAIDs which are associated with a higher rate of developing ZOEY. Counseling for alcohol in patients with chronic liver disease  The patient was counseled regarding alcohol consumption and the effect of alcohol on chronic liver disease. The patient does not consume any significant amount of alcohol. Vaccinations   Vaccination for viral hepatitis A and B is recommended since the patient has no serologic evidence of previous exposure or vaccination with immunity. Routine vaccinations against other bacterial and viral agents can be performed as indicated. Annual flu vaccination should be administered if indicated. ALLERGIES  Allergies   Allergen Reactions    Shellfish Derived Hives       MEDICATIONS  Current Outpatient Medications   Medication Sig    furosemide (LASIX) 40 mg tablet Take 1 Tab by mouth daily.  lactulose (CHRONULAC) 10 gram/15 mL solution Take 45 mL by mouth three (3) times daily.  spironolactone (ALDACTONE) 100 mg tablet Take 100 mg by mouth daily.     albuterol (PROVENTIL HFA, VENTOLIN HFA, PROAIR HFA) 90 mcg/actuation inhaler Take  by inhalation as needed for Wheezing.  ondansetron (ZOFRAN ODT) 4 mg disintegrating tablet Take 1 Tab by mouth every eight (8) hours as needed for Nausea. No current facility-administered medications for this visit. SYSTEM REVIEW NOT RELATED TO LIVER DISEASE OR REVIEWED ABOVE:  Constitution systems: Negative for fever, chills. Weight stable since last office visit. Eyes: Negative for visual changes. ENT: Negative for sore throat, painful swallowing. Respiratory: Negative for cough, hemoptysis. Positive for SOB, patient is inconsistent with administration of Ventolin. Cardiology: Negative for chest pain, palpitations. GI:  Negative for constipation or diarrhea. Occasional complaint of RUQ dull achy bruise feeling, helped with changes in position. Occasional post-prandial nausea  : Negative for urinary frequency, dysuria, hematuria, nocturia. Skin: Negative for rash. Hematology: Negative for easy bruising, blood clots. Musculo-skeletal: Negative for back pain, muscle pain, weakness. Neurologic: Negative for headaches, dizziness, vertigo. Memory problems ? related to HE. Psychology: Negative for anxiety, depression. FAMILY HISTORY:  The father  of heart disease. The mother  of MVA but had elevated liver enzymes. There is no family history of liver disease. SOCIAL HISTORY:  The patient is . The patient has no children. The patient currently smokes 1 pack of tobacco daily. The patient has never consumed significant amounts of alcohol. 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 PHYSICAL EXAMINATION:  Visit Vitals  /49 (BP 1 Location: Left arm, BP Patient Position: Sitting)   Pulse 85   Temp 99.5 °F (37.5 °C) (Tympanic)   Ht 6' 1\" (1.854 m)   Wt 185 lb 9.6 oz (84.2 kg)   SpO2 96%   BMI 24.49 kg/m²     General: No acute distress. Eyes: Sclera anicteric. ENT: No oral lesions.   Thyroid normal.  Nodes: No adenopathy. Skin: No spider angiomata. No jaundice. Positive for palmar erythema. Respiratory: Lungs clear to auscultation. Cardiovascular: Regular heart rate. No murmurs. No JVD. Abdomen: Soft, mild tender to deep palpation of the LUQ, spleen tip palpable. Liver edge firm, easily palpable 2-3 CM BCM nontender. No obvious ascites. Extremities: Trace edema. No muscle wasting. No gross arthritic changes. Neurologic: Alert and oriented. Cranial nerves grossly intact. Fine asterixis. LABORATORY STUDIES:  Liver [place] of [address] & Units 6/15/2019 6/14/2019   WBC 3.4 - 10.8 x10E3/uL 6.0 6.4   ANC 1.4 - 7.0 x10E3/uL 3.3 3.5   HGB 13.0 - 17.7 g/dL 10.4 (L) 11.4 (L)    - 450 x10E3/uL 85 (L) 94 (L)   INR 0.8 - 1.2     AST 0 - 40 IU/L 42 (H) 53 (H)   ALT 0 - 44 IU/L 43 46   Alk Phos 39 - 117 IU/L 106 130 (H)   Bili, Total 0.0 - 1.2 mg/dL 3.0 (H) 3.3 (H)   Bili, Direct 0.00 - 0.40 mg/dL     Albumin 3.5 - 5.5 g/dL 2.7 (L) 2.0 (L)   BUN 6 - 24 mg/dL 11 16   Creat 0.76 - 1.27 mg/dL 1.06 1.33 (H)   Na 134 - 144 mmol/L 136 137   K 3.5 - 5.2 mmol/L 3.9 4.0   Cl 96 - 106 mmol/L 108 105   CO2 20 - 29 mmol/L 23 22   Glucose 65 - 99 mg/dL 79 90   Magnesium 1.6 - 2.4 mg/dL  2.0     Cancer Screening Latest Ref Rng & Units 5/3/2019 2/22/2019 9/12/2018   AFP, Serum 0.0 - 8.0 ng/mL 3.4 3.6 4.3   AFP-L3% 0.0 - 9.9 % 9.7 8.3 8.4     SEROLOGIES:  5/2017. HBsurface antigen negative, anti-HBcore negative, anti-HBsurface negative, HCV RNA negative, iron saturation 29%, ASMA negative. Serologies Latest Ref Rng & Units 6/6/2017   Ferritin 30 - 400 ng/mL 39   Alpha-1 antitrypsin level 90 - 200 mg/dL 87 (L)     LIVER HISTOLOGY:  6/2017. FibroScan performed at [address] EkPa was 72.1. The results suggested a fibrosis level of F4. ENDOSCOPIC PROCEDURES:  5/2017. EGD by Dr Sixto Murphy. Esophageal varices. Banding performed. 8/2017. EGD performed by Dr Dillon Valle.   Small esophageal varices. No banding performed. No gastric varices. Mild portal gastropathy. Repeat in 6 months.  2/2018. EGD performed by Dr Goyo Jackson. Medium esophageal varices. Banding performed x 4. No gastric varices. Moderate portal gastropathy. 5/2018. EGD performed by Dr Goyo Jackson. No esophageal varices. No gastric varices. Moderate portal gastropathy. Repeat 5/2019. RADIOLOGY:  11/22/2017. US of abdomen. No liver mass/lesion. 5/2018. Ultrasound of liver. Echogenic consistent with cirrhosis. No liver mass lesions. No dilated bile ducts. No ascites. OTHER TESTING:  Not available or performed    FOLLOW-UP:  All of the issues listed above in the Assessment and Plan were discussed with the patient. All questions were answered. The patient expressed a clear understanding of the above. [address] [place] [address] in 4 weeks for routine monitoring.       MD David Gonzáles 13 of [address], [address] [place] [address] 22.  [phone]  [address] [place] [address]

## 2019-06-27 NOTE — PROGRESS NOTES
Chief Complaint   Patient presents with    Follow-up     Visit Vitals  /49 (BP 1 Location: Left arm, BP Patient Position: Sitting)   Pulse 85   Temp 99.5 °F (37.5 °C) (Tympanic)   Ht 6' 1\" (1.854 m)   Wt 185 lb 9.6 oz (84.2 kg)   SpO2 96%   BMI 24.49 kg/m²     3 most recent PHQ Screens 2/22/2019   Little interest or pleasure in doing things -   Feeling down, depressed, irritable, or hopeless Not at all   Total Score PHQ 2 -     Abuse Screening Questionnaire 2/22/2019   Do you ever feel afraid of your partner? N   Are you in a relationship with someone who physically or mentally threatens you? N   Is it safe for you to go home? Y     1. Have you been to the ER, urgent care clinic since your last visit? Hospitalized since your last visit? No    2. Have you seen or consulted any other health care providers outside of the [address] [place] [address] since your last visit? Include any pap smears or colon screening.  No

## 2019-07-02 ENCOUNTER — HOSPITAL ENCOUNTER (OUTPATIENT)
Dept: ULTRASOUND IMAGING | Age: 59
Discharge: HOME | End: 2019-07-02
Attending: INTERNAL MEDICINE
Payer: SELF-PAY

## 2019-07-02 DIAGNOSIS — K74.60 CIRRHOSIS OF LIVER WITHOUT ASCITES, UNSPECIFIED HEPATIC CIRRHOSIS TYPE (HCC): ICD-10-CM

## 2019-07-02 PROCEDURE — 76705 ECHO EXAM OF ABDOMEN: CPT

## 2019-07-03 LAB
ALBUMIN SERPL-MCNC: 3.2 G/DL (ref 3.5–5.5)
ALP SERPL-CCNC: 110 IU/L (ref 39–117)
ALT SERPL-CCNC: 33 IU/L (ref 0–44)
AST SERPL-CCNC: 50 IU/L (ref 0–40)
BILIRUB DIRECT SERPL-MCNC: 1.08 MG/DL (ref 0–0.4)
BILIRUB SERPL-MCNC: 3.8 MG/DL (ref 0–1.2)
BUN SERPL-MCNC: 11 MG/DL (ref 6–24)
BUN/CREAT SERPL: 11 (ref 9–20)
CALCIUM SERPL-MCNC: 8.8 MG/DL (ref 8.7–10.2)
CHLORIDE SERPL-SCNC: 101 MMOL/L (ref 96–106)
CO2 SERPL-SCNC: 22 MMOL/L (ref 20–29)
CREAT SERPL-MCNC: 1.02 MG/DL (ref 0.76–1.27)
GLUCOSE SERPL-MCNC: 91 MG/DL (ref 65–99)
INR PPP: 1.5 (ref 0.8–1.2)
POTASSIUM SERPL-SCNC: 4.7 MMOL/L (ref 3.5–5.2)
PROT SERPL-MCNC: 6 G/DL (ref 6–8.5)
PROTHROMBIN TIME: 14.8 SEC (ref 9.1–12)
SODIUM SERPL-SCNC: 136 MMOL/L (ref 134–144)

## 2019-07-04 LAB
BASOPHILS # BLD AUTO: 0 X10E3/UL (ref 0–0.2)
BASOPHILS NFR BLD AUTO: 1 %
EOSINOPHIL # BLD AUTO: 0.1 X10E3/UL (ref 0–0.4)
EOSINOPHIL NFR BLD AUTO: 2 %
ERYTHROCYTE [DISTWIDTH] IN BLOOD BY AUTOMATED COUNT: 17.2 % (ref 12.3–15.4)
HCT VFR BLD AUTO: 32.9 % (ref 37.5–51)
HGB BLD-MCNC: 10.9 G/DL (ref 13–17.7)
IMM GRANULOCYTES # BLD AUTO: 0 X10E3/UL (ref 0–0.1)
IMM GRANULOCYTES NFR BLD AUTO: 0 %
LYMPHOCYTES # BLD AUTO: 1.5 X10E3/UL (ref 0.7–3.1)
LYMPHOCYTES NFR BLD AUTO: 23 %
MCH RBC QN AUTO: 32.2 PG (ref 26.6–33)
MCHC RBC AUTO-ENTMCNC: 33.1 G/DL (ref 31.5–35.7)
MCV RBC AUTO: 97 FL (ref 79–97)
MONOCYTES # BLD AUTO: 1.5 X10E3/UL (ref 0.1–0.9)
MONOCYTES NFR BLD AUTO: 23 %
MORPHOLOGY BLD-IMP: ABNORMAL
NEUTROPHILS # BLD AUTO: 3.3 X10E3/UL (ref 1.4–7)
NEUTROPHILS NFR BLD AUTO: 51 %
PLATELET # BLD AUTO: 120 X10E3/UL (ref 150–450)
RBC # BLD AUTO: 3.39 X10E6/UL (ref 4.14–5.8)
WBC # BLD AUTO: 6.4 X10E3/UL (ref 3.4–10.8)

## 2019-07-10 ENCOUNTER — TELEPHONE (OUTPATIENT)
Dept: HEMATOLOGY | Age: 59
End: 2019-07-10

## 2019-07-10 NOTE — TELEPHONE ENCOUNTER
Patient does not have follow up scheduled for the end of July to see DASHA Gutierrez. Left vm for patient to call front office to schedule.

## 2019-07-11 NOTE — TELEPHONE ENCOUNTER
----- Message from Luciano Paige LPN sent at 1/88/3761  4:17 PM EDT -----  Regarding: follow up  Nasra Cummings,   I left a vm for patient to return call to front office to schedule follow up with Eunice Pride. He does not have a follow up scheduled.      Raenan Zarate

## 2019-07-17 NOTE — TELEPHONE ENCOUNTER
----- Message from Darell Melgar LPN sent at 8/35/6519  4:17 PM EDT -----  Regarding: follow up  Joselyn Torres   I left a vm for patient to return call to front office to schedule follow up with Sofie Borges. He does not have a follow up scheduled.      Franck Earl

## 2019-07-23 ENCOUNTER — HOSPITAL ENCOUNTER (OUTPATIENT)
Dept: ULTRASOUND IMAGING | Age: 59
Discharge: HOME | End: 2019-07-23
Attending: INTERNAL MEDICINE
Payer: SELF-PAY

## 2019-07-23 ENCOUNTER — OFFICE VISIT (OUTPATIENT)
Dept: HEMATOLOGY | Age: 59
End: 2019-07-23

## 2019-07-23 ENCOUNTER — HOSPITAL ENCOUNTER (OUTPATIENT)
Dept: GENERAL RADIOLOGY | Age: 59
Discharge: HOME | End: 2019-07-23
Attending: RADIOLOGY
Payer: SELF-PAY

## 2019-07-23 ENCOUNTER — HOSPITAL ENCOUNTER (OUTPATIENT)
Dept: GENERAL RADIOLOGY | Age: 59
Discharge: HOME | End: 2019-07-23
Payer: SELF-PAY

## 2019-07-23 VITALS
HEART RATE: 98 BPM | DIASTOLIC BLOOD PRESSURE: 57 MMHG | OXYGEN SATURATION: 94 % | RESPIRATION RATE: 18 BRPM | SYSTOLIC BLOOD PRESSURE: 100 MMHG

## 2019-07-23 VITALS — OXYGEN SATURATION: 94 % | DIASTOLIC BLOOD PRESSURE: 66 MMHG | SYSTOLIC BLOOD PRESSURE: 121 MMHG | HEART RATE: 102 BPM

## 2019-07-23 DIAGNOSIS — K70.30 ALCOHOLIC CIRRHOSIS OF LIVER WITHOUT ASCITES (HCC): Primary | ICD-10-CM

## 2019-07-23 DIAGNOSIS — J90 PLEURAL EFFUSION: ICD-10-CM

## 2019-07-23 DIAGNOSIS — J90 PLEURAL EFFUSION: Primary | ICD-10-CM

## 2019-07-23 LAB
APPEARANCE FLD: ABNORMAL
COLOR FLD: YELLOW
LYMPHOCYTES NFR FLD: 48 %
MESOTHL CELL NFR FLD: 4 %
MONOS+MACROS NFR FLD: 40 %
NEUTROPHILS NFR FLD: 8 %
NUC CELL # FLD: 189 /CU MM
RBC # FLD: >100 /CU MM
SPECIMEN SOURCE FLD: ABNORMAL

## 2019-07-23 PROCEDURE — 71045 X-RAY EXAM CHEST 1 VIEW: CPT

## 2019-07-23 PROCEDURE — 89050 BODY FLUID CELL COUNT: CPT

## 2019-07-23 PROCEDURE — 32555 ASPIRATE PLEURA W/ IMAGING: CPT

## 2019-07-23 PROCEDURE — 71046 X-RAY EXAM CHEST 2 VIEWS: CPT

## 2019-07-23 RX ORDER — FUROSEMIDE 40 MG/1
80 TABLET ORAL DAILY
Qty: 60 TAB | Refills: 4 | Status: ON HOLD | OUTPATIENT
Start: 2019-07-23 | End: 2019-09-16 | Stop reason: SDUPTHER

## 2019-07-23 RX ORDER — SPIRONOLACTONE 100 MG/1
200 TABLET, FILM COATED ORAL DAILY
Qty: 60 TAB | Refills: 4 | Status: SHIPPED | OUTPATIENT
Start: 2019-07-23 | End: 2019-09-16

## 2019-07-23 NOTE — PROGRESS NOTES
Chief Complaint   Patient presents with    Follow-up     Patient is sob sitting, and standing, and upon exersion. Visit Vitals  /66 (BP 1 Location: Left arm, BP Patient Position: Sitting)   Pulse (!) 102   SpO2 94%     3 most recent PHQ Screens 2/22/2019   Little interest or pleasure in doing things -   Feeling down, depressed, irritable, or hopeless Not at all   Total Score PHQ 2 -     Abuse Screening Questionnaire 2/22/2019   Do you ever feel afraid of your partner? N   Are you in a relationship with someone who physically or mentally threatens you? N   Is it safe for you to go home?  Barby Garces

## 2019-07-23 NOTE — PROGRESS NOTES
Pt received to US, changed to gown and assessed for thoracentesis. Vitals stable. Confirmed allergies. Dr Steven Azevedo here for consent, timeout at  [phone], start time [id number]. Draining clear yellow fluid from R back. Total of 1960 cc clear yellow fluid drained. Pt tolerated well. Stat portable xray ordered. Clean dressing applied. Post procedure xray completed.

## 2019-07-23 NOTE — PROGRESS NOTES
[address] [place] [address], MD, MD Yelena Sood PA-C [facility], [facility]     Adriana Albarado, [facility]   Natalee Franco, P-C    Carlos Morocho, [facility]       Jaradavril Salgado [facility] 136    at [address], [address] [place] [address], [facility] 22.    [phone]    FAX: [address] [place] [address], [address] [place] [address],[address]    [address] [facility] [address]: [phone]       Patient Care Team:  Sherri Gallardo MD as PCP - [facility])  Collin Bowers MD (Gastroenterology)      Problem List  Date Reviewed: 7/4/2019          Codes Class Noted    Large pleural effusion ICD-10-CM: J90  ICD-9-CM: 511.9  6/13/2019        Cirrhosis ([facility] [address] ICD-10-CM: K74.60  ICD-9-CM: 571.5  6/6/2017        Esophageal varices ([facility] [address] ICD-10-CM: I85.00  ICD-9-CM: 456.1  6/6/2017        Diabetes mellitus ([facility] [address] ICD-10-CM: E11.9  ICD-9-CM: 250.00  0/5/7753        H/O umbilical hernia repair [id number]: Z98.890, Z87.19  ICD-9-CM: V15.29  6/6/2017        S/P appendectomy ICD-10-CM: Z90.49  ICD-9-CM: V45.89  6/6/2017              Duglas Yu returns to the [address] for management of cryptogenic cirrhosis. The active problem list, all pertinent past medical history, medications, endoscopic studies, radiologic findings and laboratory findings related to the liver disorder were reviewed with the patient. The patient is a 63 y.o.  male who was found to have chronic liver disease and cirrhosis in 5/2017 when he developed variceal bleeding.     The patient has developed the following complications of cirrhosis: esophageal varices, variceal bleeding, edema, ascites, hepatic hydrothorax, hepatic encephalopathy,     Since the last office appointment the patient has:  Hospitalized at [facility] for 1 week because of SOB and hepatic hydrothorax. He underwent thoracentesis. The patient notes fatigue, SOB,    The patient has not experienced yellowing of the eyes or skin, problems concentrating, swelling of the abdomen, hematemesis, hematochezia. The patient has severe limitations in functional activities which can be attributed to the liver disease       ASSESSMENT AND PLAN:  Cirrhosis  Cryptogenic cirrhosis. The diagnosis of cirrhosis is based upon Fiboscan, imaging, laboratory studies, complications of cirrhosis. AST is elevated. ALT is normal.  ALP is elevated. Liver function is normal.  The platelet count is depressed. The CTP is 7. Child class B. The MELD score is 16. Ascites   Ascites appears to have resolved now that he has developed hydrothorax. Will get CRX today. Will increase the dose of diuretics to step 2. The renal function is normal and ascites should be able to be controled with diuretics. The patient was counseled regarding the need to maintain sodium restriction and the types of foods containing high amounts of sodium to be avoided. Hepatic hydrothorax  He has undergone paracnetesis at [facility] last week. 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  Will get CXR today. May need another thoracentesis as OP. Increase diuretics to step 2. Lower extremity edema  Edema has resolved with current dose of diuretics. Will change the current dose of diuretics to step 2. The renal function is normal and edema should be able to be controled with diuretics. Screening for Esophageal varices   The patient has esophageal varices with prior bleeding. Varices have been banded   The last EGD to assess for varices was performed in 5/218. Will schedule for EGD to assess for varices and need for banding in 5/2019. Hepatic encephalopathy   Overt HE has recently developed.     This is now controlled on current dose of lactulose TID. He is not having an diarrhea. There is no need to restrict dietary protein at this time. Thrombocytopenia   This is secondary to cirrhosis. There is no evidence of overt bleeding. No treatment is required. The platelet count is adequate for the patient to undergo procedures without the need for platelet transfusion or platelet growth factors. Screening for Hepatocellular Carcinoma  HCC screening has recently been performed and does not suggest [address] The next liver imaging study will be performed in 9/2019. DM. This is being managed by his PCP. COPD. Patient is inconsistent with inhaler use and continues to smoke 1 PPD. Treatment of other medical problems in patients with chronic liver disease  There are no contraindications for the patient to take most medications that are necessary for treatment of other medical issues. The patient has cirrhrosis and should avoid the following medications:  NSAIDs which are associated with a higher rate of developing ZOEY. Counseling for alcohol in patients with chronic liver disease  The patient was counseled regarding alcohol consumption and the effect of alcohol on chronic liver disease. The patient does not consume any significant amount of alcohol. Vaccinations   Vaccination for viral hepatitis A and B is recommended since the patient has no serologic evidence of previous exposure or vaccination with immunity. Routine vaccinations against other bacterial and viral agents can be performed as indicated. Annual flu vaccination should be administered if indicated. ALLERGIES  Allergies   Allergen Reactions    Shellfish Derived Hives       MEDICATIONS  Current Outpatient Medications   Medication Sig    furosemide (LASIX) 40 mg tablet Take 2 Tabs by mouth daily.  spironolactone (ALDACTONE) 100 mg tablet Take 2 Tabs by mouth daily.     lactulose (CHRONULAC) 10 gram/15 mL solution Take 45 mL by mouth three (3) times daily.  albuterol (PROVENTIL HFA, VENTOLIN HFA, PROAIR HFA) 90 mcg/actuation inhaler Take  by inhalation as needed for Wheezing.  ondansetron (ZOFRAN ODT) 4 mg disintegrating tablet Take 1 Tab by mouth every eight (8) hours as needed for Nausea. No current facility-administered medications for this visit. SYSTEM REVIEW NOT RELATED TO LIVER DISEASE OR REVIEWED ABOVE:  Constitution systems: Negative for fever, chills. Weight stable since last office visit. Eyes: Negative for visual changes. ENT: Negative for sore throat, painful swallowing. Respiratory: Negative for cough, hemoptysis. Positive for SOB, patient is inconsistent with administration of Ventolin. Cardiology: Negative for chest pain, palpitations. GI:  Negative for constipation or diarrhea. Occasional complaint of RUQ dull achy bruise feeling, helped with changes in position. Occasional post-prandial nausea  : Negative for urinary frequency, dysuria, hematuria, nocturia. Skin: Negative for rash. Hematology: Negative for easy bruising, blood clots. Musculo-skeletal: Negative for back pain, muscle pain, weakness. Neurologic: Negative for headaches, dizziness, vertigo. Memory problems ? related to HE. Psychology: Negative for anxiety, depression. FAMILY HISTORY:  The father  of heart disease. The mother  of MVA but had elevated liver enzymes. There is no family history of liver disease. SOCIAL HISTORY:  The patient is . The patient has no children. The patient currently smokes 1 pack of tobacco daily. The patient has never consumed significant amounts of alcohol. 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 PHYSICAL EXAMINATION:  Visit Vitals  /66 (BP 1 Location: Left arm, BP Patient Position: Sitting)   Pulse (!) 102   SpO2 94%     General: No acute distress. Eyes: Sclera anicteric. ENT: No oral lesions.   Thyroid normal.  Nodes: No adenopathy. Skin: No spider angiomata. No jaundice. Positive for palmar erythema. Respiratory: Lungs clear to auscultation. Cardiovascular: Regular heart rate. No murmurs. No JVD. Abdomen: Soft, mild tender to deep palpation of the LUQ, spleen tip palpable. Liver edge firm, easily palpable 2-3 CM BCM nontender. No obvious ascites. Extremities: Trace edema. No muscle wasting. No gross arthritic changes. Neurologic: Alert and oriented. Cranial nerves grossly intact. Fine asterixis. LABORATORY STUDIES:  Liver [place] [address] & Units 7/2/2019 6/15/2019   WBC 4.1 - 11.1 K/uL 6.4 6.0   ANC 1.8 - 8.0 K/UL 3.3 3.3   HGB 12.1 - 17.0 g/dL 10.9 (L) 10.4 (L)    - 400 K/uL 120 (L) 85 (L)   INR 0.9 - 1.1   1.5 (H)    AST 15 - 37 U/L 50 (H) 42 (H)   ALT 12 - 78 U/L 33 43   Alk Phos 45 - 117 U/L 110 106   Bili, Total 0.2 - 1.0 MG/DL 3.8 (H) 3.0 (H)   Bili, Direct 0.00 - 0.40 mg/dL 1.08 (H)    Albumin 3.5 - 5.0 g/dL 3.2 (L) 2.7 (L)   BUN 6 - 20 MG/DL 11 11   Creat 0.70 - 1.30 MG/DL 1.02 1.06   Na 136 - 145 mmol/L 136 136   K 3.5 - 5.1 mmol/L 4.7 3.9   Cl 97 - 108 mmol/L 101 108   CO2 21 - 32 mmol/L 22 23   Glucose 65 - 100 mg/dL 91 79   Magnesium 1.6 - 2.4 mg/dL     Ammonia 27 - 102 ug/dL         Cancer Screening Latest Ref Rng & Units 5/3/2019 2/22/2019 9/12/2018   AFP, Serum 0.0 - 8.0 ng/mL 3.4 3.6 4.3   AFP-L3% 0.0 - 9.9 % 9.7 8.3 8.4     SEROLOGIES:  5/2017. HBsurface antigen negative, anti-HBcore negative, anti-HBsurface negative, HCV RNA negative, iron saturation 29%, ASMA negative. Serologies Latest Ref Rng & Units 6/6/2017   Ferritin 30 - 400 ng/mL 39   Alpha-1 antitrypsin level 90 - 200 mg/dL 87 (L)     LIVER HISTOLOGY:  6/2017. FibroScan performed at [address]. EkPa was 72.1. The results suggested a fibrosis level of F4. ENDOSCOPIC PROCEDURES:  5/2017. EGD by Dr Jenn Rodriguez. Esophageal varices. Banding performed. 8/2017.   EGD performed by Dr Isac Wilson. Small esophageal varices. No banding performed. No gastric varices. Mild portal gastropathy. Repeat in 6 months.  2/2018. EGD performed by Dr Isac Wilson. Medium esophageal varices. Banding performed x 4. No gastric varices. Moderate portal gastropathy. 5/2018. EGD performed by Dr Isac Wilson. No esophageal varices. No gastric varices. Moderate portal gastropathy. Repeat 5/2019. RADIOLOGY:  11/22/2017. US of abdomen. No liver mass/lesion. 5/2018. Ultrasound of liver. Echogenic consistent with cirrhosis. No liver mass lesions. No dilated bile ducts. No ascites. OTHER TESTING:  Not available or performed    FOLLOW-UP:  All of the issues listed above in the Assessment and Plan were discussed with the patient. All questions were answered. The patient expressed a clear understanding of the above. [address] [place] [address] in 4 weeks for routine monitoring.       MD Bernard [address] [place] [address] A, [address] [place] [address] 22.  [phone]  [address] [place] [address]

## 2019-07-23 NOTE — DISCHARGE INSTRUCTIONS
Patient Education        Thoracentesis: What to Expect at Home  Your Recovery  Thoracentesis (say \"nczr-ig-mxm-HAYDEE-sis\") is a procedure to remove fluid from the space between the lungs and the chest wall (pleural space). This procedure may also be called a \"chest tap. \" It is normal to have a small amount of fluid in the pleural space. But too much fluid can build up because of problems such as infection, heart failure, or lung cancer. The procedure may have been done to help with shortness of breath and pain caused by the fluid buildup. Or you may have had this procedure so the doctor could test the fluid to find the cause of the buildup. Your chest may be sore where the doctor put the needle or catheter into your skin (the puncture site). This usually gets better after a day or two. You can go back to work or your normal activities as soon as you feel up to it. If the doctor sent the fluid to a lab for testing, it may take several days to get the results. The doctor or nurse will discuss the results with you. This care sheet gives you a general idea about how long it will take for you to recover. But each person recovers at a different pace. Follow the steps below to feel better as quickly as possible. How can you care for yourself at home? Activity    · Rest when you feel tired. Getting enough sleep will help you recover.     · Avoid strenuous activities, such as bicycle riding, jogging, weight lifting, or aerobic exercise, until your doctor says it is okay.     · You may shower. Do not take a bath until the puncture site has healed, or until your doctor tells you it is okay.     · Ask your doctor when you can drive again.     · You may need to take 1 or 2 days off from work. It depends on the type of work you do and how you feel. Diet    · You can eat your normal diet.     · Drink plenty of fluids (unless your doctor tells you not to).    Medicines    · Your doctor will tell you if and when you can restart your medicines. He or she will also give you instructions about taking any new medicines.     · If you take blood thinners, such as warfarin (Coumadin), clopidogrel (Plavix), or aspirin, be sure to talk to your doctor. He or she will tell you if and when to start taking those medicines again. Make sure that you understand exactly what your doctor wants you to do.     · Be safe with medicines. Take pain medicines exactly as directed. ? If the doctor gave you a prescription medicine for pain, take it as prescribed. ? If you are not taking a prescription pain medicine, ask your doctor if you can take an over-the-counter medicine. ? Do not take two or more pain medicines at the same time unless the doctor told you to. Many pain medicines have acetaminophen, which is Tylenol. Too much acetaminophen (Tylenol) can be harmful.     · If you think your pain medicine is making you sick to your stomach:  ? Take your medicine after meals (unless your doctor has told you not to). ? Ask your doctor for a different pain medicine.     · If your doctor prescribed antibiotics, take them as directed. Do not stop taking them just because you feel better. You need to take the full course of antibiotics.    Care of the puncture site    · Wash the area daily with warm, soapy water, and pat it dry. Don't use hydrogen peroxide or alcohol, which may delay healing. You may cover the area with a gauze bandage if it weeps or rubs against clothing. Change the bandage every day.     · Keep the area clean and dry. Follow-up care is a key part of your treatment and safety. Be sure to make and go to all appointments, and call your doctor if you are having problems. It's also a good idea to know your test results and keep a list of the medicines you take. When should you call for help? Call 911 anytime you think you may need emergency care. For example, call if:    · You passed out (lost consciousness).     · You have severe trouble breathing.   · You have sudden chest pain and shortness of breath, or you cough up blood.    Call your doctor now or seek immediate medical care if:    · You have shortness of breath that is new or getting worse.     · You have new or worse pain in your chest, especially when you take a deep breath.     · You are sick to your stomach or cannot keep fluids down.     · You have a fever over 100°F.     · Bright red blood has soaked through the bandage over your puncture site.     · You have signs of infection, such as:  ? Increased pain, swelling, warmth, or redness. ? Red streaks leading from the puncture site. ? Pus draining from the puncture site. ? Swollen lymph nodes in your neck, armpits, or groin. ? A fever.     · You cough up a lot more mucus than normal, or your mucus changes color.    Watch closely for changes in your health, and be sure to contact your doctor if you have any problems. Where can you learn more? Go to http://alesia-cooper.info/. Enter W800 in the search box to learn more about \"Thoracentesis: What to Expect at Home. \"  Current as of: September 5, 2018  Content Version: 12.1  © 9920-3163 [employer]wise, [employer]. Care instructions adapted under license by [employer] (which disclaims liability or warranty for this information). If you have questions about a medical condition or this instruction, always ask your healthcare professional. [place] [address] any warranty or liability for your use of this information.

## 2019-07-26 ENCOUNTER — APPOINTMENT (OUTPATIENT)
Dept: GENERAL RADIOLOGY | Age: 59
End: 2019-07-26
Attending: RADIOLOGY
Payer: SELF-PAY

## 2019-07-26 ENCOUNTER — APPOINTMENT (OUTPATIENT)
Dept: GENERAL RADIOLOGY | Age: 59
End: 2019-07-26
Attending: EMERGENCY MEDICINE
Payer: SELF-PAY

## 2019-07-26 ENCOUNTER — APPOINTMENT (OUTPATIENT)
Dept: ULTRASOUND IMAGING | Age: 59
End: 2019-07-26
Attending: EMERGENCY MEDICINE
Payer: SELF-PAY

## 2019-07-26 ENCOUNTER — HOSPITAL ENCOUNTER (EMERGENCY)
Age: 59
Discharge: HOME | End: 2019-07-26
Attending: EMERGENCY MEDICINE
Payer: SELF-PAY

## 2019-07-26 ENCOUNTER — TELEPHONE (OUTPATIENT)
Dept: HEMATOLOGY | Age: 59
End: 2019-07-26

## 2019-07-26 VITALS
OXYGEN SATURATION: 94 % | BODY MASS INDEX: 24.52 KG/M2 | HEART RATE: 95 BPM | WEIGHT: 185 LBS | DIASTOLIC BLOOD PRESSURE: 48 MMHG | TEMPERATURE: 98.2 F | HEIGHT: 73 IN | SYSTOLIC BLOOD PRESSURE: 105 MMHG | RESPIRATION RATE: 17 BRPM

## 2019-07-26 DIAGNOSIS — J90 PLEURAL EFFUSION: Primary | ICD-10-CM

## 2019-07-26 DIAGNOSIS — J90 LARGE PLEURAL EFFUSION: Primary | ICD-10-CM

## 2019-07-26 LAB
ALBUMIN SERPL-MCNC: 2.6 G/DL (ref 3.5–5)
ALBUMIN/GLOB SERPL: 0.8 {RATIO} (ref 1.1–2.2)
ALP SERPL-CCNC: 124 U/L (ref 45–117)
ALT SERPL-CCNC: 45 U/L (ref 12–78)
ANION GAP SERPL CALC-SCNC: 8 MMOL/L (ref 5–15)
AST SERPL-CCNC: 57 U/L (ref 15–37)
BASOPHILS # BLD: 0 K/UL (ref 0–0.1)
BASOPHILS NFR BLD: 0 % (ref 0–1)
BILIRUB SERPL-MCNC: 4.2 MG/DL (ref 0.2–1)
BUN SERPL-MCNC: 12 MG/DL (ref 6–20)
BUN/CREAT SERPL: 10 (ref 12–20)
CALCIUM SERPL-MCNC: 8.3 MG/DL (ref 8.5–10.1)
CHLORIDE SERPL-SCNC: 103 MMOL/L (ref 97–108)
CO2 SERPL-SCNC: 23 MMOL/L (ref 21–32)
COMMENT, HOLDF: NORMAL
CREAT SERPL-MCNC: 1.25 MG/DL (ref 0.7–1.3)
DIFFERENTIAL METHOD BLD: ABNORMAL
EOSINOPHIL # BLD: 0.1 K/UL (ref 0–0.4)
EOSINOPHIL NFR BLD: 1 % (ref 0–7)
ERYTHROCYTE [DISTWIDTH] IN BLOOD BY AUTOMATED COUNT: 16.9 % (ref 11.5–14.5)
GLOBULIN SER CALC-MCNC: 3.3 G/DL (ref 2–4)
GLUCOSE SERPL-MCNC: 119 MG/DL (ref 65–100)
HCT VFR BLD AUTO: 33.8 % (ref 36.6–50.3)
HGB BLD-MCNC: 11.6 G/DL (ref 12.1–17)
IMM GRANULOCYTES # BLD AUTO: 0 K/UL
IMM GRANULOCYTES NFR BLD AUTO: 0 %
INR PPP: 1.5 (ref 0.9–1.1)
LYMPHOCYTES # BLD: 1.5 K/UL (ref 0.8–3.5)
LYMPHOCYTES NFR BLD: 13 % (ref 12–49)
MCH RBC QN AUTO: 33.4 PG (ref 26–34)
MCHC RBC AUTO-ENTMCNC: 34.3 G/DL (ref 30–36.5)
MCV RBC AUTO: 97.4 FL (ref 80–99)
MONOCYTES # BLD: 2.1 K/UL (ref 0–1)
MONOCYTES NFR BLD: 18 % (ref 5–13)
NEUTS SEG # BLD: 8.2 K/UL (ref 1.8–8)
NEUTS SEG NFR BLD: 68 % (ref 32–75)
NRBC # BLD: 0 K/UL (ref 0–0.01)
NRBC BLD-RTO: 0 PER 100 WBC
PLATELET # BLD AUTO: 122 K/UL (ref 150–400)
PMV BLD AUTO: 10.6 FL (ref 8.9–12.9)
POTASSIUM SERPL-SCNC: 4.1 MMOL/L (ref 3.5–5.1)
PROT SERPL-MCNC: 5.9 G/DL (ref 6.4–8.2)
PROTHROMBIN TIME: 14.5 SEC (ref 9–11.1)
RBC # BLD AUTO: 3.47 M/UL (ref 4.1–5.7)
RBC MORPH BLD: ABNORMAL
SAMPLES BEING HELD,HOLD: NORMAL
SODIUM SERPL-SCNC: 134 MMOL/L (ref 136–145)
WBC # BLD AUTO: 11.9 K/UL (ref 4.1–11.1)

## 2019-07-26 PROCEDURE — 85610 PROTHROMBIN TIME: CPT

## 2019-07-26 PROCEDURE — C1729 CATH, DRAINAGE: HCPCS

## 2019-07-26 PROCEDURE — 74011250636 HC RX REV CODE- 250/636: Performed by: EMERGENCY MEDICINE

## 2019-07-26 PROCEDURE — 96365 THER/PROPH/DIAG IV INF INIT: CPT

## 2019-07-26 PROCEDURE — 85025 COMPLETE CBC W/AUTO DIFF WBC: CPT

## 2019-07-26 PROCEDURE — 96366 THER/PROPH/DIAG IV INF ADDON: CPT

## 2019-07-26 PROCEDURE — 80053 COMPREHEN METABOLIC PANEL: CPT

## 2019-07-26 PROCEDURE — 99285 EMERGENCY DEPT VISIT HI MDM: CPT

## 2019-07-26 PROCEDURE — P9047 ALBUMIN (HUMAN), 25%, 50ML: HCPCS | Performed by: EMERGENCY MEDICINE

## 2019-07-26 PROCEDURE — 74011250636 HC RX REV CODE- 250/636: Performed by: RADIOLOGY

## 2019-07-26 PROCEDURE — 71045 X-RAY EXAM CHEST 1 VIEW: CPT

## 2019-07-26 PROCEDURE — 93005 ELECTROCARDIOGRAM TRACING: CPT

## 2019-07-26 PROCEDURE — 77030012390 HC DRN CHST BTL GTNG -B

## 2019-07-26 PROCEDURE — 71046 X-RAY EXAM CHEST 2 VIEWS: CPT

## 2019-07-26 PROCEDURE — 32555 ASPIRATE PLEURA W/ IMAGING: CPT

## 2019-07-26 RX ORDER — ALBUMIN HUMAN 250 G/1000ML
25 SOLUTION INTRAVENOUS ONCE
Status: COMPLETED | OUTPATIENT
Start: 2019-07-26 | End: 2019-07-26

## 2019-07-26 RX ORDER — LIDOCAINE HYDROCHLORIDE 10 MG/ML
4 INJECTION, SOLUTION EPIDURAL; INFILTRATION; INTRACAUDAL; PERINEURAL
Status: COMPLETED | OUTPATIENT
Start: 2019-07-26 | End: 2019-07-26

## 2019-07-26 RX ADMIN — LIDOCAINE HYDROCHLORIDE 4 ML: 10 INJECTION, SOLUTION EPIDURAL; INFILTRATION; INTRACAUDAL; PERINEURAL at 16:16

## 2019-07-26 RX ADMIN — ALBUMIN (HUMAN) 25 G: 0.25 INJECTION, SOLUTION INTRAVENOUS at 15:27

## 2019-07-26 RX ADMIN — SODIUM CHLORIDE 500 ML: 900 INJECTION, SOLUTION INTRAVENOUS at 14:18

## 2019-07-26 NOTE — DISCHARGE INSTRUCTIONS
Patient Education        Thoracentesis: What to Expect at Home  Your Recovery  Thoracentesis (say \"rymi-dk-grl-HAYDEE-sis\") is a procedure to remove fluid from the space between the lungs and the chest wall (pleural space). This procedure may also be called a \"chest tap. \" It is normal to have a small amount of fluid in the pleural space. But too much fluid can build up because of problems such as infection, heart failure, or lung cancer. The procedure may have been done to help with shortness of breath and pain caused by the fluid buildup. Or you may have had this procedure so the doctor could test the fluid to find the cause of the buildup. Your chest may be sore where the doctor put the needle or catheter into your skin (the puncture site). This usually gets better after a day or two. You can go back to work or your normal activities as soon as you feel up to it. If the doctor sent the fluid to a lab for testing, it may take several days to get the results. The doctor or nurse will discuss the results with you. This care sheet gives you a general idea about how long it will take for you to recover. But each person recovers at a different pace. Follow the steps below to feel better as quickly as possible. How can you care for yourself at home? Activity    · Rest when you feel tired. Getting enough sleep will help you recover.     · Avoid strenuous activities, such as bicycle riding, jogging, weight lifting, or aerobic exercise, until your doctor says it is okay.     · You may shower. Do not take a bath until the puncture site has healed, or until your doctor tells you it is okay.     · Ask your doctor when you can drive again.     · You may need to take 1 or 2 days off from work. It depends on the type of work you do and how you feel. Diet    · You can eat your normal diet.     · Drink plenty of fluids (unless your doctor tells you not to).    Medicines    · Your doctor will tell you if and when you can restart your medicines. He or she will also give you instructions about taking any new medicines.     · If you take blood thinners, such as warfarin (Coumadin), clopidogrel (Plavix), or aspirin, be sure to talk to your doctor. He or she will tell you if and when to start taking those medicines again. Make sure that you understand exactly what your doctor wants you to do.     · Be safe with medicines. Take pain medicines exactly as directed. ? If the doctor gave you a prescription medicine for pain, take it as prescribed. ? If you are not taking a prescription pain medicine, ask your doctor if you can take an over-the-counter medicine. ? Do not take two or more pain medicines at the same time unless the doctor told you to. Many pain medicines have acetaminophen, which is Tylenol. Too much acetaminophen (Tylenol) can be harmful.     · If you think your pain medicine is making you sick to your stomach:  ? Take your medicine after meals (unless your doctor has told you not to). ? Ask your doctor for a different pain medicine.     · If your doctor prescribed antibiotics, take them as directed. Do not stop taking them just because you feel better. You need to take the full course of antibiotics.    Care of the puncture site    · Wash the area daily with warm, soapy water, and pat it dry. Don't use hydrogen peroxide or alcohol, which may delay healing. You may cover the area with a gauze bandage if it weeps or rubs against clothing. Change the bandage every day.     · Keep the area clean and dry. Follow-up care is a key part of your treatment and safety. Be sure to make and go to all appointments, and call your doctor if you are having problems. It's also a good idea to know your test results and keep a list of the medicines you take. When should you call for help? Call 911 anytime you think you may need emergency care. For example, call if:    · You passed out (lost consciousness).     · You have severe trouble breathing.   · You have sudden chest pain and shortness of breath, or you cough up blood.    Call your doctor now or seek immediate medical care if:    · You have shortness of breath that is new or getting worse.     · You have new or worse pain in your chest, especially when you take a deep breath.     · You are sick to your stomach or cannot keep fluids down.     · You have a fever over 100°F.     · Bright red blood has soaked through the bandage over your puncture site.     · You have signs of infection, such as:  ? Increased pain, swelling, warmth, or redness. ? Red streaks leading from the puncture site. ? Pus draining from the puncture site. ? Swollen lymph nodes in your neck, armpits, or groin. ? A fever.     · You cough up a lot more mucus than normal, or your mucus changes color.    Watch closely for changes in your health, and be sure to contact your doctor if you have any problems. Where can you learn more? Go to http://alesia-cooper.info/. Enter W984 in the search box to learn more about \"Thoracentesis: What to Expect at Home. \"  Current as of: September 5, 2018  Content Version: 12.1  © 2584-4447 [employer]wise, Incorporated. Care instructions adapted under license by [employer] (which disclaims liability or warranty for this information). If you have questions about a medical condition or this instruction, always ask your healthcare professional. [place] [address] any warranty or liability for your use of this information.

## 2019-07-26 NOTE — ED TRIAGE NOTES
Pt reports Dr Althea Lomas schedules his paracentesis and he states recently \"I've had to do them once a week now\"

## 2019-07-26 NOTE — ED PROVIDER NOTES
63 y.o. male with past medical history significant for DM, COPD, and liver disease who presents from home via personal vehicle with chief complaint of SOB. Pt presents to the ED and reports constant SOB, chest pain while supine, productive cough (clear phlegm), and occasional dizziness upon standing. PT reports his SOB began a few days ago but worsened in condition on 7/25/19 which caused him to seek treatment. Pt states that he feels fine when he is lying on his right side. Pt reports he has an hx of COPD and that he has had similar past medical episodes. Pt notes that he had 2L of fluid drained from his lungs on 7/23/19 and that he feels like it has returned. Pt is unaware if he has ascites. Pt reports that he recently had the dosage of his regular diuretics regimen doubled. Pt denies any fever or chills. There are no other acute medical concerns at this time. Surgical hx - appendectomy, hernia repair, esophageal varices band placement   Social hx - Tobacco use: daily smoker (0.25 packs/day), Alcohol Use: denies, Drug Use: denies    PCP: Chang Sam MD    Note written by Elvie Lester, as dictated by Osiris Dowell MD 2:07PM.      The history is provided by the patient. No  was used.         Past Medical History:   Diagnosis Date    Chronic obstructive pulmonary disease ([address]     Diabetes [facility]     Liver disease        Past Surgical History:   Procedure Laterality Date    HX APPENDECTOMY  1966    HX GI      Esophageal Varices, banded    HX HERNIA REPAIR  2015         Family History:   Problem Relation Age of Onset    Diabetes Mother     Heart Disease Father        Social History     Socioeconomic History    Marital status:      Spouse name: Not on file    Number of children: Not on file    Years of education: Not on file    Highest education level: Not on file   Occupational History    Not on file   Social Needs    Financial resource strain: Not on file    Food insecurity:     Worry: Not on file     Inability: Not on file    Transportation needs:     Medical: Not on file     Non-medical: Not on file   Tobacco Use    Smoking status: Current Every Day Smoker     Packs/day: 0.25    Smokeless tobacco: Never Used   Substance and Sexual Activity    Alcohol use: No    Drug use: No    Sexual activity: Not on file   Lifestyle    Physical activity:     Days per week: Not on file     Minutes per session: Not on file    Stress: Not on file   Relationships    Social connections:     Talks on phone: Not on file     Gets together: Not on file     Attends Latter day service: Not on file     Active member of club or organization: Not on file     Attends meetings of clubs or organizations: Not on file     Relationship status: Not on file    Intimate partner violence:     Fear of current or ex partner: Not on file     Emotionally abused: Not on file     Physically abused: Not on file     Forced sexual activity: Not on file   Other Topics Concern    Not on file   Social History Narrative    Not on file         ALLERGIES: Shellfish derived    Review of Systems   Constitutional: Negative for chills and fever. Respiratory: Positive for cough and shortness of breath. Cardiovascular: Positive for chest pain. Gastrointestinal: Negative for abdominal pain, constipation, diarrhea and vomiting. Neurological: Positive for dizziness. Negative for light-headedness. All other systems reviewed and are negative. Vitals:    07/26/19 1347 07/26/19 1356 07/26/19 1359   BP:   100/40   Pulse: 97  96   Resp:   18   Temp:   98.2 °F (36.8 °C)   SpO2: 95% 91% 93%   Weight:   83.9 kg (185 lb)   Height:   6' 1\" (1.854 m)            Physical Exam   Constitutional: He appears well-developed and well-nourished. No distress. HENT:   Head: Normocephalic and atraumatic. Eyes: Pupils are equal, round, and reactive to light.  Conjunctivae are normal.   Neck: Normal range of motion. Cardiovascular: Normal rate, regular rhythm, normal heart sounds and intact distal pulses. Pulmonary/Chest: He has no wheezes. Decreased breath sounds through out. Abdominal: Soft. He exhibits no distension. There is no tenderness. Musculoskeletal: Normal range of motion. Neurological: He is alert. Skin: Skin is dry. Capillary refill takes less than 2 seconds. Mild jaundice    Nursing note and vitals reviewed. Note written by Elvie Smith, as dictated by Adrian Pleitez MD 2:07 PM    MDM  Number of Diagnoses or Management Options  Large pleural effusion:   Diagnosis management comments: DDX: pleural effusion, PNA, PTX, Pericarditis         Procedures  ED EKG interpretation:  Rhythm: normal sinus rhythm; and regular . Rate (approx.): 90; Axis: normal; ST wave: nonspecific abnormality; Note written by Elvie Smith, as dictated by Adrian Pleitez MD 2:04 PM       Pt improved after therapeutic tap. The patient's results have been reviewed with them and/or available family. Patient and/or family verbally conveyed their understanding and agreement of the patient's signs, symptoms, diagnosis, treatment and prognosis and additionally agree to follow up as recommended in the discharge instructions or to return to the Emergency Room should their condition change prior to their follow-up appointment. The patient/family verbally agrees with the care-plan and verbally conveys that all of their questions have been answered. The discharge instructions have also been provided to the patient and/or family with some educational information regarding the patient's diagnosis as well a list of reasons why the patient would want to return to the ER prior to their follow-up appointment, should their condition change.

## 2019-07-26 NOTE — TELEPHONE ENCOUNTER
The patient called in requesting to speak with Dr. Teresa Barajas nurse as soon as possible. She stated that the patient had fluids drained and she was supposed to contact our office with any problems. I advised the patient's wife that I would relay this information to her nurse as she was with patients at that time. I also advised Mrs. Baird to take the patient to the nearest EMR if he is having any trouble breathing.

## 2019-07-26 NOTE — TELEPHONE ENCOUNTER
Spoke with Shannan Talley, patient wife, who is on PHI list. Informed Shannan Talley that an order for an [address] [facility] [facility] had been placed. Coordination of Care was contacted. None of the three hospitals [facility] [place], [facility], [facility] had an opening today. The earliest appointment was Tuesday 7.30.19. Shannan Gerri stated that Mr. Baird will not be able to wait that long. She stated that he can only lay down at an angle to feel completely comfortable. Shannan Gerri stated that sitting up as well as exertion makes his sob worse. She stated that the patient was not having chest, neck, or back pain. Denied nausea,vomiting, fever. Shannan Magoil was advised to take Mr. Ford to the ED for evaluation. Patient expressed an understanding. She stated she will take him to [facility]. Will notify physician.

## 2019-07-26 NOTE — ED TRIAGE NOTES
Pt presents to ED with SOB. Pt reports having outpatient paracentesis on Tuesday at [facility] that drained 2 liters of fluid. Pt states he initially felt better but noticed since then he has had increased shortness of breath. Today he reports he is out of breath while staying still. Pt also reports clear \"jelly\" like phlegm.  Denies chest pain

## 2019-07-27 LAB
ATRIAL RATE: 90 BPM
CALCULATED P AXIS, ECG09: 75 DEGREES
CALCULATED R AXIS, ECG10: 27 DEGREES
CALCULATED T AXIS, ECG11: 22 DEGREES
DIAGNOSIS, 93000: NORMAL
P-R INTERVAL, ECG05: 124 MS
Q-T INTERVAL, ECG07: 390 MS
QRS DURATION, ECG06: 78 MS
QTC CALCULATION (BEZET), ECG08: 477 MS
VENTRICULAR RATE, ECG03: 90 BPM

## 2019-07-29 ENCOUNTER — DOCUMENTATION ONLY (OUTPATIENT)
Dept: HEMATOLOGY | Age: 59
End: 2019-07-29

## 2019-07-29 ENCOUNTER — PATIENT OUTREACH (OUTPATIENT)
Dept: HEMATOLOGY | Age: 59
End: 2019-07-29

## 2019-07-29 DIAGNOSIS — K74.60 CIRRHOSIS OF LIVER WITH ASCITES, UNSPECIFIED HEPATIC CIRRHOSIS TYPE (HCC): Primary | ICD-10-CM

## 2019-07-29 DIAGNOSIS — R18.8 CIRRHOSIS OF LIVER WITH ASCITES, UNSPECIFIED HEPATIC CIRRHOSIS TYPE (HCC): Primary | ICD-10-CM

## 2019-07-29 LAB
AFP L3 MFR SERPL: NORMAL % (ref 0–9.9)
AFP SERPL-MCNC: 2.5 NG/ML (ref 0–8)
ALBUMIN SERPL-MCNC: 3 G/DL (ref 3.5–5.5)
ALP SERPL-CCNC: 120 IU/L (ref 39–117)
ALT SERPL-CCNC: 38 IU/L (ref 0–44)
AST SERPL-CCNC: 56 IU/L (ref 0–40)
BASOPHILS # BLD AUTO: 0.1 X10E3/UL (ref 0–0.2)
BASOPHILS NFR BLD AUTO: 1 %
BILIRUB DIRECT SERPL-MCNC: 1.3 MG/DL (ref 0–0.4)
BILIRUB SERPL-MCNC: 3.4 MG/DL (ref 0–1.2)
BUN SERPL-MCNC: 10 MG/DL (ref 6–24)
BUN/CREAT SERPL: 10 (ref 9–20)
CALCIUM SERPL-MCNC: 8.9 MG/DL (ref 8.7–10.2)
CHLORIDE SERPL-SCNC: 101 MMOL/L (ref 96–106)
CO2 SERPL-SCNC: 23 MMOL/L (ref 20–29)
CREAT SERPL-MCNC: 1.04 MG/DL (ref 0.76–1.27)
EOSINOPHIL # BLD AUTO: 0.3 X10E3/UL (ref 0–0.4)
EOSINOPHIL NFR BLD AUTO: 3 %
ERYTHROCYTE [DISTWIDTH] IN BLOOD BY AUTOMATED COUNT: 17.2 % (ref 12.3–15.4)
GLUCOSE SERPL-MCNC: 103 MG/DL (ref 65–99)
HCT VFR BLD AUTO: 35.2 % (ref 37.5–51)
HGB BLD-MCNC: 12.5 G/DL (ref 13–17.7)
IMM GRANULOCYTES # BLD AUTO: 0 X10E3/UL (ref 0–0.1)
IMM GRANULOCYTES NFR BLD AUTO: 0 %
INR PPP: NORMAL
LYMPHOCYTES # BLD AUTO: 2.1 X10E3/UL (ref 0.7–3.1)
LYMPHOCYTES NFR BLD AUTO: 24 %
MCH RBC QN AUTO: 33.4 PG (ref 26.6–33)
MCHC RBC AUTO-ENTMCNC: 35.5 G/DL (ref 31.5–35.7)
MCV RBC AUTO: 94 FL (ref 79–97)
MONOCYTES # BLD AUTO: 1.3 X10E3/UL (ref 0.1–0.9)
MONOCYTES NFR BLD AUTO: 15 %
NEUTROPHILS # BLD AUTO: 4.9 X10E3/UL (ref 1.4–7)
NEUTROPHILS NFR BLD AUTO: 57 %
PLATELET # BLD AUTO: 121 X10E3/UL (ref 150–450)
POTASSIUM SERPL-SCNC: 4.2 MMOL/L (ref 3.5–5.2)
PROT SERPL-MCNC: 5.9 G/DL (ref 6–8.5)
PROTHROMBIN TIME: NORMAL S
RBC # BLD AUTO: 3.74 X10E6/UL (ref 4.14–5.8)
SODIUM SERPL-SCNC: 137 MMOL/L (ref 134–144)
WBC # BLD AUTO: 8.7 X10E3/UL (ref 3.4–10.8)

## 2019-07-29 NOTE — PROGRESS NOTES
Phone call placed to patient. Spoke to patient's wife and reviewed plan for this week. Notified wife patient has been scheduled for a thoracentesis at [facility] at 8:30 am on 7/31/19. Advised wife to check in at OP Registration at 8 am and notified her of NPO status. Asked that patient return at 12:30 to check in for echocardiogram. Scheduled follow up with Dr. Ranjana Fagan on 8/1/19 to discuss TIPS. Notified wife TIPS has been scheduled at [facility] on 8/2/19 with a 7 am arrival time and can be cancelled if patient decides not to have procedure. Wife commented they live an hour and a half away and these are early appointments. Offered referral to [facility]. Wife stated she would discuss with the patient. NN name and contact information provided.

## 2019-07-29 NOTE — PROGRESS NOTES
Patient called with concerns over needing a thoracentesis x 2. Dr. Jonathan Garcias ordered a TIPS procedure. Will order a repeat Thoracentesis for Wed followed by an ECHO. Will see Dr. Jonathan Garcias on Thursday to discuss the TIPS in detail.

## 2019-07-30 DIAGNOSIS — K74.60 CIRRHOSIS OF LIVER WITH ASCITES, UNSPECIFIED HEPATIC CIRRHOSIS TYPE (HCC): ICD-10-CM

## 2019-07-30 DIAGNOSIS — R18.8 CIRRHOSIS OF LIVER WITH ASCITES, UNSPECIFIED HEPATIC CIRRHOSIS TYPE (HCC): ICD-10-CM

## 2019-07-31 ENCOUNTER — HOSPITAL ENCOUNTER (OUTPATIENT)
Dept: NON INVASIVE DIAGNOSTICS | Age: 59
Discharge: HOME | End: 2019-07-31
Attending: NURSE PRACTITIONER
Payer: SELF-PAY

## 2019-07-31 ENCOUNTER — HOSPITAL ENCOUNTER (OUTPATIENT)
Dept: GENERAL RADIOLOGY | Age: 59
Discharge: HOME | End: 2019-07-31
Attending: RADIOLOGY
Payer: SELF-PAY

## 2019-07-31 ENCOUNTER — HOSPITAL ENCOUNTER (OUTPATIENT)
Dept: ULTRASOUND IMAGING | Age: 59
Discharge: HOME | End: 2019-07-31
Attending: NURSE PRACTITIONER
Payer: SELF-PAY

## 2019-07-31 VITALS
HEART RATE: 88 BPM | SYSTOLIC BLOOD PRESSURE: 108 MMHG | DIASTOLIC BLOOD PRESSURE: 58 MMHG | OXYGEN SATURATION: 96 % | RESPIRATION RATE: 20 BRPM

## 2019-07-31 VITALS
HEIGHT: 73 IN | DIASTOLIC BLOOD PRESSURE: 55 MMHG | SYSTOLIC BLOOD PRESSURE: 110 MMHG | BODY MASS INDEX: 25.05 KG/M2 | WEIGHT: 189 LBS

## 2019-07-31 DIAGNOSIS — R18.8 CIRRHOSIS OF LIVER WITH ASCITES, UNSPECIFIED HEPATIC CIRRHOSIS TYPE (HCC): ICD-10-CM

## 2019-07-31 DIAGNOSIS — K74.60 CIRRHOSIS OF LIVER WITH ASCITES, UNSPECIFIED HEPATIC CIRRHOSIS TYPE (HCC): ICD-10-CM

## 2019-07-31 LAB
APPEARANCE FLD: ABNORMAL
COLOR FLD: YELLOW
ECHO AO ROOT DIAM: 3.64 CM
ECHO AV AREA PEAK VELOCITY: 2.7 CM2
ECHO AV PEAK GRADIENT: 7.4 MMHG
ECHO AV PEAK VELOCITY: 135.69 CM/S
ECHO LA MAJOR AXIS: 2.72 CM
ECHO LA TO AORTIC ROOT RATIO: 0.75
ECHO LA VOL 2C: 44.4 ML (ref 18–58)
ECHO LA VOL 4C: 36.19 ML (ref 18–58)
ECHO LA VOL BP: 43.86 ML (ref 18–58)
ECHO LA VOL/BSA BIPLANE: 20.88 ML/M2 (ref 16–28)
ECHO LA VOLUME INDEX A2C: 21.14 ML/M2 (ref 16–28)
ECHO LA VOLUME INDEX A4C: 17.23 ML/M2 (ref 16–28)
ECHO LV INTERNAL DIMENSION DIASTOLIC: 4.04 CM (ref 4.2–5.9)
ECHO LV INTERNAL DIMENSION SYSTOLIC: 2.56 CM
ECHO LV IVSD: 0.97 CM (ref 0.6–1)
ECHO LV MASS 2D: 118.3 G (ref 88–224)
ECHO LV MASS INDEX 2D: 56.3 G/M2 (ref 49–115)
ECHO LV POSTERIOR WALL DIASTOLIC: 0.77 CM (ref 0.6–1)
ECHO LVOT DIAM: 2.14 CM
ECHO LVOT PEAK GRADIENT: 4.1 MMHG
ECHO LVOT PEAK VELOCITY: 100.66 CM/S
ECHO MV A VELOCITY: 78.39 CM/S
ECHO MV E DECELERATION TIME (DT): 192.5 MS
ECHO MV E VELOCITY: 62.83 CM/S
ECHO MV E/A RATIO: 0.8
ECHO PULMONARY ARTERY SYSTOLIC PRESSURE (PASP): 42 MMHG
ECHO PV MAX VELOCITY: 97.32 CM/S
ECHO PV PEAK GRADIENT: 3.8 MMHG
ECHO RV INTERNAL DIMENSION: 3.35 CM
ECHO TV REGURGITANT MAX VELOCITY: 305.18 CM/S
ECHO TV REGURGITANT PEAK GRADIENT: 37.3 MMHG
LYMPHOCYTES NFR FLD: 25 %
MESOTHL CELL NFR FLD: 1 %
MONOS+MACROS NFR FLD: 64 %
NEUTROPHILS NFR FLD: 10 %
NUC CELL # FLD: 155 /CU MM
RBC # FLD: >100 /CU MM
SPECIMEN SOURCE FLD: ABNORMAL

## 2019-07-31 PROCEDURE — 71045 X-RAY EXAM CHEST 1 VIEW: CPT

## 2019-07-31 PROCEDURE — 74011250636 HC RX REV CODE- 250/636: Performed by: NURSE PRACTITIONER

## 2019-07-31 PROCEDURE — 74011250636 HC RX REV CODE- 250/636: Performed by: RADIOLOGY

## 2019-07-31 PROCEDURE — C8929 TTE W OR WO FOL WCON,DOPPLER: HCPCS

## 2019-07-31 PROCEDURE — 89050 BODY FLUID CELL COUNT: CPT

## 2019-07-31 PROCEDURE — 32555 ASPIRATE PLEURA W/ IMAGING: CPT

## 2019-07-31 RX ORDER — SODIUM CHLORIDE 9 MG/ML
150 INJECTION, SOLUTION INTRAVENOUS
Status: COMPLETED | OUTPATIENT
Start: 2019-07-31 | End: 2019-07-31

## 2019-07-31 RX ADMIN — PERFLUTREN 2 ML: 6.52 INJECTION, SUSPENSION INTRAVENOUS at 10:35

## 2019-07-31 RX ADMIN — SODIUM CHLORIDE 150 ML/HR: 900 INJECTION, SOLUTION INTRAVENOUS at 09:05

## 2019-07-31 NOTE — DISCHARGE INSTRUCTIONS
Patient Education   You had 2000 ml of fluid removed from your R pleural space today. For questions or concerns please call Radiology Nurse at [phone]; after 3:30 PM  Or weekends please call [phone] and ask for Radiology Nurse on call. Thoracentesis: What to Expect at Home  Your Recovery  Thoracentesis (say \"pgvg-lu-lzp-HAYDEE-sis\") is a procedure to remove fluid from the space between the lungs and the chest wall (pleural space). This procedure may also be called a \"chest tap. \" It is normal to have a small amount of fluid in the pleural space. But too much fluid can build up because of problems such as infection, heart failure, or lung cancer. The procedure may have been done to help with shortness of breath and pain caused by the fluid buildup. Or you may have had this procedure so the doctor could test the fluid to find the cause of the buildup. Your chest may be sore where the doctor put the needle or catheter into your skin (the puncture site). This usually gets better after a day or two. You can go back to work or your normal activities as soon as you feel up to it. If the doctor sent the fluid to a lab for testing, it may take several days to get the results. The doctor or nurse will discuss the results with you. This care sheet gives you a general idea about how long it will take for you to recover. But each person recovers at a different pace. Follow the steps below to feel better as quickly as possible. How can you care for yourself at home? Activity    · Rest when you feel tired. Getting enough sleep will help you recover.     · Avoid strenuous activities, such as bicycle riding, jogging, weight lifting, or aerobic exercise, until your doctor says it is okay.     · You may shower. Do not take a bath until the puncture site has healed, or until your doctor tells you it is okay.     · Ask your doctor when you can drive again.     · You may need to take 1 or 2 days off from work.  It depends on the type of work you do and how you feel. Diet    · You can eat your normal diet.     · Drink plenty of fluids (unless your doctor tells you not to). Medicines    · Your doctor will tell you if and when you can restart your medicines. He or she will also give you instructions about taking any new medicines.     · If you take blood thinners, such as warfarin (Coumadin), clopidogrel (Plavix), or aspirin, be sure to talk to your doctor. He or she will tell you if and when to start taking those medicines again. Make sure that you understand exactly what your doctor wants you to do.     · Be safe with medicines. Take pain medicines exactly as directed. ? If the doctor gave you a prescription medicine for pain, take it as prescribed. ? If you are not taking a prescription pain medicine, ask your doctor if you can take an over-the-counter medicine. ? Do not take two or more pain medicines at the same time unless the doctor told you to. Many pain medicines have acetaminophen, which is Tylenol. Too much acetaminophen (Tylenol) can be harmful.     · If you think your pain medicine is making you sick to your stomach:  ? Take your medicine after meals (unless your doctor has told you not to). ? Ask your doctor for a different pain medicine.     · If your doctor prescribed antibiotics, take them as directed. Do not stop taking them just because you feel better. You need to take the full course of antibiotics.    Care of the puncture site    · Wash the area daily with warm, soapy water, and pat it dry. Don't use hydrogen peroxide or alcohol, which may delay healing. You may cover the area with a gauze bandage if it weeps or rubs against clothing. Change the bandage every day.     · Keep the area clean and dry. Follow-up care is a key part of your treatment and safety. Be sure to make and go to all appointments, and call your doctor if you are having problems.  It's also a good idea to know your test results and keep a list of the medicines you take. When should you call for help? Call 911 anytime you think you may need emergency care. For example, call if:    · You passed out (lost consciousness).     · You have severe trouble breathing.     · You have sudden chest pain and shortness of breath, or you cough up blood.    Call your doctor now or seek immediate medical care if:    · You have shortness of breath that is new or getting worse.     · You have new or worse pain in your chest, especially when you take a deep breath.     · You are sick to your stomach or cannot keep fluids down.     · You have a fever over 100°F.     · Bright red blood has soaked through the bandage over your puncture site.     · You have signs of infection, such as:  ? Increased pain, swelling, warmth, or redness. ? Red streaks leading from the puncture site. ? Pus draining from the puncture site. ? Swollen lymph nodes in your neck, armpits, or groin. ? A fever.     · You cough up a lot more mucus than normal, or your mucus changes color.    Watch closely for changes in your health, and be sure to contact your doctor if you have any problems. Where can you learn more? Go to http://alesia-cooper.info/. Enter G063 in the search box to learn more about \"Thoracentesis: What to Expect at Home. \"  Current as of: September 5, 2018  Content Version: 12.1  © 8358-7307 [employer], [employer]. Care instructions adapted under license by [employer] (which disclaims liability or warranty for this information). If you have questions about a medical condition or this instruction, always ask your healthcare professional. [place] [address] any warranty or liability for your use of this information.

## 2019-07-31 NOTE — PROGRESS NOTES
0820 Pt rec'd to Rad holding via wheelchair from patient access area for ultrasound guided R sided thoracentesis. Pt is alert and oriented x 3. States he has difficulty breathing at rest and it is worse with activity. He is speaking in phrases only and  Grunting with breathing. No pedal edema noted, abdomen soft and non- tender. Left lung fields CTA, no breath sounds heard R fields. Pt resp rate is 28 at rest on room air, pulse oximetry reveals 95% also on room air. Pt requests to lie down on Right side because sitting causes him discomfort and lying on Left side also causes discomfort. US assessment completed and pt allowed to lie down until procedure begins. Pt states he had thoracentesis done 7/26 and had nearly 3 L drained. States he felt better for 2 days then became short of breath again. 46 Dr Claudeen Margarita in and discussed procedure, risks and benefits and consent obtained. REviewed lab results of 7/26/19 with Dr Claudeen Margarita.      2901 Thoracentesis stopped. Pt c/o nausea and feeling \"woozy\". Pt moved from sitting position to L side lying with HOB down. PIV started and fluid bolus of NS begun for BP 53/38. HR 94 Resp 22, pulse oximetry 92% room air. Gauze and transparent dressing applied to thoracentesis site R mid lateral back. Please refer to flowsheet for vitals. Procedure start time 0845, stop time 0905. Total of 2000 ml clear yanet fluid removed during thoracentesis. Pt tolerated fair after BP drop managed with fluid replacement. 0920 Pt states feeling better. Took juice without problem. NS infusing  Dr. Claudeen Margarita in to check on patient. Upon auscultation of lungs R side now with diminished breath sounds throughout. [address] [place] completed, await results. Pt assisted to position of comfort on R side. [address] read; small R pleural effusion remains, decreased in size and no pneumothorax noted per results report.   Pt taken via stretcher to ECHO for previously planned OP ECHO with nurse accompanied by nurse. Pt states he feels much improved and is able to speak full sentences and pt says his breathing is \" much better\". Spoke to pt's wife to advise we are completing all tests now so that patient will not have to wait here for actual appointment time of ECHO. 1028 ECHO completed. Pt BP and heart rate and pulse oximetry monitored by this RN during ECHO. Dr Bishop Betters apprised of pt's current condition and vitals and per him, pt may go home. PIV removed and gauze dressing applied. Dressing to R back thoracentesis site remains dry and intact. Discharge instructions reviewed with pt and his wife and written copy provided to them. They express understanding. Unable to get patient's e-signature due to technical difficulties. Pt to vehicle via wheelchair to care of his wife and brother.

## 2019-08-01 ENCOUNTER — OFFICE VISIT (OUTPATIENT)
Dept: HEMATOLOGY | Age: 59
End: 2019-08-01

## 2019-08-01 ENCOUNTER — HOSPITAL ENCOUNTER (INPATIENT)
Age: 59
LOS: 4 days | Discharge: HOME | DRG: 406 | End: 2019-08-05
Attending: INTERNAL MEDICINE | Admitting: INTERNAL MEDICINE
Payer: SELF-PAY

## 2019-08-01 VITALS
OXYGEN SATURATION: 95 % | TEMPERATURE: 98 F | BODY MASS INDEX: 24.14 KG/M2 | HEART RATE: 101 BPM | DIASTOLIC BLOOD PRESSURE: 61 MMHG | SYSTOLIC BLOOD PRESSURE: 122 MMHG | WEIGHT: 183 LBS

## 2019-08-01 DIAGNOSIS — K74.60 CIRRHOSIS OF LIVER WITHOUT ASCITES, UNSPECIFIED HEPATIC CIRRHOSIS TYPE (HCC): Primary | ICD-10-CM

## 2019-08-01 DIAGNOSIS — R18.8 OTHER ASCITES: ICD-10-CM

## 2019-08-01 DIAGNOSIS — J91.8 PLEURAL EFFUSION ASSOCIATED WITH HEPATIC DISORDER: ICD-10-CM

## 2019-08-01 DIAGNOSIS — K76.9 PLEURAL EFFUSION ASSOCIATED WITH HEPATIC DISORDER: ICD-10-CM

## 2019-08-01 DIAGNOSIS — K76.82 HEPATIC ENCEPHALOPATHY: ICD-10-CM

## 2019-08-01 DIAGNOSIS — D64.9 ANEMIA, UNSPECIFIED TYPE: ICD-10-CM

## 2019-08-01 DIAGNOSIS — Z95.828 S/P TIPS (TRANSJUGULAR INTRAHEPATIC PORTOSYSTEMIC SHUNT): ICD-10-CM

## 2019-08-01 DIAGNOSIS — K74.60 CIRRHOSIS OF LIVER WITHOUT ASCITES, UNSPECIFIED HEPATIC CIRRHOSIS TYPE (HCC): ICD-10-CM

## 2019-08-01 DIAGNOSIS — D69.6 THROMBOCYTOPENIA (HCC): ICD-10-CM

## 2019-08-01 PROBLEM — J90 PLEURAL EFFUSION: Status: ACTIVE | Noted: 2019-08-01

## 2019-08-01 PROCEDURE — 74011000250 HC RX REV CODE- 250: Performed by: INTERNAL MEDICINE

## 2019-08-01 PROCEDURE — 74011250637 HC RX REV CODE- 250/637: Performed by: INTERNAL MEDICINE

## 2019-08-01 PROCEDURE — 94640 AIRWAY INHALATION TREATMENT: CPT

## 2019-08-01 PROCEDURE — 65660000000 HC RM CCU STEPDOWN

## 2019-08-01 RX ORDER — FUROSEMIDE 40 MG/1
80 TABLET ORAL DAILY
Status: DISCONTINUED | OUTPATIENT
Start: 2019-08-02 | End: 2019-08-05 | Stop reason: HOSPADM

## 2019-08-01 RX ORDER — SODIUM CHLORIDE 0.9 % (FLUSH) 0.9 %
5-40 SYRINGE (ML) INJECTION AS NEEDED
Status: DISCONTINUED | OUTPATIENT
Start: 2019-08-01 | End: 2019-08-05 | Stop reason: HOSPADM

## 2019-08-01 RX ORDER — SPIRONOLACTONE 100 MG/1
200 TABLET, FILM COATED ORAL DAILY
Status: DISCONTINUED | OUTPATIENT
Start: 2019-08-02 | End: 2019-08-05 | Stop reason: HOSPADM

## 2019-08-01 RX ORDER — SODIUM CHLORIDE 0.9 % (FLUSH) 0.9 %
5-40 SYRINGE (ML) INJECTION EVERY 8 HOURS
Status: DISCONTINUED | OUTPATIENT
Start: 2019-08-01 | End: 2019-08-05 | Stop reason: HOSPADM

## 2019-08-01 RX ORDER — IPRATROPIUM BROMIDE AND ALBUTEROL SULFATE 2.5; .5 MG/3ML; MG/3ML
3 SOLUTION RESPIRATORY (INHALATION)
Status: DISCONTINUED | OUTPATIENT
Start: 2019-08-01 | End: 2019-08-05

## 2019-08-01 RX ORDER — FACIAL-BODY WIPES
10 EACH TOPICAL DAILY PRN
Status: DISCONTINUED | OUTPATIENT
Start: 2019-08-01 | End: 2019-08-05 | Stop reason: HOSPADM

## 2019-08-01 RX ORDER — ONDANSETRON 2 MG/ML
4 INJECTION INTRAMUSCULAR; INTRAVENOUS
Status: DISCONTINUED | OUTPATIENT
Start: 2019-08-01 | End: 2019-08-05 | Stop reason: HOSPADM

## 2019-08-01 RX ORDER — MORPHINE SULFATE 2 MG/ML
2 INJECTION, SOLUTION INTRAMUSCULAR; INTRAVENOUS
Status: DISCONTINUED | OUTPATIENT
Start: 2019-08-01 | End: 2019-08-02

## 2019-08-01 RX ADMIN — Medication 10 ML: at 21:06

## 2019-08-01 RX ADMIN — LACTULOSE 45 ML: 20 SOLUTION ORAL at 17:07

## 2019-08-01 RX ADMIN — IPRATROPIUM BROMIDE AND ALBUTEROL SULFATE 3 ML: .5; 3 SOLUTION RESPIRATORY (INHALATION) at 15:11

## 2019-08-01 RX ADMIN — Medication 10 ML: at 14:55

## 2019-08-01 RX ADMIN — IPRATROPIUM BROMIDE AND ALBUTEROL SULFATE 3 ML: .5; 3 SOLUTION RESPIRATORY (INHALATION) at 20:10

## 2019-08-01 NOTE — PROGRESS NOTES
1. Have you been to the ER, urgent care clinic since your last visit? Hospitalized since your last visit? Yes Where: pt seen at [facility] outpatient for draining of lungs WAS NOT able to finish 2. Have you seen or consulted any other health care providers outside of the [address] [place] [address] since your last visit? Include any pap smears or colon screening. No  
Chief Complaint Patient presents with  Follow-up Visit Vitals /61 (BP 1 Location: Left arm, BP Patient Position: Sitting) Pulse (!) 101 Temp 98 °F (36.7 °C) (Tympanic) Wt 183 lb (83 kg) SpO2 95% BMI 24.14 kg/m² 3 most recent PHQ Screens 8/1/2019 Little interest or pleasure in doing things Not at all Feeling down, depressed, irritable, or hopeless Not at all Total Score PHQ 2 0 Learning Assessment 8/1/2019 PRIMARY LEARNER Patient BARRIERS PRIMARY LEARNER NONE  
CO-LEARNER CAREGIVER No  
PRIMARY LANGUAGE ENGLISH  
LEARNER PREFERENCE PRIMARY LISTENING  
ANSWERED BY patient RELATIONSHIP SELF Abuse Screening Questionnaire 8/1/2019 Do you ever feel afraid of your partner? Pearle Custard Are you in a relationship with someone who physically or mentally threatens you? Bee Custisaac Is it safe for you to go home? Trista Kinsey Fall Risk Assessment, last 12 mths 2/22/2019 Able to walk? Yes Fall in past 12 months?  No

## 2019-08-01 NOTE — PROGRESS NOTES
Bedside and Verbal shift change report given to Madeleine Payne (oncoming nurse) by Shaka Dillon (offgoing nurse). Report included the following information SBAR, ED Summary, Procedure Summary, Intake/Output, MAR, Accordion, Recent Results and Cardiac Rhythm normal sinus.

## 2019-08-01 NOTE — Clinical Note
8/1/19 Patient: Calos Barrera YOB: 1960 Date of Visit: 8/1/2019 Kisha Goddard MD 
[address] [place] [address] VIA Facsimile: [phone] Dear Kisha Goddard MD, Thank you for referring Mr. Zahida Gonsales to [address] [place] [address] for evaluation. My notes for this consultation are attached. If you have questions, please do not hesitate to call me. I look forward to following your patient along with you. Sincerely, Lizbeth Manzanares MD

## 2019-08-01 NOTE — H&P
History and Physical  Primary Care Provider: Kaylee Kiran MD    Subjective:     Emely Wyatt is a 63 y.o. male with pmh of cryptogenic cirrhosis, hydrothorax s/p recent thoracentesis 7/31 at [facility], h/o HE, who presents directly from hepatology clinic for TIPS. He has some worsening shortness of breath, and has been seen a few times in the ED. During his thoracentesis yesterday, he had sudden onset dizziness, was orthostatic, and nearly passed out. He saw Dr. Jonathan Garcias in clinic today, who increased his diuretics to step 2, and recommended TIPS procedure. Other than SOB with exertion, he denies any SOB at rest. He has a cough, non productive which has been ongoing for some time, and he complains of persistent nausea. Denies any changes in vision, nasal congestion, chest pain, abdominal pain, N/V/D/C, no MSK pain otherwise. Review of Systems:    A comprehensive review of systems was negative except for that written in the History of Present Illness. Past Medical History:   Diagnosis Date    [facility] ([facility] [address]     Diabetes ([facility] [address]     Liver disease       Past Surgical History:   Procedure Laterality Date    HX APPENDECTOMY  1966    HX GI      Esophageal Varices, banded    HX HERNIA REPAIR  2015     Prior to Admission medications    Medication Sig Start Date End Date Taking? Authorizing Provider   furosemide (LASIX) 40 mg tablet Take 2 Tabs by mouth daily. 7/23/19   Parisa Lomas MD   spironolactone (ALDACTONE) 100 mg tablet Take 2 Tabs by mouth daily. 7/23/19   Parisa Lomas MD   lactulose (CHRONULAC) 10 gram/15 mL solution Take 45 mL by mouth three (3) times daily. 6/16/19   Brandi Deluca MD   albuterol (PROVENTIL HFA, VENTOLIN HFA, PROAIR HFA) 90 mcg/actuation inhaler Take  by inhalation as needed for Wheezing. Provider, Historical   ondansetron (ZOFRAN ODT) 4 mg disintegrating tablet Take 1 Tab by mouth every eight (8) hours as needed for Nausea.  4/18/19 Ester Kirk MD     Allergies   Allergen Reactions    Shellfish Derived Hives      Family History   Problem Relation Age of Onset    Diabetes Mother     Heart Disease Father         SOCIAL HISTORY:  Patient resides at Home. Patient ambulates without assistance. Smoking history: Previoulsy 1ppd, has cut down to about 4 cigg per day   Alcohol history: Denies         Objective:       Physical Exam:   Visit Vitals  /52 (BP 1 Location: Right arm, BP Patient Position: At rest)   Pulse 90   Temp 98.8 °F (37.1 °C)   Resp 16   Ht 6' 1\" (1.854 m)   Wt 82 kg (180 lb 12.4 oz)   SpO2 99%   BMI 23.85 kg/m²     General appearance: alert, cooperative, no distress, appears stated age  Head: Normocephalic, without obvious abnormality, atraumatic  Eyes: corneas clear. Conjunctiva icteric. PERRL, EOM's intact. Throat: Lips, mucosa, and tongue normal. Teeth and gums normal  Neck: supple, symmetrical, trachea midline and no adenopathy  Lungs: Decreased breath sounds over the R lung base, otherwise clear to ausculation bilaterally, no increased work of breathing, speaking in full sentences. Heart: regular rate and rhythm, S1, S2 normal, no murmur, click, rub or gallop  Abdomen: soft, non-tender. Bowel sounds normal. No masses,  no organomegaly  Extremities: extremities normal, atraumatic, no cyanosis or edema  Pulses: 2+ and symmetric  Skin: Skin color, texture, turgor normal. No rashes or lesions  Neurologic: Grossly normal    ECG:  From 07/26 - normal EKG, normal sinus rhythm     Data Review: All diagnostic labs and studies have been reviewed. Chest x-ray 07/31, personally reviewed. IMPRESSION:   1. No pneumothorax status post right thoracentesis.   2. Improved but persistent small right pleural effusion and right basilar  atelectasis.     Assessment:     Active Problems:    Pleural effusion (8/1/2019)        Plan:   Pleural effusion - likey hydrothorax from cirrhosis  - Increase diuretics to step 2: 80mg lasix per day, 200mg of spironolactone per day  - Continue IS  - Plan for TIPS procedure tomorrow, per hepatology, NPO at midnight.   - Dr. Edouard Flowers aware, will place formal consult.      Cryptogenic cirrhosis  - Step 2 diuretics  - Continue lactulose  - Ceciliofmtoño consulted as above    COPD - nebs PRN     FULL CODE   Dispo: hopefully home by Sat     FUNCTIONAL STATUS PRIOR TO HOSPITALIZATION (including history of recent falls): independent     Signed By: Katarzyna Lackey MD     August 1, 2019

## 2019-08-01 NOTE — PROGRESS NOTES
[address] Adela Manzanares MD, MARIETTA Evans MD Merdis Cam, SYLVIE Ley, [facility] Adriana Albarado [facility]   Yamileth Edmondson FNP-RUFINA Garsia [facility] Jarad Deputado [address]ricks 136 
  at [address], [address] [place] [address] 1400 [facility] 22. 
  [phone] FAX: [address] [place] [address] 
  [address], [address] Remi Claude, [address] [phone] FAX: [phone] Patient Care Team: 
Alicia Palumbo MD as PCP - [facility]) Beulah Valles MD (Gastroenterology) Problem List  Date Reviewed: 8/1/2019 Codes Class Noted Hepatic encephalopathy ([facility] ICD-10-CM: K72.90 ICD-9-CM: 572.2  8/1/2019 Ascites ICD-10-CM: R18.8 ICD-9-CM: 789.59  8/1/2019 Large pleural effusion ICD-10-CM: J90 ICD-9-CM: 511.9  6/13/2019 Cirrhosis ([facility] ICD-10-CM: K74.60 ICD-9-CM: 571.5  6/6/2017 Esophageal varices (HCC) ICD-10-CM: I85.00 ICD-9-CM: 456.1  6/6/2017 Diabetes mellitus ([facility].) ICD-10-CM: E11.9 ICD-9-CM: 250.00  6/6/2017 H/O umbilical hernia repair WYL-08-YD: Z98.890, Z87.19 ICD-9-CM: V15.29  6/6/2017 S/P appendectomy ICD-10-CM: Z90.49 ICD-9-CM: V45.89  6/6/2017 Calos Barrera returns to the [address] for management of cryptogenic cirrhosis. The active problem list, all pertinent past medical history, medications, endoscopic studies, radiologic findings and laboratory findings related to the liver disorder were reviewed with the patient. The patient is a 63 y.o.  male who was found to have chronic liver disease and cirrhosis in 5/2017 when he developed variceal bleeding. The patient has developed the following complications of cirrhosis: esophageal varices, variceal bleeding, edema, ascites, hepatic hydrothorax, hepatic encephalopathy, Since the last office appointment the patient has: 
He underwent another thoracentesis yesterday at [facility]. 2 liters of ascites was removed. He then develop SOB and kirsty syncopal episode. The patient notes fatigue, SOB, The patient has not experienced yellowing of the eyes or skin, problems concentrating, swelling of the abdomen, hematemesis, hematochezia. The patient has severe limitations in functional activities which can be attributed to the liver disease ASSESSMENT AND PLAN: 
Cirrhosis Cryptogenic cirrhosis. The diagnosis of cirrhosis is based upon Fiboscan, imaging, laboratory studies, complications of cirrhosis. AST is elevated. ALT is normal.  ALP is elevated. Liver function is normal.  The platelet count is depressed. The CTP is 7. Child class B. The MELD score is 21. Ascites Ascites appears to have resolved now that he has developed hydrothorax. Will increase the dose of diuretics to step 2. The renal function is normal and ascites should be able to be controled with diuretics. The patient was counseled regarding the need to maintain sodium restriction and the types of foods containing high amounts of sodium to be avoided. Hepatic hydrothorax He has undergone thoracentesis yesterday at [facility]. He had a near syncopal episode during the procedure and this was terminated prior to removing all fluid. About 1.5 liters remained in chest. 
 
Will proceed with TIPS Hepatic venous pressure measurements in AM 
If pressure measurements are adequate will proceed with TIPS placement. Lower extremity edema Edema has resolved with current dose of diuretics. Will change the current dose of diuretics to step 2. The renal function is normal and edema should be able to be controled with diuretics. Screening for Esophageal varices The patient has esophageal varices with prior bleeding. Varices have been banded The last EGD to assess for varices was performed in 5/218. Will schedule for EGD to assess for varices and need for banding in 5/2019. Hepatic encephalopathy Overt HE is controlled on current dose of lactulose TID. He is not having an diarrhea. He will need xifaxan after TIPS. There is no need to restrict dietary protein at this time. Pulmonary hypertnesion ECHO shows mild pulmonary HTN with estimated PAP of 42 mm Hg. There is mild TR. This may be a contraindication to TIPS. Will perform hepatic venous pressure measurements prior to TIPS. Will place TIPS if the HVPG is higher than the RA pressure. Thrombocytopenia This is secondary to cirrhosis. There is no evidence of overt bleeding. No treatment is required. The platelet count is adequate for the patient to undergo procedures without the need for platelet transfusion or platelet growth factors. Screening for Hepatocellular Carcinoma [facility] [address] screening has recently been performed and does not suggest [address] The next liver imaging study will be performed in 9/2019. COPD. Patient is inconsistent with inhaler use and continues to smoke 1 PPD. Treatment of other medical problems in patients with chronic liver disease There are no contraindications for the patient to take most medications that are necessary for treatment of other medical issues. The patient has cirrhrosis and should avoid NSAIDs which are associated with a higher rate of developing ZOEY. Counseling for alcohol in patients with chronic liver disease The patient was counseled regarding alcohol consumption and the effect of alcohol on chronic liver disease. The patient does not consume any significant amount of alcohol. Vaccinations Vaccination for viral hepatitis A and B is recommended since the patient has no serologic evidence of previous exposure or vaccination with immunity. Routine vaccinations against other bacterial and viral agents can be performed as indicated. Annual flu vaccination should be administered if indicated. ALLERGIES Allergies Allergen Reactions  Shellfish Derived Hives MEDICATIONS Current Outpatient Medications Medication Sig  furosemide (LASIX) 40 mg tablet Take 2 Tabs by mouth daily.  spironolactone (ALDACTONE) 100 mg tablet Take 2 Tabs by mouth daily.  lactulose (CHRONULAC) 10 gram/15 mL solution Take 45 mL by mouth three (3) times daily.  albuterol (PROVENTIL HFA, VENTOLIN HFA, PROAIR HFA) 90 mcg/actuation inhaler Take  by inhalation as needed for Wheezing.  ondansetron (ZOFRAN ODT) 4 mg disintegrating tablet Take 1 Tab by mouth every eight (8) hours as needed for Nausea. No current facility-administered medications for this visit. SYSTEM REVIEW NOT RELATED TO LIVER DISEASE OR REVIEWED ABOVE: 
Constitution systems: Negative for fever, chills. Weight stable since last office visit. Eyes: Negative for visual changes. ENT: Negative for sore throat, painful swallowing. Respiratory: Negative for cough, hemoptysis. Positive for SOB, patient is inconsistent with administration of Ventolin. Cardiology: Negative for chest pain, palpitations. GI:  Negative for constipation or diarrhea. Occasional complaint of RUQ dull achy bruise feeling, helped with changes in position. Occasional post-prandial nausea : Negative for urinary frequency, dysuria, hematuria, nocturia. Skin: Negative for rash. Hematology: Negative for easy bruising, blood clots. Musculo-skeletal: Negative for back pain, muscle pain, weakness. Neurologic: Negative for headaches, dizziness, vertigo. Memory problems ? related to HE. Psychology: Negative for anxiety, depression. FAMILY HISTORY: 
The father  of heart disease. The mother  of MVA but had elevated liver enzymes. There is no family history of liver disease. SOCIAL HISTORY: 
The patient is . The patient has no children. The patient currently smokes 1 pack of tobacco daily. The patient has never consumed significant amounts of alcohol. 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 PHYSICAL EXAMINATION: 
Visit Vitals /61 (BP 1 Location: Left arm, BP Patient Position: Sitting) Pulse (!) 101 Temp 98 °F (36.7 °C) (Tympanic) Wt 183 lb (83 kg) SpO2 95% BMI 24.14 kg/m² General: No acute distress. Eyes: Sclera anicteric. ENT: No oral lesions. Thyroid normal. 
Nodes: No adenopathy. Skin: No spider angiomata. No jaundice. Positive for palmar erythema. Respiratory: Lungs clear to auscultation. Cardiovascular: Regular heart rate. No murmurs. No JVD. Abdomen: Soft, mild tender to deep palpation of the LUQ, spleen tip palpable. Liver edge firm, easily palpable 2-3 CM BCM nontender. No obvious ascites. Extremities: Trace edema. No muscle wasting. No gross arthritic changes. Neurologic: Alert and oriented. Cranial nerves grossly intact. Fine asterixis. LABORATORY STUDIES: 
Liver [place] of [address] [place] [address] & Units 2019 WBC 4.1 - 11.1 K/uL 11.9 (H) 8.7 ANC 1.8 - 8.0 K/UL 8.2 (H) 4.9 HGB 12.1 - 17.0 g/dL 11.6 (L) 12.5 (L)  - 400 K/uL 122 (L) 121 (L) INR 0.9 - 1.1   1.5 (H) CANCELED  
AST 15 - 37 U/L 57 (H) 56 (H) ALT 12 - 78 U/L 45 38 Alk Phos 45 - 117 U/L 124 (H) 120 (H) Bili, Total 0.2 - 1.0 MG/DL 4.2 (H) 3.4 (H) Bili, Direct 0.00 - 0.40 mg/dL  1.30 (H) Albumin 3.5 - 5.0 g/dL 2.6 (L) 3.0 (L) BUN 6 - 20 MG/DL 12 10 Creat 0.70 - 1.30 MG/DL 1.25 1.04 Na 136 - 145 mmol/L 134 (L) 137  
K 3.5 - 5.1 mmol/L 4.1 4.2 Cl 97 - 108 mmol/L 103 101 CO2 21 - 32 mmol/L 23 23 Glucose 65 - 100 mg/dL 119 (H) 103 (H) Cancer Screening Latest Ref Rng & Units 5/3/2019 2/22/2019 9/12/2018 AFP, Serum 0.0 - 8.0 ng/mL 3.4 3.6 4.3 AFP-L3% 0.0 - 9.9 % 9.7 8.3 8.4 SEROLOGIES: 
5/2017. HBsurface antigen negative, anti-HBcore negative, anti-HBsurface negative, HCV RNA negative, iron saturation 29%, ASMA negative. Serologies Latest Ref Rng & Units 6/6/2017 Ferritin 30 - 400 ng/mL 39 Alpha-1 antitrypsin level 90 - 200 mg/dL 87 (L) LIVER HISTOLOGY: 
6/2017. FibroScan performed at [address] EkPa was 72.1. The results suggested a fibrosis level of F4. ENDOSCOPIC PROCEDURES: 
5/2017. EGD by Dr Jenn Rodriguez. Esophageal varices. Banding performed. 8/2017. EGD performed by Dr Shan Dsouza. Small esophageal varices. No banding performed. No gastric varices. Mild portal gastropathy. Repeat in 6 months. 
2/2018. EGD performed by Dr Shan Dsouza. Medium esophageal varices. Banding performed x 4. No gastric varices. Moderate portal gastropathy. 5/2018. EGD performed by Dr Shan Dsouza. No esophageal varices. No gastric varices. Moderate portal gastropathy. Repeat 5/2019. RADIOLOGY: 
11/22/2017. US of abdomen. No liver mass/lesion. 5/2018. Ultrasound of liver. Echogenic consistent with cirrhosis. No liver mass lesions. No dilated bile ducts. No ascites. OTHER TESTING: 
Not available or performed FOLLOW-UP: 
All of the issues listed above in the Assessment and Plan were discussed with the patient. All questions were answered. The patient expressed a clear understanding of the above. The patient will be hospitalized today because of SOB. He will undergo hepatic venous pressure measuremtents and TIPS placement in AM. [address] [address] in 1 week after TIPS placement. MD Jarad Qiu Deputado Xu De Hendricks 136 [address] [address], [address] [facility], [facility]. 
[phone] [address]

## 2019-08-02 ENCOUNTER — APPOINTMENT (OUTPATIENT)
Dept: INTERVENTIONAL RADIOLOGY/VASCULAR | Age: 59
DRG: 406 | End: 2019-08-02
Attending: INTERNAL MEDICINE
Payer: SELF-PAY

## 2019-08-02 ENCOUNTER — ANESTHESIA EVENT (OUTPATIENT)
Dept: INTERVENTIONAL RADIOLOGY/VASCULAR | Age: 59
DRG: 406 | End: 2019-08-02
Payer: SELF-PAY

## 2019-08-02 ENCOUNTER — ANESTHESIA (OUTPATIENT)
Dept: INTERVENTIONAL RADIOLOGY/VASCULAR | Age: 59
DRG: 406 | End: 2019-08-02
Payer: SELF-PAY

## 2019-08-02 LAB
ABO + RH BLD: NORMAL
ALBUMIN SERPL-MCNC: 2.2 G/DL (ref 3.5–5)
ALBUMIN/GLOB SERPL: 0.7 {RATIO} (ref 1.1–2.2)
ALP SERPL-CCNC: 108 U/L (ref 45–117)
ALT SERPL-CCNC: 34 U/L (ref 12–78)
ANION GAP SERPL CALC-SCNC: 6 MMOL/L (ref 5–15)
AST SERPL-CCNC: 38 U/L (ref 15–37)
BASOPHILS # BLD: 0 K/UL (ref 0–0.1)
BASOPHILS NFR BLD: 1 % (ref 0–1)
BILIRUB DIRECT SERPL-MCNC: 1 MG/DL (ref 0–0.2)
BILIRUB SERPL-MCNC: 2.5 MG/DL (ref 0.2–1)
BLOOD GROUP ANTIBODIES SERPL: NORMAL
BUN SERPL-MCNC: 12 MG/DL (ref 6–20)
BUN/CREAT SERPL: 9 (ref 12–20)
CALCIUM SERPL-MCNC: 8.4 MG/DL (ref 8.5–10.1)
CHLORIDE SERPL-SCNC: 104 MMOL/L (ref 97–108)
CO2 SERPL-SCNC: 23 MMOL/L (ref 21–32)
CREAT SERPL-MCNC: 1.31 MG/DL (ref 0.7–1.3)
DIFFERENTIAL METHOD BLD: ABNORMAL
EOSINOPHIL # BLD: 0.4 K/UL (ref 0–0.4)
EOSINOPHIL NFR BLD: 6 % (ref 0–7)
ERYTHROCYTE [DISTWIDTH] IN BLOOD BY AUTOMATED COUNT: 16 % (ref 11.5–14.5)
GLOBULIN SER CALC-MCNC: 3 G/DL (ref 2–4)
GLUCOSE BLD STRIP.AUTO-MCNC: 104 MG/DL (ref 65–100)
GLUCOSE BLD STRIP.AUTO-MCNC: 119 MG/DL (ref 65–100)
GLUCOSE SERPL-MCNC: 96 MG/DL (ref 65–100)
HCT VFR BLD AUTO: 31.7 % (ref 36.6–50.3)
HGB BLD-MCNC: 10.9 G/DL (ref 12.1–17)
IMM GRANULOCYTES # BLD AUTO: 0 K/UL (ref 0–0.04)
IMM GRANULOCYTES NFR BLD AUTO: 0 % (ref 0–0.5)
INR PPP: 1.6 (ref 0.9–1.1)
LYMPHOCYTES # BLD: 2.1 K/UL (ref 0.8–3.5)
LYMPHOCYTES NFR BLD: 30 % (ref 12–49)
MAGNESIUM SERPL-MCNC: 2 MG/DL (ref 1.6–2.4)
MCH RBC QN AUTO: 33.6 PG (ref 26–34)
MCHC RBC AUTO-ENTMCNC: 34.4 G/DL (ref 30–36.5)
MCV RBC AUTO: 97.8 FL (ref 80–99)
MONOCYTES # BLD: 1.3 K/UL (ref 0–1)
MONOCYTES NFR BLD: 18 % (ref 5–13)
NEUTS SEG # BLD: 3.3 K/UL (ref 1.8–8)
NEUTS SEG NFR BLD: 45 % (ref 32–75)
NRBC # BLD: 0 K/UL (ref 0–0.01)
NRBC BLD-RTO: 0 PER 100 WBC
PHOSPHATE SERPL-MCNC: 3.3 MG/DL (ref 2.6–4.7)
PLATELET # BLD AUTO: 113 K/UL (ref 150–400)
PMV BLD AUTO: 9.6 FL (ref 8.9–12.9)
POTASSIUM SERPL-SCNC: 4.2 MMOL/L (ref 3.5–5.1)
PROT SERPL-MCNC: 5.2 G/DL (ref 6.4–8.2)
PROTHROMBIN TIME: 15.4 SEC (ref 9–11.1)
RBC # BLD AUTO: 3.24 M/UL (ref 4.1–5.7)
SERVICE CMNT-IMP: ABNORMAL
SERVICE CMNT-IMP: ABNORMAL
SODIUM SERPL-SCNC: 133 MMOL/L (ref 136–145)
SPECIMEN EXP DATE BLD: NORMAL
WBC # BLD AUTO: 7.2 K/UL (ref 4.1–11.1)

## 2019-08-02 PROCEDURE — 37182 INSERT HEPATIC SHUNT (TIPS): CPT

## 2019-08-02 PROCEDURE — 77030008477 HC STYL SATN SLP COVD -A: Performed by: NURSE PRACTITIONER

## 2019-08-02 PROCEDURE — 36415 COLL VENOUS BLD VENIPUNCTURE: CPT

## 2019-08-02 PROCEDURE — 85610 PROTHROMBIN TIME: CPT

## 2019-08-02 PROCEDURE — 74011250636 HC RX REV CODE- 250/636: Performed by: ANESTHESIOLOGY

## 2019-08-02 PROCEDURE — 77030008684 HC TU ET CUF COVD -B: Performed by: NURSE PRACTITIONER

## 2019-08-02 PROCEDURE — 76060000039 HC ANESTHESIA 4 TO 4.5 HR

## 2019-08-02 PROCEDURE — 65660000000 HC RM CCU STEPDOWN

## 2019-08-02 PROCEDURE — P9047 ALBUMIN (HUMAN), 25%, 50ML: HCPCS | Performed by: HOSPITALIST

## 2019-08-02 PROCEDURE — 94640 AIRWAY INHALATION TREATMENT: CPT

## 2019-08-02 PROCEDURE — 32557 INSERT CATH PLEURA W/ IMAGE: CPT

## 2019-08-02 PROCEDURE — 76210000006 HC OR PH I REC 0.5 TO 1 HR

## 2019-08-02 PROCEDURE — 74011250636 HC RX REV CODE- 250/636: Performed by: RADIOLOGY

## 2019-08-02 PROCEDURE — 74011250636 HC RX REV CODE- 250/636: Performed by: INTERNAL MEDICINE

## 2019-08-02 PROCEDURE — 82962 GLUCOSE BLOOD TEST: CPT

## 2019-08-02 PROCEDURE — 74011250636 HC RX REV CODE- 250/636: Performed by: HOSPITALIST

## 2019-08-02 PROCEDURE — 0W9930Z DRAINAGE OF RIGHT PLEURAL CAVITY WITH DRAINAGE DEVICE, PERCUTANEOUS APPROACH: ICD-10-PCS | Performed by: RADIOLOGY

## 2019-08-02 PROCEDURE — 74011250637 HC RX REV CODE- 250/637: Performed by: INTERNAL MEDICINE

## 2019-08-02 PROCEDURE — 85025 COMPLETE CBC W/AUTO DIFF WBC: CPT

## 2019-08-02 PROCEDURE — 80076 HEPATIC FUNCTION PANEL: CPT

## 2019-08-02 PROCEDURE — 80048 BASIC METABOLIC PNL TOTAL CA: CPT

## 2019-08-02 PROCEDURE — 84100 ASSAY OF PHOSPHORUS: CPT

## 2019-08-02 PROCEDURE — 86900 BLOOD TYPING SEROLOGIC ABO: CPT

## 2019-08-02 PROCEDURE — 06183J4 BYPASS PORTAL VEIN TO HEPATIC VEIN WITH SYNTHETIC SUBSTITUTE, PERCUTANEOUS APPROACH: ICD-10-PCS | Performed by: RADIOLOGY

## 2019-08-02 PROCEDURE — 74011250637 HC RX REV CODE- 250/637: Performed by: NURSE PRACTITIONER

## 2019-08-02 PROCEDURE — 74011000250 HC RX REV CODE- 250: Performed by: INTERNAL MEDICINE

## 2019-08-02 PROCEDURE — 74011250636 HC RX REV CODE- 250/636: Performed by: NURSE PRACTITIONER

## 2019-08-02 PROCEDURE — 83735 ASSAY OF MAGNESIUM: CPT

## 2019-08-02 PROCEDURE — 74011636320 HC RX REV CODE- 636/320: Performed by: RADIOLOGY

## 2019-08-02 RX ORDER — SODIUM CHLORIDE 9 MG/ML
25 INJECTION, SOLUTION INTRAVENOUS CONTINUOUS
Status: DISCONTINUED | OUTPATIENT
Start: 2019-08-02 | End: 2019-08-05 | Stop reason: HOSPADM

## 2019-08-02 RX ORDER — DEXMEDETOMIDINE HYDROCHLORIDE 4 UG/ML
INJECTION, SOLUTION INTRAVENOUS AS NEEDED
Status: DISCONTINUED | OUTPATIENT
Start: 2019-08-02 | End: 2019-08-02 | Stop reason: HOSPADM

## 2019-08-02 RX ORDER — CEFAZOLIN SODIUM 1 G/3ML
INJECTION, POWDER, FOR SOLUTION INTRAMUSCULAR; INTRAVENOUS
Status: DISPENSED
Start: 2019-08-02 | End: 2019-08-03

## 2019-08-02 RX ORDER — DEXAMETHASONE SODIUM PHOSPHATE 4 MG/ML
INJECTION, SOLUTION INTRA-ARTICULAR; INTRALESIONAL; INTRAMUSCULAR; INTRAVENOUS; SOFT TISSUE AS NEEDED
Status: DISCONTINUED | OUTPATIENT
Start: 2019-08-02 | End: 2019-08-02 | Stop reason: HOSPADM

## 2019-08-02 RX ORDER — ENOXAPARIN SODIUM 100 MG/ML
40 INJECTION SUBCUTANEOUS EVERY 24 HOURS
Status: DISCONTINUED | OUTPATIENT
Start: 2019-08-02 | End: 2019-08-05 | Stop reason: HOSPADM

## 2019-08-02 RX ORDER — LIDOCAINE HYDROCHLORIDE 10 MG/ML
10 INJECTION, SOLUTION EPIDURAL; INFILTRATION; INTRACAUDAL; PERINEURAL ONCE
Status: COMPLETED | OUTPATIENT
Start: 2019-08-02 | End: 2019-08-02

## 2019-08-02 RX ORDER — OXYCODONE HYDROCHLORIDE 5 MG/1
10 TABLET ORAL
Status: DISCONTINUED | OUTPATIENT
Start: 2019-08-02 | End: 2019-08-05 | Stop reason: HOSPADM

## 2019-08-02 RX ORDER — SODIUM CHLORIDE 9 MG/ML
INJECTION, SOLUTION INTRAVENOUS
Status: DISCONTINUED | OUTPATIENT
Start: 2019-08-02 | End: 2019-08-02 | Stop reason: HOSPADM

## 2019-08-02 RX ORDER — SODIUM CHLORIDE 9 MG/ML
250 INJECTION, SOLUTION INTRAVENOUS AS NEEDED
Status: DISCONTINUED | OUTPATIENT
Start: 2019-08-02 | End: 2019-08-05 | Stop reason: HOSPADM

## 2019-08-02 RX ORDER — SODIUM CHLORIDE 0.9 % (FLUSH) 0.9 %
5-40 SYRINGE (ML) INJECTION AS NEEDED
Status: DISCONTINUED | OUTPATIENT
Start: 2019-08-02 | End: 2019-08-02 | Stop reason: HOSPADM

## 2019-08-02 RX ORDER — CEFAZOLIN SODIUM/WATER 2 G/20 ML
2 SYRINGE (ML) INTRAVENOUS ONCE
Status: COMPLETED | OUTPATIENT
Start: 2019-08-02 | End: 2019-08-02

## 2019-08-02 RX ORDER — SODIUM CHLORIDE 0.9 % (FLUSH) 0.9 %
5-40 SYRINGE (ML) INJECTION EVERY 8 HOURS
Status: DISCONTINUED | OUTPATIENT
Start: 2019-08-02 | End: 2019-08-02 | Stop reason: HOSPADM

## 2019-08-02 RX ORDER — ONDANSETRON 2 MG/ML
INJECTION INTRAMUSCULAR; INTRAVENOUS AS NEEDED
Status: DISCONTINUED | OUTPATIENT
Start: 2019-08-02 | End: 2019-08-02 | Stop reason: HOSPADM

## 2019-08-02 RX ORDER — MORPHINE SULFATE 2 MG/ML
2 INJECTION, SOLUTION INTRAMUSCULAR; INTRAVENOUS
Status: DISCONTINUED | OUTPATIENT
Start: 2019-08-02 | End: 2019-08-05 | Stop reason: HOSPADM

## 2019-08-02 RX ORDER — SODIUM CHLORIDE, SODIUM LACTATE, POTASSIUM CHLORIDE, CALCIUM CHLORIDE 600; 310; 30; 20 MG/100ML; MG/100ML; MG/100ML; MG/100ML
INJECTION, SOLUTION INTRAVENOUS
Status: DISCONTINUED | OUTPATIENT
Start: 2019-08-02 | End: 2019-08-02

## 2019-08-02 RX ORDER — PHENYLEPHRINE HCL IN 0.9% NACL 0.4MG/10ML
SYRINGE (ML) INTRAVENOUS AS NEEDED
Status: DISCONTINUED | OUTPATIENT
Start: 2019-08-02 | End: 2019-08-02

## 2019-08-02 RX ORDER — FENTANYL CITRATE 50 UG/ML
25 INJECTION, SOLUTION INTRAMUSCULAR; INTRAVENOUS
Status: DISCONTINUED | OUTPATIENT
Start: 2019-08-02 | End: 2019-08-02 | Stop reason: HOSPADM

## 2019-08-02 RX ORDER — PROPOFOL 10 MG/ML
INJECTION, EMULSION INTRAVENOUS AS NEEDED
Status: DISCONTINUED | OUTPATIENT
Start: 2019-08-02 | End: 2019-08-02 | Stop reason: HOSPADM

## 2019-08-02 RX ORDER — LIDOCAINE HYDROCHLORIDE 20 MG/ML
INJECTION, SOLUTION EPIDURAL; INFILTRATION; INTRACAUDAL; PERINEURAL AS NEEDED
Status: DISCONTINUED | OUTPATIENT
Start: 2019-08-02 | End: 2019-08-02 | Stop reason: HOSPADM

## 2019-08-02 RX ORDER — ROCURONIUM BROMIDE 10 MG/ML
INJECTION, SOLUTION INTRAVENOUS AS NEEDED
Status: DISCONTINUED | OUTPATIENT
Start: 2019-08-02 | End: 2019-08-02 | Stop reason: HOSPADM

## 2019-08-02 RX ORDER — ALBUMIN HUMAN 250 G/1000ML
25 SOLUTION INTRAVENOUS EVERY 6 HOURS
Status: DISPENSED | OUTPATIENT
Start: 2019-08-02 | End: 2019-08-03

## 2019-08-02 RX ORDER — ALBUMIN HUMAN 50 G/1000ML
SOLUTION INTRAVENOUS AS NEEDED
Status: DISCONTINUED | OUTPATIENT
Start: 2019-08-02 | End: 2019-08-02 | Stop reason: HOSPADM

## 2019-08-02 RX ORDER — FENTANYL CITRATE 50 UG/ML
25 INJECTION, SOLUTION INTRAMUSCULAR; INTRAVENOUS
Status: DISCONTINUED | OUTPATIENT
Start: 2019-08-02 | End: 2019-08-02 | Stop reason: SDUPTHER

## 2019-08-02 RX ORDER — SUCCINYLCHOLINE CHLORIDE 20 MG/ML
INJECTION INTRAMUSCULAR; INTRAVENOUS AS NEEDED
Status: DISCONTINUED | OUTPATIENT
Start: 2019-08-02 | End: 2019-08-02 | Stop reason: HOSPADM

## 2019-08-02 RX ORDER — ONDANSETRON 2 MG/ML
4 INJECTION INTRAMUSCULAR; INTRAVENOUS AS NEEDED
Status: DISCONTINUED | OUTPATIENT
Start: 2019-08-02 | End: 2019-08-02 | Stop reason: HOSPADM

## 2019-08-02 RX ADMIN — ONDANSETRON 4 MG: 2 INJECTION INTRAMUSCULAR; INTRAVENOUS at 07:21

## 2019-08-02 RX ADMIN — FENTANYL CITRATE 25 MCG: 50 INJECTION, SOLUTION INTRAMUSCULAR; INTRAVENOUS at 17:24

## 2019-08-02 RX ADMIN — MORPHINE SULFATE 2 MG: 2 INJECTION, SOLUTION INTRAMUSCULAR; INTRAVENOUS at 22:27

## 2019-08-02 RX ADMIN — ALBUMIN HUMAN 250 ML: 50 SOLUTION INTRAVENOUS at 13:40

## 2019-08-02 RX ADMIN — ALBUMIN (HUMAN) 25 G: 0.25 INJECTION, SOLUTION INTRAVENOUS at 18:22

## 2019-08-02 RX ADMIN — IPRATROPIUM BROMIDE AND ALBUTEROL SULFATE 3 ML: .5; 3 SOLUTION RESPIRATORY (INHALATION) at 07:49

## 2019-08-02 RX ADMIN — Medication 10 ML: at 17:26

## 2019-08-02 RX ADMIN — ONDANSETRON 4 MG: 2 INJECTION INTRAMUSCULAR; INTRAVENOUS at 12:55

## 2019-08-02 RX ADMIN — OXYCODONE HYDROCHLORIDE 10 MG: 5 TABLET ORAL at 20:12

## 2019-08-02 RX ADMIN — ALBUMIN HUMAN 250 ML: 50 SOLUTION INTRAVENOUS at 13:00

## 2019-08-02 RX ADMIN — FENTANYL CITRATE 25 MCG: 50 INJECTION, SOLUTION INTRAMUSCULAR; INTRAVENOUS at 17:34

## 2019-08-02 RX ADMIN — DEXAMETHASONE SODIUM PHOSPHATE 8 MG: 4 INJECTION, SOLUTION INTRA-ARTICULAR; INTRALESIONAL; INTRAMUSCULAR; INTRAVENOUS; SOFT TISSUE at 12:54

## 2019-08-02 RX ADMIN — MORPHINE SULFATE 2 MG: 2 INJECTION, SOLUTION INTRAMUSCULAR; INTRAVENOUS at 18:23

## 2019-08-02 RX ADMIN — SODIUM CHLORIDE 5 MG: 9 INJECTION INTRAMUSCULAR; INTRAVENOUS; SUBCUTANEOUS at 10:14

## 2019-08-02 RX ADMIN — ALBUMIN HUMAN 250 ML: 50 SOLUTION INTRAVENOUS at 13:58

## 2019-08-02 RX ADMIN — DEXMEDETOMIDINE HYDROCHLORIDE 4 MCG: 4 INJECTION, SOLUTION INTRAVENOUS at 13:28

## 2019-08-02 RX ADMIN — IOPAMIDOL 200 ML: 612 INJECTION, SOLUTION INTRAVENOUS at 16:22

## 2019-08-02 RX ADMIN — ROCURONIUM BROMIDE 10 MG: 10 INJECTION, SOLUTION INTRAVENOUS at 12:47

## 2019-08-02 RX ADMIN — SODIUM CHLORIDE 25 ML/HR: 900 INJECTION, SOLUTION INTRAVENOUS at 13:00

## 2019-08-02 RX ADMIN — SODIUM CHLORIDE: 9 INJECTION, SOLUTION INTRAVENOUS at 12:36

## 2019-08-02 RX ADMIN — DEXMEDETOMIDINE HYDROCHLORIDE 4 MCG: 4 INJECTION, SOLUTION INTRAVENOUS at 13:22

## 2019-08-02 RX ADMIN — PROPOFOL 150 MG: 10 INJECTION, EMULSION INTRAVENOUS at 12:47

## 2019-08-02 RX ADMIN — Medication 10 ML: at 07:22

## 2019-08-02 RX ADMIN — ALBUMIN HUMAN 250 ML: 50 SOLUTION INTRAVENOUS at 15:46

## 2019-08-02 RX ADMIN — LIDOCAINE HYDROCHLORIDE 10 ML: 10 INJECTION, SOLUTION EPIDURAL; INFILTRATION; INTRACAUDAL; PERINEURAL at 16:23

## 2019-08-02 RX ADMIN — DEXMEDETOMIDINE HYDROCHLORIDE 4 MCG: 4 INJECTION, SOLUTION INTRAVENOUS at 13:35

## 2019-08-02 RX ADMIN — IPRATROPIUM BROMIDE AND ALBUTEROL SULFATE 3 ML: .5; 3 SOLUTION RESPIRATORY (INHALATION) at 20:41

## 2019-08-02 RX ADMIN — Medication 2 G: at 12:47

## 2019-08-02 RX ADMIN — FENTANYL CITRATE 25 MCG: 50 INJECTION, SOLUTION INTRAMUSCULAR; INTRAVENOUS at 17:30

## 2019-08-02 RX ADMIN — LACTULOSE 45 ML: 20 SOLUTION ORAL at 22:27

## 2019-08-02 RX ADMIN — LIDOCAINE HYDROCHLORIDE 100 MG: 20 INJECTION, SOLUTION EPIDURAL; INFILTRATION; INTRACAUDAL; PERINEURAL at 12:47

## 2019-08-02 RX ADMIN — SUCCINYLCHOLINE CHLORIDE 200 MG: 20 INJECTION INTRAMUSCULAR; INTRAVENOUS at 12:47

## 2019-08-02 NOTE — PROGRESS NOTES
2:00 pm CM attempted to meet with patient, he was off the unit for a TIPS procedure. CM will follow with patient later.  Gideon Soriano MSA, RN, CRM

## 2019-08-02 NOTE — PROGRESS NOTES
Bedside shift change report given to [address] [place] [address],[address] (oncoming nurse) by Laura Zavala RN (offgoing nurse).  Report included the following information SBAR, Intake/Output, MAR, Recent Results and Cardiac Rhythm SR.

## 2019-08-02 NOTE — PROGRESS NOTES
1236 - Anesthesia staff at patient's bedside administering anesthesia and monitoring patients vital signs throughout procedure. See anesthesia note. 1700 - patient transferred via stretcher to PACU with CRNA. Bedside report given to [facility]. Patient vital signs within normal range, please see doc flowsheet. Chest tube output 2800 clear yanet. Reported to Dr. Jb Hanna. See anesthesia note.

## 2019-08-02 NOTE — ANESTHESIA PREPROCEDURE EVALUATION
Relevant Problems No relevant active problems Anesthetic History Review of Systems / Medical History Patient summary reviewed, nursing notes reviewed and pertinent labs reviewed Pulmonary COPD Smoker Comments: Large pleural effusion Neuro/Psych Comments: Hepatic encephalopathy [facility] Cardiovascular Exercise tolerance: <4 METS 
  
GI/Hepatic/Renal 
  
 
 
 
Liver disease Comments: Cirrhosis ([facility] [address] Esophageal varices (HCC) Ascites Endo/Other Diabetes Other Findings Physical Exam 
 
Airway Mallampati: III 
TM Distance: > 6 cm Neck ROM: normal range of motion Mouth opening: Normal 
 
 Cardiovascular Regular rate and rhythm,  S1 and S2 normal,  no murmur, click, rub, or gallop Rhythm: regular Rate: normal 
 
 
 
 Dental 
No notable dental hx Pulmonary Breath sounds clear to auscultation Abdominal 
GI exam deferred Other Findings Anesthetic Plan ASA: 4 Anesthesia type: general 
 
 
 
 
Induction: Intravenous Anesthetic plan and risks discussed with: Patient

## 2019-08-02 NOTE — PROGRESS NOTES
Hospitalist Progress Note  Holley Brennan MD  Answering service: [phone] OR [phone] from in house phone        Date of Service:  2019  NAME:  Malia Mcgowan  :  1960  MRN:  [id number]      Admission Summary:   63 y.o. male with pmh of cryptogenic cirrhosis, hydrothorax s/p recent thoracentesis  at [facility], h/o HE, who presents directly from hepatology clinic for TIPS. Interval history / Subjective:   TIPS procedure planned for today around noon. Complaining of some nausea, Zofran did not help, asking for more antinausea medications  Anxious to have his procedure done. Assessment & Plan:     Pleural effusion - likey hydrothorax from cirrhosis  - Continue step2 diuretics 80mg lasix per day, 200mg of spironolactone per day  - Continue IS  - TIPS procedure  per hepatology  - IR consulted, appreciate assistance. - Hepatology to see today as well.      Cryptogenic cirrhosis  - Step 2 diuretics  - Continue lactulose  - Hepatology following     Elevated Creatinine, likely component of CKD, has had multipel Cr around 1.2 over the last few months. - Monitor with diuresis and post procedure  - strict I and O, avoid nephrotoxins. COPD - nebs PRN   Code status: FULL  DVT prophylaxis: LOvenox     Care Plan discussed with: Patient/Family  Disposition: TBD     Hospital Problems  Date Reviewed: 2019          Codes Class Noted POA    Pleural effusion ICD-10-CM: J90  ICD-9-CM: 511.9  2019 Unknown                Review of Systems:   A comprehensive review of systems was negative except for that written in the HPI. Vital Signs:    Last 24hrs VS reviewed since prior progress note.  Most recent are:  Visit Vitals  /44 (BP 1 Location: Left arm, BP Patient Position: At rest)   Pulse 94   Temp 97.6 °F (36.4 °C)   Resp 16   Ht 6' 1\" (1.854 m)   Wt 82.7 kg (182 lb 5.1 oz)   SpO2 96%   BMI 24.05 kg/m²       No intake or output data in the 24 hours ending 08/02/19 1444     Physical Examination:             Constitutional:  No acute distress, cooperative, pleasant    ENT:  Oral mucous moist, oropharynx benign. Sclera anicteric    Resp:  Decreased breath sounds on the R, normal on the L, no increased work of breathing. CV:  Regular rhythm, normal rate, no murmurs, gallops, rubs    GI:  Soft, + distended, non tender. normoactive bowel sounds, no hepatosplenomegaly     Musculoskeletal:  No edema, warm, 2+ pulses throughout    Neurologic:  Moves all extremities. AAOx3, CN II-XII reviewed     Skin:  Good turgor, no rashes or ulcers       Data Review:   Imaging and laboratory data reviewed. Labs:     Recent Labs     08/02/19 0238   WBC 7.2   HGB 10.9*   HCT 31.7*   *     Recent Labs     08/02/19 0238   *   K 4.2      CO2 23   BUN 12   CREA 1.31*   GLU 96   CA 8.4*   MG 2.0   PHOS 3.3     Recent Labs     08/02/19 0238   SGOT 38*   ALT 34      TBILI 2.5*   TP 5.2*   ALB 2.2*   GLOB 3.0     Recent Labs     08/02/19 0238   INR 1.6*   PTP 15.4*      No results for input(s): FE, TIBC, PSAT, FERR in the last 72 hours. No results found for: FOL, RBCF   No results for input(s): PH, PCO2, PO2 in the last 72 hours. No results for input(s): CPK, CKNDX, TROIQ in the last 72 hours.     No lab exists for component: CPKMB  No results found for: CHOL, CHOLX, CHLST, CHOLV, HDL, LDL, LDLC, DLDLP, TGLX, TRIGL, TRIGP, CHHD, CHHDX  Lab Results   Component Value Date/Time    Glucose (POC) 104 (H) 08/02/2019 12:27 PM    Glucose (POC) 90 08/31/2017 01:03 PM     No results found for: COLOR, APPRN, SPGRU, REFSG, CARLENE, PROTU, GLUCU, KETU, BILU, UROU, NANY, LEUKU, GLUKE, EPSU, BACTU, WBCU, RBCU, CASTS, UCRY      Medications Reviewed:     Current Facility-Administered Medications   Medication Dose Route Frequency    albumin human 25% (BUMINATE) solution 25 g  25 g IntraVENous Q6H    prochlorperazine (COMPAZINE) with saline injection 5 mg  5 mg IntraVENous Q6H PRN    0.9% sodium chloride infusion  25 mL/hr IntraVENous CONTINUOUS    0.9% sodium chloride infusion 250 mL  250 mL IntraVENous PRN    iopamidol (ISOVUE 300) 61 % contrast injection 50 mL  50 mL IntraCATHeter RAD ONCE    lidocaine (PF) (XYLOCAINE) 10 mg/mL (1 %) injection 10 mL  10 mL SubCUTAneous ONCE    iopamidol (ISOVUE 300) 61 % contrast injection 50 mL  50 mL IntraCATHeter RAD ONCE    ceFAZolin (ANCEF) 1 gram injection        iopamidol (ISOVUE 300) 61 % contrast injection        sodium chloride (NS) flush 5-40 mL  5-40 mL IntraVENous Q8H    sodium chloride (NS) flush 5-40 mL  5-40 mL IntraVENous PRN    furosemide (LASIX) tablet 80 mg  80 mg Oral DAILY    spironolactone (ALDACTONE) tablet 200 mg  200 mg Oral DAILY    lactulose (CHRONULAC) 10 gram/15 mL solution 45 mL  30 g Oral TID    albuterol-ipratropium (DUO-NEB) 2.5 MG-0.5 MG/3 ML  3 mL Nebulization Q4H RT    morphine injection 2 mg  2 mg IntraVENous Q4H PRN    ondansetron (ZOFRAN) injection 4 mg  4 mg IntraVENous Q8H PRN    bisacodyl (DULCOLAX) suppository 10 mg  10 mg Rectal DAILY PRN     Facility-Administered Medications Ordered in Other Encounters   Medication Dose Route Frequency    lidocaine (PF) (XYLOCAINE) 20 mg/mL (2 %) injection   IntraVENous PRN    rocuronium injection   IntraVENous PRN    succinylcholine (ANECTINE) injection   IntraVENous PRN    propofol (DIPRIVAN) 10 mg/mL injection   IntraVENous PRN    dexamethasone (DECADRON) 4 mg/mL injection    PRN    PHENYLephrine (REYES-SYNEPHRINE) 10 mg in 0.9% sodium chloride 250 mL infusion   IntraVENous CONTINUOUS    ondansetron (ZOFRAN) injection    PRN    lactated Ringers infusion   IntraVENous CONTINUOUS    albumin human 5% (BUMINATE) solution   IntraVENous PRN    dexmedeTOMidine in 0.9 % NaCl (PRECEDEX) 400 mcg/100 mL (4 mcg/mL) infusion soln   IntraVENous PRN ______________________________________________________________________  EXPECTED LENGTH OF STAY: 3d 7h  ACTUAL LENGTH OF STAY:          1                 Carolyn Woodall MD

## 2019-08-02 NOTE — PROGRESS NOTES
TRANSFER - IN REPORT:    Verbal report received from Parminder RN(name) on Erick Noel  being received from PACU(unit) for routine progression of care      Report consisted of patients Situation, Background, Assessment and   Recommendations(SBAR). Information from the following report(s) SBAR, Intake/Output, MAR, Recent Results and Cardiac Rhythm SR was reviewed with the receiving nurse. Opportunity for questions and clarification was provided. Assessment completed upon patients arrival to unit and care assumed.

## 2019-08-02 NOTE — CONSULTS
[address] [facility], Dusty MCKEON Cite Mongi Slim, Remigio Saliva, MD Norberto Martinet, PA-C Natha Carrow, [facility]     April VICKI Albarado, [facility]   HERNAN Arredondo, [facility]       J[facility]ad [facility] De Hendricks 136    at [address], [address] [facility], [place]  22.    [phone]    FAX: [address] [place] [address], [address]    [phone]    FAX: [phone]       HEPATOLOGY CONSULT NOTE  The patient is well known to me and regularly cared for at [address] [address]. I have reviewed the Emergency room note, Hospital admission note, Notes by all other physicians who have seen the patient during this hospitalization to date. I have reviewed the problem list and the reason for this hospitalization. I have reviewed the allergies and the medications the patient was taking at home prior to this hospitalization. HISTORY:  The patient is a 63 y.o.  male who was found to have chronic liver disease and cirrhosis in 5/2017 when he developed variceal bleeding. The patient was admitted for TIPS procedure due to refractory right hepatic hydrothorax and ascites. The patient notes shortness of breath on mild exertion. The patient has developed the following complications of cirrhosis: esophageal varices, variceal bleeding, edema, ascites, hepatic hydrothorax, hepatic encephalopathy,     ASSESSMENT AND PLAN:  Cirrhosis  Cryptogenic cirrhosis. The diagnosis of cirrhosis is based upon Fiboscan, imaging, laboratory studies, complications of cirrhosis. AST is elevated. ALT is normal.  ALP is elevated . Liver function is depressed. The platelet count is depressed. The CTP is 7. Child class B.   The MELD score is 21.    Ascites   Ascites is persistent despite diurectics. Patient now has developed hydrothorax. Patient was recent increased to step 2 diuretics. There is a creatinine bump to 1.31 from 1.25. We will start albumin   Patient is scheduled for TIPS procedure   The patient was counseled regarding the need to maintain sodium restriction and the types of foods containing high amounts of sodium to be avoided.     Hepatic hydrothorax  He has recently undergone thoracentesis . He had a near syncopal episode during the procedure and this was terminated prior to removing all fluid. About 1.5 liters remained in chest.  Scheduled  For TIPS procedure today and Hepatic venous pressure measurements. If pressure measurements are adequate will proceed with TIPS placement.     Lower extremity edema  Edema has resolved with current dose of diuretics.     Screening for Esophageal varices   The patient has esophageal varices with prior bleeding. Varices have been banded   The last EGD to assess for varices was performed in 5/218. Will schedule for EGD to assess for varices and need for banding in 5/2019.     Hepatic encephalopathy   Overt HE is controlled on current dose of lactulose TID. He is not having an diarrhea. He will need xifaxan after TIPS. There is no need to restrict dietary protein at this time.       Pulmonary hypertension  ECHO shows mild pulmonary HTN with estimated PAP of 42 mm Hg. There is mild TR. This may be a contraindication to TIPS. Will perform hepatic venous pressure measurements prior to TIPS. Will place TIPS if the HVPG is higher than the RA pressure.     Thrombocytopenia   This is secondary to cirrhosis. There is no evidence of overt bleeding. No treatment is required.   The platelet count is adequate for the patient to undergo procedures without the need for platelet transfusion or platelet growth factors.     Screening for Hepatocellular Carcinoma  HCC screening has recently been performed and does not suggest [facility] [place] [facility].. The next liver imaging study will be performed in 2019.     COPD  He is currently not bronchospastic. Patient is inconsistent with inhaler use and continues to smoke 1 PPD.       Treatment of other medical problems in patients with chronic liver disease  There are no contraindications for the patient to take most medications that are necessary for treatment of other medical issues. The patient has cirrhrosis and should avoid NSAIDs which are associated with a higher rate of developing ZOEY.           SYSTEM REVIEW:  Constitution systems: Negative for fever, chills, weight gain, weight loss. Eyes: Negative for visual changes. ENT: Negative for sore throat, painful swallowing. Respiratory: Negative for cough, hemoptysis, SOB. Cardiology: Negative for chest pain, palpitations. GI:  Negative for constipation or diarrhea. : Negative for urinary frequency, dysuria, hematuria, nocturia. Skin: Negative for rash. Hematology: Negative for easy bruising, blood clots. Musculo-skelatal: Negative for back pain, muscle pain, weakness. Neurologic: Negative for headaches, dizziness, vertigo, memory problems not related to HE. Psychology: Negative for anxiety, depression. FAMILY HISTORY:  The father  of heart disease. The mother  of MVA but had elevated liver enzymes. There is no family history of liver disease. SOCIAL HISTORY:  The patient is . The patient has no children. The patient currently smokes 1 pack of tobacco daily. The patient has never consumed significant amounts of alcohol. 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       PHYSICAL EXAMINATION:  VS: per nursing note  General:  No acute distress. Eyes:  Sclera anicteric. ENT:  No oral lesions. Thyroid normal.  Nodes:  No adenopathy. Skin: spider angiomata. Respiratory:  Lungs clear to auscultation.  Reduced breath sounds on the right about half way up. Cardiovascular: Regular heart rate. No JVD. Abdomen: Soft non-tender, Distended with obvious ascites. Extremities:  No lower extremity edema. Neurologic:  Alert and oriented. LABORATORY:  Results for Monica Ashby (MRN [id number]) as of 8/2/2019 20:01   Ref. Range 7/23/2019 11:05 7/26/2019 14:02 8/2/2019 02:38   WBC Latest Ref Range: 4.1 - 11.1 K/uL 8.7 11.9 (H) 7.2   HGB Latest Ref Range: 12.1 - 17.0 g/dL 12.5 (L) 11.6 (L) 10.9 (L)   PLATELET Latest Ref Range: 150 - 400 K/uL 121 (L) 122 (L) 113 (L)   Sodium Latest Ref Range: 136 - 145 mmol/L 137 134 (L) 133 (L)   Potassium Latest Ref Range: 3.5 - 5.1 mmol/L 4.2 4.1 4.2   BUN Latest Ref Range: 6 - 20 MG/DL 10 12 12   Creatinine Latest Ref Range: 0.70 - 1.30 MG/DL 1.04 1.25 1.31 (H)   Bilirubin, total Latest Ref Range: 0.2 - 1.0 MG/DL 3.4 (H) 4.2 (H) 2.5 (H)   Bilirubin, direct Latest Ref Range: 0.0 - 0.2 MG/DL 1.30 (H)  1.0 (H)   Protein, total Latest Ref Range: 6.4 - 8.2 g/dL 5.9 (L) 5.9 (L) 5.2 (L)   Albumin Latest Ref Range: 3.5 - 5.0 g/dL 3.0 (L) 2.6 (L) 2.2 (L)   ALT (SGPT) Latest Ref Range: 12 - 78 U/L 38 45 34   AST Latest Ref Range: 15 - 37 U/L 56 (H) 57 (H) 38 (H)   Alk. phosphatase Latest Ref Range: 45 - 117 U/L 120 (H) 124 (H) 108     Results for Monica Ashby (MRN [id number]) as of 8/2/2019 20:01   Ref. Range 8/2/2019 02:38   INR Latest Ref Range: 0.9 - 1.1   1.6 (H)   Prothrombin time Latest Ref Range: 9.0 - 11.1 sec 15.4 (H)     RADIOLOGY:  11/22/2017. US of abdomen. No liver mass/lesion. 5/2018. Ultrasound of liver. Echogenic consistent with cirrhosis. No liver mass lesions. No dilated bile ducts. No ascites. Cathe Ormond, MD, MPH  [place] [address].  [phone]  Tessie Hankins MD  I have personally interviewed and examined the patient during the encounter.   I have explained the key findings related to the liver disease and other pertinent diagnoses as noted above to the patient and answered all questions. I have reviewed and agree with the findings documented above, including all diagnostic interpretations, and plans. I have edited the original note as appropriate for clarity.     Amanda Langford MD  [place] [address].  [phone]  [address] [place] [address]

## 2019-08-02 NOTE — PERIOP NOTES
TRANSFER - OUT REPORT:    Verbal report given to Ty Flores RN on Justino Oliver  being transferred to [phone] for routine post - op       Report consisted of patients Situation, Background, Assessment and   Recommendations(SBAR). Time Pre op antibiotic given:1247  Anesthesia Stop time: 3938  Teresa Present on Transfer to floor:no  Order for Teresa on Chart:no  Discharge Prescriptions with Chart:no    Information from the following report(s) SBAR, OR Summary, Intake/Output and MAR was reviewed with the receiving nurse. Opportunity for questions and clarification was provided. Is the patient on 02? YES       L/Min 1       Other 0    Is the patient on a monitor? YES    Is the nurse transporting with the patient? YES    Surgical Waiting Area notified of patient's transfer from PACU? YES      The following personal items collected during your admission accompanied patient upon transfer:   Dental Appliance: Dental Appliances: None  Vision: Visual Aid: None  Hearing Aid:    Jewelry: Jewelry: None  Clothing: Clothing: (clothes with pt in pacu from angio)  Other Valuables:  Other Valuables: Eyeglasses  Valuables sent to safe:

## 2019-08-02 NOTE — ANESTHESIA POSTPROCEDURE EVALUATION
Post-Anesthesia Evaluation and Assessment Patient: Buck Bruno MRN: [id number]  SSN: [id number] YOB: 1960  Age: 63 y.o. Sex: male I have evaluated the patient and they are stable and ready for discharge from the PACU. Cardiovascular Function/Vital Signs Visit Vitals /54 Pulse (!) 101 Temp 36.4 °C (97.5 °F) Resp 20 Ht 6' 1\" (1.854 m) Wt 82.7 kg (182 lb 5.1 oz) SpO2 94% BMI 24.05 kg/m² Patient is status post * No anesthesia type entered * anesthesia for * No procedures listed *. Nausea/Vomiting: None Postoperative hydration reviewed and adequate. Pain: 
Pain Scale 1: Adult Nonverbal Pain Scale (08/02/19 1639) Pain Intensity 1: 0 (08/02/19 1215) Managed Neurological Status:  
Neuro (WDL): Exceptions to WDL (08/02/19 1639) Neuro Neurologic State: Sleeping (08/02/19 1639) At baseline Mental Status, Level of Consciousness: Alert and  oriented to person, place, and time Pulmonary Status:  
O2 Device: Nasal cannula (08/02/19 1643) Adequate oxygenation and airway patent Complications related to anesthesia: None Post-anesthesia assessment completed. No concerns Signed By: Renae Mccormick MD   
 August 2, 2019 * No procedures listed *. 
 
general 
 
<BSHSIANPOST> Vitals Value Taken Time /58 8/2/2019  5:00 PM  
Temp 36.4 °C (97.5 °F) 8/2/2019  4:43 PM  
Pulse 100 8/2/2019  5:01 PM  
Resp 20 8/2/2019  5:01 PM  
SpO2 93 % 8/2/2019  5:01 PM  
Vitals shown include unvalidated device data.

## 2019-08-02 NOTE — PROGRESS NOTES
Problem: Breathing Pattern - Ineffective  Goal: *Absence of hypoxia  Outcome: Progressing Towards Goal  Goal: *Use of effective breathing techniques  Outcome: Progressing Towards Goal  Goal: *PALLIATIVE CARE:  Alleviation of Dyspnea  Outcome: Progressing Towards Goal     Problem: Patient Education: Go to Patient Education Activity  Goal: Patient/Family Education  Outcome: Progressing Towards Goal     Problem: Falls - Risk of  Goal: *Absence of Falls  Description  Document Jl Ditch Fall Risk and appropriate interventions in the flowsheet.   Outcome: Progressing Towards Goal  Note:   Fall Risk Interventions:            Medication Interventions: Patient to call before getting OOB                   Problem: Patient Education: Go to Patient Education Activity  Goal: Patient/Family Education  Outcome: Progressing Towards Goal     Problem: General Medical Care Plan  Goal: *Vital signs within specified parameters  Outcome: Progressing Towards Goal  Goal: *Labs within defined limits  Outcome: Progressing Towards Goal  Goal: *Absence of infection signs and symptoms  Outcome: Progressing Towards Goal  Goal: *Optimal pain control at patient's stated goal  Outcome: Progressing Towards Goal  Goal: *Skin integrity maintained  Outcome: Progressing Towards Goal  Goal: *Fluid volume balance  Outcome: Progressing Towards Goal  Goal: *Optimize nutritional status  Outcome: Progressing Towards Goal  Goal: *Anxiety reduced or absent  Outcome: Progressing Towards Goal  Goal: *Progressive mobility and function (eg: ADL's)  Outcome: Progressing Towards Goal     Problem: Patient Education: Go to Patient Education Activity  Goal: Patient/Family Education  Outcome: Progressing Towards Goal

## 2019-08-03 ENCOUNTER — APPOINTMENT (OUTPATIENT)
Dept: GENERAL RADIOLOGY | Age: 59
DRG: 406 | End: 2019-08-03
Attending: INTERNAL MEDICINE
Payer: SELF-PAY

## 2019-08-03 ENCOUNTER — APPOINTMENT (OUTPATIENT)
Dept: ULTRASOUND IMAGING | Age: 59
DRG: 406 | End: 2019-08-03
Attending: INTERNAL MEDICINE
Payer: SELF-PAY

## 2019-08-03 ENCOUNTER — APPOINTMENT (OUTPATIENT)
Dept: GENERAL RADIOLOGY | Age: 59
DRG: 406 | End: 2019-08-03
Attending: NURSE PRACTITIONER
Payer: SELF-PAY

## 2019-08-03 LAB
ALBUMIN SERPL-MCNC: 3.5 G/DL (ref 3.5–5)
ALBUMIN SERPL-MCNC: 3.5 G/DL (ref 3.5–5)
ALBUMIN/GLOB SERPL: 1.4 {RATIO} (ref 1.1–2.2)
ALBUMIN/GLOB SERPL: 1.4 {RATIO} (ref 1.1–2.2)
ALP SERPL-CCNC: 91 U/L (ref 45–117)
ALP SERPL-CCNC: 94 U/L (ref 45–117)
ALT SERPL-CCNC: 48 U/L (ref 12–78)
ALT SERPL-CCNC: 50 U/L (ref 12–78)
AMMONIA PLAS-SCNC: 92 UMOL/L
ANION GAP SERPL CALC-SCNC: 11 MMOL/L (ref 5–15)
ANION GAP SERPL CALC-SCNC: 16 MMOL/L (ref 5–15)
ANION GAP SERPL CALC-SCNC: 16 MMOL/L (ref 5–15)
ARTERIAL PATENCY WRIST A: YES
AST SERPL-CCNC: 70 U/L (ref 15–37)
AST SERPL-CCNC: 82 U/L (ref 15–37)
B-OH-BUTYR SERPL-SCNC: 0.07 MMOL/L
BASE DEFICIT BLD-SCNC: 13 MMOL/L
BASOPHILS # BLD: 0 K/UL (ref 0–0.1)
BASOPHILS NFR BLD: 0 % (ref 0–1)
BDY SITE: ABNORMAL
BILIRUB DIRECT SERPL-MCNC: 1.7 MG/DL (ref 0–0.2)
BILIRUB SERPL-MCNC: 4 MG/DL (ref 0.2–1)
BILIRUB SERPL-MCNC: 4.1 MG/DL (ref 0.2–1)
BUN SERPL-MCNC: 11 MG/DL (ref 6–20)
BUN SERPL-MCNC: 11 MG/DL (ref 6–20)
BUN SERPL-MCNC: 12 MG/DL (ref 6–20)
BUN/CREAT SERPL: 8 (ref 12–20)
BUN/CREAT SERPL: 9 (ref 12–20)
BUN/CREAT SERPL: 9 (ref 12–20)
CALCIUM SERPL-MCNC: 9 MG/DL (ref 8.5–10.1)
CALCIUM SERPL-MCNC: 9.3 MG/DL (ref 8.5–10.1)
CALCIUM SERPL-MCNC: 9.4 MG/DL (ref 8.5–10.1)
CHLORIDE SERPL-SCNC: 100 MMOL/L (ref 97–108)
CHLORIDE SERPL-SCNC: 101 MMOL/L (ref 97–108)
CHLORIDE SERPL-SCNC: 101 MMOL/L (ref 97–108)
CO2 SERPL-SCNC: 14 MMOL/L (ref 21–32)
CO2 SERPL-SCNC: 15 MMOL/L (ref 21–32)
CO2 SERPL-SCNC: 22 MMOL/L (ref 21–32)
CREAT SERPL-MCNC: 1.16 MG/DL (ref 0.7–1.3)
CREAT SERPL-MCNC: 1.3 MG/DL (ref 0.7–1.3)
CREAT SERPL-MCNC: 1.42 MG/DL (ref 0.7–1.3)
DIFFERENTIAL METHOD BLD: ABNORMAL
EOSINOPHIL # BLD: 0 K/UL (ref 0–0.4)
EOSINOPHIL NFR BLD: 0 % (ref 0–7)
ERYTHROCYTE [DISTWIDTH] IN BLOOD BY AUTOMATED COUNT: 15.9 % (ref 11.5–14.5)
GAS FLOW.O2 O2 DELIVERY SYS: ABNORMAL L/MIN
GLOBULIN SER CALC-MCNC: 2.5 G/DL (ref 2–4)
GLOBULIN SER CALC-MCNC: 2.5 G/DL (ref 2–4)
GLUCOSE SERPL-MCNC: 187 MG/DL (ref 65–100)
GLUCOSE SERPL-MCNC: 207 MG/DL (ref 65–100)
GLUCOSE SERPL-MCNC: 226 MG/DL (ref 65–100)
HCO3 BLD-SCNC: 12.9 MMOL/L (ref 22–26)
HCT VFR BLD AUTO: 32.7 % (ref 36.6–50.3)
HGB BLD-MCNC: 10.5 G/DL (ref 12.1–17)
IMM GRANULOCYTES # BLD AUTO: 0 K/UL
IMM GRANULOCYTES NFR BLD AUTO: 0 %
INR PPP: 1.7 (ref 0.9–1.1)
LACTATE SERPL-SCNC: 5.5 MMOL/L (ref 0.4–2)
LACTATE SERPL-SCNC: 8.5 MMOL/L (ref 0.4–2)
LYMPHOCYTES # BLD: 0.7 K/UL (ref 0.8–3.5)
LYMPHOCYTES NFR BLD: 8 % (ref 12–49)
MAGNESIUM SERPL-MCNC: 2 MG/DL (ref 1.6–2.4)
MAGNESIUM SERPL-MCNC: 2.2 MG/DL (ref 1.6–2.4)
MCH RBC QN AUTO: 33.1 PG (ref 26–34)
MCHC RBC AUTO-ENTMCNC: 32.1 G/DL (ref 30–36.5)
MCV RBC AUTO: 103.2 FL (ref 80–99)
MONOCYTES # BLD: 0.5 K/UL (ref 0–1)
MONOCYTES NFR BLD: 6 % (ref 5–13)
NEUTS BAND NFR BLD MANUAL: 9 % (ref 0–6)
NEUTS SEG # BLD: 7.3 K/UL (ref 1.8–8)
NEUTS SEG NFR BLD: 77 % (ref 32–75)
NRBC # BLD: 0 K/UL (ref 0–0.01)
NRBC BLD-RTO: 0 PER 100 WBC
PCO2 BLD: 25.5 MMHG (ref 35–45)
PH BLD: 7.31 [PH] (ref 7.35–7.45)
PHOSPHATE SERPL-MCNC: 3 MG/DL (ref 2.6–4.7)
PHOSPHATE SERPL-MCNC: 3.2 MG/DL (ref 2.6–4.7)
PLATELET # BLD AUTO: 87 K/UL (ref 150–400)
PMV BLD AUTO: 9.8 FL (ref 8.9–12.9)
PO2 BLD: 88 MMHG (ref 80–100)
POTASSIUM SERPL-SCNC: 4.3 MMOL/L (ref 3.5–5.1)
POTASSIUM SERPL-SCNC: 4.4 MMOL/L (ref 3.5–5.1)
POTASSIUM SERPL-SCNC: 4.4 MMOL/L (ref 3.5–5.1)
PROCALCITONIN SERPL-MCNC: 0.2 NG/ML
PROT SERPL-MCNC: 6 G/DL (ref 6.4–8.2)
PROT SERPL-MCNC: 6 G/DL (ref 6.4–8.2)
PROTHROMBIN TIME: 17.2 SEC (ref 9–11.1)
RBC # BLD AUTO: 3.17 M/UL (ref 4.1–5.7)
RBC MORPH BLD: ABNORMAL
SAO2 % BLD: 96 % (ref 92–97)
SODIUM SERPL-SCNC: 130 MMOL/L (ref 136–145)
SODIUM SERPL-SCNC: 132 MMOL/L (ref 136–145)
SODIUM SERPL-SCNC: 134 MMOL/L (ref 136–145)
SPECIMEN TYPE: ABNORMAL
WBC # BLD AUTO: 8.5 K/UL (ref 4.1–11.1)

## 2019-08-03 PROCEDURE — 85610 PROTHROMBIN TIME: CPT

## 2019-08-03 PROCEDURE — 83605 ASSAY OF LACTIC ACID: CPT

## 2019-08-03 PROCEDURE — 85025 COMPLETE CBC W/AUTO DIFF WBC: CPT

## 2019-08-03 PROCEDURE — 74011250637 HC RX REV CODE- 250/637: Performed by: NURSE PRACTITIONER

## 2019-08-03 PROCEDURE — 71045 X-RAY EXAM CHEST 1 VIEW: CPT

## 2019-08-03 PROCEDURE — 80076 HEPATIC FUNCTION PANEL: CPT

## 2019-08-03 PROCEDURE — 83735 ASSAY OF MAGNESIUM: CPT

## 2019-08-03 PROCEDURE — 74011250636 HC RX REV CODE- 250/636: Performed by: HOSPITALIST

## 2019-08-03 PROCEDURE — 84100 ASSAY OF PHOSPHORUS: CPT

## 2019-08-03 PROCEDURE — 74011250636 HC RX REV CODE- 250/636: Performed by: NURSE PRACTITIONER

## 2019-08-03 PROCEDURE — 65660000001 HC RM ICU INTERMED STEPDOWN

## 2019-08-03 PROCEDURE — 80048 BASIC METABOLIC PNL TOTAL CA: CPT

## 2019-08-03 PROCEDURE — P9047 ALBUMIN (HUMAN), 25%, 50ML: HCPCS | Performed by: HOSPITALIST

## 2019-08-03 PROCEDURE — 74011000258 HC RX REV CODE- 258: Performed by: INTERNAL MEDICINE

## 2019-08-03 PROCEDURE — 36600 WITHDRAWAL OF ARTERIAL BLOOD: CPT

## 2019-08-03 PROCEDURE — 93976 VASCULAR STUDY: CPT

## 2019-08-03 PROCEDURE — 74011250637 HC RX REV CODE- 250/637: Performed by: INTERNAL MEDICINE

## 2019-08-03 PROCEDURE — 84145 PROCALCITONIN (PCT): CPT

## 2019-08-03 PROCEDURE — 74011250636 HC RX REV CODE- 250/636: Performed by: INTERNAL MEDICINE

## 2019-08-03 PROCEDURE — 94760 N-INVAS EAR/PLS OXIMETRY 1: CPT

## 2019-08-03 PROCEDURE — 82010 KETONE BODYS QUAN: CPT

## 2019-08-03 PROCEDURE — 82803 BLOOD GASES ANY COMBINATION: CPT

## 2019-08-03 PROCEDURE — 77010033678 HC OXYGEN DAILY

## 2019-08-03 PROCEDURE — 82140 ASSAY OF AMMONIA: CPT

## 2019-08-03 PROCEDURE — 36415 COLL VENOUS BLD VENIPUNCTURE: CPT

## 2019-08-03 PROCEDURE — 80053 COMPREHEN METABOLIC PANEL: CPT

## 2019-08-03 PROCEDURE — 94640 AIRWAY INHALATION TREATMENT: CPT

## 2019-08-03 PROCEDURE — 74011000250 HC RX REV CODE- 250: Performed by: INTERNAL MEDICINE

## 2019-08-03 PROCEDURE — 87040 BLOOD CULTURE FOR BACTERIA: CPT

## 2019-08-03 RX ORDER — NALOXONE HYDROCHLORIDE 0.4 MG/ML
0.4 INJECTION, SOLUTION INTRAMUSCULAR; INTRAVENOUS; SUBCUTANEOUS
Status: DISCONTINUED | OUTPATIENT
Start: 2019-08-03 | End: 2019-08-05 | Stop reason: HOSPADM

## 2019-08-03 RX ORDER — NALOXONE HYDROCHLORIDE 0.4 MG/ML
INJECTION, SOLUTION INTRAMUSCULAR; INTRAVENOUS; SUBCUTANEOUS
Status: DISPENSED
Start: 2019-08-03 | End: 2019-08-03

## 2019-08-03 RX ORDER — SODIUM CHLORIDE 9 MG/ML
75 INJECTION, SOLUTION INTRAVENOUS CONTINUOUS
Status: DISCONTINUED | OUTPATIENT
Start: 2019-08-03 | End: 2019-08-03

## 2019-08-03 RX ORDER — MORPHINE SULFATE 2 MG/ML
2 INJECTION, SOLUTION INTRAMUSCULAR; INTRAVENOUS ONCE
Status: COMPLETED | OUTPATIENT
Start: 2019-08-03 | End: 2019-08-03

## 2019-08-03 RX ADMIN — IPRATROPIUM BROMIDE AND ALBUTEROL SULFATE 3 ML: .5; 3 SOLUTION RESPIRATORY (INHALATION) at 23:43

## 2019-08-03 RX ADMIN — LACTULOSE 45 ML: 20 SOLUTION ORAL at 21:24

## 2019-08-03 RX ADMIN — ALBUMIN (HUMAN) 25 G: 0.25 INJECTION, SOLUTION INTRAVENOUS at 13:17

## 2019-08-03 RX ADMIN — IPRATROPIUM BROMIDE AND ALBUTEROL SULFATE 3 ML: .5; 3 SOLUTION RESPIRATORY (INHALATION) at 06:47

## 2019-08-03 RX ADMIN — NALOXONE HYDROCHLORIDE 0.4 MG: 0.4 INJECTION, SOLUTION INTRAMUSCULAR; INTRAVENOUS; SUBCUTANEOUS at 07:42

## 2019-08-03 RX ADMIN — LACTULOSE 45 ML: 20 SOLUTION ORAL at 09:49

## 2019-08-03 RX ADMIN — OXYCODONE HYDROCHLORIDE 10 MG: 5 TABLET ORAL at 00:42

## 2019-08-03 RX ADMIN — IPRATROPIUM BROMIDE AND ALBUTEROL SULFATE 3 ML: .5; 3 SOLUTION RESPIRATORY (INHALATION) at 00:15

## 2019-08-03 RX ADMIN — IPRATROPIUM BROMIDE AND ALBUTEROL SULFATE 3 ML: .5; 3 SOLUTION RESPIRATORY (INHALATION) at 04:07

## 2019-08-03 RX ADMIN — PIPERACILLIN SODIUM,TAZOBACTAM SODIUM 3.38 G: 3; .375 INJECTION, POWDER, FOR SOLUTION INTRAVENOUS at 16:00

## 2019-08-03 RX ADMIN — IPRATROPIUM BROMIDE AND ALBUTEROL SULFATE 3 ML: .5; 3 SOLUTION RESPIRATORY (INHALATION) at 18:14

## 2019-08-03 RX ADMIN — PIPERACILLIN SODIUM,TAZOBACTAM SODIUM 3.38 G: 3; .375 INJECTION, POWDER, FOR SOLUTION INTRAVENOUS at 09:49

## 2019-08-03 RX ADMIN — MORPHINE SULFATE 2 MG: 2 INJECTION, SOLUTION INTRAMUSCULAR; INTRAVENOUS at 16:26

## 2019-08-03 RX ADMIN — Medication 10 ML: at 21:24

## 2019-08-03 RX ADMIN — ALBUMIN (HUMAN) 25 G: 0.25 INJECTION, SOLUTION INTRAVENOUS at 00:42

## 2019-08-03 RX ADMIN — Medication 10 ML: at 09:50

## 2019-08-03 RX ADMIN — ALBUMIN (HUMAN) 25 G: 0.25 INJECTION, SOLUTION INTRAVENOUS at 18:58

## 2019-08-03 RX ADMIN — Medication 10 ML: at 13:17

## 2019-08-03 RX ADMIN — MORPHINE SULFATE 2 MG: 2 INJECTION, SOLUTION INTRAMUSCULAR; INTRAVENOUS at 15:29

## 2019-08-03 RX ADMIN — MORPHINE SULFATE 2 MG: 2 INJECTION, SOLUTION INTRAMUSCULAR; INTRAVENOUS at 03:08

## 2019-08-03 RX ADMIN — IPRATROPIUM BROMIDE AND ALBUTEROL SULFATE 3 ML: .5; 3 SOLUTION RESPIRATORY (INHALATION) at 12:25

## 2019-08-03 NOTE — PROGRESS NOTES
Patient has increasingly became lethargic with labored respirations and expiratory wheezing. Patient is falling asleep and snoring during conversations as well. Hospitalist was paged and received orders for blood gases, chest xray, CMP, lactic acid, and ammonia level. Several labs were abnormal (see results) as well as chest xray reading \"trapped lung\" and apical pneumothorax. Received orders to place patient on non-re breather, [address] patient to IMCU, and call down to radiology for them to page in IR and stabilize patient's pneumothorax.     0700: Dr. Juliann Mcdonald was paged and made aware of abnormal labs, abnormal chest xray, that he was being transferred, and patient's condition. Dr. Juliann Mcdonald stated he would be in to see the patient today.

## 2019-08-03 NOTE — PROGRESS NOTES
Patsy NP Progress note    Name: Jaclyn Bergman  YOB: 1960  MRN: [id number]  Admission Date: 8/1/2019 10:50 AM    Date of service: 8/3/2019 5:49 AM    Subjective:   Abnormal labs    Objective:   RN called to report pt w/ abnormal CMP this AM including: Na 130 (prev 133), CO2 14 (prev 23), Anion gap 16 (prev 6), Glucose 226 (prev 96), Tbili 4.1 (prev 2.5). RN reports mild tachycardia low 100s and that pt sats 99% on RA but appears to be breathing \"a little harder. \"  Pt has DM in hx but A1c 4.9 in feb 2019. Last ammonia check in feb 2019 was 212. Pt is s/p TIPS yesterday.  Per RN, pt is oriented but \"a little off\" and she received in report that he had been since he came back from TIPS procedure     Assessment & Plan:     Possible metabolic acidosis  -Repeat labs to ensure accuracy of elevated results  -Check ABG, lactic acid, ammonia level, CXR  -Pending results, may need bicarb    Possible DKA  -Repeat labs to ensure accuracy of elevated results  -BS have been low-normal and a1c 4.9 in feb 2019  -Check BHB; if elevated will need DKA protocol    0635-Discussed available results with Dr. Emelyn Ribera initially, then with patient's attending (Dr. Emi Ruth)  -Will transfer patient to IMCU (not ICU per Dr. Evi Lozano)  -Place pt on NRB  -Stat IR consult for pneumo-evacuation  -Discussed with [facility] RN     Josette Antony, KATELYNN  [phone] or Altagracia

## 2019-08-03 NOTE — PROGRESS NOTES
5485: TRANSFER - IN REPORT:    Verbal report received from [address] [facility] on [address]  being received from [facility](unit) for change in patient condition(resp depression lethargic)      Report consisted of patients Situation, Background, Assessment and   Recommendations(SBAR). Information from the following report(s)  was reviewed with the receiving nurse. SBAR MAR recent results sinus tach  Opportunity for questions and clarification was provided. Assessment completed upon patients arrival to unit and care assumed. Primary Nurse Jarocho Blackwood RN and Montez Bryan performed a dual skin assessment on this patient No impairment noted    0735: patients resp rate 5-7 when asleep falls asleep in the middle of conversation pupils pinpoint. Order received for narcan    Bedside shift change report given to Ingrid MCKEON (oncoming nurse) by Bryson Rosas RN (offgoing nurse). Report included the following information SBAR, Intake/Output, MAR, Recent Results, Med Rec Status and Cardiac Rhythm sinus tach.

## 2019-08-03 NOTE — PROGRESS NOTES
[address] [facility]       Candie Jaramillo MD, Bridgette Abraham, MD Gavin Perez, PA-RUFINA Barbosa, [facility]     Adriana Albarado, [facility]   Reyna Mendoza, [facility]-    Evi Deluca, AGPCNP-BC        [employer] [facility] [place] [address] [address] [facility], [address] [place] [address], [facility] 22.    [phone]    FAX: [address] [place] [address], [address], [address]    [phone]    FAX: [phone]         HEPATOLOGY PROGRESS NOTE  The patient is a 60 y.o.  Kira Christian was found to have chronic liver disease and cirrhosis in 5/2017 when he developed variceal bleeding. He developed hepatic hydrothorax and has undergone several thoracentesis. He as hospitalized for TIPS placement. The TIPS was placed yesterday. This was a very very difficult procedure and required an exceeding long period of time. This AM he was found to have a metabolic acidosis, elevated lactate and was lethargic. CX demonstated a very small effusion and a small apical pneumothorax on the right with entrapped ling at the right base. This afternoon he is awake, the metabolic acidosis has resolved but he has sever abdominal tenderness and pain. Repeat CXR this afternoon shows increase size Of the pneumothorax and mild pulmonary edema. He is complaining of severe abdominal pain and is being given some morphine.     ASSESSMENT AND PLAN:  Cirrhosis  Cryptogenic cirrhosis.     The diagnosis of cirrhosis is based upon Fiboscan, imaging, laboratory studies, complications of cirrhosis. The CTP is 7.  Child class B.  The MELD score is 21.     Ascites   Some ascites remains after TIPS placement. The abdomen is soft but diffusely tender.   He is getting IV albumin     Will get an US of the TIPS to make sure there has not been acute thrombosis.      Hepatic hydrothorax  This was treated with TIPS placement. Initial RA was 7, wedge HVP 23. HVPG = 15 mm Hg. Post TIPS RA 11.  PV post TIPS was 15. HVPG=4    He had a thoracentesis performed after the TIPS. There is a small pneumothorax this AM which appears larger this afternoon. Lower extremity edema  Edema has resolved with current dose of diuretics.     Screening for Esophageal varices   The patient has esophageal varices with prior bleeding.    Varices have been banded   The last EGD to assess for varices was performed in 5/218. Now that he has patent TIPS esophageal varices will be decompressed and repeat EGD not necessary.     Hepatic encephalopathy   Overt HE is controlled on current dose of lactulose TID. He is not having an diarrhea. He will need xifaxan after TIPS. There is no need to restrict dietary protein at this time.       Pulmonary hypertension  ECHO prior to TIPS showed mild pulmonary HTN with estimated PAP of 42 mm Hg and mild TR. RA pressure prior to TIPS was only 7 mm Hg.     Thrombocytopenia   This is secondary to cirrhosis. There is no evidence of overt bleeding.    No treatment is required. The platelet count is adequate for the patient to undergo procedures without the need for platelet transfusion or platelet growth factors. Anemia   This is due to multifactorial causes including portal hypertension with chronic GI blood loss, chronic systemic disease malnutrition due to poor nutritional intake. PHYSICAL EXAMINATION:  VS: per nursing note  General:  No acute distress. Eyes:  Sclera anicteric. ENT:  No oral lesions. Thyroid normal.  Nodes:  No adenopathy. Skin: spider angiomata. Respiratory:  Lungs clear to auscultation. Reduced breath sounds on the right about half way up. Cardiovascular: Regular heart rate. No JVD. Abdomen: Soft non-tender, Distended with obvious ascites.   Extremities: No lower extremity edema. Neurologic:  Alert and oriented.     LABORATORY:  Results for Anirudh Wilhelm (MRN [id number]) as of 8/3/2019 15:31   Ref. Range 8/2/2019 02:38 8/3/2019 03:13 8/3/2019 05:14 8/3/2019 13:04   WBC Latest Ref Range: 4.1 - 11.1 K/uL 7.2 8.5     HGB Latest Ref Range: 12.1 - 17.0 g/dL 10.9 (L) 10.5 (L)     PLATELET Latest Ref Range: 150 - 400 K/uL 113 (L) 87 (L)     INR Latest Ref Range: 0.9 - 1.1   1.6 (H) 1.7 (H)     Sodium Latest Ref Range: 136 - 145 mmol/L 133 (L) 130 (L) 132 (L) 134 (L)   Potassium Latest Ref Range: 3.5 - 5.1 mmol/L 4.2 4.4 4.3 4.4   Chloride Latest Ref Range: 97 - 108 mmol/L 104 100 101 101   CO2 Latest Ref Range: 21 - 32 mmol/L 23 14 (LL) 15 (LL) 22   Glucose Latest Ref Range: 65 - 100 mg/dL 96 226 (H) 207 (H) 187 (H)   BUN Latest Ref Range: 6 - 20 MG/DL 12 11 12 11   Creatinine Latest Ref Range: 0.70 - 1.30 MG/DL 1.31 (H) 1.42 (H) 1.30 1.16   Bilirubin, total Latest Ref Range: 0.2 - 1.0 MG/DL 2.5 (H) 4.1 (H) 4.0 (H)    Albumin Latest Ref Range: 3.5 - 5.0 g/dL 2.2 (L) 3.5 3.5    ALT (SGPT) Latest Ref Range: 12 - 78 U/L 34 48 50    AST Latest Ref Range: 15 - 37 U/L 38 (H) 70 (H) 82 (H)    Alk.  phosphatase Latest Ref Range: 45 - 117 U/L 108 94 850 [facility] HospitalMD Hernandez 13  [address] [facility], [facility] [address]

## 2019-08-03 NOTE — CONSULTS
Asked to see Mr Asim Kwan re: right pneumothorax    Referred by Hospitalist Service    Mr Asim Kwan is a pleasant 62 y/o gentleman admitted for a TIPS procedure and complications from liver failure. He has a past medical history significant for COPD, liver failure and DMII. Overnight he had a change in mental status as well in respiratory status. CXR showed a small pneumothorax. He is currently on a non rebreather. Allergies   Allergen Reactions    Shellfish Derived Hives       PMHx: Liver failure, DMII, COPD    PSHx: appendectomy    SocHx: active smoker    Family History   Problem Relation Age of Onset    Diabetes Mother     Heart Disease Father        Outpatient Medications Marked as Taking for the 8/1/19 encounter [facility] Encounter)   Medication Sig Dispense Refill    furosemide (LASIX) 40 mg tablet Take 2 Tabs by mouth daily. 60 Tab 4    spironolactone (ALDACTONE) 100 mg tablet Take 2 Tabs by mouth daily. 60 Tab 4    lactulose (CHRONULAC) 10 gram/15 mL solution Take 45 mL by mouth three (3) times daily. 1 Bottle 1    albuterol (PROVENTIL HFA, VENTOLIN HFA, PROAIR HFA) 90 mcg/actuation inhaler Take  by inhalation as needed for Wheezing.  ondansetron (ZOFRAN ODT) 4 mg disintegrating tablet Take 1 Tab by mouth every eight (8) hours as needed for Nausea. 90 Tab 6       ROS:    Unable to obtain a full ROS from the patient as he was not fully oriented or conversant    Blood pressure 158/65, pulse (!) 111, temperature 97.8 °F (36.6 °C), resp. rate 10, height 6' 1\" (1.854 m), weight 181 lb 14.1 oz (82.5 kg), SpO2 100 %.     On exam he is laying in bed on his side  Alert  On oxygen  tachypneic and unable to complete sentences  Pale appearing  Anicteric  No goiter  Coarse breath sounds bilaterally  Rrr, no murmurs  Radial pulses palp b/l  abd soft, mild protuberance  LE non edematous  ==============================    Recent Results (from the past 24 hour(s))   TYPE + CROSSMATCH Collection Time: 08/02/19 12:20 PM   Result Value Ref Range    Crossmatch Expiration 08/05/2019     ABO/Rh(D) A POSITIVE     Antibody screen NEG    GLUCOSE, POC    Collection Time: 08/02/19 12:27 PM   Result Value Ref Range    Glucose (POC) 104 (H) 65 - 100 mg/dL    Performed by [address], POC    Collection Time: 08/02/19  4:53 PM   Result Value Ref Range    Glucose (POC) 119 (H) 65 - 100 mg/dL    Performed by Ela Butterfield    MAGNESIUM    Collection Time: 08/03/19  3:13 AM   Result Value Ref Range    Magnesium 2.2 1.6 - 2.4 mg/dL   METABOLIC PANEL, BASIC    Collection Time: 08/03/19  3:13 AM   Result Value Ref Range    Sodium 130 (L) 136 - 145 mmol/L    Potassium 4.4 3.5 - 5.1 mmol/L    Chloride 100 97 - 108 mmol/L    CO2 14 (LL) 21 - 32 mmol/L    Anion gap 16 (H) 5 - 15 mmol/L    Glucose 226 (H) 65 - 100 mg/dL    BUN 11 6 - 20 MG/DL    Creatinine 1.42 (H) 0.70 - 1.30 MG/DL    BUN/Creatinine ratio 8 (L) 12 - 20      GFR est AA >60 >60 ml/min/1.73m2    GFR est non-AA 51 (L) >60 ml/min/1.73m2    Calcium 9.4 8.5 - 10.1 MG/DL   PHOSPHORUS    Collection Time: 08/03/19  3:13 AM   Result Value Ref Range    Phosphorus 3.2 2.6 - 4.7 MG/DL   CBC WITH AUTOMATED DIFF    Collection Time: 08/03/19  3:13 AM   Result Value Ref Range    WBC 8.5 4.1 - 11.1 K/uL    RBC 3.17 (L) 4.10 - 5.70 M/uL    HGB 10.5 (L) 12.1 - 17.0 g/dL    HCT 32.7 (L) 36.6 - 50.3 %    .2 (H) 80.0 - 99.0 FL    MCH 33.1 26.0 - 34.0 PG    MCHC 32.1 30.0 - 36.5 g/dL    RDW 15.9 (H) 11.5 - 14.5 %    PLATELET 87 (L) 141 - 400 K/uL    MPV 9.8 8.9 - 12.9 FL    NRBC 0.0 0  WBC    ABSOLUTE NRBC 0.00 0.00 - 0.01 K/uL    NEUTROPHILS 77 (H) 32 - 75 %    BAND NEUTROPHILS 9 (H) 0 - 6 %    LYMPHOCYTES 8 (L) 12 - 49 %    MONOCYTES 6 5 - 13 %    EOSINOPHILS 0 0 - 7 %    BASOPHILS 0 0 - 1 %    IMMATURE GRANULOCYTES 0 %    ABS. NEUTROPHILS 7.3 1.8 - 8.0 K/UL    ABS. LYMPHOCYTES 0.7 (L) 0.8 - 3.5 K/UL    ABS. MONOCYTES 0.5 0.0 - 1.0 K/UL    ABS. EOSINOPHILS 0.0 0.0 - 0.4 K/UL    ABS. BASOPHILS 0.0 0.0 - 0.1 K/UL    ABS. IMM. GRANS. 0.0 K/UL    DF MANUAL      RBC COMMENTS ANISOCYTOSIS  1+        RBC COMMENTS MACROCYTOSIS  1+        RBC COMMENTS DANIELLA CELLS  PRESENT       HEPATIC FUNCTION PANEL    Collection Time: 08/03/19  3:13 AM   Result Value Ref Range    Protein, total 6.0 (L) 6.4 - 8.2 g/dL    Albumin 3.5 3.5 - 5.0 g/dL    Globulin 2.5 2.0 - 4.0 g/dL    A-G Ratio 1.4 1.1 - 2.2      Bilirubin, total 4.1 (H) 0.2 - 1.0 MG/DL    Bilirubin, direct 1.7 (H) 0.0 - 0.2 MG/DL    Alk. phosphatase 94 45 - 117 U/L    AST (SGOT) 70 (H) 15 - 37 U/L    ALT (SGPT) 48 12 - 78 U/L   PROTHROMBIN TIME + INR    Collection Time: 08/03/19  3:13 AM   Result Value Ref Range    INR 1.7 (H) 0.9 - 1.1      Prothrombin time 17.2 (H) 9.0 - 57.6 sec   METABOLIC PANEL, COMPREHENSIVE    Collection Time: 08/03/19  5:14 AM   Result Value Ref Range    Sodium 132 (L) 136 - 145 mmol/L    Potassium 4.3 3.5 - 5.1 mmol/L    Chloride 101 97 - 108 mmol/L    CO2 15 (LL) 21 - 32 mmol/L    Anion gap 16 (H) 5 - 15 mmol/L    Glucose 207 (H) 65 - 100 mg/dL    BUN 12 6 - 20 MG/DL    Creatinine 1.30 0.70 - 1.30 MG/DL    BUN/Creatinine ratio 9 (L) 12 - 20      GFR est AA >60 >60 ml/min/1.73m2    GFR est non-AA 57 (L) >60 ml/min/1.73m2    Calcium 9.3 8.5 - 10.1 MG/DL    Bilirubin, total 4.0 (H) 0.2 - 1.0 MG/DL    ALT (SGPT) 50 12 - 78 U/L    AST (SGOT) 82 (H) 15 - 37 U/L    Alk.  phosphatase 91 45 - 117 U/L    Protein, total 6.0 (L) 6.4 - 8.2 g/dL    Albumin 3.5 3.5 - 5.0 g/dL    Globulin 2.5 2.0 - 4.0 g/dL    A-G Ratio 1.4 1.1 - 2.2     MAGNESIUM    Collection Time: 08/03/19  5:14 AM   Result Value Ref Range    Magnesium 2.0 1.6 - 2.4 mg/dL   PHOSPHORUS    Collection Time: 08/03/19  5:14 AM   Result Value Ref Range    Phosphorus 3.0 2.6 - 4.7 MG/DL   AMMONIA    Collection Time: 08/03/19  5:14 AM   Result Value Ref Range    Ammonia 92 (H) <32 UMOL/L   LACTIC ACID    Collection Time: 08/03/19  5:14 AM Result Value Ref Range    Lactic acid 8.5 (HH) 0.4 - 2.0 MMOL/L   BETA-HYDROXYBUTYRATE    Collection Time: 08/03/19  5:14 AM   Result Value Ref Range    B-hydroxybutyrate 0.07 <0.40 mmol/L   POC G3 - PUL    Collection Time: 08/03/19  5:32 AM   Result Value Ref Range    pH (POC) 7.311 (L) 7.35 - 7.45      pCO2 (POC) 25.5 (L) 35.0 - 45.0 MMHG    pO2 (POC) 88 80 - 100 MMHG    HCO3 (POC) 12.9 (L) 22 - 26 MMOL/L    sO2 (POC) 96 92 - 97 %    Base deficit (POC) 13 mmol/L    Site LEFT RADIAL      Device: ROOM AIR      Allens test (POC) YES      Specimen type (POC) ARTERIAL       ==============================    I have personally reviewed his CXR from early this morning. Tiny apical and lateral right penumothorax. Possible entrapped RLL    ==============================    PFTs- none    ==============================    Diagnoses  1: right pneumothorax  2: history of right hepatic hydropthorax  3: Liver failure  4: Acute hypoxic respiratory failure with metabloic acidosis    =============================    Mr. Kelvin Green has a small right pneumothorax of uncertain etiology. Based on imaging it is very unlikely to be the cause of his change in clinical status. He may have a small component of an entrapped right lung from his recurrent right hepatic hydrothorax. Based on current information I would not recommend a chest tube or surgical intervention. Once he is more stable we can consider a chest CT. Should he developed a much large pneumothorax we would then have to consider placing a chest tube.      Thank you for allowing us to care for Mr Alda Feranndez

## 2019-08-03 NOTE — PROGRESS NOTES
65- I spoke with Dr. Wyatt Palencia regarding patients Lovenox injection. Orders received to wait until abd duplexes result and Dr. Wyatt Palencia will notify RN whether to administer Lovenox or not. 2000- Bedside shift change report given to Elliott Solano, RN by Gill Cid, RN. Report included the following information SBAR, Kardex, ED Summary, Intake/Output, MAR, Recent Results and Cardiac Rhythm NSR/ST. Problem: Breathing Pattern - Ineffective  Goal: *Absence of hypoxia  Outcome: Progressing Towards Goal  Note:   Oxygen saturations are within defined normal limits on 6L nasal cannula and non-rebreather. Dr. Nany Dawson would like patient to wear non-rebreather until 1pm due to patient having a pneumothorax. Patient is placed on 6L nasal cannula for meals and to go to the bathroom. Will continue to monitor. Problem: Falls - Risk of  Goal: *Absence of Falls  Description  Document Leeroy Martinez Fall Risk and appropriate interventions in the flowsheet. Note:   Fall Risk Interventions:  Mobility Interventions: Communicate number of staff needed for ambulation/transfer, Patient to call before getting OOB         Medication Interventions: Evaluate medications/consider consulting pharmacy, Patient to call before getting OOB    Elimination Interventions: Call light in reach, Patient to call for help with toileting needs, Toileting schedule/hourly rounds, Stay With Me (per policy)       Patient has non-grid socks on, call bell within reach, and patient educated to call out for assistance when needing bathroom assistance. Patient verbalized understanding and calls out appropriately for assistance.

## 2019-08-03 NOTE — PROGRESS NOTES
Hospitalist Progress Note  Williams Ward MD  Answering service: [phone] OR [phone] from in house phone        Date of Service:  8/3/2019  NAME:  Calos Barrera  :  1960  MRN:  [id number]      Admission Summary:   63 y.o. male with pmh of cryptogenic cirrhosis, hydrothorax s/p recent thoracentesis  at [facility] [place], h/o HE, who presents directly from hepatology clinic for TIPS. Interval history / Subjective:   S/p TIPS procedure , as well as thoracentesis by IR   After returning from his procedure, noted overnight to be breathing slightly more labored. CXR showed small pneumothorax, ABG showed metabolic acidosis. CTS consulted, no intervention required given small PTX. Pt has no complaints, he denies any trouble breathing. He is hungry and is wondering if he can eat. Assessment & Plan:     Small traumatic pneumothorax - secondary to paracentesis  - No intervention required  - Repeat CXR at 1pm  - Continue NRB for now for nitrogen washout.   - if clinically worsens may need to consider pigtail placement. - IR notified. AGMA - lactic acidosis   - Likely secondary to procedure yesterday  - hold lasix, and monitor with PO intake   - Will have slow clearance due to liver cirrhosis  - Monitor lactate closely     Sepsis (RR, HR) - possible source in the abdomen after his TIPS procedure yesterday  - Start Zosyn, low threshold for abx in this setting  - Blood cultures, monitor   - Monitor lactate      Increased fatigue - ammonia 95  - Need to be mindful of HE post TIPS  - Continue lactulose and close monitoring    Pleural effusion - likey hydrothorax from cirrhosis  - Hold step2 diuretics 80mg lasix per day, 200mg of spironolactone per day  - Continue IS  - s/p TIPS procedure    - s/p thoracentesis   - IR consulted, appreciate assistance. - Hepatology following     Cryptogenic cirrhosis  - Step 2 diuretics on hold. - Continue lactulose  - Hepatology following     Elevated Creatinine, likely component of CKD, has had multipel Cr around 1.2 over the last few months. - Monitor with diuresis and post procedure  - strict I and O, avoid nephrotoxins. COPD - nebs PRN     Code status: FULL  DVT prophylaxis: LOvenox     Care Plan discussed with: Patient/Family  Disposition: TBD     Hospital Problems  Date Reviewed: 8/2/2019          Codes Class Noted POA    Pleural effusion ICD-10-CM: J90  ICD-9-CM: 511.9  8/1/2019 Unknown                Review of Systems:   A comprehensive review of systems was negative except for that written in the HPI. Vital Signs:    Last 24hrs VS reviewed since prior progress note. Most recent are:  Visit Vitals  /64 (BP 1 Location: Right arm, BP Patient Position: At rest)   Pulse (!) 103   Temp 98.2 °F (36.8 °C)   Resp 12   Ht 6' 1\" (1.854 m)   Wt 82.5 kg (181 lb 14.1 oz)   SpO2 99%   BMI 24.00 kg/m²         Intake/Output Summary (Last 24 hours) at 8/3/2019 1541  Last data filed at 8/2/2019 1714  Gross per 24 hour   Intake 1000 ml   Output 5610 ml   Net -4610 ml        Physical Examination:             Constitutional:  No acute distress, cooperative, pleasant    ENT:  Oral mucous moist, oropharynx benign. Sclera anicteric    Resp:  Decreased breath sounds on the R, normal on the L, no increased work of breathing. CV:  Regular rhythm, normal rate, no murmurs, gallops, rubs    GI:  Soft, + distended, non tender. normoactive bowel sounds, no hepatosplenomegaly     Musculoskeletal:  No edema, warm, 2+ pulses throughout    Neurologic:  Moves all extremities. AAOx3, CN II-XII reviewed     Skin:  Good turgor, no rashes or ulcers       Data Review:   Imaging and laboratory data reviewed.        Labs:     Recent Labs     08/03/19  0313 08/02/19  0238   WBC 8.5 7.2   HGB 10.5* 10.9*   HCT 32.7* 31.7*   PLT 87* 113*     Recent Labs     08/03/19  1304 08/03/19  0514 08/03/19  0313 08/02/19  0238   NA 134* 132* 130* 133*   K 4.4 4.3 4.4 4.2    101 100 104   CO2 22 15* 14* 23   BUN 11 12 11 12   CREA 1.16 1.30 1.42* 1.31*   * 207* 226* 96   CA 9.0 9.3 9.4 8.4*   MG  --  2.0 2.2 2.0   PHOS  --  3.0 3.2 3.3     Recent Labs     08/03/19  0514 08/03/19 0313 08/02/19  0238   SGOT 82* 70* 38*   ALT 50 48 34   AP 91 94 108   TBILI 4.0* 4.1* 2.5*   TP 6.0* 6.0* 5.2*   ALB 3.5 3.5 2.2*   GLOB 2.5 2.5 3.0     Recent Labs     08/03/19 0313 08/02/19 0238   INR 1.7* 1.6*   PTP 17.2* 15.4*      No results for input(s): FE, TIBC, PSAT, FERR in the last 72 hours. No results found for: FOL, RBCF   No results for input(s): PH, PCO2, PO2 in the last 72 hours. No results for input(s): CPK, CKNDX, TROIQ in the last 72 hours.     No lab exists for component: CPKMB  No results found for: CHOL, CHOLX, CHLST, CHOLV, HDL, LDL, LDLC, DLDLP, TGLX, TRIGL, TRIGP, CHHD, CHHDX  Lab Results   Component Value Date/Time    Glucose (POC) 119 (H) 08/02/2019 04:53 PM    Glucose (POC) 104 (H) 08/02/2019 12:27 PM    Glucose (POC) 90 08/31/2017 01:03 PM     No results found for: COLOR, APPRN, SPGRU, REFSG, CARLENE, PROTU, GLUCU, KETU, BILU, UROU, NANY, LEUKU, GLUKE, EPSU, BACTU, WBCU, RBCU, CASTS, UCRY      Medications Reviewed:     Current Facility-Administered Medications   Medication Dose Route Frequency    naloxone (NARCAN) injection 0.4 mg  0.4 mg IntraVENous EVERY 2 MINUTES AS NEEDED    naloxone (NARCAN) 0.4 mg/mL injection        piperacillin-tazobactam (ZOSYN) 3.375 g in 0.9% sodium chloride (MBP/ADV) 100 mL  3.375 g IntraVENous Q8H    albumin human 25% (BUMINATE) solution 25 g  25 g IntraVENous Q6H    prochlorperazine (COMPAZINE) with saline injection 5 mg  5 mg IntraVENous Q6H PRN    0.9% sodium chloride infusion  25 mL/hr IntraVENous CONTINUOUS    0.9% sodium chloride infusion 250 mL  250 mL IntraVENous PRN    enoxaparin (LOVENOX) injection 40 mg  40 mg SubCUTAneous Q24H    [Held by provider] oxyCODONE IR (Mervin Dozier) tablet 10 mg  10 mg Oral Q4H PRN    morphine injection 2 mg  2 mg IntraVENous Q2H PRN    sodium chloride (NS) flush 5-40 mL  5-40 mL IntraVENous Q8H    sodium chloride (NS) flush 5-40 mL  5-40 mL IntraVENous PRN    [Held by provider] furosemide (LASIX) tablet 80 mg  80 mg Oral DAILY    [Held by provider] spironolactone (ALDACTONE) tablet 200 mg  200 mg Oral DAILY    lactulose (CHRONULAC) 10 gram/15 mL solution 45 mL  30 g Oral TID    albuterol-ipratropium (DUO-NEB) 2.5 MG-0.5 MG/3 ML  3 mL Nebulization Q4H RT    ondansetron (ZOFRAN) injection 4 mg  4 mg IntraVENous Q8H PRN    bisacodyl (DULCOLAX) suppository 10 mg  10 mg Rectal DAILY PRN     ______________________________________________________________________  EXPECTED LENGTH OF STAY: 3d 7h  ACTUAL LENGTH OF STAY:          2                 Tiffanie Fontaine MD

## 2019-08-04 ENCOUNTER — APPOINTMENT (OUTPATIENT)
Dept: GENERAL RADIOLOGY | Age: 59
DRG: 406 | End: 2019-08-04
Attending: INTERNAL MEDICINE
Payer: SELF-PAY

## 2019-08-04 LAB
ALBUMIN SERPL-MCNC: 3.3 G/DL (ref 3.5–5)
ALBUMIN/GLOB SERPL: 1.4 {RATIO} (ref 1.1–2.2)
ALP SERPL-CCNC: 87 U/L (ref 45–117)
ALT SERPL-CCNC: 59 U/L (ref 12–78)
ANION GAP SERPL CALC-SCNC: 7 MMOL/L (ref 5–15)
AST SERPL-CCNC: 89 U/L (ref 15–37)
BASOPHILS # BLD: 0 K/UL (ref 0–0.1)
BASOPHILS NFR BLD: 0 % (ref 0–1)
BILIRUB DIRECT SERPL-MCNC: 1.1 MG/DL (ref 0–0.2)
BILIRUB SERPL-MCNC: 3.7 MG/DL (ref 0.2–1)
BUN SERPL-MCNC: 11 MG/DL (ref 6–20)
BUN/CREAT SERPL: 10 (ref 12–20)
CALCIUM SERPL-MCNC: 8.8 MG/DL (ref 8.5–10.1)
CHLORIDE SERPL-SCNC: 102 MMOL/L (ref 97–108)
CO2 SERPL-SCNC: 21 MMOL/L (ref 21–32)
COMMENT, HOLDF: NORMAL
CREAT SERPL-MCNC: 1.13 MG/DL (ref 0.7–1.3)
DIFFERENTIAL METHOD BLD: ABNORMAL
EOSINOPHIL # BLD: 0 K/UL (ref 0–0.4)
EOSINOPHIL NFR BLD: 0 % (ref 0–7)
ERYTHROCYTE [DISTWIDTH] IN BLOOD BY AUTOMATED COUNT: 16.1 % (ref 11.5–14.5)
GLOBULIN SER CALC-MCNC: 2.3 G/DL (ref 2–4)
GLUCOSE SERPL-MCNC: 149 MG/DL (ref 65–100)
HCT VFR BLD AUTO: 27.1 % (ref 36.6–50.3)
HGB BLD-MCNC: 9 G/DL (ref 12.1–17)
IMM GRANULOCYTES # BLD AUTO: 0.1 K/UL (ref 0–0.04)
IMM GRANULOCYTES NFR BLD AUTO: 1 % (ref 0–0.5)
INR PPP: 1.9 (ref 0.9–1.1)
LYMPHOCYTES # BLD: 1.1 K/UL (ref 0.8–3.5)
LYMPHOCYTES NFR BLD: 12 % (ref 12–49)
MAGNESIUM SERPL-MCNC: 2 MG/DL (ref 1.6–2.4)
MCH RBC QN AUTO: 33.3 PG (ref 26–34)
MCHC RBC AUTO-ENTMCNC: 33.2 G/DL (ref 30–36.5)
MCV RBC AUTO: 100.4 FL (ref 80–99)
MONOCYTES # BLD: 1.3 K/UL (ref 0–1)
MONOCYTES NFR BLD: 14 % (ref 5–13)
NEUTS SEG # BLD: 6.7 K/UL (ref 1.8–8)
NEUTS SEG NFR BLD: 73 % (ref 32–75)
NRBC # BLD: 0 K/UL (ref 0–0.01)
NRBC BLD-RTO: 0 PER 100 WBC
PHOSPHATE SERPL-MCNC: 1.8 MG/DL (ref 2.6–4.7)
PLATELET # BLD AUTO: 85 K/UL (ref 150–400)
PMV BLD AUTO: 9.8 FL (ref 8.9–12.9)
POTASSIUM SERPL-SCNC: 4.5 MMOL/L (ref 3.5–5.1)
PROT SERPL-MCNC: 5.6 G/DL (ref 6.4–8.2)
PROTHROMBIN TIME: 18.4 SEC (ref 9–11.1)
RBC # BLD AUTO: 2.7 M/UL (ref 4.1–5.7)
SAMPLES BEING HELD,HOLD: NORMAL
SODIUM SERPL-SCNC: 130 MMOL/L (ref 136–145)
WBC # BLD AUTO: 9.2 K/UL (ref 4.1–11.1)

## 2019-08-04 PROCEDURE — 74011250636 HC RX REV CODE- 250/636: Performed by: NURSE PRACTITIONER

## 2019-08-04 PROCEDURE — 94664 DEMO&/EVAL PT USE INHALER: CPT

## 2019-08-04 PROCEDURE — 71045 X-RAY EXAM CHEST 1 VIEW: CPT

## 2019-08-04 PROCEDURE — 80076 HEPATIC FUNCTION PANEL: CPT

## 2019-08-04 PROCEDURE — 80048 BASIC METABOLIC PNL TOTAL CA: CPT

## 2019-08-04 PROCEDURE — 83735 ASSAY OF MAGNESIUM: CPT

## 2019-08-04 PROCEDURE — 74011250636 HC RX REV CODE- 250/636: Performed by: INTERNAL MEDICINE

## 2019-08-04 PROCEDURE — 36415 COLL VENOUS BLD VENIPUNCTURE: CPT

## 2019-08-04 PROCEDURE — 74011000258 HC RX REV CODE- 258: Performed by: INTERNAL MEDICINE

## 2019-08-04 PROCEDURE — 65660000001 HC RM ICU INTERMED STEPDOWN

## 2019-08-04 PROCEDURE — 74011250636 HC RX REV CODE- 250/636: Performed by: RADIOLOGY

## 2019-08-04 PROCEDURE — 77010033678 HC OXYGEN DAILY

## 2019-08-04 PROCEDURE — 94760 N-INVAS EAR/PLS OXIMETRY 1: CPT

## 2019-08-04 PROCEDURE — 84100 ASSAY OF PHOSPHORUS: CPT

## 2019-08-04 PROCEDURE — 85025 COMPLETE CBC W/AUTO DIFF WBC: CPT

## 2019-08-04 PROCEDURE — 94640 AIRWAY INHALATION TREATMENT: CPT

## 2019-08-04 PROCEDURE — 74011250637 HC RX REV CODE- 250/637: Performed by: INTERNAL MEDICINE

## 2019-08-04 PROCEDURE — 85610 PROTHROMBIN TIME: CPT

## 2019-08-04 PROCEDURE — 74011000250 HC RX REV CODE- 250: Performed by: INTERNAL MEDICINE

## 2019-08-04 RX ADMIN — MORPHINE SULFATE 2 MG: 2 INJECTION, SOLUTION INTRAMUSCULAR; INTRAVENOUS at 22:05

## 2019-08-04 RX ADMIN — IPRATROPIUM BROMIDE AND ALBUTEROL SULFATE 3 ML: .5; 3 SOLUTION RESPIRATORY (INHALATION) at 11:59

## 2019-08-04 RX ADMIN — LACTULOSE 45 ML: 20 SOLUTION ORAL at 17:12

## 2019-08-04 RX ADMIN — SODIUM CHLORIDE 25 ML/HR: 900 INJECTION, SOLUTION INTRAVENOUS at 22:01

## 2019-08-04 RX ADMIN — LACTULOSE 45 ML: 20 SOLUTION ORAL at 21:52

## 2019-08-04 RX ADMIN — SODIUM PHOSPHATE, MONOBASIC, MONOHYDRATE: 276; 142 INJECTION, SOLUTION INTRAVENOUS at 09:53

## 2019-08-04 RX ADMIN — PIPERACILLIN SODIUM,TAZOBACTAM SODIUM 3.38 G: 3; .375 INJECTION, POWDER, FOR SOLUTION INTRAVENOUS at 09:53

## 2019-08-04 RX ADMIN — IPRATROPIUM BROMIDE AND ALBUTEROL SULFATE 3 ML: .5; 3 SOLUTION RESPIRATORY (INHALATION) at 04:45

## 2019-08-04 RX ADMIN — Medication 10 ML: at 14:00

## 2019-08-04 RX ADMIN — ONDANSETRON 4 MG: 2 INJECTION INTRAMUSCULAR; INTRAVENOUS at 20:08

## 2019-08-04 RX ADMIN — PIPERACILLIN SODIUM,TAZOBACTAM SODIUM 3.38 G: 3; .375 INJECTION, POWDER, FOR SOLUTION INTRAVENOUS at 00:16

## 2019-08-04 RX ADMIN — Medication 10 ML: at 20:09

## 2019-08-04 RX ADMIN — IPRATROPIUM BROMIDE AND ALBUTEROL SULFATE 3 ML: .5; 3 SOLUTION RESPIRATORY (INHALATION) at 19:43

## 2019-08-04 RX ADMIN — IPRATROPIUM BROMIDE AND ALBUTEROL SULFATE 3 ML: .5; 3 SOLUTION RESPIRATORY (INHALATION) at 23:22

## 2019-08-04 RX ADMIN — LACTULOSE 45 ML: 20 SOLUTION ORAL at 09:53

## 2019-08-04 RX ADMIN — IPRATROPIUM BROMIDE AND ALBUTEROL SULFATE 3 ML: .5; 3 SOLUTION RESPIRATORY (INHALATION) at 16:18

## 2019-08-04 RX ADMIN — PIPERACILLIN SODIUM,TAZOBACTAM SODIUM 3.38 G: 3; .375 INJECTION, POWDER, FOR SOLUTION INTRAVENOUS at 17:13

## 2019-08-04 NOTE — ROUTINE PROCESS
Bedside and Verbal shift change report given to Eloina Medina RN (oncoming nurse) by Jude Hale RN (offgoing nurse). Report included the following information SBAR, Kardex, Intake/Output, MAR, Recent Results and Cardiac Rhythm NSR, Sinus Tachycardia.

## 2019-08-04 NOTE — PROGRESS NOTES
Problem: Breathing Pattern - Ineffective  Goal: *Absence of hypoxia  Outcome: Progressing Towards Goal  Goal: *Use of effective breathing techniques  Outcome: Progressing Towards Goal  Goal: *PALLIATIVE CARE:  Alleviation of Dyspnea  Outcome: Progressing Towards Goal     Problem: Falls - Risk of  Goal: *Absence of Falls  Description  Document Kelley Ventura Fall Risk and appropriate interventions in the flowsheet.   Outcome: Progressing Towards Goal  Note:   Fall Risk Interventions:  Mobility Interventions: Communicate number of staff needed for ambulation/transfer, Patient to call before getting OOB         Medication Interventions: Patient to call before getting OOB, Teach patient to arise slowly    Elimination Interventions: Call light in reach

## 2019-08-04 NOTE — PROGRESS NOTES
Hospitalist Progress Note  Mary Salazar MD  Answering service: [phone] OR [phone] from in house phone        Date of Service:  2019  NAME:  Rosy Costello  :  1960  MRN:  [id number]      Admission Summary:   63 y.o. male with pmh of cryptogenic cirrhosis, hydrothorax s/p recent thoracentesis  at [facility] [place], h/o HE, who presents directly from hepatology clinic for TIPS. Interval history / Subjective:   Ongoing slight abdominal pain. Metabolic acidosis resolved, lactate is clearing. CXR with minimal enlargement of R PTX yesterday, now stable this morning. Some pleuritic chest pain, but no SOB. Receiving IV albumin  US pending to evaluate patency of the TIPS     Assessment & Plan:     Small traumatic pneumothorax - secondary to paracentesis  - No intervention required, CTS following.   - Repeat CXR this am with stable PTX  - Continue O2   - if clinically worsens may need to consider pigtail placement or needle decompression.   - IR notified. AGMA - lactic acidosis resolved. - Likely secondary to procedure yesterday  - hold lasix, on IV albumin   - Will have slow clearance due to liver cirrhosis  - Monitor lactate closely     Sepsis (RR, HR) - possible source in the abdomen after his TIPS procedure yesterday  - Continue Zosyn for now   - Blood cultures, monitor NGTD  - Monitor lactate,  Repeat today     Increased fatigue - ammonia 95, improved today   - Need to be mindful of HE post TIPS  - Continue lactulose and close monitoring    Pleural effusion - likey hydrothorax from cirrhosis  - Hold step2 diuretics 80mg lasix per day, 200mg of spironolactone per day  - Continue IS  - s/p TIPS procedure    - s/p thoracentesis   - IR consulted, appreciate assistance. - Hepatology following     Cryptogenic cirrhosis  - Step 2 diuretics on hold.    - Continue lactulose  - Hepatology following     Elevated Creatinine, likely component of CKD, has had multipel Cr around 1.2 over the last few months. - Monitor with diuresis and post procedure  - strict I and O, avoid nephrotoxins. Hyponatremia - chronic secondary to cirrhosis, monitor     COPD - nebs PRN     Code status: FULL  DVT prophylaxis: Lovenox, hold if PLT less than 50    Care Plan discussed with: Patient/Family  Disposition: TBD     Hospital Problems  Date Reviewed: 8/2/2019          Codes Class Noted POA    Pleural effusion ICD-10-CM: J90  ICD-9-CM: 511.9  8/1/2019 Unknown                Review of Systems:   A comprehensive review of systems was negative except for that written in the HPI. Vital Signs:    Last 24hrs VS reviewed since prior progress note. Most recent are:  Visit Vitals  /41 (BP 1 Location: Left arm, BP Patient Position: At rest)   Pulse (!) 106   Temp 98.4 °F (36.9 °C)   Resp 16   Ht 6' 1\" (1.854 m)   Wt 84.1 kg (185 lb 6.5 oz)   SpO2 98%   BMI 24.46 kg/m²         Intake/Output Summary (Last 24 hours) at 8/4/2019 1307  Last data filed at 8/4/2019 1202  Gross per 24 hour   Intake 450 ml   Output --   Net 450 ml        Physical Examination:             Constitutional:  No acute distress, cooperative, pleasant    ENT:  Oral mucous moist, oropharynx benign. Sclera icteric    Resp:  Decreased breath sounds on the R, normal on the L, no increased work of breathing. CV:  Regular rhythm, + tachycardic, no murmurs, gallops, rubs    GI:  Soft, + distended, + tender in RUQ. normoactive bowel sounds, no hepatosplenomegaly     Musculoskeletal:  1-2+ edena, warm, 2+ pulses throughout    Neurologic:  Moves all extremities. AAOx3, CN II-XII reviewed, no asterixis      Skin:  Good turgor, no rashes or ulcers       Data Review:   Imaging and laboratory data reviewed.        Labs:     Recent Labs     08/04/19 0519 08/03/19  0313   WBC 9.2 8.5   HGB 9.0* 10.5*   HCT 27.1* 32.7*   PLT 85* 87*     Recent Labs     08/04/19  0519 08/03/19  1304 08/03/19  0514 08/03/19 0313   * 134* 132* 130*   K 4.5 4.4 4.3 4.4    101 101 100   CO2 21 22 15* 14*   BUN 11 11 12 11   CREA 1.13 1.16 1.30 1.42*   * 187* 207* 226*   CA 8.8 9.0 9.3 9.4   MG 2.0  --  2.0 2.2   PHOS 1.8*  --  3.0 3.2     Recent Labs     08/04/19 0519 08/03/19 0514 08/03/19 0313   SGOT 89* 82* 70*   ALT 59 50 48   AP 87 91 94   TBILI 3.7* 4.0* 4.1*   TP 5.6* 6.0* 6.0*   ALB 3.3* 3.5 3.5   GLOB 2.3 2.5 2.5     Recent Labs     08/04/19 0519 08/03/19 0313 08/02/19 0238   INR 1.9* 1.7* 1.6*   PTP 18.4* 17.2* 15.4*      No results for input(s): FE, TIBC, PSAT, FERR in the last 72 hours. No results found for: FOL, RBCF   No results for input(s): PH, PCO2, PO2 in the last 72 hours. No results for input(s): CPK, CKNDX, TROIQ in the last 72 hours.     No lab exists for component: CPKMB  No results found for: CHOL, CHOLX, CHLST, CHOLV, HDL, LDL, LDLC, DLDLP, TGLX, TRIGL, TRIGP, CHHD, CHHDX  Lab Results   Component Value Date/Time    Glucose (POC) 119 (H) 08/02/2019 04:53 PM    Glucose (POC) 104 (H) 08/02/2019 12:27 PM    Glucose (POC) 90 08/31/2017 01:03 PM     No results found for: COLOR, APPRN, SPGRU, REFSG, CARLENE, PROTU, GLUCU, KETU, BILU, UROU, NANY, LEUKU, GLUKE, EPSU, BACTU, WBCU, RBCU, CASTS, UCRY      Medications Reviewed:     Current Facility-Administered Medications   Medication Dose Route Frequency    sodium phosphate 30 mmol in 0.9% sodium chloride 250 mL infusion   IntraVENous ONCE    naloxone (NARCAN) injection 0.4 mg  0.4 mg IntraVENous EVERY 2 MINUTES AS NEEDED    piperacillin-tazobactam (ZOSYN) 3.375 g in 0.9% sodium chloride (MBP/ADV) 100 mL  3.375 g IntraVENous Q8H    prochlorperazine (COMPAZINE) with saline injection 5 mg  5 mg IntraVENous Q6H PRN    0.9% sodium chloride infusion  25 mL/hr IntraVENous CONTINUOUS    0.9% sodium chloride infusion 250 mL  250 mL IntraVENous PRN    enoxaparin (LOVENOX) injection 40 mg  40 mg SubCUTAneous Q24H    [Held by provider] oxyCODONE IR (ROXICODONE) tablet 10 mg  10 mg Oral Q4H PRN    morphine injection 2 mg  2 mg IntraVENous Q2H PRN    sodium chloride (NS) flush 5-40 mL  5-40 mL IntraVENous Q8H    sodium chloride (NS) flush 5-40 mL  5-40 mL IntraVENous PRN    [Held by provider] furosemide (LASIX) tablet 80 mg  80 mg Oral DAILY    [Held by provider] spironolactone (ALDACTONE) tablet 200 mg  200 mg Oral DAILY    lactulose (CHRONULAC) 10 gram/15 mL solution 45 mL  30 g Oral TID    albuterol-ipratropium (DUO-NEB) 2.5 MG-0.5 MG/3 ML  3 mL Nebulization Q4H RT    ondansetron (ZOFRAN) injection 4 mg  4 mg IntraVENous Q8H PRN    bisacodyl (DULCOLAX) suppository 10 mg  10 mg Rectal DAILY PRN     ______________________________________________________________________  EXPECTED LENGTH OF STAY: 3d 7h  ACTUAL LENGTH OF STAY:          3                 Roel Waller MD

## 2019-08-04 NOTE — PROGRESS NOTES
Problem: Breathing Pattern - Ineffective  Goal: *Absence of hypoxia  Outcome: Progressing Towards Goal  Goal: *Use of effective breathing techniques  Outcome: Progressing Towards Goal  Goal: *PALLIATIVE CARE:  Alleviation of Dyspnea  Outcome: Progressing Towards Goal     Problem: Patient Education: Go to Patient Education Activity  Goal: Patient/Family Education  Outcome: Progressing Towards Goal     Problem: Falls - Risk of  Goal: *Absence of Falls  Description  Document Tae Fuentes Fall Risk and appropriate interventions in the flowsheet.   Outcome: Progressing Towards Goal  Note:   Fall Risk Interventions:  Mobility Interventions: Patient to call before getting OOB         Medication Interventions: Patient to call before getting OOB, Teach patient to arise slowly    Elimination Interventions: Call light in reach, Urinal in reach              Problem: Patient Education: Go to Patient Education Activity  Goal: Patient/Family Education  Outcome: Progressing Towards Goal     Problem: General Medical Care Plan  Goal: *Vital signs within specified parameters  Outcome: Progressing Towards Goal  Goal: *Labs within defined limits  Outcome: Progressing Towards Goal  Goal: *Absence of infection signs and symptoms  Outcome: Progressing Towards Goal  Goal: *Optimal pain control at patient's stated goal  Outcome: Progressing Towards Goal  Goal: *Skin integrity maintained  Outcome: Progressing Towards Goal  Goal: *Fluid volume balance  Outcome: Progressing Towards Goal  Goal: *Optimize nutritional status  Outcome: Progressing Towards Goal  Goal: *Anxiety reduced or absent  Outcome: Progressing Towards Goal  Goal: *Progressive mobility and function (eg: ADL's)  Outcome: Progressing Towards Goal     Problem: Patient Education: Go to Patient Education Activity  Goal: Patient/Family Education  Outcome: Progressing Towards Goal

## 2019-08-04 NOTE — PROGRESS NOTES
Mr Wells Cali reports he is doing better this morning. Abd pain improved. No acute events overnight. Metabolic acidosis resolved yesterday. Afebrile  HD stable    On exam he is seated upright  Alert and oriented  Much more conversant  Still mildly pale  Lungs CTA b/l  rrr  Abd soft, mildly protuberant, mild tenderness      I have personally reviewed his repeat CXR. Pneumothorax is not enlarging. Trace pleural fluid    Diagnoses  1- Right hepatic hydrothorax  2: right pneumothorax  3: cryptogenic cirrhosis    Mr. Baird clinically appears much better this morning. No indications for intervention in his right chest.  Hopefully, the TIPS procedure will improve his overall condition and decrease the recurrence of his hepatic hydrothorax. We will remain available for any future needs.

## 2019-08-04 NOTE — PROGRESS NOTES
2 [facility] [address] [facility] Ashutosh Montes MD, Alyse Ng MD Luetta Griffin, SYLVIE Patel, [facility]    Ananya Leo, [facility]   [facility]  Rebecca Varela, [facility]        [address] [facility]    [address], [address] [place]  [place], [address]    [phone]    FAX: [phone] [address]  [address] [facility], [address]  [place], [address] [facility]    [phone]    FAX: [phone]         HEPATOLOGY PROGRESS NOTE  The patient is H 79 y.o. Carol Askew was found to have chronic liver disease and cirrhosis in 5/2017 when he developed variceal bleeding.     He developed hepatic hydrothorax and has undergone several thoracentesis. He as hospitalized for TIPS placement. This was a very very difficult procedure and required an exceeding long procedure time.     Yesterday he was found to have a small pneumothorax, developed a metabolic acidosis, and sevre abdominal pain. Ultrasound with duplex demonstrated the TIPS was patent, no ascites and small pleural effusion. He was given ABX and IV albumin. The metabolic acidosis and ZOEY resolved. The was given pain medications and the pain has resolved. Today he feels much better. There is no abdominal tenderness. No ascites, edema. All labs are improved. The WBC SCreat, TBILI are down. Sna is up. Only lab going wrong way is INR which is up a bit to 1.9    Can stop IV albumin. If clinically stable and pneumothorax needs no treatment he could potentially go home in AM.     ASSESSMENT AND PLAN:  Cirrhosis  Cryptogenic cirrhosis.     The diagnosis of cirrhosis is based upon Fiboscan, imaging, laboratory studies, complications of cirrhosis.   The CTP is 9.  Child class C.  The MELD score is 24.     Ascites   There is no obvious ascites. There is no edema. The abdomen is soft and non-tender. US of TIPS yesterday showed good flow through the tract.     Hepatic hydrothorax  This was treated with TIPS placement. Initial RA was 7, wedge HVP 23. HVPG = 15 mm Hg. Post TIPS RA 11.  PV post TIPS was 15. HVPG=4    He had a thoracentesis performed after the TIPS. There is a small pneumothorax this AM which appears larger this afternoon.        Screening for Esophageal varices   The patient has esophageal varices with prior bleeding.    Varices have been banded   The last EGD to assess for varices was performed in 5/218. Now that he has patent TIPS esophageal varices will be decompressed and repeat EGD not necessary.     Hepatic encephalopathy   Overt HE is controlled on current dose of lactulose TID. He is not having an diarrhea. He will need xifaxan after TIPS. Since it is difficult to get xifaxan and co-payment is high will start on flagyl 500 BID then switch to xifaxan when patient assistance is approved. There is no need to restrict dietary protein at this time.       Pulmonary hypertension  ECHO prior to TIPS showed mild pulmonary HTN with estimated PAP of 42 mm Hg and mild TR. RA pressure prior at time of TIPS was only 7 mm Hg.     Thrombocytopenia   This is secondary to cirrhosis. There is no evidence of overt bleeding.    No treatment is required. The platelet count is adequate for the patient to undergo procedures without the need for platelet transfusion or platelet growth factors.     Anemia   This is due to multifactorial causes including portal hypertension with chronic GI blood loss, chronic systemic disease malnutrition due to poor nutritional intake.        PHYSICAL EXAMINATION:  VS: per nursing note  General:  No acute distress. Eyes:  Sclera anicteric. ENT:  No oral lesions.  Thyroid normal.  Nodes:  No adenopathy. Skin: Spider angiomata.   Respiratory:  Lungs clear to auscultation bilaterally. Cardiovascular: Regular heart rate. No JVD. Abdomen: Soft non-tender, No obvious ascites. Extremities:  No lower extremity edema.    Neurologic:  Alert and oriented.     LABORATORY:  Results for Beverly Whitmore (MRN [id number]) as of 8/4/2019 13:00   Ref. Range 8/2/2019 02:38 8/3/2019 03:13 8/4/2019 05:19   WBC Latest Ref Range: 4.1 - 11.1 K/uL 7.2 8.5 9.2   HGB Latest Ref Range: 12.1 - 17.0 g/dL 10.9 (L) 10.5 (L) 9.0 (L)   PLATELET Latest Ref Range: 150 - 400 K/uL 113 (L) 87 (L) 85 (L)   INR Latest Ref Range: 0.9 - 1.1   1.6 (H) 1.7 (H) 1.9 (H)   Sodium Latest Ref Range: 136 - 145 mmol/L 133 (L) 130 (L) 130 (L)   Potassium Latest Ref Range: 3.5 - 5.1 mmol/L 4.2 4.4 4.5   Chloride Latest Ref Range: 97 - 108 mmol/L 104 100 102   CO2 Latest Ref Range: 21 - 32 mmol/L 23 14 (LL) 21   Glucose Latest Ref Range: 65 - 100 mg/dL 96 226 (H) 149 (H)   BUN Latest Ref Range: 6 - 20 MG/DL 12 11 11   Creatinine Latest Ref Range: 0.70 - 1.30 MG/DL 1.31 (H) 1.42 (H) 1.13   Bilirubin, total Latest Ref Range: 0.2 - 1.0 MG/DL 2.5 (H) 4.1 (H) 3.7 (H)   Albumin Latest Ref Range: 3.5 - 5.0 g/dL 2.2 (L) 3.5 3.3 (L)   ALT (SGPT) Latest Ref Range: 12 - 78 U/L 34 48 59   AST Latest Ref Range: 15 - 37 U/L 38 (H) 70 (H) 89 (H)   Alk.  phosphatase Latest Ref Range: 45 - 117 U/L 108 94 87     CXR: no change in size of small-moderate pneumothorax      MD Wilver HernadezRhode Island Hospitalskezia 1, [address] [address]. [phone]  [address] [facility]

## 2019-08-05 ENCOUNTER — APPOINTMENT (OUTPATIENT)
Dept: GENERAL RADIOLOGY | Age: 59
DRG: 406 | End: 2019-08-05
Attending: INTERNAL MEDICINE
Payer: SELF-PAY

## 2019-08-05 VITALS
DIASTOLIC BLOOD PRESSURE: 45 MMHG | BODY MASS INDEX: 25.13 KG/M2 | WEIGHT: 189.6 LBS | TEMPERATURE: 98.3 F | HEIGHT: 73 IN | RESPIRATION RATE: 15 BRPM | OXYGEN SATURATION: 95 % | SYSTOLIC BLOOD PRESSURE: 121 MMHG | HEART RATE: 104 BPM

## 2019-08-05 PROBLEM — E87.20 LACTIC ACIDOSIS: Status: ACTIVE | Noted: 2019-08-05

## 2019-08-05 PROBLEM — J93.9 PNEUMOTHORAX: Status: ACTIVE | Noted: 2019-08-05

## 2019-08-05 PROBLEM — A41.9 SEPSIS (HCC): Status: ACTIVE | Noted: 2019-08-05

## 2019-08-05 PROBLEM — Z95.828 S/P TIPS (TRANSJUGULAR INTRAHEPATIC PORTOSYSTEMIC SHUNT): Status: ACTIVE | Noted: 2019-08-05

## 2019-08-05 LAB
ANION GAP SERPL CALC-SCNC: 5 MMOL/L (ref 5–15)
BASOPHILS # BLD: 0 K/UL (ref 0–0.1)
BASOPHILS NFR BLD: 0 % (ref 0–1)
BUN SERPL-MCNC: 12 MG/DL (ref 6–20)
BUN/CREAT SERPL: 12 (ref 12–20)
CALCIUM SERPL-MCNC: 8.4 MG/DL (ref 8.5–10.1)
CHLORIDE SERPL-SCNC: 104 MMOL/L (ref 97–108)
CO2 SERPL-SCNC: 24 MMOL/L (ref 21–32)
CREAT SERPL-MCNC: 1.04 MG/DL (ref 0.7–1.3)
DIFFERENTIAL METHOD BLD: ABNORMAL
EOSINOPHIL # BLD: 0.2 K/UL (ref 0–0.4)
EOSINOPHIL NFR BLD: 2 % (ref 0–7)
ERYTHROCYTE [DISTWIDTH] IN BLOOD BY AUTOMATED COUNT: 15.9 % (ref 11.5–14.5)
GLUCOSE SERPL-MCNC: 122 MG/DL (ref 65–100)
HCT VFR BLD AUTO: 25.7 % (ref 36.6–50.3)
HGB BLD-MCNC: 8.9 G/DL (ref 12.1–17)
IMM GRANULOCYTES # BLD AUTO: 0 K/UL (ref 0–0.04)
IMM GRANULOCYTES NFR BLD AUTO: 1 % (ref 0–0.5)
LYMPHOCYTES # BLD: 1.1 K/UL (ref 0.8–3.5)
LYMPHOCYTES NFR BLD: 15 % (ref 12–49)
MAGNESIUM SERPL-MCNC: 2 MG/DL (ref 1.6–2.4)
MCH RBC QN AUTO: 34 PG (ref 26–34)
MCHC RBC AUTO-ENTMCNC: 34.6 G/DL (ref 30–36.5)
MCV RBC AUTO: 98.1 FL (ref 80–99)
MONOCYTES # BLD: 1.3 K/UL (ref 0–1)
MONOCYTES NFR BLD: 17 % (ref 5–13)
NEUTS SEG # BLD: 5.1 K/UL (ref 1.8–8)
NEUTS SEG NFR BLD: 66 % (ref 32–75)
NRBC # BLD: 0 K/UL (ref 0–0.01)
NRBC BLD-RTO: 0 PER 100 WBC
PHOSPHATE SERPL-MCNC: 2.1 MG/DL (ref 2.6–4.7)
PLATELET # BLD AUTO: 77 K/UL (ref 150–400)
PMV BLD AUTO: 9.7 FL (ref 8.9–12.9)
POTASSIUM SERPL-SCNC: 4.3 MMOL/L (ref 3.5–5.1)
RBC # BLD AUTO: 2.62 M/UL (ref 4.1–5.7)
SODIUM SERPL-SCNC: 133 MMOL/L (ref 136–145)
WBC # BLD AUTO: 7.8 K/UL (ref 4.1–11.1)

## 2019-08-05 PROCEDURE — 71045 X-RAY EXAM CHEST 1 VIEW: CPT

## 2019-08-05 PROCEDURE — 80048 BASIC METABOLIC PNL TOTAL CA: CPT

## 2019-08-05 PROCEDURE — 74011250636 HC RX REV CODE- 250/636: Performed by: INTERNAL MEDICINE

## 2019-08-05 PROCEDURE — 84100 ASSAY OF PHOSPHORUS: CPT

## 2019-08-05 PROCEDURE — 85025 COMPLETE CBC W/AUTO DIFF WBC: CPT

## 2019-08-05 PROCEDURE — 74011000258 HC RX REV CODE- 258: Performed by: INTERNAL MEDICINE

## 2019-08-05 PROCEDURE — 36415 COLL VENOUS BLD VENIPUNCTURE: CPT

## 2019-08-05 PROCEDURE — 74011250637 HC RX REV CODE- 250/637: Performed by: INTERNAL MEDICINE

## 2019-08-05 PROCEDURE — 83735 ASSAY OF MAGNESIUM: CPT

## 2019-08-05 RX ORDER — OXYCODONE HYDROCHLORIDE 5 MG/1
10 TABLET ORAL
Qty: 12 TAB | Refills: 0 | Status: SHIPPED | OUTPATIENT
Start: 2019-08-05 | End: 2019-08-08

## 2019-08-05 RX ORDER — IPRATROPIUM BROMIDE AND ALBUTEROL SULFATE 2.5; .5 MG/3ML; MG/3ML
3 SOLUTION RESPIRATORY (INHALATION)
Status: DISCONTINUED | OUTPATIENT
Start: 2019-08-05 | End: 2019-08-05 | Stop reason: HOSPADM

## 2019-08-05 RX ORDER — METRONIDAZOLE 500 MG/1
500 TABLET ORAL 2 TIMES DAILY
Qty: 60 TAB | Refills: 0 | Status: SHIPPED | OUTPATIENT
Start: 2019-08-05 | End: 2019-09-04

## 2019-08-05 RX ADMIN — LACTULOSE 45 ML: 20 SOLUTION ORAL at 09:02

## 2019-08-05 RX ADMIN — PIPERACILLIN SODIUM,TAZOBACTAM SODIUM 3.38 G: 3; .375 INJECTION, POWDER, FOR SOLUTION INTRAVENOUS at 09:02

## 2019-08-05 RX ADMIN — PIPERACILLIN SODIUM,TAZOBACTAM SODIUM 3.38 G: 3; .375 INJECTION, POWDER, FOR SOLUTION INTRAVENOUS at 02:31

## 2019-08-05 NOTE — PROGRESS NOTES
0300 RN in patient's room to weight patient. Patient refusing weight at this time. Will attempt to get weight at later time. 0730 Bedside and Verbal shift change report given to Josette Wong RN (oncoming nurse) by Marilou Warner RN (offgoing nurse). Report included the following information SBAR, Kardex, Intake/Output, MAR, Recent Results and Cardiac Rhythm ST. Oncoming RN notified of weight variance. Last 3 Recorded Weights in this Encounter    08/03/19 0341 08/04/19 0459 08/05/19 0743   Weight: 82.5 kg (181 lb 14.1 oz) 84.1 kg (185 lb 6.5 oz) 86 kg (189 lb 9.5 oz)         Problem: Falls - Risk of  Goal: *Absence of Falls  Description  Document Naila Fall Risk and appropriate interventions in the flowsheet. Outcome: Progressing Towards Goal  Note:   Fall Risk Interventions:  Mobility Interventions: Assess mobility with egress test, Communicate number of staff needed for ambulation/transfer, Patient to call before getting OOB         Medication Interventions: Assess postural VS orthostatic hypotension, Evaluate medications/consider consulting pharmacy, Patient to call before getting OOB, Teach patient to arise slowly    Elimination Interventions: Call light in reach, Patient to call for help with toileting needs       Bed in low position, locked bed wheels. Call bell and personal items within reach. Hourly rounding performed. Instructed patient to call when needing assistance. Patient demonstrates understanding. Problem: General Medical Care Plan  Goal: *Vital signs within specified parameters  Outcome: Progressing Towards Goal  Note:   Patient Vitals for the past 8 hrs:   Temp Pulse Resp BP SpO2   08/05/19 0300 98.4 °F (36.9 °C) (!) 102 16 138/47 98 %   08/04/19 2329 99 °F (37.2 °C) 100 24 (!) 112/26 93 %   08/04/19 2322 -- -- -- -- 95 %   08/04/19 2205 -- (!) 105 -- (!) 125/38 --       VS WDL.    Goal: *Optimal pain control at patient's stated goal  Outcome: Progressing Towards Goal  Note:   Patient complains of chest pain when inhaling deeply. Pain rating of 6/10. Treated with 2 mg IV morphine. Pain rating improved to 0. Will continue to monitor and assess pain as appropriate.

## 2019-08-05 NOTE — DISCHARGE INSTRUCTIONS
Discharge Instructions       PATIENT ID: Gaudencio Arreguin  MRN: [id number]   YOB: 1960    DATE OF ADMISSION: 8/1/2019 10:50 AM    DATE OF DISCHARGE: 8/5/2019    PRIMARY CARE PROVIDER: Randy Galvez MD     ATTENDING PHYSICIAN: Richelle Ramirez*  DISCHARGING PROVIDER: Robbie Dye MD    To contact this individual call [phone] and ask the  to page. If unavailable ask to be transferred the Adult Hospitalist Department. DISCHARGE DIAGNOSES   Sepsis - resolved. Cryptogenic cirrhosis - s/p TIPS  Hydrpothorax s/p TIPS and thoracentesis   Small traumatic pneumothorax         CONSULTATIONS: IP CONSULT TO HEPATOLOGY  IP CONSULT TO INTERVENTIONAL RADIOLOGY  IP CONSULT TO THORACIC SURGERY    PROCEDURES/SURGERIES: * No surgery found *    PENDING TEST RESULTS:   At the time of discharge the following test results are still pending: none    FOLLOW UP APPOINTMENTS:   Follow-up Information     Follow up With Specialties Details Why Contact Info    Randy Galvez MD [facility] In 2 weeks  [address] [address]      Salud Bright MD Hepatology, Liver Disease, Internal Medicine In 1 week hospital follow up  [address] [facility] Toshia Byers [address]  [phone]             ADDITIONAL CARE RECOMMENDATIONS:   1. Please take all medications as prescribed. Note changes as below. - as needed pain medications  - start flagyl twice daily, for the next 30 days, until instructed by Dr. Renetta Watson   - resume your diuretics   2. Please make sure to follow up with your primary care physician within 1-2 weeks of discharge for hospital follow up. You also need to follow up with Dr. Renetta Watson. 3. Please make continue to monitor for signs of respiratory distress, trouble breathing, increased pain with breathing, or fast breathing. Come back to the emergency room immediately or call 911 with any of these symptoms.    4. Avoid tobacco, alcohol and other illicit drug use. DIET: Resume previous diet    ACTIVITY: Activity as tolerated    WOUND CARE: None    EQUIPMENT needed: None    DISCHARGE MEDICATIONS:   See Medication Reconciliation Form    · It is important that you take the medication exactly as they are prescribed. · Keep your medication in the bottles provided by the pharmacist and keep a list of the medication names, dosages, and times to be taken in your wallet. · Do not take other medications without consulting your doctor. NOTIFY YOUR PHYSICIAN FOR ANY OF THE FOLLOWING:   Fever over 101 degrees for 24 hours. Chest pain, shortness of breath, fever, chills, nausea, vomiting, diarrhea, change in mentation, falling, weakness, bleeding. Severe pain or pain not relieved by medications. Or, any other signs or symptoms that you may have questions about. DISPOSITION:  X  Home With:   OT  PT  HH  RN       SNF/Inpatient Rehab/LTAC    Independent/assisted living    Hospice    Other:     CDMP Checked: Yes X     PROBLEM LIST Updated:   Yes X       Signed:   Robbie Dye MD  8/5/2019  9:36 AM

## 2019-08-05 NOTE — DISCHARGE SUMMARY
Discharge Summary       PATIENT ID: Faisal Ames  MRN: [id number]   YOB: 1960    DATE OF ADMISSION: 8/1/2019 10:50 AM    DATE OF DISCHARGE: 08/05/2019   PRIMARY CARE PROVIDER: José Antonio Avalos MD     DISCHARGING PROVIDER: Nannette Aflord MD    To contact this individual call [phone] and ask the  to page. If unavailable ask to be transferred the Adult Hospitalist Department. CONSULTATIONS: IP CONSULT TO HEPATOLOGY  IP CONSULT TO INTERVENTIONAL RADIOLOGY  IP CONSULT TO THORACIC SURGERY    PROCEDURES/SURGERIES: * No surgery found *    ADMITTING DIAGNOSES & HOSPITAL COURSE:   Sepsis - resolved. Cryptogenic cirrhosis - s/p TIPS  Hydrpothorax s/p TIPS and thoracentesis   Small traumatic pneumothorax       63 y. o. male with pmh of cryptogenic cirrhosis, hydrothorax s/p recent thoracentesis 7/31 at [facility], h/o HE, who presents directly from hepatology clinic for TIPS. Pt had a prolonged and technically difficult TIPS procedure on 8/2, and thoracentesis for pleural effusion on 8/2. After the procedure he likely had some local bleeding causing elevated lactate and AGMA, which resolved after a few days. He also had a traumatic pneumothorax secondary to the thoracentesis which was treated with oxygen. CXR daily has been stable, with no increased work of breathing on room air. Dr. Cuevas Friday was consulted, US of the liver showed a patent TIPS, and he was discharged home on step 2 diuretics and flagyl per Dr. Cheo Rivas recommendations. DISCHARGE DIAGNOSES / PLAN:      Small traumatic pneumothorax - secondary to paracentesis  - No intervention required, CTS following.   - Repeat CXR 8/5 with stable PTX  - s/p P2 therapy   - counseled on signs of respiratory distress after discharge. .   - IR notified.      AGMA - lactic acidosis resolved.    - Likely secondary to procedure 8/2     Sepsis (RR, HR) - possible source in the abdomen after his TIPS procedure yesterday  - DC zosyn   - Blood cultures NGTD     Increased fatigue - ammonia 95, improved today   - Need to be mindful of HE post TIPS  - Continue lactulose and close monitoring     Pleural effusion - likey hydrothorax from cirrhosis  - Hold step2 diuretics 80mg lasix per day, 200mg of spironolactone per day  - Continue IS  - s/p TIPS procedure 8/2   - s/p thoracentesis 8/2  - IR consulted, appreciate assistance. - Hepatology following     Cryptogenic cirrhosis  - Step 2 diuretics on hold. - Continue lactulose  - Hepatology following  - start flagyl per hepatology recs.      Elevated Creatinine, likely component of CKD, has had multipel Cr around 1.2 over the last few months. - Monitor with diuresis and post procedure  - strict I and O, avoid nephrotoxins.      Hyponatremia - chronic secondary to cirrhosis, monitor      COPD - nebs PRN         ADDITIONAL CARE RECOMMENDATIONS:   1. Please take all medications as prescribed. Note changes as below. - as needed pain medications  - start flagyl twice daily, for the next 30 days, until instructed by Dr. Mansi Selby   - resume your diuretics   2. Please make sure to follow up with your primary care physician within 1-2 weeks of discharge for hospital follow up. You also need to follow up with Dr. Mansi Selby. 3. Please make continue to monitor for signs of respiratory distress, trouble breathing, increased pain with breathing, or fast breathing. Come back to the emergency room immediately or call 911 with any of these symptoms. 4. Avoid tobacco, alcohol and other illicit drug use.        PENDING TEST RESULTS:   At the time of discharge the following test results are still pending: None    FOLLOW UP APPOINTMENTS:    Follow-up Information     Follow up With Specialties Details Why Cherie Flores MD Family Practice In 2 weeks  [address] [place] [address]      Natalie Santiago MD Hepatology, Liver Disease, Internal Medicine In 1 week hospital follow up  [phone] 4101 [address]  [phone]               DIET: Resume previous diet    ACTIVITY: Activity as tolerated    WOUND CARE: None    EQUIPMENT needed: None      DISCHARGE MEDICATIONS:  Current Discharge Medication List      START taking these medications    Details   oxyCODONE IR (ROXICODONE) 5 mg immediate release tablet Take 2 Tabs by mouth every four (4) hours as needed for Pain for up to 3 days. Max Daily Amount: 60 mg.  Qty: 12 Tab, Refills: 0    Associated Diagnoses: S/P TIPS (transjugular intrahepatic portosystemic shunt)      metroNIDAZOLE (FLAGYL) 500 mg tablet Take 1 Tab by mouth two (2) times a day for 30 days. Qty: 60 Tab, Refills: 0         CONTINUE these medications which have NOT CHANGED    Details   furosemide (LASIX) 40 mg tablet Take 2 Tabs by mouth daily. Qty: 60 Tab, Refills: 4      spironolactone (ALDACTONE) 100 mg tablet Take 2 Tabs by mouth daily. Qty: 60 Tab, Refills: 4      lactulose (CHRONULAC) 10 gram/15 mL solution Take 45 mL by mouth three (3) times daily. Qty: 1 Bottle, Refills: 1      albuterol (PROVENTIL HFA, VENTOLIN HFA, PROAIR HFA) 90 mcg/actuation inhaler Take  by inhalation as needed for Wheezing. ondansetron (ZOFRAN ODT) 4 mg disintegrating tablet Take 1 Tab by mouth every eight (8) hours as needed for Nausea. Qty: 90 Tab, Refills: 6               NOTIFY YOUR PHYSICIAN FOR ANY OF THE FOLLOWING:   Fever over 101 degrees for 24 hours. Chest pain, shortness of breath, fever, chills, nausea, vomiting, diarrhea, change in mentation, falling, weakness, bleeding. Severe pain or pain not relieved by medications. Or, any other signs or symptoms that you may have questions about.     DISPOSITION:   X Home With:   OT  PT  HH  RN       Long term SNF/Inpatient Rehab    Independent/assisted living    Hospice    Other:       PATIENT CONDITION AT DISCHARGE:     Functional status    Poor     Deconditioned    X Independent      Cognition    X Lucid     Forgetful Dementia      Catheters/lines (plus indication)    Teresa     PICC     PEG    X None      Code status    x Full code     DNR      PHYSICAL EXAMINATION AT DISCHARGE:  Visit Vitals  /45 (BP 1 Location: Left arm, BP Patient Position: At rest;Sitting)   Pulse (!) 104   Temp 98.3 °F (36.8 °C)   Resp 15   Ht 6' 1\" (1.854 m)   Wt 86 kg (189 lb 9.5 oz)   SpO2 95%   BMI 25.01 kg/m²     Gen: NAD, awake in bed  HEENT: NC/AT, sclera anicteric, PERRL, EOMI  CV: SR, + tachycardia  no m/r/g  Resp: decreased breath sounds over the R, clear on the L, no work of breathing, speaking in full sentences, on RA   Abd: NT/ND, normal bowel sounds  Ext: 2+ pulses, no edema  Skin: No rashes        CHRONIC MEDICAL DIAGNOSES:  Problem List as of 8/5/2019 Date Reviewed: 8/2/2019          Codes Class Noted - Resolved    Hepatic encephalopathy ([facility] ICD-10-CM: K72.90  ICD-9-CM: 572.2  8/1/2019 - Present        Ascites ICD-10-CM: R18.8  ICD-9-CM: 789.59  8/1/2019 - Present        Pleural effusion ICD-10-CM: J90  ICD-9-CM: 511.9  8/1/2019 - Present        Large pleural effusion ICD-10-CM: J90  ICD-9-CM: 511.9  6/13/2019 - Present        Cirrhosis ([facility] ICD-10-CM: K74.60  ICD-9-CM: 571.5  6/6/2017 - Present        Esophageal varices ([facility] ICD-10-CM: I85.00  ICD-9-CM: 456.1  6/6/2017 - Present        Diabetes mellitus ([facility] ICD-10-CM: E11.9  ICD-9-CM: 250.00  6/6/2017 - Present        H/O umbilical hernia repair [id number]: Z98.890, Z87.19  ICD-9-CM: V15.29  6/6/2017 - Present        S/P appendectomy ICD-10-CM: Z90.49  ICD-9-CM: V45.89  6/6/2017 - Present              Greater than 30 minutes were spent with the patient on counseling and coordination of care    Signed:   Jevon Villanueva MD  8/5/2019  9:27 AM

## 2019-08-08 LAB
BACTERIA SPEC CULT: NORMAL
SERVICE CMNT-IMP: NORMAL

## 2019-08-13 ENCOUNTER — HOSPITAL ENCOUNTER (OUTPATIENT)
Dept: ULTRASOUND IMAGING | Age: 59
Discharge: HOME | End: 2019-08-13
Attending: INTERNAL MEDICINE
Payer: SELF-PAY

## 2019-08-13 ENCOUNTER — HOSPITAL ENCOUNTER (EMERGENCY)
Age: 59
Discharge: HOME | End: 2019-08-13
Attending: EMERGENCY MEDICINE
Payer: SELF-PAY

## 2019-08-13 ENCOUNTER — HOSPITAL ENCOUNTER (OUTPATIENT)
Dept: GENERAL RADIOLOGY | Age: 59
Discharge: HOME | End: 2019-08-13
Attending: INTERNAL MEDICINE
Payer: SELF-PAY

## 2019-08-13 ENCOUNTER — HOSPITAL ENCOUNTER (OUTPATIENT)
Dept: GENERAL RADIOLOGY | Age: 59
Discharge: HOME | End: 2019-08-13
Attending: RADIOLOGY
Payer: SELF-PAY

## 2019-08-13 ENCOUNTER — OFFICE VISIT (OUTPATIENT)
Dept: HEMATOLOGY | Age: 59
End: 2019-08-13

## 2019-08-13 VITALS
HEART RATE: 91 BPM | DIASTOLIC BLOOD PRESSURE: 38 MMHG | SYSTOLIC BLOOD PRESSURE: 106 MMHG | OXYGEN SATURATION: 94 % | RESPIRATION RATE: 21 BRPM

## 2019-08-13 VITALS
RESPIRATION RATE: 15 BRPM | TEMPERATURE: 98 F | SYSTOLIC BLOOD PRESSURE: 129 MMHG | BODY MASS INDEX: 23.99 KG/M2 | HEART RATE: 108 BPM | DIASTOLIC BLOOD PRESSURE: 61 MMHG | WEIGHT: 181 LBS | HEIGHT: 73 IN | OXYGEN SATURATION: 93 %

## 2019-08-13 VITALS
HEIGHT: 73 IN | SYSTOLIC BLOOD PRESSURE: 117 MMHG | WEIGHT: 181 LBS | TEMPERATURE: 97.6 F | BODY MASS INDEX: 23.99 KG/M2 | OXYGEN SATURATION: 95 % | DIASTOLIC BLOOD PRESSURE: 53 MMHG | HEART RATE: 98 BPM

## 2019-08-13 VITALS — RESPIRATION RATE: 15 BRPM | HEART RATE: 90 BPM | DIASTOLIC BLOOD PRESSURE: 33 MMHG | SYSTOLIC BLOOD PRESSURE: 104 MMHG

## 2019-08-13 DIAGNOSIS — K70.31 ALCOHOLIC CIRRHOSIS OF LIVER WITH ASCITES (HCC): ICD-10-CM

## 2019-08-13 DIAGNOSIS — J90 PLEURAL EFFUSION: ICD-10-CM

## 2019-08-13 DIAGNOSIS — R55 NEAR SYNCOPE: Primary | ICD-10-CM

## 2019-08-13 DIAGNOSIS — K70.31 ALCOHOLIC CIRRHOSIS OF LIVER WITH ASCITES (HCC): Primary | ICD-10-CM

## 2019-08-13 LAB
ALBUMIN SERPL-MCNC: 2.5 G/DL (ref 3.5–5)
ALBUMIN/GLOB SERPL: 0.9 {RATIO} (ref 1.1–2.2)
ALP SERPL-CCNC: 155 U/L (ref 45–117)
ALT SERPL-CCNC: 35 U/L (ref 12–78)
AMMONIA PLAS-SCNC: 38 UMOL/L
ANION GAP SERPL CALC-SCNC: 8 MMOL/L (ref 5–15)
APPEARANCE FLD: ABNORMAL
AST SERPL-CCNC: 60 U/L (ref 15–37)
BASOPHILS # BLD: 0 K/UL (ref 0–0.1)
BASOPHILS NFR BLD: 0 % (ref 0–1)
BILIRUB SERPL-MCNC: 3.6 MG/DL (ref 0.2–1)
BUN SERPL-MCNC: 9 MG/DL (ref 6–20)
BUN/CREAT SERPL: 9 (ref 12–20)
CALCIUM SERPL-MCNC: 7.9 MG/DL (ref 8.5–10.1)
CHLORIDE SERPL-SCNC: 104 MMOL/L (ref 97–108)
CO2 SERPL-SCNC: 22 MMOL/L (ref 21–32)
COLOR FLD: ABNORMAL
COMMENT, HOLDF: NORMAL
CREAT SERPL-MCNC: 1.05 MG/DL (ref 0.7–1.3)
DIFFERENTIAL METHOD BLD: ABNORMAL
EOSINOPHIL # BLD: 0.4 K/UL (ref 0–0.4)
EOSINOPHIL NFR BLD: 3 % (ref 0–7)
EOSINOPHIL NFR FLD MANUAL: 57 %
ERYTHROCYTE [DISTWIDTH] IN BLOOD BY AUTOMATED COUNT: 17.4 % (ref 11.5–14.5)
GLOBULIN SER CALC-MCNC: 2.7 G/DL (ref 2–4)
GLUCOSE BLD STRIP.AUTO-MCNC: 147 MG/DL (ref 65–100)
GLUCOSE SERPL-MCNC: 147 MG/DL (ref 65–100)
HCT VFR BLD AUTO: 34.2 % (ref 36.6–50.3)
HGB BLD-MCNC: 11.3 G/DL (ref 12.1–17)
IMM GRANULOCYTES # BLD AUTO: 0 K/UL
IMM GRANULOCYTES NFR BLD AUTO: 0 %
LYMPHOCYTES # BLD: 1.2 K/UL (ref 0.8–3.5)
LYMPHOCYTES NFR BLD: 10 % (ref 12–49)
LYMPHOCYTES NFR FLD: 7 %
MCH RBC QN AUTO: 33.6 PG (ref 26–34)
MCHC RBC AUTO-ENTMCNC: 33 G/DL (ref 30–36.5)
MCV RBC AUTO: 101.8 FL (ref 80–99)
MONOCYTES # BLD: 1.1 K/UL (ref 0–1)
MONOCYTES NFR BLD: 9 % (ref 5–13)
MONOS+MACROS NFR FLD: 35 %
NEUTROPHILS NFR FLD: 1 %
NEUTS SEG # BLD: 9.7 K/UL (ref 1.8–8)
NEUTS SEG NFR BLD: 78 % (ref 32–75)
NRBC # BLD: 0 K/UL (ref 0–0.01)
NRBC BLD-RTO: 0 PER 100 WBC
NUC CELL # FLD: 2440 /CU MM
PLATELET # BLD AUTO: 116 K/UL (ref 150–400)
PMV BLD AUTO: 9.4 FL (ref 8.9–12.9)
POTASSIUM SERPL-SCNC: 4.2 MMOL/L (ref 3.5–5.1)
PROT SERPL-MCNC: 5.2 G/DL (ref 6.4–8.2)
RBC # BLD AUTO: 3.36 M/UL (ref 4.1–5.7)
RBC # FLD: >100 /CU MM
RBC MORPH BLD: ABNORMAL
SAMPLES BEING HELD,HOLD: NORMAL
SERVICE CMNT-IMP: ABNORMAL
SODIUM SERPL-SCNC: 134 MMOL/L (ref 136–145)
SPECIMEN SOURCE FLD: ABNORMAL
TROPONIN I SERPL-MCNC: <0.05 NG/ML
WBC # BLD AUTO: 12.4 K/UL (ref 4.1–11.1)

## 2019-08-13 PROCEDURE — 36415 COLL VENOUS BLD VENIPUNCTURE: CPT

## 2019-08-13 PROCEDURE — 82140 ASSAY OF AMMONIA: CPT

## 2019-08-13 PROCEDURE — 32555 ASPIRATE PLEURA W/ IMAGING: CPT

## 2019-08-13 PROCEDURE — C1729 CATH, DRAINAGE: HCPCS

## 2019-08-13 PROCEDURE — 93976 VASCULAR STUDY: CPT

## 2019-08-13 PROCEDURE — 99284 EMERGENCY DEPT VISIT MOD MDM: CPT

## 2019-08-13 PROCEDURE — 99285 EMERGENCY DEPT VISIT HI MDM: CPT

## 2019-08-13 PROCEDURE — 89050 BODY FLUID CELL COUNT: CPT

## 2019-08-13 PROCEDURE — 80053 COMPREHEN METABOLIC PANEL: CPT

## 2019-08-13 PROCEDURE — 94762 N-INVAS EAR/PLS OXIMTRY CONT: CPT

## 2019-08-13 PROCEDURE — 85025 COMPLETE CBC W/AUTO DIFF WBC: CPT

## 2019-08-13 PROCEDURE — 94640 AIRWAY INHALATION TREATMENT: CPT

## 2019-08-13 PROCEDURE — 71045 X-RAY EXAM CHEST 1 VIEW: CPT

## 2019-08-13 PROCEDURE — 84484 ASSAY OF TROPONIN QUANT: CPT

## 2019-08-13 PROCEDURE — 71046 X-RAY EXAM CHEST 2 VIEWS: CPT

## 2019-08-13 PROCEDURE — 74011000250 HC RX REV CODE- 250: Performed by: EMERGENCY MEDICINE

## 2019-08-13 PROCEDURE — 82962 GLUCOSE BLOOD TEST: CPT

## 2019-08-13 RX ADMIN — ALBUTEROL SULFATE 1 DOSE: 2.5 SOLUTION RESPIRATORY (INHALATION) at 17:41

## 2019-08-13 NOTE — ED NOTES
I have reviewed discharge instructions with the patient. The patient verbalized understanding. Pt wheeled out by wife.

## 2019-08-13 NOTE — ED PROVIDER NOTES
Patient is a 50-year-old male who comes in with a near syncopal event. He had been having a thoracentesis for recurrent pleural effusions. Patient apparently had 2 L taken off. He has recently had 2 other thoracentesis in the last few weeks. He said he started feeling lightheaded and dizzy towards the end of the procedure. He thought he was going to pass out. He had blood pressures checked by the staff, which were first in the 98H and 04X systolic. He eventually came up to the 100s after IV fluid. He said he has been short of breath for the last few weeks. He is unable to walk around much. He is unable to walk out of his house due to shortness of breath. He denies chest pain. He reports some chills. No fever. No chest pain or abdominal pain. He currently denies any fluid buildup in his belly. Past Medical History:  
Diagnosis Date  Chronic obstructive pulmonary disease ([address]  Diabetes ([address]  Liver disease Past Surgical History:  
Procedure Laterality Date [address]  HX GI Esophageal Varices, banded  HX HERNIA REPAIR  2015  IR INSERT TIPS HEPATIC SHUNT  8/2/2019  IR THORACENTESIS/INSERT CHEST TUBE  8/2/2019 Family History:  
Problem Relation Age of Onset  Diabetes Mother  Heart Disease Father Social History Socioeconomic History  Marital status:  Spouse name: Not on file  Number of children: Not on file  Years of education: Not on file  Highest education level: Not on file Occupational History  Not on file Social Needs  Financial resource strain: Not on file  Food insecurity:  
  Worry: Not on file Inability: Not on file  Transportation needs:  
  Medical: Not on file Non-medical: Not on file Tobacco Use  Smoking status: Current Every Day Smoker Packs/day: 0.25  Smokeless tobacco: Never Used Substance and Sexual Activity  Alcohol use:  No  
  Drug use: No  
 Sexual activity: Not on file Lifestyle  Physical activity:  
  Days per week: Not on file Minutes per session: Not on file  Stress: Not on file Relationships  Social connections:  
  Talks on phone: Not on file Gets together: Not on file Attends Spiritism service: Not on file Active member of club or organization: Not on file Attends meetings of clubs or organizations: Not on file Relationship status: Not on file  Intimate partner violence:  
  Fear of current or ex partner: Not on file Emotionally abused: Not on file Physically abused: Not on file Forced sexual activity: Not on file Other Topics Concern  Not on file Social History Narrative  Not on file ALLERGIES: Shellfish derived Review of Systems Constitutional: Negative for chills and fever. HENT: Negative for rhinorrhea and sore throat. Respiratory: Positive for shortness of breath. Negative for cough. Cardiovascular: Negative for chest pain. Gastrointestinal: Negative for abdominal pain, diarrhea, nausea and vomiting. Genitourinary: Negative for dysuria and hematuria. Musculoskeletal: Negative for arthralgias and myalgias. Skin: Negative for pallor and rash. Neurological: Positive for dizziness and light-headedness. Negative for weakness. All other systems reviewed and are negative. Vitals:  
 08/13/19 1631 BP: 120/52 Pulse: 88 Resp: 18 Temp: 98 °F (36.7 °C) SpO2: 97% Weight: 82.1 kg (181 lb) Height: 6' 1\" (1.854 m) Physical Exam  
 
Vital signs reviewed. Nursing notes reviewed. Const:  No acute distress, well developed, well nourished Head:  Atraumatic, normocephalic Eyes:  PERRL, conjunctiva normal, no scleral icterus Neck:  Supple, trachea midline Cardiovascular:  RRR, no murmurs, no gallops, no rubs, bandage in place over thoracentesis site Resp:  No resp distress, no increased work of breathing, no wheezes, no rhonchi, no rales, Abd:  Soft, non-tender, non-distended, no rebound, no guarding, no CVA tenderness MSK:  No pedal edema, normal ROM Neuro:  Alert and oriented x3, no cranial nerve defect Skin:  Warm, dry, intact Psych: normal mood and affect, behavior is normal, judgement and thought content is normal 
 
 
 
 
MDM Number of Diagnoses or Management Options Near syncope:  
Pleural effusion:  
  
Amount and/or Complexity of Data Reviewed Clinical lab tests: ordered and reviewed Tests in the radiology section of CPT®: reviewed and ordered Review and summarize past medical records: yes Patient Progress Patient progress: stable Patient is a 61-year-old male who comes in with transient hypotension at the end of the thoracentesis procedure. He has not been hypotensive in the emergency room. He has not been short of breath. The x-ray that was performed just after the procedure did not show any acute complication. Patient says he feels better at the time of discharge. He is not hypoxic. Patient given strict instructions to follow-up with Dr. Mansi Selby, or to return to the emergency room with any new or worsening symptoms. Procedures

## 2019-08-13 NOTE — PROGRESS NOTES
TRANSFER - OUT REPORT:    Verbal report given to ER charge RN(name) on Calos Barrera  being transferred to ER 22(unit) for routine progression of care       Report consisted of patients Situation, Background, Assessment and   Recommendations(SBAR). Information from the following report(s) Procedure Summary was reviewed with the receiving nurse. Lines:   Peripheral IV 08/13/19 Left;Posterior Hand (Active)       Peripheral IV 08/13/19 Left Forearm (Active)   Site Assessment Clean, dry, & intact 8/13/2019  4:34 PM   Phlebitis Assessment 0 8/13/2019  4:34 PM   Infiltration Assessment 0 8/13/2019  4:34 PM   Dressing Status Clean, dry, & intact 8/13/2019  4:34 PM        Opportunity for questions and clarification was provided.       Patient transported with:   Monitor  O2 @ 2 liters

## 2019-08-13 NOTE — PROGRESS NOTES
Stat port CXR done, B/P improving with start of IVF's . Patient denies chest pain or difficult with taking breaths. Transported to ED room 23 via stretcher for further evaluation.

## 2019-08-13 NOTE — Clinical Note
9/15/19 Patient: Gaudencio Arreguin YOB: 1960 Date of Visit: 8/13/2019 Ronnie Bee MD 
[address] [address] VIA Facsimile: [phone] Dear Ronnie Bee MD, Thank you for referring Mr. Hoda Diaz to [address]"" [facility] for evaluation. My notes for this consultation are attached. If you have questions, please do not hesitate to call me. I look forward to following your patient along with you. Sincerely, Amanda Langford MD

## 2019-08-13 NOTE — ED TRIAGE NOTES
Pt was having an outpatient right thoracentesis when a rapid response was called BP 80/40s. Pt had 2 liters drained in ultrasound. Dr Heri Nash was radiologist. Pt c/o dizziness. Pt states \"I just don't feel good. \" Pt /50s prior to coming to ER 23.

## 2019-08-13 NOTE — PROGRESS NOTES
Chief Complaint   Patient presents with    Follow-up   Patent states he feels short of breath sitting, standing, upon exertion. Patient is requesting a breathing treatment. Informed patient we do not keep medications in the office. Will inform Dr. Kendy Edwards of recurrent symtoms. Patient also complains of chest pain - sharp pain when he takes a breath, mainly in the morning. Visit Vitals  /53 (BP 1 Location: Left arm, BP Patient Position: Sitting)   Pulse 98   Temp 97.6 °F (36.4 °C) (Tympanic)   Ht 6' 1\" (1.854 m)   Wt 181 lb (82.1 kg)   SpO2 95%   BMI 23.88 kg/m²     3 most recent PHQ Screens 8/1/2019   Little interest or pleasure in doing things Not at all   Feeling down, depressed, irritable, or hopeless Not at all   Total Score PHQ 2 0     Abuse Screening Questionnaire 8/1/2019   Do you ever feel afraid of your partner? N   Are you in a relationship with someone who physically or mentally threatens you? N   Is it safe for you to go home?  Adithya Winston

## 2019-08-13 NOTE — PROGRESS NOTES
Spiritual Care Assessment/Progress Note [address] 
 
 
NAME: Sea Bustamante      MRN: [id number] AGE: 63 y.o. SEX: male Mandaen Affiliation: [place]  
Language: [place] 8/13/2019     Total Time (in minutes): 30 Spiritual Assessment begun in [address] [facility] [address] DEP through conversation with: 
  
    [x]Patient        [x] Family    [] Friend(s) Reason for Consult: Rapid response team  
 
Spiritual beliefs: (Please include comment if needed) 
   [] Identifies with a latasha tradition:     
   [] Supported by a latasha community:        
   [] Claims no spiritual orientation:       
   [] Seeking spiritual identity:            
   [] Adheres to an individual form of spirituality:       
   [] Not able to assess:                   
 
    
Identified resources for coping:  
   [] Prayer                           
   [] Music                  [] Guided Imagery [x] Family/friends                 [] Pet visits [] Devotional reading                         [] Unknown 
   [] Other:                                         
 
 
Interventions offered during this visit: (See comments for more details) Patient Interventions: Crisis, Other (comment), Affirmation of emotions/emotional suffering(RRT ) Family/Friend(s): Affirmation of emotions/emotional suffering, Initial Assessment, Normalization of emotional/spiritual concerns Plan of Care: 
 
 [x] Support spiritual and/or cultural needs  
 [] Support AMD and/or advance care planning process    
 [] Support grieving process 
 [] Coordinate Rites and/or Rituals  
 [] Coordination with community clergy [] No spiritual needs identified at this time 
 [] Detailed Plan of Care below (See Comments)  [] Make referral to Music Therapy 
[] Make referral to Pet Therapy    
[] Make referral to Addiction services 
[] Make referral to [facility] 
[] Make referral to Spiritual Care Partner 
[] No future visits requested [x] Follow up visits as needed Comments:  responded to RRT in Radiology ED. When  arrived the patient was being attended to by medical staff. There was family in the waiting room, but they did not know anything at this time, so  stayed with patient. Patient was being transferred to ED.  went with nurse to get family and escorted them to ED to be with the patient. The patients wife and a another family member were present.  provided pastoral care and comfort to the family. Spiritual Care will follow up as needed and able. Sebastian Haro Pager: [phone]

## 2019-08-13 NOTE — PROGRESS NOTES
Called to US for assistance s/p right thoracentesis. Patent laying flat in stretcher, B/P low, skin dry, patient moaning, coherent. Dr. Saul Al at bedside. Rapid response called.

## 2019-08-13 NOTE — DISCHARGE INSTRUCTIONS
[facility] [address] [facility]    THORACENTESIS DISCHARGE INSTRUCTIONS    General Information:  During this procedures, the doctor will insert a needle into the body to drain fluid from the chest. After the procedure, you will be able to take a deep breath much easier. The site of the puncture may ooze the first day. This will decrease and eventually stop. With the Thoracentesis (draining fluid from the chest), there is a risk of air leaking into the chest around the lung, and risk of bleeding into the chest, with the resulting pressure on the lung possibly making it collapse. A chest x-ray is done after the procedure to detect possible complications. Home Care Instructions:  Keep the puncture site clean and dry. No tub baths or swimming until puncture site heals. Showering is acceptable. Resume your normal diet, and resume your normal activity slowly and as you tolerate. If you are short of breath, rest. If shortness of breath does not ease, please call your ordering doctor. Fluid can re-accumulate in the chest and/or in the abdomen. If this should occur, your doctor needs to know as you may need to have the procedure done again. Call If:     You should call your Physician and/or the Radiology Nurse if you notice any signs of infection, like pus draining, or if it is swollen or reddened. Also call if you have a fever, or if you are bleeding from the puncture site more than a small amount on the dressing. Call if the puncture site keeps draining fluid. Some oozing is to be expected, but should slow and then stop. Call if you feel like you have pressure in your abdomen. SEEK IMMEDIATE CARE OR CALL 911 IF YOU SUDDENLY HAVE TROUBLE BREATHING, OR IF YOUR LIPS TURN BLUE, OR IF YOU NOTICE BLOOD IN YOUR SPUTUM. Follow-Up Instructions: Please see your ordering doctor as he/she has requested.         Radiologist: Dr. Dave Mallory    Date: 8/13/2019      Thoracentesis Discharge Instructions    You may have an aching pain in the puncture site tonight as the numbing medicine wears off. You may take Tylenol, as directed on the label, for pain or discomfort. Avoid ibuprofen (Advil, Motrin) and aspirin products for the next 48 hours as these drugs may increase your chance of bleeding. You have develop a mild cough as the lungs re expand with air, this should resolve with in the next 24 hours. Resume your previous diet and continue your prescribed medicaitons. Rest for the next 24 hours. If you experience shortness of breath (other than your normal breathing pattern) difficulty breathing, or have severe chest pain, call 911 and go to the nearest Emergency Room. Be sure to follow up with your referring physician as soon as possible      Side effects of medications used today have been reviewed. Notify us of nausea, itching, hives, dizziness, or anything else out of the ordinary. Should you experience any of these significant changes, please call [phone] between the hours of 7:30 am and 10 pm or [phone] after hours.  After hours, ask the  to page the [address] Technologist, and describe the problem to the technologist.       Date: 8/13/2019  Discharging Nurse: Jeri Lay RN

## 2019-08-14 LAB
ALBUMIN SERPL-MCNC: 3.1 G/DL (ref 3.5–5.5)
ALP SERPL-CCNC: 151 IU/L (ref 39–117)
ALT SERPL-CCNC: 28 IU/L (ref 0–44)
AST SERPL-CCNC: 61 IU/L (ref 0–40)
BASOPHILS # BLD AUTO: 0.1 X10E3/UL (ref 0–0.2)
BASOPHILS NFR BLD AUTO: 1 %
BILIRUB DIRECT SERPL-MCNC: 1.18 MG/DL (ref 0–0.4)
BILIRUB SERPL-MCNC: 3.6 MG/DL (ref 0–1.2)
BUN SERPL-MCNC: 9 MG/DL (ref 6–24)
BUN/CREAT SERPL: 8 (ref 9–20)
CALCIUM SERPL-MCNC: 9.2 MG/DL (ref 8.7–10.2)
CHLORIDE SERPL-SCNC: 98 MMOL/L (ref 96–106)
CO2 SERPL-SCNC: 23 MMOL/L (ref 20–29)
CREAT SERPL-MCNC: 1.12 MG/DL (ref 0.76–1.27)
EOSINOPHIL # BLD AUTO: 0.8 X10E3/UL (ref 0–0.4)
EOSINOPHIL NFR BLD AUTO: 7 %
ERYTHROCYTE [DISTWIDTH] IN BLOOD BY AUTOMATED COUNT: 17.7 % (ref 12.3–15.4)
GLUCOSE SERPL-MCNC: 110 MG/DL (ref 65–99)
HCT VFR BLD AUTO: 34 % (ref 37.5–51)
HGB BLD-MCNC: 11.7 G/DL (ref 13–17.7)
IMM GRANULOCYTES # BLD AUTO: 0 X10E3/UL (ref 0–0.1)
IMM GRANULOCYTES NFR BLD AUTO: 0 %
INR PPP: 1.6 (ref 0.8–1.2)
LYMPHOCYTES # BLD AUTO: 2 X10E3/UL (ref 0.7–3.1)
LYMPHOCYTES NFR BLD AUTO: 18 %
Lab: 102 CM/S
MCH RBC QN AUTO: 33.2 PG (ref 26.6–33)
MCHC RBC AUTO-ENTMCNC: 34.4 G/DL (ref 31.5–35.7)
MCV RBC AUTO: 97 FL (ref 79–97)
MONOCYTES # BLD AUTO: 1.1 X10E3/UL (ref 0.1–0.9)
MONOCYTES NFR BLD AUTO: 10 %
MORPHOLOGY BLD-IMP: ABNORMAL
NEUTROPHILS # BLD AUTO: 7.3 X10E3/UL (ref 1.4–7)
NEUTROPHILS NFR BLD AUTO: 64 %
PLATELET # BLD AUTO: 136 X10E3/UL (ref 150–450)
POTASSIUM SERPL-SCNC: 5.5 MMOL/L (ref 3.5–5.2)
PROT SERPL-MCNC: 5.7 G/DL (ref 6–8.5)
PROTHROMBIN TIME: 16 SEC (ref 9.1–12)
RBC # BLD AUTO: 3.52 X10E6/UL (ref 4.14–5.8)
SODIUM SERPL-SCNC: 133 MMOL/L (ref 134–144)
TIPS PORTAL VEIN INFLOW PSV: 36 CM/S
TIPS STENT MID PSV: 106 CM/S
TIPS STENT PORTAL END PSV: 125 CM/S
WBC # BLD AUTO: 11.2 X10E3/UL (ref 3.4–10.8)

## 2019-08-15 ENCOUNTER — TELEPHONE (OUTPATIENT)
Dept: HEMATOLOGY | Age: 59
End: 2019-08-15

## 2019-08-15 NOTE — TELEPHONE ENCOUNTER
Left vm for patient or his wife to return call in reference to how he is feeling after thoracentesis on 8/13/19, and to give update on lab results.

## 2019-08-16 ENCOUNTER — DOCUMENTATION ONLY (OUTPATIENT)
Dept: HEMATOLOGY | Age: 59
End: 2019-08-16

## 2019-08-16 NOTE — PROGRESS NOTES
Follow up phone call placed to patient after thoracentesis. Patient states he other than being tired, he feels \"pretty good. \"  Patient denies tightness or pain in the chest area, denies fever, nausea/vomiting. Follow up appointment given. Patient had no further questions.

## 2019-09-12 DIAGNOSIS — K76.82 HEPATIC ENCEPHALOPATHY: Primary | ICD-10-CM

## 2019-09-13 ENCOUNTER — TELEPHONE (OUTPATIENT)
Dept: HEMATOLOGY | Age: 59
End: 2019-09-13

## 2019-09-13 ENCOUNTER — HOSPITAL ENCOUNTER (INPATIENT)
Age: 59
LOS: 3 days | Discharge: HOME | DRG: 443 | End: 2019-09-16
Attending: EMERGENCY MEDICINE | Admitting: INTERNAL MEDICINE
Payer: SELF-PAY

## 2019-09-13 ENCOUNTER — APPOINTMENT (OUTPATIENT)
Dept: CT IMAGING | Age: 59
DRG: 443 | End: 2019-09-13
Attending: EMERGENCY MEDICINE
Payer: SELF-PAY

## 2019-09-13 DIAGNOSIS — E86.0 DEHYDRATION: ICD-10-CM

## 2019-09-13 DIAGNOSIS — Z95.828 S/P TIPS (TRANSJUGULAR INTRAHEPATIC PORTOSYSTEMIC SHUNT): ICD-10-CM

## 2019-09-13 DIAGNOSIS — E72.20 HYPERAMMONEMIA (HCC): ICD-10-CM

## 2019-09-13 DIAGNOSIS — R78.81 POSITIVE BLOOD CULTURE: ICD-10-CM

## 2019-09-13 DIAGNOSIS — K74.60 CIRRHOSIS OF LIVER WITHOUT ASCITES, UNSPECIFIED HEPATIC CIRRHOSIS TYPE (HCC): ICD-10-CM

## 2019-09-13 DIAGNOSIS — K76.82 HEPATIC ENCEPHALOPATHY: Primary | ICD-10-CM

## 2019-09-13 LAB
ALBUMIN SERPL-MCNC: 2.7 G/DL (ref 3.5–5)
ALBUMIN/GLOB SERPL: 0.8 {RATIO} (ref 1.1–2.2)
ALP SERPL-CCNC: 147 U/L (ref 45–117)
ALT SERPL-CCNC: 66 U/L (ref 12–78)
AMMONIA PLAS-SCNC: 78 UMOL/L
ANION GAP SERPL CALC-SCNC: 10 MMOL/L (ref 5–15)
APAP SERPL-MCNC: <2 UG/ML (ref 10–30)
APPEARANCE UR: CLEAR
AST SERPL-CCNC: 62 U/L (ref 15–37)
BASOPHILS # BLD: 0 K/UL (ref 0–0.1)
BASOPHILS NFR BLD: 0 % (ref 0–1)
BILIRUB SERPL-MCNC: 3.3 MG/DL (ref 0.2–1)
BILIRUB UR QL: NEGATIVE
BUN SERPL-MCNC: 14 MG/DL (ref 6–20)
BUN/CREAT SERPL: 11 (ref 12–20)
CALCIUM SERPL-MCNC: 8.8 MG/DL (ref 8.5–10.1)
CHLORIDE SERPL-SCNC: 110 MMOL/L (ref 97–108)
CO2 SERPL-SCNC: 20 MMOL/L (ref 21–32)
COLOR UR: NORMAL
COMMENT, HOLDF: NORMAL
CREAT SERPL-MCNC: 1.3 MG/DL (ref 0.7–1.3)
DIFFERENTIAL METHOD BLD: ABNORMAL
EOSINOPHIL # BLD: 0 K/UL (ref 0–0.4)
EOSINOPHIL NFR BLD: 0 % (ref 0–7)
ERYTHROCYTE [DISTWIDTH] IN BLOOD BY AUTOMATED COUNT: 16.5 % (ref 11.5–14.5)
ETHANOL SERPL-MCNC: <10 MG/DL
GLOBULIN SER CALC-MCNC: 3.3 G/DL (ref 2–4)
GLUCOSE SERPL-MCNC: 190 MG/DL (ref 65–100)
GLUCOSE UR STRIP.AUTO-MCNC: NEGATIVE MG/DL
HCT VFR BLD AUTO: 35.5 % (ref 36.6–50.3)
HGB BLD-MCNC: 12.1 G/DL (ref 12.1–17)
HGB UR QL STRIP: NEGATIVE
IMM GRANULOCYTES # BLD AUTO: 0 K/UL
IMM GRANULOCYTES NFR BLD AUTO: 0 %
INR PPP: 1.4 (ref 0.9–1.1)
KETONES UR QL STRIP.AUTO: NEGATIVE MG/DL
LEUKOCYTE ESTERASE UR QL STRIP.AUTO: NEGATIVE
LIPASE SERPL-CCNC: 349 U/L (ref 73–393)
LYMPHOCYTES # BLD: 1.6 K/UL (ref 0.8–3.5)
LYMPHOCYTES NFR BLD: 18 % (ref 12–49)
MCH RBC QN AUTO: 33.8 PG (ref 26–34)
MCHC RBC AUTO-ENTMCNC: 34.1 G/DL (ref 30–36.5)
MCV RBC AUTO: 99.2 FL (ref 80–99)
MONOCYTES # BLD: 0.8 K/UL (ref 0–1)
MONOCYTES NFR BLD: 9 % (ref 5–13)
NEUTS SEG # BLD: 6.7 K/UL (ref 1.8–8)
NEUTS SEG NFR BLD: 73 % (ref 32–75)
NITRITE UR QL STRIP.AUTO: NEGATIVE
NRBC # BLD: 0 K/UL (ref 0–0.01)
NRBC BLD-RTO: 0 PER 100 WBC
PH UR STRIP: 5 [PH] (ref 5–8)
PLATELET # BLD AUTO: 112 K/UL (ref 150–400)
PMV BLD AUTO: 10.7 FL (ref 8.9–12.9)
POTASSIUM SERPL-SCNC: 4.3 MMOL/L (ref 3.5–5.1)
PROT SERPL-MCNC: 6 G/DL (ref 6.4–8.2)
PROT UR STRIP-MCNC: NEGATIVE MG/DL
PROTHROMBIN TIME: 14 SEC (ref 9–11.1)
RBC # BLD AUTO: 3.58 M/UL (ref 4.1–5.7)
RBC MORPH BLD: ABNORMAL
SALICYLATES SERPL-MCNC: <1.7 MG/DL (ref 2.8–20)
SAMPLES BEING HELD,HOLD: NORMAL
SODIUM SERPL-SCNC: 140 MMOL/L (ref 136–145)
SP GR UR REFRACTOMETRY: 1 (ref 1–1.03)
UROBILINOGEN UR QL STRIP.AUTO: 1 EU/DL (ref 0.2–1)
WBC # BLD AUTO: 9.1 K/UL (ref 4.1–11.1)

## 2019-09-13 PROCEDURE — 74011250636 HC RX REV CODE- 250/636: Performed by: EMERGENCY MEDICINE

## 2019-09-13 PROCEDURE — 74011636320 HC RX REV CODE- 636/320: Performed by: RADIOLOGY

## 2019-09-13 PROCEDURE — 85025 COMPLETE CBC W/AUTO DIFF WBC: CPT

## 2019-09-13 PROCEDURE — 80307 DRUG TEST PRSMV CHEM ANLYZR: CPT

## 2019-09-13 PROCEDURE — 74011250637 HC RX REV CODE- 250/637: Performed by: EMERGENCY MEDICINE

## 2019-09-13 PROCEDURE — 74177 CT ABD & PELVIS W/CONTRAST: CPT

## 2019-09-13 PROCEDURE — 82140 ASSAY OF AMMONIA: CPT

## 2019-09-13 PROCEDURE — 81003 URINALYSIS AUTO W/O SCOPE: CPT

## 2019-09-13 PROCEDURE — 83690 ASSAY OF LIPASE: CPT

## 2019-09-13 PROCEDURE — 87040 BLOOD CULTURE FOR BACTERIA: CPT

## 2019-09-13 PROCEDURE — 36415 COLL VENOUS BLD VENIPUNCTURE: CPT

## 2019-09-13 PROCEDURE — P9047 ALBUMIN (HUMAN), 25%, 50ML: HCPCS | Performed by: INTERNAL MEDICINE

## 2019-09-13 PROCEDURE — 74011000258 HC RX REV CODE- 258: Performed by: RADIOLOGY

## 2019-09-13 PROCEDURE — 87086 URINE CULTURE/COLONY COUNT: CPT

## 2019-09-13 PROCEDURE — 70450 CT HEAD/BRAIN W/O DYE: CPT

## 2019-09-13 PROCEDURE — 80053 COMPREHEN METABOLIC PANEL: CPT

## 2019-09-13 PROCEDURE — 99285 EMERGENCY DEPT VISIT HI MDM: CPT

## 2019-09-13 PROCEDURE — 65660000000 HC RM CCU STEPDOWN

## 2019-09-13 PROCEDURE — 74011250636 HC RX REV CODE- 250/636: Performed by: INTERNAL MEDICINE

## 2019-09-13 PROCEDURE — 85610 PROTHROMBIN TIME: CPT

## 2019-09-13 RX ORDER — ONDANSETRON 2 MG/ML
4 INJECTION INTRAMUSCULAR; INTRAVENOUS
Status: DISCONTINUED | OUTPATIENT
Start: 2019-09-13 | End: 2019-09-16 | Stop reason: HOSPADM

## 2019-09-13 RX ORDER — IPRATROPIUM BROMIDE AND ALBUTEROL SULFATE 2.5; .5 MG/3ML; MG/3ML
3 SOLUTION RESPIRATORY (INHALATION)
Status: DISCONTINUED | OUTPATIENT
Start: 2019-09-13 | End: 2019-09-16 | Stop reason: HOSPADM

## 2019-09-13 RX ORDER — SODIUM CHLORIDE 0.9 % (FLUSH) 0.9 %
5-40 SYRINGE (ML) INJECTION AS NEEDED
Status: DISCONTINUED | OUTPATIENT
Start: 2019-09-13 | End: 2019-09-16 | Stop reason: HOSPADM

## 2019-09-13 RX ORDER — FUROSEMIDE 40 MG/1
80 TABLET ORAL DAILY
Status: DISCONTINUED | OUTPATIENT
Start: 2019-09-14 | End: 2019-09-14

## 2019-09-13 RX ORDER — DEXTROSE MONOHYDRATE 100 MG/ML
0-250 INJECTION, SOLUTION INTRAVENOUS AS NEEDED
Status: DISCONTINUED | OUTPATIENT
Start: 2019-09-13 | End: 2019-09-16 | Stop reason: HOSPADM

## 2019-09-13 RX ORDER — SODIUM CHLORIDE 0.9 % (FLUSH) 0.9 %
5-40 SYRINGE (ML) INJECTION EVERY 8 HOURS
Status: DISCONTINUED | OUTPATIENT
Start: 2019-09-14 | End: 2019-09-16 | Stop reason: HOSPADM

## 2019-09-13 RX ORDER — MAGNESIUM SULFATE 100 %
4 CRYSTALS MISCELLANEOUS AS NEEDED
Status: DISCONTINUED | OUTPATIENT
Start: 2019-09-13 | End: 2019-09-16 | Stop reason: HOSPADM

## 2019-09-13 RX ORDER — SODIUM CHLORIDE 0.9 % (FLUSH) 0.9 %
10 SYRINGE (ML) INJECTION
Status: COMPLETED | OUTPATIENT
Start: 2019-09-13 | End: 2019-09-13

## 2019-09-13 RX ORDER — ALBUMIN HUMAN 250 G/1000ML
25 SOLUTION INTRAVENOUS EVERY 6 HOURS
Status: DISCONTINUED | OUTPATIENT
Start: 2019-09-14 | End: 2019-09-16 | Stop reason: HOSPADM

## 2019-09-13 RX ORDER — INSULIN LISPRO 100 [IU]/ML
INJECTION, SOLUTION INTRAVENOUS; SUBCUTANEOUS
Status: DISCONTINUED | OUTPATIENT
Start: 2019-09-14 | End: 2019-09-16 | Stop reason: HOSPADM

## 2019-09-13 RX ORDER — SPIRONOLACTONE 100 MG/1
200 TABLET, FILM COATED ORAL DAILY
Status: DISCONTINUED | OUTPATIENT
Start: 2019-09-14 | End: 2019-09-14

## 2019-09-13 RX ORDER — IBUPROFEN 200 MG
1 TABLET ORAL DAILY
Status: DISCONTINUED | OUTPATIENT
Start: 2019-09-14 | End: 2019-09-16 | Stop reason: HOSPADM

## 2019-09-13 RX ADMIN — LACTULOSE 45 ML: 20 SOLUTION ORAL at 20:26

## 2019-09-13 RX ADMIN — SODIUM CHLORIDE 1000 ML: 900 INJECTION, SOLUTION INTRAVENOUS at 17:51

## 2019-09-13 RX ADMIN — ALBUMIN (HUMAN) 25 G: 0.25 INJECTION, SOLUTION INTRAVENOUS at 23:59

## 2019-09-13 RX ADMIN — SODIUM CHLORIDE 100 ML: 900 INJECTION, SOLUTION INTRAVENOUS at 18:25

## 2019-09-13 RX ADMIN — IOPAMIDOL 100 ML: 755 INJECTION, SOLUTION INTRAVENOUS at 18:25

## 2019-09-13 RX ADMIN — Medication 10 ML: at 18:25

## 2019-09-13 NOTE — TELEPHONE ENCOUNTER
Patients wife Ninfa Johnson called the office requesting the patient see  Edin Lacy today. Kristie stated that Mr. Baird is confused. \"He can get up and walk, but he is shaky and  forgets where he is going. He is moaning everywhere he goes. \" This nurse could hear moaning in the background. Asked patient wife if he is oriented to person, place, and time. Also asked if  his arms or hand are shaking or flapping. She replied \"that was a crazy question. He's just confused. \"  This nurse asked to speak with the patient. Mr. Esvin Solano answered the phone moaning. This nurse asked Mr. Baird several questions to include his name, where he lived, and time. He could answer his name, and that he was at home, but moaned the answer. Voice was deep and slow. Phone was returned back to Ms. Baird. Asked if he has been having at least two bowl movements per day. She responded that he rarely goes. \"This morning he went a little bit. He's taking his Lactulose most of the time. \"     Based on the symptoms above, recommended Mrs. Baird take the patient to the nearest ED for evaluation via ambulance. Explained, based on his confusion, speech, and gait,  that would be the safest mode of transportation for him. Asked if she wanted this nurse to call an ambulance? Mrs. Baird said no. Her and his brother will drive him. That Mr. Esvin Solano will refuse an ambulance. Mrs. Baird had no further questions. Will contact Mrs. Baird for follow up.

## 2019-09-13 NOTE — ED PROVIDER NOTES
63 y.o. male with past medical history significant for diabetes, liver disease, and COPD who presents from home with chief complaint of AMS. Patient's wife states onset of patients AMS was ~1 day ago. Patients wife reports patient had an episode of bowel incontinence and she believes his sx are related to elevated ammonia levels. Patient presents to [facility] ED with persisting AMS described by his wife as \"confusion,\" and c/o weakness and generalized abdominal pain. Of note patient's wife gave patient an extra dose of Lactulose ~1 day ago (usually takes BID, took TID). Patients wife notes ~11 month ago Dr. Salome Genao placed a stent in liver. Patient denies recent GLF. Patient denies head injury. Patient denies numbness, weakness, N/V/D, melena, dysuria, fever, cough, chest pain, and SOB. There are no other acute medical concerns at this time. Full history, physical exam, and ROS unable to be obtained due to:  AMS. Social hx: (+) Tobacco use, No EtOH use, No illicit drug use. PCP: Ene Carvalho MD    Note written by Elvie Morales, as dictated by Davin Moore, DO 5:20 PM     The history is provided by the patient and the spouse. No  was used.         Past Medical History:   Diagnosis Date    Chronic obstructive pulmonary disease ([address]     Diabetes [facility]     Liver disease        Past Surgical History:   Procedure Laterality Date    HX APPENDECTOMY  1966    HX GI      Esophageal Varices, banded    HX HERNIA REPAIR  2015    IR INSERT TIPS HEPATIC SHUNT  8/2/2019    IR THORACENTESIS/INSERT CHEST TUBE  8/2/2019         Family History:   Problem Relation Age of Onset    Diabetes Mother     Heart Disease Father        Social History     Socioeconomic History    Marital status:      Spouse name: Not on file    Number of children: Not on file    Years of education: Not on file    Highest education level: Not on file   Occupational History    Not on file   Social Needs    Financial resource strain: Not on file    Food insecurity:     Worry: Not on file     Inability: Not on file    Transportation needs:     Medical: Not on file     Non-medical: Not on file   Tobacco Use    Smoking status: Current Every Day Smoker     Packs/day: 0.25    Smokeless tobacco: Never Used   Substance and Sexual Activity    Alcohol use: No    Drug use: No    Sexual activity: Not on file   Lifestyle    Physical activity:     Days per week: Not on file     Minutes per session: Not on file    Stress: Not on file   Relationships    Social connections:     Talks on phone: Not on file     Gets together: Not on file     Attends Synagogue service: Not on file     Active member of club or organization: Not on file     Attends meetings of clubs or organizations: Not on file     Relationship status: Not on file    Intimate partner violence:     Fear of current or ex partner: Not on file     Emotionally abused: Not on file     Physically abused: Not on file     Forced sexual activity: Not on file   Other Topics Concern    Not on file   Social History Narrative    Not on file         ALLERGIES: Shellfish derived    Review of Systems   Unable to perform ROS: Mental status change       Vitals:    09/13/19 1615 09/13/19 1700 09/13/19 1715 09/13/19 1730   BP: 121/65  128/56 127/63   Pulse: 95 92 87 95   Resp: 18 15 13 21   Temp: 98.3 °F (36.8 °C)      SpO2: 99% 100% 100% 100%            Physical Exam   Constitutional: He appears well-nourished. No distress. Ill appearing. HENT:   Head: Normocephalic. Mouth/Throat: Oropharynx is clear and moist. Mucous membranes are dry. Eyes: Pupils are equal, round, and reactive to light. Conjunctivae and EOM are normal.   Neck: Neck supple. Cardiovascular: Normal rate, regular rhythm, normal heart sounds and normal pulses. Exam reveals no gallop. No murmur heard. Pulmonary/Chest: Effort normal and breath sounds normal. No stridor. No respiratory distress.  He has no wheezes. He has no rhonchi. He has no rales. Abdominal: Soft. He exhibits no distension. There is no rebound and no guarding. Tenderness generalized. Musculoskeletal: He exhibits no edema or tenderness. Neurological: He is alert. Confused, oriented to person. Slurred speech. Some moderate asterixis. 5/5 strength in all four extremities. Limited exam by confusion. Skin: Skin is warm and dry. He is not diaphoretic. Nursing note and vitals reviewed. Note written by Elvie Sarmiento, as dictated by Chio Singh DO 5:20 PM     MDM   70-year-old male with a history of cirrhosis presents with increased confusion and concern for hepatic encephalopathy. Noted asterixis on exam.  Afebrile with vital signs stable. Oriented to person but otherwise significantly confused. Labs returned showing T bili of 3.3, AST 62, alk phos 147, ammonia 78, INR 1.4. CT head negative    CT abdomen pelvis shows cirrhosis and TI PS stent in place. UA negative for infection. Given dose of lactulose in the ED    Admit to the hospital service for further evaluation. Procedures  Total critical care time spent exclusive of procedures:  35 minutes    Hospitalist Altagracia for Admission  8:01 PM    ED Room Number: SW31/79  Patient Name and age:  Clemencia Sheth 63 y.o.  male  Working Diagnosis:   1. Hepatic encephalopathy ([address]    2. Hyperammonemia ([facility]      Readmission: no  Isolation Requirements:  no  Recommended Level of Care:  step down  Code Status:  Full Code  Department:[facility] Adult ED - 21   Other:  Getting lactulose, significantly confused from baseline.

## 2019-09-14 ENCOUNTER — APPOINTMENT (OUTPATIENT)
Dept: GENERAL RADIOLOGY | Age: 59
DRG: 443 | End: 2019-09-14
Attending: INTERNAL MEDICINE
Payer: SELF-PAY

## 2019-09-14 LAB
ALBUMIN SERPL-MCNC: 2.4 G/DL (ref 3.5–5)
ALBUMIN/GLOB SERPL: 0.9 {RATIO} (ref 1.1–2.2)
ALP SERPL-CCNC: 121 U/L (ref 45–117)
ALT SERPL-CCNC: 51 U/L (ref 12–78)
AMMONIA PLAS-SCNC: 66 UMOL/L
ANION GAP SERPL CALC-SCNC: 11 MMOL/L (ref 5–15)
AST SERPL-CCNC: 46 U/L (ref 15–37)
BASOPHILS # BLD: 0 K/UL (ref 0–0.1)
BASOPHILS NFR BLD: 1 % (ref 0–1)
BILIRUB SERPL-MCNC: 2.3 MG/DL (ref 0.2–1)
BUN SERPL-MCNC: 10 MG/DL (ref 6–20)
BUN/CREAT SERPL: 10 (ref 12–20)
CALCIUM SERPL-MCNC: 7.9 MG/DL (ref 8.5–10.1)
CHLORIDE SERPL-SCNC: 111 MMOL/L (ref 97–108)
CHOLEST SERPL-MCNC: 111 MG/DL
CO2 SERPL-SCNC: 20 MMOL/L (ref 21–32)
CREAT SERPL-MCNC: 0.99 MG/DL (ref 0.7–1.3)
DIFFERENTIAL METHOD BLD: ABNORMAL
EOSINOPHIL # BLD: 0.3 K/UL (ref 0–0.4)
EOSINOPHIL NFR BLD: 5 % (ref 0–7)
ERYTHROCYTE [DISTWIDTH] IN BLOOD BY AUTOMATED COUNT: 16.4 % (ref 11.5–14.5)
GLOBULIN SER CALC-MCNC: 2.7 G/DL (ref 2–4)
GLUCOSE BLD STRIP.AUTO-MCNC: 120 MG/DL (ref 65–100)
GLUCOSE BLD STRIP.AUTO-MCNC: 137 MG/DL (ref 65–100)
GLUCOSE BLD STRIP.AUTO-MCNC: 153 MG/DL (ref 65–100)
GLUCOSE BLD STRIP.AUTO-MCNC: 92 MG/DL (ref 65–100)
GLUCOSE SERPL-MCNC: 107 MG/DL (ref 65–100)
HCT VFR BLD AUTO: 29.2 % (ref 36.6–50.3)
HDLC SERPL-MCNC: 56 MG/DL
HDLC SERPL: 2 {RATIO} (ref 0–5)
HGB BLD-MCNC: 9.8 G/DL (ref 12.1–17)
IMM GRANULOCYTES # BLD AUTO: 0 K/UL (ref 0–0.04)
IMM GRANULOCYTES NFR BLD AUTO: 0 % (ref 0–0.5)
LDLC SERPL CALC-MCNC: 47.2 MG/DL (ref 0–100)
LIPID PROFILE,FLP: NORMAL
LYMPHOCYTES # BLD: 1.9 K/UL (ref 0.8–3.5)
LYMPHOCYTES NFR BLD: 24 % (ref 12–49)
MAGNESIUM SERPL-MCNC: 2 MG/DL (ref 1.6–2.4)
MCH RBC QN AUTO: 33 PG (ref 26–34)
MCHC RBC AUTO-ENTMCNC: 33.6 G/DL (ref 30–36.5)
MCV RBC AUTO: 98.3 FL (ref 80–99)
MONOCYTES # BLD: 1.1 K/UL (ref 0–1)
MONOCYTES NFR BLD: 14 % (ref 5–13)
NEUTS SEG # BLD: 4.3 K/UL (ref 1.8–8)
NEUTS SEG NFR BLD: 56 % (ref 32–75)
NRBC # BLD: 0 K/UL (ref 0–0.01)
NRBC BLD-RTO: 0 PER 100 WBC
PHOSPHATE SERPL-MCNC: 2.6 MG/DL (ref 2.6–4.7)
PLATELET # BLD AUTO: 84 K/UL (ref 150–400)
PMV BLD AUTO: 10.7 FL (ref 8.9–12.9)
POTASSIUM SERPL-SCNC: 3.6 MMOL/L (ref 3.5–5.1)
PROT SERPL-MCNC: 5.1 G/DL (ref 6.4–8.2)
RBC # BLD AUTO: 2.97 M/UL (ref 4.1–5.7)
SERVICE CMNT-IMP: ABNORMAL
SERVICE CMNT-IMP: NORMAL
SODIUM SERPL-SCNC: 142 MMOL/L (ref 136–145)
TRIGL SERPL-MCNC: 39 MG/DL (ref ?–150)
TSH SERPL DL<=0.05 MIU/L-ACNC: 2.12 UIU/ML (ref 0.36–3.74)
VLDLC SERPL CALC-MCNC: 7.8 MG/DL
WBC # BLD AUTO: 7.6 K/UL (ref 4.1–11.1)

## 2019-09-14 PROCEDURE — 74011250637 HC RX REV CODE- 250/637: Performed by: INTERNAL MEDICINE

## 2019-09-14 PROCEDURE — 84100 ASSAY OF PHOSPHORUS: CPT

## 2019-09-14 PROCEDURE — 82962 GLUCOSE BLOOD TEST: CPT

## 2019-09-14 PROCEDURE — 82140 ASSAY OF AMMONIA: CPT

## 2019-09-14 PROCEDURE — 36415 COLL VENOUS BLD VENIPUNCTURE: CPT

## 2019-09-14 PROCEDURE — 71046 X-RAY EXAM CHEST 2 VIEWS: CPT

## 2019-09-14 PROCEDURE — 83735 ASSAY OF MAGNESIUM: CPT

## 2019-09-14 PROCEDURE — 80053 COMPREHEN METABOLIC PANEL: CPT

## 2019-09-14 PROCEDURE — 74011250636 HC RX REV CODE- 250/636: Performed by: INTERNAL MEDICINE

## 2019-09-14 PROCEDURE — 65270000032 HC RM SEMIPRIVATE

## 2019-09-14 PROCEDURE — 83036 HEMOGLOBIN GLYCOSYLATED A1C: CPT

## 2019-09-14 PROCEDURE — 85025 COMPLETE CBC W/AUTO DIFF WBC: CPT

## 2019-09-14 PROCEDURE — 80061 LIPID PANEL: CPT

## 2019-09-14 PROCEDURE — P9047 ALBUMIN (HUMAN), 25%, 50ML: HCPCS | Performed by: INTERNAL MEDICINE

## 2019-09-14 PROCEDURE — 84443 ASSAY THYROID STIM HORMONE: CPT

## 2019-09-14 RX ORDER — DIPHENHYDRAMINE HCL 25 MG
50 CAPSULE ORAL
Status: DISCONTINUED | OUTPATIENT
Start: 2019-09-14 | End: 2019-09-16 | Stop reason: HOSPADM

## 2019-09-14 RX ADMIN — LACTULOSE 45 ML: 20 SOLUTION ORAL at 21:48

## 2019-09-14 RX ADMIN — ALBUMIN (HUMAN) 25 G: 0.25 INJECTION, SOLUTION INTRAVENOUS at 23:47

## 2019-09-14 RX ADMIN — LACTULOSE 45 ML: 20 SOLUTION ORAL at 08:38

## 2019-09-14 RX ADMIN — RIFAXIMIN 550 MG: 550 TABLET ORAL at 17:27

## 2019-09-14 RX ADMIN — Medication 10 ML: at 21:50

## 2019-09-14 RX ADMIN — SPIRONOLACTONE 200 MG: 100 TABLET ORAL at 08:39

## 2019-09-14 RX ADMIN — FUROSEMIDE 80 MG: 40 TABLET ORAL at 08:39

## 2019-09-14 RX ADMIN — ALBUMIN (HUMAN) 25 G: 0.25 INJECTION, SOLUTION INTRAVENOUS at 11:15

## 2019-09-14 RX ADMIN — Medication 10 ML: at 06:24

## 2019-09-14 RX ADMIN — LACTULOSE 45 ML: 20 SOLUTION ORAL at 17:27

## 2019-09-14 RX ADMIN — LACTULOSE 45 ML: 20 SOLUTION ORAL at 12:03

## 2019-09-14 RX ADMIN — ALBUMIN (HUMAN) 25 G: 0.25 INJECTION, SOLUTION INTRAVENOUS at 17:27

## 2019-09-14 RX ADMIN — Medication 10 ML: at 00:15

## 2019-09-14 RX ADMIN — DIPHENHYDRAMINE HYDROCHLORIDE 50 MG: 25 CAPSULE ORAL at 21:55

## 2019-09-14 RX ADMIN — ALBUMIN (HUMAN) 25 G: 0.25 INJECTION, SOLUTION INTRAVENOUS at 06:24

## 2019-09-14 NOTE — PROGRESS NOTES
TRANSFER - OUT REPORT:    Verbal report given to monica(name) on Opal Semen  being transferred to [facility](unit) for routine progression of care       Report consisted of patients Situation, Background, Assessment and   Recommendations(SBAR). Information from the following report(s) Kardex, ED Summary, Procedure Summary, Intake/Output, MAR and Cardiac Rhythm nsr was reviewed with the receiving nurse. Lines:   Peripheral IV 09/13/19 Right Antecubital (Active)   Site Assessment Clean, dry, & intact 9/13/2019 11:59 PM   Phlebitis Assessment 0 9/13/2019 11:59 PM   Infiltration Assessment 0 9/13/2019 11:59 PM   Dressing Status Clean, dry, & intact 9/13/2019 11:59 PM   Dressing Type Transparent 9/13/2019 11:59 PM   Hub Color/Line Status Pink;Capped;Flushed 9/13/2019 11:59 PM        Opportunity for questions and clarification was provided.       Patient transported with:   [employer]

## 2019-09-14 NOTE — PROGRESS NOTES
[address] [facility]       Jabier Rosas MD, Chidi Best, MD Delfina Sewell PA-C [place], [facility]     April S Per, BannerNP-BC   Cecilia Rider, FNP-C    Marino Eric, BannerNP-BC        [address] [place] [address], [facility] Patricia Gonzalez  22.    [phone]    FAX: [address] [place] [address], [address]    [phone]    FAX: [phone]         HEPATOLOGY CONSULT NOTE  The patient is well known to me and regularly cared for at [address] [address]. He is a 63year old [place] male with cirrhosis that has been attributed to alcohol. He developed refractory ascites and hydrothorax and underwent TIPS placement in 8/2019. All ascites and edema has since resolved.     He was brought to the ED by family today because of increased lethargy and confusion. Labs suggest he is dehydrated with Scr 1.3 and ammonia 78.     ZOEY has resolved and Scr down to normal.  Mental status still slow but better. Blood cx is negative so far.     ASSESSMENT AND PLAN:  Cirrhosis  Cryptogenic cirrhosis.     The diagnosis of cirrhosis is based upon Fiboscan, imaging, laboratory studies, complications of cirrhosis. AST is elevated.  ALT is normal.  ALP is elevated .  Liver function is depressed.  The platelet count is depressed.    The CTP is 7.  Child class B.  The MELD score is 21.     The patient has a MELD score greater than 15 and has developed complications of cirrhosis. Will initiate liver transplant evaluation testing. Medicare start 10/1.   Will have tests scheduled for LT evaluation to start then and completed so he can be seen at [facility] center by end of October.     Ascites   Ascites has resolved following TIPS placement and with diuretics.       Dehydration  Will continue to hold diuretics  Will start IV albumin 25 gm Q6H     Screening for Esophageal varices   The patient has esophageal varices with prior bleeding.    Varices have resolved following TIPS.    Hepatic encephalopathy   Overt HE is intermittent on current doses of lactulose and Xifaxan. This was worse today and likely precipitated by dehydration. Will need to exclude infection. Blood and Urine cx. Will continue current dose of lactulose TID and add xifaxan BID     Thrombocytopenia   This is secondary to cirrhosis. There is no evidence of overt bleeding.    No treatment is required. The platelet count is adequate for the patient to undergo procedures without the need for platelet transfusion or platelet growth factors.     Screening for Hepatocellular Carcinoma  HCC screening has recently been performed and does not suggest [address] next liver imaging study will be performed in 9/2019.        PHYSICAL EXAMINATION:  VS: per nursing note  General:  No acute distress. Eyes:  Sclera anicteric. ENT:  No oral lesions.  Thyroid normal.  Nodes:  No adenopathy. Skin: spider angiomata. Respiratory:  Lungs clear to auscultation. Reduced breath sounds on the right about half way up. Cardiovascular: Regular heart rate. No JVD. Abdomen: Soft non-tender, Distended with obvious ascites. Extremities:  No lower extremity edema.    Neurologic:  Alert and oriented.     LABORATORY:  Results for Radha Anne (MRN [id number]) as of 9/14/2019 16:19   Ref.  Range 9/13/2019 16:33 9/14/2019 04:02   WBC Latest Ref Range: 4.1 - 11.1 K/uL 9.1 7.6   HGB Latest Ref Range: 12.1 - 17.0 g/dL 12.1 9.8 (L)   PLATELET Latest Ref Range: 150 - 400 K/uL 112 (L) 84 (L)   INR Latest Ref Range: 0.9 - 1.1   1.4 (H)    Sodium Latest Ref Range: 136 - 145 mmol/L 140 142   Potassium Latest Ref Range: 3.5 - 5.1 mmol/L 4.3 3.6   Chloride Latest Ref Range: 97 - 108 mmol/L 110 (H) 111 (H)   CO2 Latest Ref Range: 21 - 32 mmol/L 20 (L) 20 (L)   Glucose Latest Ref Range: 65 - 100 mg/dL 190 (H) 107 (H)   BUN Latest Ref Range: 6 - 20 MG/DL 14 10   Creatinine Latest Ref Range: 0.70 - 1.30 MG/DL 1.30 0.99   Bilirubin, total Latest Ref Range: 0.2 - 1.0 MG/DL 3.3 (H) 2.3 (H)   Albumin Latest Ref Range: 3.5 - 5.0 g/dL 2.7 (L) 2.4 (L)   ALT (SGPT) Latest Ref Range: 12 - 78 U/L 66 51   AST Latest Ref Range: 15 - 37 U/L 62 (H) 46 (H)   Alk.  phosphatase Latest Ref Range: 45 - 117 U/L 147 (H) 121 (H)       MD Michael GuoJefferson Regional Medical Center 13  [address], [address] [place] [address]

## 2019-09-14 NOTE — H&P
[address]  HISTORY AND PHYSICAL    Name:  Emely Teixeira  MR#:  [id number]  :  1960  ACCOUNT #:  [de-identified]  ADMIT DATE:  2019      The patient was seen, evaluated, and admitted by me on 2019. PRIMARY CARE PHYSICIAN:  Leora Pineda MD    SOURCE OF INFORMATION:  The patient who is not a good historian because of his mental status and the patient's spouse who was present at the bedside. CHIEF COMPLAINT:  Change in mental status. HISTORY OF PRESENT ILLNESS:  This is a 45-year-old man with a past medical history significant for type 2 diabetes, cryptogenic liver cirrhosis, COPD, was in his usual state of health until about 1 day ago when the patient developed change in mental status. This is progressive, getting worse, associated with weakness as well as abdominal pain. The abdominal pain was described as diffuse in location, constant, no radiation, described as dull ache. No associated nausea and vomiting. The spouse stated that the patient has been taking his lactulose as prescribed. The patient was recently admitted to this hospital from 2019 to 2019. The patient was admitted because of recurrent ascites and underwent TIPS procedure. The hospitalization was complicated by sepsis. The patient was treated and discharged home in stable condition. When the patient arrived at the emergency room, the patient was found to have an elevated ammonia level. The patient was referred to the hospitalist service for evaluation for admission. No history of fever, no rigors, and no chills. PAST MEDICAL HISTORY:  Cryptogenic liver cirrhosis, COPD, type 2 diabetes. ALLERGIES:  THE PATIENT IS ALLERGIC TO SHELLFISH. MEDICATIONS:  1. Lasix 40 mg 2 tablets daily. 2.  Lactulose 45 mL 3 times daily. 3.  Zofran 4 mg every 8 hours as needed for nausea. 4.  Aldactone 100 mg 2 tablets daily. FAMILY HISTORY:  This was reviewed. His mother had diabetes. His father had heart disease. PAST SURGICAL HISTORY:  This is significant for appendectomy, hernia repair, TIPS procedure. SOCIAL HISTORY:  The patient smokes few cigarettes daily. Denies alcohol abuse. REVIEW OF SYSTEMS:  Unable to obtain because of the patient's mental status. PHYSICAL EXAMINATION:  GENERAL APPEARANCE:  The patient appeared ill, in moderate distress. VITAL SIGNS:  On arrival at the emergency room, temperature 98.3, pulse 95, respiratory rate 18, blood pressure 131/65, oxygen saturation 99% on room air. HEENT:  Head:  Normocephalic, atraumatic. Eyes:  Normal eye movements. No redness, no drainage, no discharge. Ears:  Normal external ears with no evidence of drainage. Nose:  No deformity and no drainage. Mouth and Throat:  No visible oral lesion. NECK:  Neck is supple. No JVD. No thyromegaly. CHEST:  Clear breath sounds. No wheezing. No crackles. HEART:  Normal S1 and S2, regular. No clinically appreciable murmur. ABDOMEN:  Soft, nontender, normal bowel sounds. CNS:  Alert and not oriented. No gross focal neurological deficit. EXTREMITIES:  No edema. Pulses 2+ bilaterally. MUSCULOSKELETAL SYSTEM:  No evidence of joint deformity and swelling. SKIN:  No active skin lesions seen in the exposed part of the body. PSYCHIATRY:  Normal mood and affect. LYMPHATIC SYSTEM:  No cervical lymphadenopathy. DIAGNOSTIC DATA:  CT scan of the abdomen and pelvis shows cirrhosis, splenomegaly, varices. TIPS stent; 10 mm lesion of the liver, hepatoma is not excluded. CT scan of the head without contrast, no acute findings. LABORATORY DATA:  Chemistry:  Sodium 140, potassium 4.3, chloride 110, CO2 of 20, glucose 190, BUN 14, creatinine 1.30, calcium 8.8, total bilirubin 3.3, ALT 66, AST 62, alkaline phosphatase 147, total protein at 6.0, albumin level 2.7, globulin at 3.3. Ammonia level 78. Hematology:  WBC 9.1, hemoglobin at 12.1, hematocrit 35.5, platelets 336. Urinalysis: This is significant for negative nitrite, negative leuko esterase, and negative blood. Lipase level 349. Coagulation profile:  INR 1.4 and PT 14.0. Salicylate level less than 1.7. Acetaminophen level less than 2. Serum alcohol level less than 10. ASSESSMENT:  1. Hepatic encephalopathy. 2.  Type 2 diabetes. 3.  Cryptogenic liver cirrhosis. 4.  Liver mass. 5.  Thrombocytopenia. 6.  Chronic obstructive pulmonary disease. 7.  Tobacco abuse. PLAN:  1. Hepatic encephalopathy. We will admit the patient for further evaluation and treatment. This is the most likely cause of the patient's confusion. We will increase the dosage of the lactulose. Monitor ammonia level. We will check urine drug screen. We will await further recommendation from the patient's hepatologist.  2.  Type 2 diabetes. The patient will be placed on sliding scale with insulin coverage. We will check hemoglobin A1c level if one has not been checked recently. 3.  Cryptogenic liver cirrhosis. The patient is status post TIPS. We will continue to monitor. We will await further recommendation from the hepatologist.  4.  Liver mass. According to the radiologist, hepatoma is not excluded. We will await further recommendation from the patient's hepatologist.  5.  Thrombocytopenia. This is most likely as a result of the patient's liver cirrhosis. The patient is asymptomatic. We will monitor the patient's platelet count. We will avoid heparin products. 6.  COPD. We will place the patient on DuoNeb as needed. 7.  Tobacco abuse. The patient advised to quit smoking. We will place the patient on Nicoderm patch. OTHER ISSUES:  Code status, the patient is a full code. We will place SCD for DVT prophylaxis. FUNCTIONAL STATUS PRIOR TO ADMISSION:  The patient is ambulatory without assistant or device.       MD CHRIS [email or web address]  D:  09/14/2019 5:16  T:  09/14/2019 5:27  JOB #: [id number]  CC:   Melchor Canseco MD

## 2019-09-14 NOTE — PROGRESS NOTES
Problem: Pressure Injury - Risk of  Goal: *Prevention of pressure injury  Description  Document Triston Scale and appropriate interventions in the flowsheet.   Outcome: Progressing Towards Goal  Note:   Pressure Injury Interventions:  Sensory Interventions: Assess changes in LOC    Moisture Interventions: Offer toileting Q_hr, Assess need for specialty bed    Activity Interventions: Assess need for specialty bed, Increase time out of bed    Mobility Interventions: Assess need for specialty bed, Float heels    Nutrition Interventions: Document food/fluid/supplement intake    Friction and Shear Interventions: Apply protective barrier, creams and emollients, Lift sheet                Problem: Patient Education: Go to Patient Education Activity  Goal: Patient/Family Education  Outcome: Progressing Towards Goal

## 2019-09-14 NOTE — PROGRESS NOTES
Hospitalist Progress Note  Regine Wen MD  Answering service: [phone] from in house phone        Date of Service:  2019  NAME:  Anna Larsen  :  1960  MRN:  [id number]      Admission Summary:   50-year-old man with a past medical history significant for type 2 diabetes, cryptogenic liver cirrhosis, COPD, was in his usual state of health until about 1 day ago when the patient developed change in mental status. This is progressive, getting worse, associated with weakness as well as abdominal pain. The abdominal pain was described as diffuse in location, constant, no radiation, described as dull ache. No associated nausea and vomiting. The spouse stated that the patient has been taking his lactulose as prescribed. The patient was recently admitted to this hospital from 2019 to 2019. The patient was admitted because of recurrent ascites and underwent TIPS procedure. Interval history / Subjective:        patient seen and examined bedside     He seems very lethargic , confused and wife at bedside     No nausea and vomiting and ate some breakfast this am     Assessment & Plan:     . Hepatic encephalopathy. This is the most likely cause of the patient's confusion but since it is diagnosis of exclusion  We will check urine drug screen. Rule out infection , ua clean , XRAY ordered   CT head NAD    Stop lasix and aldactone   Started iv albumin   Hepatology on board        Type 2 diabetes. The patient will be placed on sliding scale with insulin coverage. check hemoglobin A1c      Cryptogenic liver cirrhosis. He  developed refractory ascites and hydrothorax and underwent TIPS placement in 2019.     As a recommendation from the hepatologist.  Child class B.  The MELD score is 21.    10 mm hypoattenuating lesion in segment 5 of the liver,  In the  setting of a cirrhotic liver, the possibility of hepatoma is not excluded. We will await further recommendation from the patient's hepatologist.    .  Thrombocytopenia. T  his is most likely as a result of the patient's liver cirrhosis. The patient is asymptomatic. We will monitor the patient's platelet count. .  COPD. .  Tobacco abuse. The patient advised to quit smoking. We will place the patient on Nicoderm patch.     4 mm nonobstructing calculus in lower pole of left kidney        Code status: FULL   DVT prophylaxis: SCD     Care Plan discussed with: Patient/Family wife   Disposition: TBD     Hospital Problems  Date Reviewed: 9/13/2019          Codes Class Noted POA    * (Principal) Hepatic encephalopathy ([facility] [address] ICD-10-CM: K72.90  ICD-9-CM: 572.2  8/1/2019 Yes                Review of Systems:   A comprehensive review of systems was negative except for that written in the HPI. Vital Signs:    Last 24hrs VS reviewed since prior progress note. Most recent are:  Visit Vitals  /60 (BP 1 Location: Left arm, BP Patient Position: At rest)   Pulse 88   Temp 98.3 °F (36.8 °C)   Resp 16   Wt 76 kg (167 lb 8.8 oz)   SpO2 99%   BMI 22.11 kg/m²         Intake/Output Summary (Last 24 hours) at 9/14/2019 0916  Last data filed at 9/14/2019 0232  Gross per 24 hour   Intake 120 ml   Output    Net 120 ml        Physical Examination:             Constitutional:  No acute distress,  Very lethargic and consfused pale   ENT:  Oral mucous moist, oropharynx benign. Neck supple,    Resp:  CTA bilaterally. No wheezing/rhonchi/rales. No accessory muscle use   CV:  Regular rhythm, normal rate, no murmurs, gallops, rubs    GI:  Soft, non distended, non tender. normoactive bowel sounds, no hepatosplenomegaly     Musculoskeletal:  No edema, warm, 2+ pulses throughout    Neurologic:  Moves all extremities.   Non focal            Data Review:    Review and/or order of clinical lab test      Labs:     Recent Labs     09/14/19  0402 09/13/19  1633   WBC 7.6 9.1 HGB 9.8* 12.1   HCT 29.2* 35.5*   PLT 84* 112*     Recent Labs     09/14/19  0402 09/13/19  1633    140   K 3.6 4.3   * 110*   CO2 20* 20*   BUN 10 14   CREA 0.99 1.30   * 190*   CA 7.9* 8.8   MG 2.0  --    PHOS 2.6  --      Recent Labs     09/14/19  0402 09/13/19  1633   SGOT 46* 62*   ALT 51 66   * 147*   TBILI 2.3* 3.3*   TP 5.1* 6.0*   ALB 2.4* 2.7*   GLOB 2.7 3.3   LPSE  --  349     Recent Labs     09/13/19  1633   INR 1.4*   PTP 14.0*      No results for input(s): FE, TIBC, PSAT, FERR in the last 72 hours. No results found for: FOL, RBCF   No results for input(s): PH, PCO2, PO2 in the last 72 hours. No results for input(s): CPK, CKNDX, TROIQ in the last 72 hours.     No lab exists for component: CPKMB  Lab Results   Component Value Date/Time    Cholesterol, total 111 09/14/2019 04:02 AM    HDL Cholesterol 56 09/14/2019 04:02 AM    LDL, calculated 47.2 09/14/2019 04:02 AM    Triglyceride 39 09/14/2019 04:02 AM    CHOL/HDL Ratio 2.0 09/14/2019 04:02 AM     Lab Results   Component Value Date/Time    Glucose (POC) 92 09/14/2019 06:34 AM    Glucose (POC) 147 (H) 08/13/2019 04:09 PM    Glucose (POC) 119 (H) 08/02/2019 04:53 PM    Glucose (POC) 104 (H) 08/02/2019 12:27 PM    Glucose (POC) 90 08/31/2017 01:03 PM     Lab Results   Component Value Date/Time    Color YELLOW/STRAW 09/13/2019 05:24 PM    Appearance CLEAR 09/13/2019 05:24 PM    Specific gravity 1.005 09/13/2019 05:24 PM    pH (UA) 5.0 09/13/2019 05:24 PM    Protein NEGATIVE  09/13/2019 05:24 PM    Glucose NEGATIVE  09/13/2019 05:24 PM    Ketone NEGATIVE  09/13/2019 05:24 PM    Bilirubin NEGATIVE  09/13/2019 05:24 PM    Urobilinogen 1.0 09/13/2019 05:24 PM    Nitrites NEGATIVE  09/13/2019 05:24 PM    Leukocyte Esterase NEGATIVE  09/13/2019 05:24 PM         Medications Reviewed:     Current Facility-Administered Medications   Medication Dose Route Frequency    albumin human 25% (BUMINATE) solution 25 g  25 g IntraVENous Q6H    albuterol-ipratropium (DUO-NEB) 2.5 MG-0.5 MG/3 ML  3 mL Nebulization Q4H PRN    furosemide (LASIX) tablet 80 mg  80 mg Oral DAILY    lactulose (CHRONULAC) 10 gram/15 mL solution 45 mL  30 g Oral QID    spironolactone (ALDACTONE) tablet 200 mg  200 mg Oral DAILY    sodium chloride (NS) flush 5-40 mL  5-40 mL IntraVENous Q8H    sodium chloride (NS) flush 5-40 mL  5-40 mL IntraVENous PRN    ondansetron (ZOFRAN) injection 4 mg  4 mg IntraVENous Q4H PRN    nicotine (NICODERM CQ) 21 mg/24 hr patch 1 Patch  1 Patch TransDERmal DAILY    insulin lispro (HUMALOG) injection   SubCUTAneous AC&HS    glucose chewable tablet 16 g  4 Tab Oral PRN    glucagon (GLUCAGEN) injection 1 mg  1 mg IntraMUSCular PRN    dextrose 10% infusion 0-250 mL  0-250 mL IntraVENous PRN     ______________________________________________________________________  EXPECTED LENGTH OF STAY: - - -  ACTUAL LENGTH OF STAY:          1                 Lydia Johansen MD

## 2019-09-14 NOTE — PROGRESS NOTES
0000-new admission from ED, alert x 1( self), confused at times but calm an cooperative, lungs sounds dim, + bowel sounds, pulses  Palpable, abd ascites, skin warm and dry, no pressure injuries, vss, IV patent,     POC reviewed, oriented to room, fall interventions in place, call light in reach    027- patient resting

## 2019-09-14 NOTE — PROGRESS NOTES
Admission Medication Reconciliation:    Information obtained from:  Wife, RX QUery  RxQuery data available¹:  YES    Comments/Recommendations: Updated PTA meds/reviewed patient's allergies. Patient is confused, wife provided history. Notes:  Nicotine: smokes \"a few cigarettes\" daily, recommend 14 mg/24 hr patch if requested by patient  Diuretics: wife gave patient half doses of diuretics \"because he has been so dehydrated. \"    Thank you for allowing me to participate in the care of your patient. Mayco Sanchez PharmD, RN #[id number] benefit data reflects medications filled and processed through the patient's insurance, however   this data does NOT capture whether the medication was picked up or is currently being taken by the patient. Allergies:  Shellfish derived    Significant PMH/Disease States:   Past Medical History:   Diagnosis Date    Chronic obstructive pulmonary disease ([facility] [address]     Diabetes ([facility] [address]     Liver disease      Chief Complaint for this Admission:  No chief complaint on file. Prior to Admission Medications:   Prior to Admission Medications   Prescriptions Last Dose Informant Patient Reported? Taking? albuterol (PROVENTIL HFA, VENTOLIN HFA, PROAIR HFA) 90 mcg/actuation inhaler 8/30/2019  Yes No   Sig: Take 1 Puff by inhalation every six (6) hours as needed for Wheezing. furosemide (LASIX) 40 mg tablet 9/13/2019 at Unknown time  No Yes   Sig: Take 2 Tabs by mouth daily. lactulose (CHRONULAC) 10 gram/15 mL solution 9/13/2019 at Unknown time  No Yes   Sig: Take 45 mL by mouth three (3) times daily. ondansetron (ZOFRAN ODT) 4 mg disintegrating tablet   No Yes   Sig: Take 1 Tab by mouth every eight (8) hours as needed for Nausea. spironolactone (ALDACTONE) 100 mg tablet 9/13/2019 at Unknown time  No Yes   Sig: Take 2 Tabs by mouth daily.       Facility-Administered Medications: None       Please contact the main inpatient pharmacy with any questions or concerns at [phone] and we will direct you to the clinical pharmacist covering this patient's care while in-house.    Hung Pride, PHARMD ed

## 2019-09-15 PROBLEM — Z95.828 S/P TIPS (TRANSJUGULAR INTRAHEPATIC PORTOSYSTEMIC SHUNT): Status: RESOLVED | Noted: 2019-08-05 | Resolved: 2019-09-15

## 2019-09-15 PROBLEM — E87.20 LACTIC ACIDOSIS: Status: RESOLVED | Noted: 2019-08-05 | Resolved: 2019-09-15

## 2019-09-15 PROBLEM — J90 LARGE PLEURAL EFFUSION: Status: RESOLVED | Noted: 2019-06-13 | Resolved: 2019-09-15

## 2019-09-15 PROBLEM — A41.9 SEPSIS (HCC): Status: RESOLVED | Noted: 2019-08-05 | Resolved: 2019-09-15

## 2019-09-15 LAB
BACTERIA SPEC CULT: NORMAL
CC UR VC: NORMAL
COMMENT, HOLDF: NORMAL
EST. AVERAGE GLUCOSE BLD GHB EST-MCNC: NORMAL MG/DL
GLUCOSE BLD STRIP.AUTO-MCNC: 106 MG/DL (ref 65–100)
GLUCOSE BLD STRIP.AUTO-MCNC: 118 MG/DL (ref 65–100)
GLUCOSE BLD STRIP.AUTO-MCNC: 126 MG/DL (ref 65–100)
GLUCOSE BLD STRIP.AUTO-MCNC: 131 MG/DL (ref 65–100)
HBA1C MFR BLD: 4.6 % (ref 4.2–6.3)
SAMPLES BEING HELD,HOLD: NORMAL
SERVICE CMNT-IMP: ABNORMAL
SERVICE CMNT-IMP: NORMAL

## 2019-09-15 PROCEDURE — P9047 ALBUMIN (HUMAN), 25%, 50ML: HCPCS | Performed by: INTERNAL MEDICINE

## 2019-09-15 PROCEDURE — 74011250636 HC RX REV CODE- 250/636: Performed by: INTERNAL MEDICINE

## 2019-09-15 PROCEDURE — 74011250637 HC RX REV CODE- 250/637: Performed by: INTERNAL MEDICINE

## 2019-09-15 PROCEDURE — 82962 GLUCOSE BLOOD TEST: CPT

## 2019-09-15 PROCEDURE — 65270000032 HC RM SEMIPRIVATE

## 2019-09-15 RX ADMIN — LACTULOSE 45 ML: 20 SOLUTION ORAL at 17:10

## 2019-09-15 RX ADMIN — ALBUMIN (HUMAN) 25 G: 0.25 INJECTION, SOLUTION INTRAVENOUS at 11:27

## 2019-09-15 RX ADMIN — DIPHENHYDRAMINE HYDROCHLORIDE 50 MG: 25 CAPSULE ORAL at 21:49

## 2019-09-15 RX ADMIN — RIFAXIMIN 550 MG: 550 TABLET ORAL at 17:10

## 2019-09-15 RX ADMIN — Medication 10 ML: at 06:06

## 2019-09-15 RX ADMIN — ALBUMIN (HUMAN) 25 G: 0.25 INJECTION, SOLUTION INTRAVENOUS at 23:08

## 2019-09-15 RX ADMIN — ALBUMIN (HUMAN) 25 G: 0.25 INJECTION, SOLUTION INTRAVENOUS at 17:11

## 2019-09-15 RX ADMIN — Medication 10 ML: at 14:30

## 2019-09-15 RX ADMIN — LACTULOSE 45 ML: 20 SOLUTION ORAL at 14:29

## 2019-09-15 RX ADMIN — LACTULOSE 45 ML: 20 SOLUTION ORAL at 21:44

## 2019-09-15 RX ADMIN — RIFAXIMIN 550 MG: 550 TABLET ORAL at 09:09

## 2019-09-15 RX ADMIN — ALBUMIN (HUMAN) 25 G: 0.25 INJECTION, SOLUTION INTRAVENOUS at 06:06

## 2019-09-15 RX ADMIN — LACTULOSE 45 ML: 20 SOLUTION ORAL at 09:09

## 2019-09-15 RX ADMIN — Medication 10 ML: at 21:44

## 2019-09-15 NOTE — PROGRESS NOTES
Bedside shift change report given to Viet Michelle (oncoming nurse) by Mihaela Argueta (offgoing nurse). Report included the following information SBAR, Kardex and MAR.

## 2019-09-15 NOTE — PROGRESS NOTES
[address] [place] [address], Dionna MCKEON, Radha Cohn, Rexine Hodgkin, MD Lorena Hy, PA-C    Madelaine Ceballos, [facility]     Adriana Albarado, [facility]   Karel Gutierrez, P-C    Bola Metzger, [facility]       Jarad Salgado [address]    at [address], [address] [place] [address], [facility] 22. [phone]    FAX: [address] [place] [address]    at [address], [address]    [phone]    FAX: [phone]       Patient Care Team:  Jolene Osborn MD as PCP - [facility])  Jignesh Rutledge MD (Gastroenterology)      Problem List  Date Reviewed: 9/15/2019          Codes Class Noted    Pneumothorax ICD-10-CM: J93.9  ICD-9-CM: 512.89  8/5/2019        S/P TIPS (transjugular intrahepatic portosystemic shunt) ICD-10-CM: A96.159  ICD-9-CM: V45.89  8/5/2019        Hepatic encephalopathy ([facility] ICD-10-CM: K72.90  ICD-9-CM: 572.2  8/1/2019        Ascites ICD-10-CM: R18.8  ICD-9-CM: 789.59  8/1/2019        Pleural effusion ICD-10-CM: J90  ICD-9-CM: 511.9  8/1/2019        Cirrhosis ([facility] ICD-10-CM: K74.60  ICD-9-CM: 571.5  6/6/2017        Esophageal varices ([facility] ICD-10-CM: I85.00  ICD-9-CM: 456.1  6/6/2017        Diabetes mellitus ([facility] ICD-10-CM: E11.9  ICD-9-CM: 250.00  8/6/4937        H/O umbilical hernia repair ISR-63-VT: Z98.890, Z87.19  ICD-9-CM: V15.29  6/6/2017        S/P appendectomy ICD-10-CM: Z90.49  ICD-9-CM: V45.89  6/6/2017              Zelda Onofre returns to the [place] [address] for management of cryptogenic cirrhosis. The active problem list, all pertinent past medical history, medications, endoscopic studies, radiologic findings and laboratory findings related to the liver disorder were reviewed with the patient. The patient is a 63 y.o.  male who was found to have chronic liver disease and cirrhosis in 5/2017 when he developed variceal bleeding. 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 The HVPG was reduced from 16 to 4 mmHg  Another  thoracentesis was performed after TIPS. He was found to have a small pneumothorax and did not require chest tube. The patient has not experienced yellowing of the eyes or skin, problems concentrating, swelling of the abdomen, hematemesis, hematochezia. The patient has severe limitations in functional activities which can be attributed to the liver disease       ASSESSMENT AND PLAN:  Cirrhosis  Cryptogenic cirrhosis. The diagnosis of cirrhosis is based upon Fiboscan, imaging, laboratory studies, complications of cirrhosis. AST is elevated. ALT is normal.  ALP is elevated. Liver function is normal.  The platelet count is depressed. The CTP is 7. Child class B. The MELD score is 21. He has now been approved for Medicare which starts Oct 1, 2019. Will perform LT evaluation testing right after Oct 1. Ascites   Ascites has resolved following TIPS placement and with diuretics. The patient was counseled regarding the need to maintain sodium restriction and the types of foods containing high amounts of sodium to be avoided. Hepatic hydrothorax  This has resolved following TIPS placement. Lower extremity edema  Edema has resolved with current dose of diuretics. Will remain on the current dose of diuretics at step 2. The renal function is normal and edema should be able to be controled with diuretics. Screening for Esophageal varices   The patient has esophageal varices with prior bleeding. Varices have been banded   Varices have now been treated with TIPS.   No further EGD is required to assess vareces is required at this time. Hepatic encephalopathy   Overt HE is controlled on current dose of lactulose TID. He is not having an diarrhea. He was not palced on xifaxan after TIPS but seems to be doing ok and has not had worsening HE  There is no need to restrict dietary protein at this time. Pulmonary hypertnesion  ECHO shows mild pulmonary HTN with estimated PAP of 42 mm Hg. This was likely due to fluid overload since direct RA pressure prior to inserting TIPS was not markedly elevated. Will need to repeat ECHO. Can delay tis until he undergoes formal LT evaluation after 10/1/2019. Thrombocytopenia   This is secondary to cirrhosis. There is no evidence of overt bleeding. No treatment is required. The platelet count is adequate for the patient to undergo procedures without the need for platelet transfusion or platelet growth factors. Screening for Hepatocellular Carcinoma  HCC screening has recently been performed and does not suggest [facility] The next liver imaging study will be performed in 9/2019. COPD. Patient is inconsistent with inhaler use and continues to smoke 1 PPD. Treatment of other medical problems in patients with chronic liver disease  There are no contraindications for the patient to take most medications that are necessary for treatment of other medical issues. The patient has cirrhrosis and should avoid NSAIDs which are associated with a higher rate of developing ZOEY. Counseling for alcohol in patients with chronic liver disease  The patient was counseled regarding alcohol consumption and the effect of alcohol on chronic liver disease. The patient does not consume any significant amount of alcohol. Vaccinations   Vaccination for viral hepatitis A and B is recommended since the patient has no serologic evidence of previous exposure or vaccination with immunity.   Routine vaccinations against other bacterial and viral agents can be performed as indicated. Annual flu vaccination should be administered if indicated. ALLERGIES  Allergies   Allergen Reactions    Shellfish Derived Hives       MEDICATIONS  No current facility-administered medications for this visit. No current outpatient medications on file. Facility-Administered Medications Ordered in Other Visits   Medication    rifAXIMin (XIFAXAN) tablet 550 mg    diphenhydrAMINE (BENADRYL) capsule 50 mg    albumin human 25% (BUMINATE) solution 25 g    albuterol-ipratropium (DUO-NEB) 2.5 MG-0.5 MG/3 ML    lactulose (CHRONULAC) 10 gram/15 mL solution 45 mL    sodium chloride (NS) flush 5-40 mL    sodium chloride (NS) flush 5-40 mL    ondansetron (ZOFRAN) injection 4 mg    nicotine (NICODERM CQ) 21 mg/24 hr patch 1 Patch    insulin lispro (HUMALOG) injection    glucose chewable tablet 16 g    glucagon (GLUCAGEN) injection 1 mg    dextrose 10% infusion 0-250 mL       SYSTEM REVIEW NOT RELATED TO LIVER DISEASE OR REVIEWED ABOVE:  Constitution systems: Negative for fever, chills. Weight stable since last office visit. Eyes: Negative for visual changes. ENT: Negative for sore throat, painful swallowing. Respiratory: Negative for cough, hemoptysis. Positive for SOB, patient is inconsistent with administration of Ventolin. Cardiology: Negative for chest pain, palpitations. GI:  Negative for constipation or diarrhea. Occasional complaint of RUQ dull achy bruise feeling, helped with changes in position. Occasional post-prandial nausea  : Negative for urinary frequency, dysuria, hematuria, nocturia. Skin: Negative for rash. Hematology: Negative for easy bruising, blood clots. Musculo-skeletal: Negative for back pain, muscle pain, weakness. Neurologic: Negative for headaches, dizziness, vertigo. Memory problems ? related to HE. Psychology: Negative for anxiety, depression. FAMILY HISTORY:  The father  of heart disease.     The mother  of MVA but had elevated liver enzymes. There is no family history of liver disease. SOCIAL HISTORY:  The patient is . The patient has no children. The patient currently smokes 1 pack of tobacco daily. The patient has never consumed significant amounts of alcohol. 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 PHYSICAL EXAMINATION:  Visit Vitals  /53 (BP 1 Location: Left arm, BP Patient Position: Sitting)   Pulse 98   Temp 97.6 °F (36.4 °C) (Tympanic)   Ht 6' 1\" (1.854 m)   Wt 181 lb (82.1 kg)   SpO2 95%   BMI 23.88 kg/m²     General: No acute distress. Eyes: Sclera anicteric. ENT: No oral lesions. Thyroid normal.  Nodes: No adenopathy. Skin: No spider angiomata. No jaundice. Positive for palmar erythema. Respiratory: Lungs clear to auscultation. Cardiovascular: Regular heart rate. No murmurs. No JVD. Abdomen: Soft, mild tender to deep palpation of the LUQ, spleen tip palpable. Liver edge firm, easily palpable 2-3 CM BCM nontender. No obvious ascites. Extremities: Trace edema. No muscle wasting. No gross arthritic changes. Neurologic: Alert and oriented. Cranial nerves grossly intact. Fine asterixis.     LABORATORY STUDIES:  Liver [place] of [address] Units 8/13/2019   WBC 4.1 - 11.1 K/uL 12.4 (H)   ANC 1.8 - 8.0 K/UL 9.7 (H)   HGB 12.1 - 17.0 g/dL 11.3 (L)    - 400 K/uL 116 (L)   INR 0.9 - 1.1      AST 15 - 37 U/L 60 (H)   ALT 12 - 78 U/L 35   Alk Phos 45 - 117 U/L 155 (H)   Bili, Total 0.2 - 1.0 MG/DL 3.6 (H)   Bili, Direct 0.00 - 0.40 mg/dL    Albumin 3.5 - 5.0 g/dL 2.5 (L)   BUN 6 - 20 MG/DL 9   Creat 0.70 - 1.30 MG/DL 1.05   Na 136 - 145 mmol/L 134 (L)   K 3.5 - 5.1 mmol/L 4.2   Cl 97 - 108 mmol/L 104   CO2 21 - 32 mmol/L 22   Glucose 65 - 100 mg/dL 147 (H)   Magnesium 1.6 - 2.4 mg/dL    Ammonia <32 UMOL/L 38 (H)       Cancer Screening Latest Ref Rng & Units 5/3/2019 2/22/2019 9/12/2018   AFP, Serum 0.0 - 8.0 ng/mL 3.4 3.6 4.3   AFP-L3% 0.0 - 9.9 % 9.7 8.3 8.4     SEROLOGIES:  5/2017. HBsurface antigen negative, anti-HBcore negative, anti-HBsurface negative, HCV RNA negative, iron saturation 29%, ASMA negative. Serologies Latest Ref Rng & Units 6/6/2017   Ferritin 30 - 400 ng/mL 39   Alpha-1 antitrypsin level 90 - 200 mg/dL 87 (L)     LIVER HISTOLOGY:  6/2017. [facility]. EkPa was 72.1. The results suggested a fibrosis level of F4. ENDOSCOPIC PROCEDURES:  5/2017. EGD by Dr Cal Patrick. Esophageal varices. Banding performed. 8/2017. EGD performed by Dr Goyo Jackson. Small esophageal varices. No banding performed. No gastric varices. Mild portal gastropathy. Repeat in 6 months.  2/2018. EGD performed by Dr Goyo Jackson. Medium esophageal varices. Banding performed x 4. No gastric varices. Moderate portal gastropathy. 5/2018. EGD performed by Dr Goyo Jackson. No esophageal varices. No gastric varices. Moderate portal gastropathy. Repeat 5/2019. RADIOLOGY:  11/22/2017. US of abdomen. No liver mass/lesion. 5/2018. Ultrasound of liver. Echogenic consistent with cirrhosis. No liver mass lesions. No dilated bile ducts. No ascites. OTHER TESTING:  Not available or performed    FOLLOW-UP:  All of the issues listed above in the Assessment and Plan were discussed with the patient. All questions were answered. The patient expressed a clear understanding of the above. [facility] 87 in 1 month. Will start LT evaluation soon thereafter.       MD [address] of [address], 2000 [facility] 22.  [phone]  [address] [place] [address]

## 2019-09-15 NOTE — PROGRESS NOTES
2 [facility] 323 [facility] Iris Anthony MD, FACP, Cite Cailin Cohn, MD Carlos Enrique Hernandez, PA-C   Ashley Pate, [facility]    Linda Snow, [facility]   Deacon Urbina, P-C  Omar Bowers, [facility]        [address] of [place]  [address] [facility], [address] [address]  [place], [address]    [phone]    FAX: [phone] [address] [facility]  [address]  6001 [facility], [address]  [place], [address] [facility]    [phone]    FAX: [phone]         HEPATOLOGY PROGRESS NOTE  The patient is well known to me and regularly cared for at [address] is a 63year old [place] male with cirrhosis that has been attributed to alcohol.  He developed refractory ascites and hydrothorax and underwent TIPS placement in 8/2019.  All ascites and edema has since resolved.     He was brought to the ED by family today because of increased lethargy and confusion.  Labs suggest he is dehydrated with Scr 1.3 and ammonia 78.     ZOEY has resolved and Scr down to normal.  WBC has come down as well. 1 of 2 blood cultures obtained in ED are growing what appears to be Staph.       ASSESSMENT AND PLAN:  Cirrhosis  Cryptogenic cirrhosis.     The diagnosis of cirrhosis is based upon Fiboscan, imaging, laboratory studies, complications of cirrhosis. AST is elevated.  ALT is normal.  ALP is elevated .  Liver function is depressed.  The platelet count is depressed.    The CTP is 7.  Child class B.  The MELD score is 21.     The patient has a MELD score greater than 15 and has developed complications of cirrhosis.  Will initiate liver transplant evaluation testing. Medicare start 10/1. Will have tests scheduled for LT evaluation to start then and completed so he can be seen at [facility] center by end of October.     Positive blood culture  All parameters are improved without ABX. WBC is down. Scr is down  TBILI is down  Mental status is better. Suspect this is skin contaminant. Discussed with Dr Pooja Beaver who agrees  Lets watch him 1 more day with plans to DC tomorrow.     Ascites   Ascites has resolved following TIPS placement and with diuretics.        Dehydration  Will continue to hold diuretics  Will start IV albumin 25 gm Q6H     Screening for Esophageal varices   The patient has esophageal varices with prior bleeding.    Varices have resolved following TIPS.    Hepatic encephalopathy   Overt HE is intermittent on current doses of lactulose and Xifaxan. This was worse today and likely precipitated by dehydration. Will need to exclude infection.  Blood and Urine cx. Will continue current dose of lactulose TID and add xifaxan BID     Thrombocytopenia   This is secondary to cirrhosis. There is no evidence of overt bleeding.    No treatment is required. The platelet count is adequate for the patient to undergo procedures without the need for platelet transfusion or platelet growth factors.     Screening for Hepatocellular Carcinoma  HCC screening has recently been performed and does not suggest [address] next liver imaging study will be performed in 9/2019.        PHYSICAL EXAMINATION:  VS: per nursing note  General:  No acute distress. Eyes:  Sclera anicteric. ENT:  No oral lesions.  Thyroid normal.  Nodes:  No adenopathy. Skin: spider angiomata. Respiratory:  Lungs clear to auscultation. Reduced breath sounds on the right about half way up. Cardiovascular: Regular heart rate. No JVD. Abdomen: Soft non-tender, Distended with obvious ascites. Extremities:  No lower extremity edema.    Neurologic:  Alert and oriented.     LABORATORY:  Results for Yumiko Wagoner (MRN [id number]) as of 9/15/2019 13:42   Ref.  Range 8/13/2019 16:47 9/13/2019 16:33 9/14/2019 04:02   WBC Latest Ref Range: 4.1 - 11.1 K/uL 12.4 (H) 9.1 7.6   HGB Latest Ref Range: 12.1 - 17.0 g/dL 11.3 (L) 12.1 9.8 (L)   PLATELET Latest Ref Range: 150 - 400 K/uL 116 (L) 112 (L) 84 (L)   INR Latest Ref Range: 0.9 - 1.1    1.4 (H)    Sodium Latest Ref Range: 136 - 145 mmol/L 134 (L) 140 142   Potassium Latest Ref Range: 3.5 - 5.1 mmol/L 4.2 4.3 3.6   Chloride Latest Ref Range: 97 - 108 mmol/L 104 110 (H) 111 (H)   CO2 Latest Ref Range: 21 - 32 mmol/L 22 20 (L) 20 (L)   Glucose Latest Ref Range: 65 - 100 mg/dL 147 (H) 190 (H) 107 (H)   BUN Latest Ref Range: 6 - 20 MG/DL 9 14 10   Creatinine Latest Ref Range: 0.70 - 1.30 MG/DL 1.05 1.30 0.99   Bilirubin, total Latest Ref Range: 0.2 - 1.0 MG/DL 3.6 (H) 3.3 (H) 2.3 (H)   Albumin Latest Ref Range: 3.5 - 5.0 g/dL 2.5 (L) 2.7 (L) 2.4 (L)   ALT (SGPT) Latest Ref Range: 12 - 78 U/L 35 66 51   AST Latest Ref Range: 15 - 37 U/L 60 (H) 62 (H) 46 (H)   Alk.  phosphatase Latest Ref Range: 45 - 117 U/L 155 (H) 147 (H) 121 (H)   Ammonia Latest Ref Range: <32 UMOL/L 38 (H) 78 (H) 66 (H)       MD [facility] 13 of [address], 2000 [facility], [facility] 22.  201 [facility]

## 2019-09-15 NOTE — PROGRESS NOTES
Hospitalist Progress Note  Corona Craig MD  Answering service: [phone] from in house phone        Date of Service:  9/15/2019  NAME:  Daniella Deng  :  1960  MRN:  [id number]      Admission Summary:   60-year-old man with a past medical history significant for type 2 diabetes, cryptogenic liver cirrhosis, COPD, was in his usual state of health until about 1 day ago when the patient developed change in mental status. This is progressive, getting worse, associated with weakness as well as abdominal pain. The abdominal pain was described as diffuse in location, constant, no radiation, described as dull ache. No associated nausea and vomiting. The spouse stated that the patient has been taking his lactulose as prescribed. The patient was recently admitted to this hospital from 2019 to 2019. The patient was admitted because of recurrent ascites and underwent TIPS procedure. Interval history / Subjective:        patient seen and examined bedside     He seems ok to me   Though still is slow     No family bedside     Assessment & Plan:     . Hepatic encephalopathy. This is the most likely cause of the patient's confusion but since it is diagnosis of exclusion  We will check urine drug screen. Rule out infection , ua clean , XRAY ordered   CT head NAD    Stop lasix and aldactone   Started iv albumin   Hepatology on board       GPC positive    farzana CONS , will wait for final c/s        Type 2 diabetes. The patient will be placed on sliding scale with insulin coverage. check hemoglobin A1c      Cryptogenic liver cirrhosis. He  developed refractory ascites and hydrothorax and underwent TIPS placement in 2019.     As a recommendation from the hepatologist.  Child class B.  The MELD score is 21.    10 mm hypoattenuating lesion in segment 5 of the liver,  In the  setting of a cirrhotic liver, the possibility of hepatoma is not excluded. We will await further recommendation from the patient's hepatologist.    .  Thrombocytopenia. T  his is most likely as a result of the patient's liver cirrhosis. The patient is asymptomatic. We will monitor the patient's platelet count. .  COPD. .  Tobacco abuse. The patient advised to quit smoking. We will place the patient on Nicoderm patch.     4 mm nonobstructing calculus in lower pole of left kidney        Code status: FULL   DVT prophylaxis: SCD     Care Plan discussed with: Patient/Family wife   Disposition: TBD     Hospital Problems  Date Reviewed: 9/15/2019          Codes Class Noted POA    * (Principal) Hepatic encephalopathy ([facility] ICD-10-CM: K72.90  ICD-9-CM: 572.2  8/1/2019 Yes                Review of Systems:   A comprehensive review of systems was negative except for that written in the HPI. Vital Signs:    Last 24hrs VS reviewed since prior progress note. Most recent are:  Visit Vitals  BP 99/55   Pulse 81   Temp 98.3 °F (36.8 °C)   Resp 16   Wt 76 kg (167 lb 8.8 oz)   SpO2 96%   BMI 22.11 kg/m²         Intake/Output Summary (Last 24 hours) at 9/15/2019 1413  Last data filed at 9/15/2019 0900  Gross per 24 hour   Intake 480 ml   Output    Net 480 ml        Physical Examination:             Constitutional:  No acute distress,Very  lethargic   ENT:  Oral mucous moist, oropharynx benign. Neck supple,    Resp:  CTA bilaterally. No wheezing/rhonchi/rales. No accessory muscle use   CV:  Regular rhythm, normal rate, no murmurs, gallops, rubs    GI:  Soft, non distended, non tender. normoactive bowel sounds, no hepatosplenomegaly     Musculoskeletal:  No edema, warm, 2+ pulses throughout    Neurologic:  Moves all extremities.   Non focal            Data Review:    Review and/or order of clinical lab test      Labs:     Recent Labs     09/14/19  0402 09/13/19  1633   WBC 7.6 9.1   HGB 9.8* 12.1   HCT 29.2* 35.5*   PLT 84* 112*     Recent Labs     09/14/19  0402 09/13/19  1633    140   K 3.6 4.3   * 110*   CO2 20* 20*   BUN 10 14   CREA 0.99 1.30   * 190*   CA 7.9* 8.8   MG 2.0  --    PHOS 2.6  --      Recent Labs     09/14/19  0402 09/13/19  1633   SGOT 46* 62*   ALT 51 66   * 147*   TBILI 2.3* 3.3*   TP 5.1* 6.0*   ALB 2.4* 2.7*   GLOB 2.7 3.3   LPSE  --  349     Recent Labs     09/13/19  1633   INR 1.4*   PTP 14.0*      No results for input(s): FE, TIBC, PSAT, FERR in the last 72 hours. No results found for: FOL, RBCF   No results for input(s): PH, PCO2, PO2 in the last 72 hours. No results for input(s): CPK, CKNDX, TROIQ in the last 72 hours.     No lab exists for component: CPKMB  Lab Results   Component Value Date/Time    Cholesterol, total 111 09/14/2019 04:02 AM    HDL Cholesterol 56 09/14/2019 04:02 AM    LDL, calculated 47.2 09/14/2019 04:02 AM    Triglyceride 39 09/14/2019 04:02 AM    CHOL/HDL Ratio 2.0 09/14/2019 04:02 AM     Lab Results   Component Value Date/Time    Glucose (POC) 106 (H) 09/15/2019 11:40 AM    Glucose (POC) 118 (H) 09/15/2019 06:02 AM    Glucose (POC) 153 (H) 09/14/2019 09:31 PM    Glucose (POC) 137 (H) 09/14/2019 04:48 PM    Glucose (POC) 120 (H) 09/14/2019 11:23 AM     Lab Results   Component Value Date/Time    Color YELLOW/STRAW 09/13/2019 05:24 PM    Appearance CLEAR 09/13/2019 05:24 PM    Specific gravity 1.005 09/13/2019 05:24 PM    pH (UA) 5.0 09/13/2019 05:24 PM    Protein NEGATIVE  09/13/2019 05:24 PM    Glucose NEGATIVE  09/13/2019 05:24 PM    Ketone NEGATIVE  09/13/2019 05:24 PM    Bilirubin NEGATIVE  09/13/2019 05:24 PM    Urobilinogen 1.0 09/13/2019 05:24 PM    Nitrites NEGATIVE  09/13/2019 05:24 PM    Leukocyte Esterase NEGATIVE  09/13/2019 05:24 PM         Medications Reviewed:     Current Facility-Administered Medications   Medication Dose Route Frequency    rifAXIMin (XIFAXAN) tablet 550 mg  550 mg Oral BID    diphenhydrAMINE (BENADRYL) capsule 50 mg  50 mg Oral QHS PRN    albumin human 25% (BUMINATE) solution 25 g  25 g IntraVENous Q6H    albuterol-ipratropium (DUO-NEB) 2.5 MG-0.5 MG/3 ML  3 mL Nebulization Q4H PRN    lactulose (CHRONULAC) 10 gram/15 mL solution 45 mL  30 g Oral QID    sodium chloride (NS) flush 5-40 mL  5-40 mL IntraVENous Q8H    sodium chloride (NS) flush 5-40 mL  5-40 mL IntraVENous PRN    ondansetron (ZOFRAN) injection 4 mg  4 mg IntraVENous Q4H PRN    nicotine (NICODERM CQ) 21 mg/24 hr patch 1 Patch  1 Patch TransDERmal DAILY    insulin lispro (HUMALOG) injection   SubCUTAneous AC&HS    glucose chewable tablet 16 g  4 Tab Oral PRN    glucagon (GLUCAGEN) injection 1 mg  1 mg IntraMUSCular PRN    dextrose 10% infusion 0-250 mL  0-250 mL IntraVENous PRN     ______________________________________________________________________  EXPECTED LENGTH OF STAY: - - -  ACTUAL LENGTH OF STAY:          2                 Antionette Greer MD

## 2019-09-16 VITALS
TEMPERATURE: 98.2 F | SYSTOLIC BLOOD PRESSURE: 113 MMHG | WEIGHT: 167.55 LBS | OXYGEN SATURATION: 100 % | RESPIRATION RATE: 16 BRPM | DIASTOLIC BLOOD PRESSURE: 55 MMHG | HEART RATE: 84 BPM | BODY MASS INDEX: 22.11 KG/M2

## 2019-09-16 LAB
GLUCOSE BLD STRIP.AUTO-MCNC: 95 MG/DL (ref 65–100)
GLUCOSE BLD STRIP.AUTO-MCNC: 95 MG/DL (ref 65–100)
SERVICE CMNT-IMP: NORMAL
SERVICE CMNT-IMP: NORMAL

## 2019-09-16 PROCEDURE — 94760 N-INVAS EAR/PLS OXIMETRY 1: CPT

## 2019-09-16 PROCEDURE — 97161 PT EVAL LOW COMPLEX 20 MIN: CPT

## 2019-09-16 PROCEDURE — P9047 ALBUMIN (HUMAN), 25%, 50ML: HCPCS | Performed by: INTERNAL MEDICINE

## 2019-09-16 PROCEDURE — 74011250636 HC RX REV CODE- 250/636: Performed by: INTERNAL MEDICINE

## 2019-09-16 PROCEDURE — 74011250637 HC RX REV CODE- 250/637: Performed by: INTERNAL MEDICINE

## 2019-09-16 PROCEDURE — 82962 GLUCOSE BLOOD TEST: CPT

## 2019-09-16 RX ORDER — FUROSEMIDE 40 MG/1
40 TABLET ORAL DAILY
Qty: 60 TAB | Refills: 4 | Status: SHIPPED | OUTPATIENT
Start: 2019-09-16 | End: 2019-09-16

## 2019-09-16 RX ADMIN — Medication 10 ML: at 10:54

## 2019-09-16 RX ADMIN — LACTULOSE 45 ML: 20 SOLUTION ORAL at 08:24

## 2019-09-16 RX ADMIN — RIFAXIMIN 550 MG: 550 TABLET ORAL at 08:23

## 2019-09-16 RX ADMIN — ALBUMIN (HUMAN) 25 G: 0.25 INJECTION, SOLUTION INTRAVENOUS at 05:51

## 2019-09-16 RX ADMIN — ALBUMIN (HUMAN) 25 G: 0.25 INJECTION, SOLUTION INTRAVENOUS at 12:20

## 2019-09-16 RX ADMIN — Medication 10 ML: at 07:16

## 2019-09-16 RX ADMIN — LACTULOSE 45 ML: 20 SOLUTION ORAL at 12:20

## 2019-09-16 NOTE — DISCHARGE SUMMARY
Discharge Summary       PATIENT ID: Bonnie De La Rosa  MRN: [id number]   YOB: 1960    DATE OF ADMISSION: 9/13/2019  4:40 PM    DATE OF DISCHARGE:    PRIMARY CARE PROVIDER: Russ Dawson MD     ATTENDING PHYSICIAN:   DISCHARGING PROVIDER: Warren Nicole MD    To contact this individual call [phone] and ask the  to page. If unavailable ask to be transferred the Adult Hospitalist Department.     CONSULTATIONS: IP CONSULT TO HEPATOLOGY    PROCEDURES/SURGERIES: * No surgery found *    ADMITTING DIAGNOSES & HOSPITAL COURSE:       60-year-old man with a past medical history significant for type 2 diabetes, cryptogenic liver cirrhosis, COPD, was in his usual state of health until about 1 day ago when the patient developed change in mental status.  This is progressive, getting worse, associated with weakness as well as abdominal pain.  The abdominal pain was described as diffuse in location, constant, no radiation, described as dull ache.  No associated nausea and vomiting.  The spouse stated that the patient has been taking his lactulose as prescribed.  The patient was recently admitted to this hospital from 08/01/2019 to 08/05/2019.  The patient was admitted because of recurrent ascites and underwent TIPS procedure.      Recent Results (from the past 24 hour(s))   GLUCOSE, POC    Collection Time: 09/15/19  5:06 PM   Result Value Ref Range    Glucose (POC) 131 (H) 65 - 100 mg/dL    [facility]    Collection Time: 09/15/19  9:10 PM   Result Value Ref Range    Glucose (POC) 126 (H) 65 - 100 mg/dL    Performed by [facility], POC    Collection Time: 09/16/19  6:58 AM   Result Value Ref Range    Glucose (POC) 95 65 - 100 mg/dL    Performed by [facility], POC    Collection Time: 09/16/19 12:09 PM   Result Value Ref Range    Glucose (POC) 95 65 - 100 mg/dL    Performed by [facility]       Hepatic encephalopathy.    This is the most likely cause of the patient's confusion but since it is diagnosis of exclusion    Rule out infection , ua clean , XRAY ordered , blood c/sl is CONS 1/2 is contaminant   CT head NAD    Stop lasix and aldactone   Started iv albumin , improved   Hepatology on board         GPC positive 1/2   Is  CONS , will wait for final c/s          Type 2 diabetes.    The patient will be placed on sliding scale with insulin coverage.     check hemoglobin A1c       Cryptogenic liver cirrhosis.    He  developed refractory ascites and hydrothorax and underwent TIPS placement in 8/2019.    As a recommendation from the hepatologist.  Child class B.  The MELD score is 21.     10 mm hypoattenuating lesion in segment 5 of the liver,  In the  setting of a cirrhotic liver, the possibility of hepatoma is not excluded.   We will await further recommendation from the patient's hepatologist.  Dr Hollie Joshi will follow OP      .  Thrombocytopenia.  T  his is most likely as a result of the patient's liver cirrhosis.    The patient is asymptomatic.    We will monitor the patient's platelet count.       .  COPD.       .  Tobacco abuse.    The patient advised to quit smoking.    We will place the patient on Nicoderm patch.     4 mm nonobstructing calculus in lower pole of left kidney      DISCHARGE DIAGNOSES / PLAN:      See above      ADDITIONAL CARE RECOMMENDATIONS:   SEE ABOVE    PENDING TEST RESULTS:   At the time of discharge the following test results are still pending:     FOLLOW UP APPOINTMENTS:    Follow-up Information     Follow up With Specialties Details Why Contact Info    Davonte Palacios MD Family Practice In 1 week  [address] [facility]  [id number] [place] [address]      Richard Mckeon MD Hepatology, Liver Disease, Internal Medicine In 1 week  [address]  [phone]               DIET: Regular Diet  Oral Nutritional Supplements:    ACTIVITY: Activity as tolerated    WOUND CARE: EQUIPMENT needed:       DISCHARGE MEDICATIONS:  Current Discharge Medication List      CONTINUE these medications which have CHANGED    Details   furosemide (LASIX) 40 mg tablet Take 1 Tab by mouth daily. Qty: 60 Tab, Refills: 4         CONTINUE these medications which have NOT CHANGED    Details   spironolactone (ALDACTONE) 100 mg tablet Take 2 Tabs by mouth daily. Qty: 60 Tab, Refills: 4      lactulose (CHRONULAC) 10 gram/15 mL solution Take 45 mL by mouth three (3) times daily. Qty: 1 Bottle, Refills: 1      ondansetron (ZOFRAN ODT) 4 mg disintegrating tablet Take 1 Tab by mouth every eight (8) hours as needed for Nausea. Qty: 90 Tab, Refills: 6      albuterol (PROVENTIL HFA, VENTOLIN HFA, PROAIR HFA) 90 mcg/actuation inhaler Take 1 Puff by inhalation every six (6) hours as needed for Wheezing. NOTIFY YOUR PHYSICIAN FOR ANY OF THE FOLLOWING:   Fever over 101 degrees for 24 hours. Chest pain, shortness of breath, fever, chills, nausea, vomiting, diarrhea, change in mentation, falling, weakness, bleeding. Severe pain or pain not relieved by medications. Or, any other signs or symptoms that you may have questions about. DISPOSITION:    Home With:   OT  PT  HH  RN       Long term SNF/Inpatient Rehab    Independent/assisted living    Hospice    Other:       PATIENT CONDITION AT DISCHARGE:     Functional status    Poor     Deconditioned    x Independent      Cognition    x Lucid     Forgetful     Dementia      Catheters/lines (plus indication)    Teresa     PICC     PEG    x None      Code status    x Full code     DNR      PHYSICAL EXAMINATION AT DISCHARGE:   Refer to Progress Note  Visit Vitals  /55 (BP 1 Location: Left arm, BP Patient Position: At rest)   Pulse 84   Temp 98.2 °F (36.8 °C)   Resp 16   Wt 76 kg (167 lb 8.8 oz)   SpO2 100%   BMI 22.11 kg/m²     No acute distress,alert and talking     ENT:  Oral mucous moist, oropharynx benign. Neck supple,    Resp:  CTA bilaterally.  No wheezing/rhonchi/rales. No accessory muscle use   CV:  Regular rhythm, normal rate, no murmurs, gallops, rubs    GI:  Soft, non distended, non tender. normoactive bowel sounds, no hepatosplenomegaly     Musculoskeletal:  No edema, warm, 2+ pulses throughout    Neurologic:  Moves all extremities.   Non focal         CHRONIC MEDICAL DIAGNOSES:  Problem List as of 9/16/2019 Date Reviewed: 9/15/2019          Codes Class Noted - Resolved    Pneumothorax ICD-10-CM: J93.9  ICD-9-CM: 512.89  8/5/2019 - Present        S/P TIPS (transjugular intrahepatic portosystemic shunt) ICD-10-CM: S32.002  ICD-9-CM: V45.89  8/5/2019 - Present        * (Principal) Hepatic encephalopathy ([facility] ICD-10-CM: K72.90  ICD-9-CM: 572.2  8/1/2019 - Present        Ascites ICD-10-CM: R18.8  ICD-9-CM: 789.59  8/1/2019 - Present        Pleural effusion ICD-10-CM: J90  ICD-9-CM: 511.9  8/1/2019 - Present        Cirrhosis ([facility] ICD-10-CM: K74.60  ICD-9-CM: 571.5  6/6/2017 - Present        Esophageal varices ([facility] ICD-10-CM: I85.00  ICD-9-CM: 456.1  6/6/2017 - Present        Diabetes mellitus ([facility] ICD-10-CM: E11.9  ICD-9-CM: 250.00  6/6/2017 - Present        H/O umbilical hernia repair CJG-68-BF: Z98.890, Z87.19  ICD-9-CM: V15.29  6/6/2017 - Present        S/P appendectomy ICD-10-CM: Z90.49  ICD-9-CM: V45.89  6/6/2017 - Present        RESOLVED: Lactic acidosis ICD-10-CM: E87.2  ICD-9-CM: 276.2  8/5/2019 - 9/15/2019        RESOLVED: Sepsis ([facility] ICD-10-CM: A41.9  ICD-9-CM: 038.9, 995.91  8/5/2019 - 9/15/2019        RESOLVED: Large pleural effusion ICD-10-CM: J90  ICD-9-CM: 511.9  6/13/2019 - 9/15/2019              Greater than 30  minutes were spent with the patient on counseling and coordination of care    Signed:   Devyn Rodriguez MD  9/16/2019  1:23 PM

## 2019-09-16 NOTE — PROGRESS NOTES
PHYSICAL THERAPY EVALUATION/DISCHARGE  Patient: Emely Wyatt (70 y.o. male)  Date: 9/16/2019  Primary Diagnosis: Hepatic encephalopathy ([facility] [K72.90]       Precautions:          ASSESSMENT  Based on the objective data described below, the patient presents with baseline mobility demonstrating safe bed mobility, transfers and gait. Gait is steady and did well with steps( only 4 due to IV). Safe for independent mobility and return home with wife. .    Functional Outcome Measure: The patient scored 28/28 on the Tinetti outcome measure which is indicative of low fall risk. Other factors to consider for discharge:      Further skilled acute physical therapy is not indicated at this time. PLAN :  Recommendation for discharge: (in order for the patient to meet his/her long term goals)  No skilled physical therapy/ follow up rehabilitation needs identified at this time. This discharge recommendation:  Has not yet been discussed the attending provider and/or case management    Equipment recommendations for successful discharge: none       SUBJECTIVE:   Patient stated I walked around this morning.     OBJECTIVE DATA SUMMARY:   HISTORY:    Past Medical History:   Diagnosis Date    Chronic obstructive pulmonary disease ([facility]     [facility]     Liver disease      Past Surgical History:   Procedure Laterality Date    HX APPENDECTOMY  1966    HX GI      Esophageal Varices, banded    HX HERNIA REPAIR  2015    IR INSERT TIPS HEPATIC SHUNT  8/2/2019    IR THORACENTESIS/INSERT CHEST TUBE  8/2/2019       Prior level of function: independent  Personal factors and/or comorbidities impacting plan of care:     Home Situation  Home Environment: Private residence  # Steps to Enter: 8  Rails to Enter: Yes  One/Two Story Residence: One story  Living Alone: No  Support Systems: Family member(s)  Patient Expects to be Discharged to[de-identified] Private residence  Current DME Used/Available at Home: None    EXAMINATION/PRESENTATION/DECISION MAKING:   Critical Behavior:  Neurologic State: Alert  Orientation Level: Oriented X4  Cognition: Follows commands     Hearing: Auditory  Auditory Impairment: None  Skin:  See nursing notes  Edema: none  Range Of Motion:  AROM: Within functional limits           PROM: Within functional limits           Strength:    Strength: Within functional limits                    Tone & Sensation:   Tone: Normal              Sensation: Intact               Coordination:  Coordination: Within functional limits  Vision:      Functional Mobility:  Bed Mobility:     Supine to Sit: Independent  Sit to Supine: Independent     Transfers:  Sit to Stand: Independent  Stand to Sit: Independent  Stand Pivot Transfers: Independent                    Balance:   Sitting: Intact  Standing: Intact  Ambulation/Gait Training:  Distance (ft): 300 Feet (ft)  Assistive Device: Gait belt  Ambulation - Level of Assistance: Independent     Gait Description (WDL): Exceptions to WDL                                          Stairs:  Number of Stairs Trained: 4  Stairs - Level of Assistance: Supervision   Rail Use: Right     Functional Measure:  Tinetti test:    Sitting Balance: 1  Arises: 2  Attempts to Rise: 2  Immediate Standing Balance: 2  Standing Balance: 2  Nudged: 2  Eyes Closed: 1  Turn 360 Degrees - Continuous/Discontinuous: 1  Turn 360 Degrees - Steady/Unsteady: 1  Sitting Down: 2  Balance Score: 16 Balance total score  Indication of Gait: 1  R Step Length/Height: 1  L Step Length/Height: 1  R Foot Clearance: 1  L Foot Clearance: 1  Step Symmetry: 1  Step Continuity: 1  Path: 2  Trunk: 2  Walking Time: 1  Gait Score: 12 Gait total score  Total Score: 28/28 Overall total score         Tinetti Tool Score Risk of Falls  <19 = High Fall Risk  19-24 = Moderate Fall Risk  25-28 = Low Fall Risk  Cletti ME. Performance-Oriented Assessment of Mobility Problems in Elderly Patients. Nicole 67; [id number]. (Scoring Description: PT Bulletin Feb. 10, 1993)    Older adults: [place] Santos et al, 2009; n = 1000 [facility] elderly evaluated with ABC, JUAN, ADL, and IADL)  · Mean JUAN score for males aged 69-68 years = 26.21(3.40)  · Mean JUAN score for females age 69-68 years = 25.16(4.30)  · Mean JUAN score for males over 80 years = 23.29(6.02)  · Mean JUAN score for females over 80 years = 17.20(8.32)            Physical Therapy Evaluation Charge Determination   History Examination Presentation Decision-Making   MEDIUM  Complexity : 1-2 comorbidities / personal factors will impact the outcome/ POC  LOW Complexity : 1-2 Standardized tests and measures addressing body structure, function, activity limitation and / or participation in recreation  LOW Complexity : Stable, uncomplicated        Based on the above components, the patient evaluation is determined to be of the following complexity level: LOW     Pain Ratin/10    Activity Tolerance:   Good      After treatment patient left in no apparent distress:   Supine in bed    COMMUNICATION/EDUCATION:   The patients plan of care was discussed with: Registered Nurse.         Thank you for this referral.  Bobbi Hernandez, PT   Time Calculation: 13 mins

## 2019-09-16 NOTE — PROGRESS NOTES
Reason for Admission:   Hepatic encephalopathy                  RRAT Score:   19               Do you (patient/family) have any concerns for transition/discharge? None voiced                 Plan for utilizing home health:   Not needed at this time    Current Advanced Directive/Advance Care Plan:  No advanced directive, patient is a full code            Transition of Care Plan:  Chart reviewed for transitions of care, discussed patient during rounds. Cm contacted patients' wife to explain role and offer support. Mrs. Baird confirmed demographics, stated patient was independent with ADLs prior to admission, no history of home health or DME. She expects him to return home today and his brother will provide transportation home. Patient is currently uninsured but will have Medicare effective October 1st. No CM needs identified at this time. Care Management Interventions  PCP Verified by CM: Yes  Palliative Care Criteria Met (RRAT>21 & CHF Dx)?: No  Current Support Network: Lives with Spouse  Confirm Follow Up Transport: Family  Plan discussed with Pt/Family/Caregiver: Yes  Freedom of Choice Offered: Yes   Resource Information Provided?: No  Discharge Location  Discharge Placement: Home with family assistance    S.  [employer]

## 2019-09-16 NOTE — DISCHARGE INSTRUCTIONS
Discharge Instructions       PATIENT ID: Justino Oliver  MRN: [id number]   YOB: 1960    DATE OF ADMISSION: 9/13/2019  4:40 PM    DATE OF DISCHARGE: 9/16/2019    PRIMARY CARE PROVIDER: Delfina Flores MD     ATTENDING PHYSICIAN: Yelena Perez MD  DISCHARGING PROVIDER: Paris Sandifer, MD    To contact this individual call [phone] and ask the  to page. If unavailable ask to be transferred the Adult Hospitalist Department. DISCHARGE DIAGNOSES   hepatic encephalopathy     CONSULTATIONS: IP CONSULT TO HEPATOLOGY    PROCEDURES/SURGERIES: * No surgery found *    PENDING TEST RESULTS:   At the time of discharge the following test results are still pending:     FOLLOW UP APPOINTMENTS:   Follow-up Information     Follow up With Specialties Details Why Contact Info    Delfina Flores MD Family Practice In 1 week PCP is with [facility]in [facility] 8:00 a.m. 10:00 p.m.  [address] [place] [address]  Chel Epstein MD Hepatology, Liver Disease, Internal Medicine In 1 week  [address]  [phone]             ADDITIONAL CARE RECOMMENDATIONS:   Please follow up with pcp and Dr Resendze Handing   Stop lasix and aldactone     DIET: Regular Diet  Oral Nutritional Supplements:    ACTIVITY: Activity as tolerated    WOUND CARE:     EQUIPMENT needed:       DISCHARGE MEDICATIONS:   See Medication Reconciliation Form    · It is important that you take the medication exactly as they are prescribed. · Keep your medication in the bottles provided by the pharmacist and keep a list of the medication names, dosages, and times to be taken in your wallet. · Do not take other medications without consulting your doctor. NOTIFY YOUR PHYSICIAN FOR ANY OF THE FOLLOWING:   Fever over 101 degrees for 24 hours. Chest pain, shortness of breath, fever, chills, nausea, vomiting, diarrhea, change in mentation, falling, weakness, bleeding.  Severe pain or pain not relieved by medications. Or, any other signs or symptoms that you may have questions about.       DISPOSITION:   xx Home With:   OT  PT  HH  RN       SNF/Inpatient Rehab/LTAC    Independent/assisted living    Hospice    Other:     CDMP Checked:   Yes x     PROBLEM LIST Updated:  Yes x       Signed:   Mirta Pritchard MD  9/16/2019  1:20 PM

## 2019-09-16 NOTE — PROGRESS NOTES
Bedside shift change report given to Rolanda Mejia RN (oncoming nurse) by Cassidy Blanchard RN (offgoing nurse). Report included the following information SBAR, Kardex, Intake/Output and MAR.

## 2019-09-19 LAB
BACTERIA SPEC CULT: ABNORMAL
SERVICE CMNT-IMP: ABNORMAL

## 2019-09-24 ENCOUNTER — OFFICE VISIT (OUTPATIENT)
Dept: HEMATOLOGY | Age: 59
End: 2019-09-24

## 2019-09-24 VITALS
TEMPERATURE: 98.4 F | WEIGHT: 178 LBS | HEART RATE: 72 BPM | SYSTOLIC BLOOD PRESSURE: 116 MMHG | BODY MASS INDEX: 23.48 KG/M2 | DIASTOLIC BLOOD PRESSURE: 50 MMHG | OXYGEN SATURATION: 98 %

## 2019-09-24 DIAGNOSIS — K74.60 CIRRHOSIS OF LIVER WITHOUT ASCITES, UNSPECIFIED HEPATIC CIRRHOSIS TYPE (HCC): Primary | ICD-10-CM

## 2019-09-24 NOTE — PROGRESS NOTES
Chief Complaint   Patient presents with    Follow-up     1. Have you been to the ER, urgent care clinic since your last visit? Hospitalized since your last visit? No    2. Have you seen or consulted any other health care providers outside of the [address] [place] [address] since your last visit? Include any pap smears or colon screening. No     3 most recent PHQ Screens 8/1/2019   Little interest or pleasure in doing things Not at all   Feeling down, depressed, irritable, or hopeless Not at all   Total Score PHQ 2 0     Abuse Screening Questionnaire 8/1/2019   Do you ever feel afraid of your partner? N   Are you in a relationship with someone who physically or mentally threatens you? N   Is it safe for you to go home? Y     Learning Assessment 8/1/2019   PRIMARY LEARNER Patient   BARRIERS PRIMARY LEARNER NONE   CO-LEARNER CAREGIVER No   PRIMARY LANGUAGE ENGLISH   LEARNER PREFERENCE PRIMARY LISTENING   ANSWERED BY patient   RELATIONSHIP SELF     Patient states that he is taking Diuretic and unsure with the name. xifaxan discontinued?     Visit Vitals  /50 (BP 1 Location: Left arm, BP Patient Position: Sitting)   Pulse 72   Temp 98.4 °F (36.9 °C) (Tympanic)   Wt 178 lb (80.7 kg)   SpO2 98%   BMI 23.48 kg/m²

## 2019-09-24 NOTE — PROGRESS NOTES
3340 [facility], MD, [address], Cite Mongi Slim, MD Rito Lauren, PA-RUFINA Borden, [facility]     April S Per, [facility]   Machelle Cavazos FNP-RUFINA Campos, [facility]       Jarad Sueutado Xu De Hendricks 136    at [address], [address] [place] [address], [facility] 22.    [phone]    FAX: [address] [place] [address], [address]    [phone]    FAX: [phone]       Patient Care Team:  Penelope Arizmendi MD as PCP - General (Internal Medicine)  Yevette Dakins, MD (Gastroenterology)      Problem List  Date Reviewed: 9/15/2019          Codes Class Noted    Pneumothorax ICD-10-CM: J93.9  ICD-9-CM: 512.89  8/5/2019        S/P TIPS (transjugular intrahepatic portosystemic shunt) ICD-10-CM: X47.631  ICD-9-CM: V45.89  8/5/2019        Hepatic encephalopathy ([facility] ICD-10-CM: K72.90  ICD-9-CM: 572.2  8/1/2019        Ascites ICD-10-CM: R18.8  ICD-9-CM: 789.59  8/1/2019        Pleural effusion ICD-10-CM: J90  ICD-9-CM: 511.9  8/1/2019        Cirrhosis ([facility] ICD-10-CM: K74.60  ICD-9-CM: 571.5  6/6/2017        Esophageal varices ([facility] ICD-10-CM: I85.00  ICD-9-CM: 456.1  6/6/2017        Diabetes mellitus ([facility] ICD-10-CM: E11.9  ICD-9-CM: 250.00  8/9/7483        H/O umbilical hernia repair EXK-56-SP: Z98.890, Z87.19  ICD-9-CM: V15.29  6/6/2017        S/P appendectomy ICD-10-CM: Z90.49  ICD-9-CM: V45.89  6/6/2017              Eliz Mazariegos returns to the [place] [address] for management of cryptogenic cirrhosis. The active problem list, all pertinent past medical history, medications, endoscopic studies, radiologic findings and laboratory findings related to the liver disorder were reviewed with the patient.       The patient is a 63 y.o.  male who was found to have chronic liver disease and cirrhosis in 5/2017 when he developed variceal bleeding. 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 He was hospitalized for HE and dehydration 1 weeks after TIPS placement. Diuretics were reduced to step 1. The patient has not experienced yellowing of the eyes or skin, problems concentrating, swelling of the abdomen, hematemesis, hematochezia. The patient has severe limitations in functional activities which can be attributed to the liver disease       ASSESSMENT AND PLAN:  Cirrhosis  Cryptogenic cirrhosis. The diagnosis of cirrhosis is based upon Fiboscan, imaging, laboratory studies, complications of cirrhosis. AST is elevated. ALT is normal.  ALP is elevated. Liver function is normal.  The platelet count is depressed. The CTP is 7. Child class B. The MELD score is 21. He has now been approved for Medicare which starts Oct 1, 2019. Will perform LT evaluation testing right after Oct 1. Ascites   Ascites has resolved following TIPS placement and with diuretics. Will remain on step 1 diuretics. The patient was counseled regarding the need to maintain sodium restriction and the types of foods containing high amounts of sodium to be avoided. Hepatic hydrothorax  This has resolved following TIPS placement. Lower extremity edema  Edema has resolved with current dose of diuretics. Will remain on the current dose of diuretics at step 1. The renal function is normal and edema should be able to be controled with diuretics. Screening for Esophageal varices   The patient has esophageal varices with prior bleeding. Varices have been banded   Varices have now been treated with TIPS.   No further EGD is required to assess vareces is required at this time. Hepatic encephalopathy   Overt HE is controlled on current dose of lactulose TID. He is not having an diarrhea. There is no need to restrict dietary protein at this time. Pulmonary hypertnesion  ECHO shows mild pulmonary HTN with estimated PAP of 42 mm Hg. This was likely due to fluid overload since direct RA pressure prior to inserting TIPS was not markedly elevated. Will need to repeat ECHO. Can delay tis until he undergoes formal LT evaluation after 10/1/2019. Thrombocytopenia   This is secondary to cirrhosis. There is no evidence of overt bleeding. No treatment is required. The platelet count is adequate for the patient to undergo procedures without the need for platelet transfusion or platelet growth factors. Screening for Hepatocellular Carcinoma  HCC screening has recently been performed and does not suggest [facility] The next liver imaging study will be performed in 9/2019. COPD. Patient is inconsistent with inhaler use and continues to smoke 1 PPD. Treatment of other medical problems in patients with chronic liver disease  There are no contraindications for the patient to take most medications that are necessary for treatment of other medical issues. The patient has cirrhrosis and should avoid NSAIDs which are associated with a higher rate of developing ZOEY. Counseling for alcohol in patients with chronic liver disease  The patient was counseled regarding alcohol consumption and the effect of alcohol on chronic liver disease. The patient does not consume any significant amount of alcohol. Vaccinations   Vaccination for viral hepatitis A and B is recommended since the patient has no serologic evidence of previous exposure or vaccination with immunity. Routine vaccinations against other bacterial and viral agents can be performed as indicated.   Annual flu vaccination should be administered if indicated. ALLERGIES  Allergies   Allergen Reactions    Shellfish Derived Hives       MEDICATIONS  Current Outpatient Medications   Medication Sig    lactulose (CHRONULAC) 10 gram/15 mL solution Take 45 mL by mouth three (3) times daily.  albuterol (PROVENTIL HFA, VENTOLIN HFA, PROAIR HFA) 90 mcg/actuation inhaler Take 1 Puff by inhalation every six (6) hours as needed for Wheezing.  ondansetron (ZOFRAN ODT) 4 mg disintegrating tablet Take 1 Tab by mouth every eight (8) hours as needed for Nausea.  furosemide (LASIX) 40 mg tablet take 2 tablets BY MOUTH EVERY DAY    spironolactone (ALDACTONE) 100 mg tablet take 2 tablets BY MOUTH EVERY DAY    nicotine (NICODERM CQ) 14 mg/24 hr patch Apply 1 patch every 24 hours for 6 weeks.  nicotine (NICODERM CQ) 7 mg/24 hr Apply 1 patch every 24 hours for 7 days. To be used after 6 weeks of 14 mg patch. No current facility-administered medications for this visit. SYSTEM REVIEW NOT RELATED TO LIVER DISEASE OR REVIEWED ABOVE:  Constitution systems: Negative for fever, chills. Weight stable since last office visit. Eyes: Negative for visual changes. ENT: Negative for sore throat, painful swallowing. Respiratory: Negative for cough, hemoptysis. Positive for SOB, patient is inconsistent with administration of Ventolin. Cardiology: Negative for chest pain, palpitations. GI:  Negative for constipation or diarrhea. Occasional complaint of RUQ dull achy bruise feeling, helped with changes in position. Occasional post-prandial nausea  : Negative for urinary frequency, dysuria, hematuria, nocturia. Skin: Negative for rash. Hematology: Negative for easy bruising, blood clots. Musculo-skeletal: Negative for back pain, muscle pain, weakness. Neurologic: Negative for headaches, dizziness, vertigo. Memory problems ? related to HE. Psychology: Negative for anxiety, depression. FAMILY HISTORY:  The father  of heart disease.     The mother  of MVA but had elevated liver enzymes. There is no family history of liver disease. SOCIAL HISTORY:  The patient is . The patient has no children. The patient currently smokes 1 pack of tobacco daily. The patient has never consumed significant amounts of alcohol. 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 PHYSICAL EXAMINATION:  Visit Vitals  /50 (BP 1 Location: Left arm, BP Patient Position: Sitting)   Pulse 72   Temp 98.4 °F (36.9 °C) (Tympanic)   Wt 178 lb (80.7 kg)   SpO2 98%   BMI 23.48 kg/m²     General: No acute distress. Eyes: Sclera anicteric. ENT: No oral lesions. Thyroid normal.  Nodes: No adenopathy. Skin: No spider angiomata. No jaundice. Positive for palmar erythema. Respiratory: Lungs clear to auscultation. Cardiovascular: Regular heart rate. No murmurs. No JVD. Abdomen: Soft, mild tender to deep palpation of the LUQ, spleen tip palpable. Liver edge firm, easily palpable 2-3 CM BCM nontender. No obvious ascites. Extremities: Trace edema. No muscle wasting. No gross arthritic changes. Neurologic: Alert and oriented. Cranial nerves grossly intact. Fine asterixis.     LABORATORY STUDIES:  Liver [place] of [address] Units 2019   WBC 3.4 - 10.8 x10E3/uL 5.2   ANC 1.4 - 7.0 x10E3/uL 2.7   HGB 13.0 - 17.7 g/dL 9.9 (L)    - 450 x10E3/uL 77 (LL)   INR 0.8 - 1.2 1.5 (H)   AST 0 - 40 IU/L 50 (H)   ALT 0 - 44 IU/L 34   Alk Phos 39 - 117 IU/L 112   Bili, Total 0.0 - 1.2 mg/dL 2.7 (H)   Bili, Direct 0.00 - 0.40 mg/dL 0.85 (H)   Albumin 3.5 - 5.5 g/dL 3.4 (L)   BUN 6 - 24 mg/dL 13   Creat 0.76 - 1.27 mg/dL 0.98   Na 134 - 144 mmol/L 141   K 3.5 - 5.2 mmol/L 4.4   Cl 96 - 106 mmol/L 107 (H)   CO2 20 - 29 mmol/L 21   Glucose 65 - 99 mg/dL 89     Cancer Screening Latest Ref Rng & Units 5/3/2019 2019 2018   AFP, Serum 0.0 - 8.0 ng/mL 3.4 3.6 4.3   AFP-L3% 0.0 - 9.9 % 9.7 8.3 8. 4     SEROLOGIES:  5/2017. HBsurface antigen negative, anti-HBcore negative, anti-HBsurface negative, HCV RNA negative, iron saturation 29%, ASMA negative. Serologies Latest Ref Rng & Units 6/6/2017   Ferritin 30 - 400 ng/mL 39   Alpha-1 antitrypsin level 90 - 200 mg/dL 87 (L)     LIVER HISTOLOGY:  6/2017. FibroScan performed at [address]. EkPa was 72.1. The results suggested a fibrosis level of F4. ENDOSCOPIC PROCEDURES:  5/2017. EGD by Dr Tony Swanson. Esophageal varices. Banding performed. 8/2017. EGD performed by Dr Franklin Garcia. Small esophageal varices. No banding performed. No gastric varices. Mild portal gastropathy. Repeat in 6 months.  2/2018. EGD performed by Dr Franklin Garcia. Medium esophageal varices. Banding performed x 4. No gastric varices. Moderate portal gastropathy. 5/2018. EGD performed by Dr Franklin Garcia. No esophageal varices. No gastric varices. Moderate portal gastropathy. Repeat 5/2019. RADIOLOGY:  11/22/2017. US of abdomen. No liver mass/lesion. 5/2018. Ultrasound of liver. Echogenic consistent with cirrhosis. No liver mass lesions. No dilated bile ducts. No ascites. OTHER TESTING:  Not available or performed    FOLLOW-UP:  All of the issues listed above in the Assessment and Plan were discussed with the patient. All questions were answered. The patient expressed a clear understanding of the above. [address] [place] [address] 87 in 1 month. Will start LT evaluation soon thereafter.       Sreekanth Ovalles MD  [address], [address] [place] [address] 22.  [phone]  [address]

## 2019-09-25 LAB
ALBUMIN SERPL-MCNC: 3.4 G/DL (ref 3.5–5.5)
ALP SERPL-CCNC: 112 IU/L (ref 39–117)
ALT SERPL-CCNC: 34 IU/L (ref 0–44)
AST SERPL-CCNC: 50 IU/L (ref 0–40)
BASOPHILS # BLD AUTO: 0.1 X10E3/UL (ref 0–0.2)
BASOPHILS NFR BLD AUTO: 1 %
BILIRUB DIRECT SERPL-MCNC: 0.85 MG/DL (ref 0–0.4)
BILIRUB SERPL-MCNC: 2.7 MG/DL (ref 0–1.2)
BUN SERPL-MCNC: 13 MG/DL (ref 6–24)
BUN/CREAT SERPL: 13 (ref 9–20)
CALCIUM SERPL-MCNC: 9.1 MG/DL (ref 8.7–10.2)
CHLORIDE SERPL-SCNC: 107 MMOL/L (ref 96–106)
CO2 SERPL-SCNC: 21 MMOL/L (ref 20–29)
CREAT SERPL-MCNC: 0.98 MG/DL (ref 0.76–1.27)
EOSINOPHIL # BLD AUTO: 0.3 X10E3/UL (ref 0–0.4)
EOSINOPHIL NFR BLD AUTO: 5 %
ERYTHROCYTE [DISTWIDTH] IN BLOOD BY AUTOMATED COUNT: 16.1 % (ref 12.3–15.4)
GLUCOSE SERPL-MCNC: 89 MG/DL (ref 65–99)
HCT VFR BLD AUTO: 28.8 % (ref 37.5–51)
HGB BLD-MCNC: 9.9 G/DL (ref 13–17.7)
IMM GRANULOCYTES # BLD AUTO: 0 X10E3/UL (ref 0–0.1)
IMM GRANULOCYTES NFR BLD AUTO: 0 %
INR PPP: 1.5 (ref 0.8–1.2)
LYMPHOCYTES # BLD AUTO: 1.4 X10E3/UL (ref 0.7–3.1)
LYMPHOCYTES NFR BLD AUTO: 27 %
MCH RBC QN AUTO: 33.8 PG (ref 26.6–33)
MCHC RBC AUTO-ENTMCNC: 34.4 G/DL (ref 31.5–35.7)
MCV RBC AUTO: 98 FL (ref 79–97)
MONOCYTES # BLD AUTO: 0.8 X10E3/UL (ref 0.1–0.9)
MONOCYTES NFR BLD AUTO: 15 %
MORPHOLOGY BLD-IMP: ABNORMAL
NEUTROPHILS # BLD AUTO: 2.7 X10E3/UL (ref 1.4–7)
NEUTROPHILS NFR BLD AUTO: 52 %
PLATELET # BLD AUTO: 77 X10E3/UL (ref 150–450)
POTASSIUM SERPL-SCNC: 4.4 MMOL/L (ref 3.5–5.2)
PROT SERPL-MCNC: 5.4 G/DL (ref 6–8.5)
PROTHROMBIN TIME: 15.6 SEC (ref 9.1–12)
RBC # BLD AUTO: 2.93 X10E6/UL (ref 4.14–5.8)
SODIUM SERPL-SCNC: 141 MMOL/L (ref 134–144)
WBC # BLD AUTO: 5.2 X10E3/UL (ref 3.4–10.8)

## 2019-10-10 NOTE — PROGRESS NOTES
Identified pt with two pt identifiers(name and ). Reviewed record in preparation for visit and have obtained necessary documentation. Chief Complaint   Patient presents with    Follow-up     2 week f/u        Health Maintenance Due   Topic    Pneumococcal 0-64 years (1 of 1 - PPSV23)    FOOT EXAM Q1     MICROALBUMIN Q1     EYE EXAM RETINAL OR DILATED     DTaP/Tdap/Td series (1 - Tdap)    Shingrix Vaccine Age 50> (1 of 2)    FOBT Q 1 YEAR AGE 54-65     Influenza Age 5 to Adult         Visit Vitals  /56 (BP 1 Location: Right arm, BP Patient Position: Sitting)   Pulse 80   Temp 97.8 °F (36.6 °C) (Tympanic)   Resp 16   Ht 6' 1\" (1.854 m)   Wt 183 lb (83 kg)   SpO2 99%   BMI 24.14 kg/m²     Pain Scale: /10    Coordination of Care Questionnaire:  :   1. Have you been to the ER, urgent care clinic since your last visit? Hospitalized since your last visit? No    2. Have you seen or consulted any other health care providers outside of the [address] [place] [address] since your last visit? Include any pap smears or colon screening.  No

## 2019-10-10 NOTE — PROGRESS NOTES
[address] [place] [address], MD, MD Gonzalez Garcia PA-C Tivis Pottier, [facility]     Adriana Albarado, [facility]   PAULO Cardona-RUFINA Rockwell, [facility]       Jarad Tomlinson 136    at [address], [address] [place] [address] Patricia Melendez  22.    [phone]    FAX: 126 [facility]    at [address], [address]    [phone]    FAX: [phone]       Patient Care Team:  Ninfa Mukherjee MD as PCP - General (Internal Medicine)  Lupe Cornell MD (Gastroenterology)      Problem List  Date Reviewed: 12/7/2019          Codes Class Noted    S/P TIPS (transjugular intrahepatic portosystemic shunt) ICD-10-CM: A01.324  ICD-9-CM: V45.89  8/5/2019        Hepatic encephalopathy ([facility] ICD-10-CM: K72.90  ICD-9-CM: 572.2  8/1/2019        Ascites ICD-10-CM: R18.8  ICD-9-CM: 789.59  8/1/2019        Cirrhosis ([facility] ICD-10-CM: K74.60  ICD-9-CM: 571.5  6/6/2017        Esophageal varices ([facility] ICD-10-CM: I85.00  ICD-9-CM: 456.1  6/6/2017        Diabetes mellitus ([facility] ICD-10-CM: E11.9  ICD-9-CM: 250.00  8/2/4589        H/O umbilical hernia repair [id number]-[id number]: Z98.890, Z87.19  ICD-9-CM: V15.29  6/6/2017        S/P appendectomy ICD-10-CM: Z90.49  ICD-9-CM: V45.89  6/6/2017              Priscila Cramer returns to the [address] for management of cryptogenic cirrhosis. The active problem list, all pertinent past medical history, medications, endoscopic studies, radiologic findings and laboratory findings related to the liver disorder were reviewed with the patient. The patient is a 63 y.o.  male who was found to have chronic liver disease and cirrhosis in 5/2017 when he developed variceal bleeding.     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 The patient has the following symptoms which are thought to be due to the liver disease:  fatigue,     The patient is not currently experiencing the following symptoms of liver disease:  problems concentrating, swelling of the abdomen, swelling of the lower extremities, hematemesis, hematochezia. The patient has limitations in functional activities which can be attributed to the liver disease. ASSESSMENT AND PLAN:  Cirrhosis  Cryptogenic cirrhosis. The diagnosis of cirrhosis is based upon Fiboscan, imaging, laboratory studies, complications of cirrhosis. AST is elevated. ALT is normal.  ALP is elevated. Liver function is normal.  The platelet count is depressed. The CTP is 7. Child class B. The MELD score is 15. Now that TIPS has been palced ZOEY has resolved and MELD score has declined from 21 to 15. Will proceed with liver tranasplant evaluaton. Ascites   Ascites has resolved following TIPS placement and with diuretics. Will remain on step 1 diuretics. The patient was counseled regarding the need to maintain sodium restriction and the types of foods containing high amounts of sodium to be avoided. Hepatic hydrothorax  This has resolved following TIPS placement. Lower extremity edema  Edema has resolved with current dose of diuretics. Will remain on the current dose of diuretics at step 1. The renal function is normal and edema should be able to be controled with diuretics. Screening for Esophageal varices   The patient has esophageal varices with prior bleeding. Varices have been banded   Varices have now been treated with TIPS. No further EGD is required to assess vareces is required at this time.     Hepatic encephalopathy Overt HE is controlled on current dose of lactulose TID. He is not having an diarrhea. There is no need to restrict dietary protein at this time. Pulmonary hypertension  ECHO shows mild pulmonary HTN with estimated PAP of 42 mm Hg. We thought this was due to fluid overload. Will need to repeat ECHO now that he has TIPS and no longer has fluid overload. .  Can delay tis until he undergoes formal LT evaluation after 10/1/2019. Thrombocytopenia   This is secondary to cirrhosis. There is no evidence of overt bleeding. No treatment is required. The platelet count is adequate for the patient to undergo procedures without the need for platelet transfusion or platelet growth factors. Screening for Hepatocellular Carcinoma  HCC screening has recently been performed and does not suggest [address] The next liver imaging study will be performed in 9/2019. COPD. Patient is inconsistent with inhaler use and continues to smoke 1 PPD. He will need to stop smoking if he wants to be considered for liver transplant. Treatment of other medical problems in patients with chronic liver disease  There are no contraindications for the patient to take most medications that are necessary for treatment of other medical issues. The patient has cirrhrosis and should avoid NSAIDs which are associated with a higher rate of developing ZOEY. Counseling for alcohol in patients with chronic liver disease  The patient was counseled regarding alcohol consumption and the effect of alcohol on chronic liver disease. The patient does not consume any significant amount of alcohol. Vaccinations   Vaccination for viral hepatitis A and B is recommended since the patient has no serologic evidence of previous exposure or vaccination with immunity. Routine vaccinations against other bacterial and viral agents can be performed as indicated.   Annual flu vaccination should be administered if indicated. ALLERGIES  Allergies   Allergen Reactions    Shellfish Derived Hives       MEDICATIONS  Current Outpatient Medications   Medication Sig    lactulose (CHRONULAC) 10 gram/15 mL solution Take 45 mL by mouth three (3) times daily.  albuterol (PROVENTIL HFA, VENTOLIN HFA, PROAIR HFA) 90 mcg/actuation inhaler Take 1 Puff by inhalation every six (6) hours as needed for Wheezing.  ondansetron (ZOFRAN ODT) 4 mg disintegrating tablet Take 1 Tab by mouth every eight (8) hours as needed for Nausea.  furosemide (LASIX) 40 mg tablet take 2 tablets BY MOUTH EVERY DAY    spironolactone (ALDACTONE) 100 mg tablet take 2 tablets BY MOUTH EVERY DAY    nicotine (NICODERM CQ) 14 mg/24 hr patch Apply 1 patch every 24 hours for 6 weeks.  nicotine (NICODERM CQ) 7 mg/24 hr Apply 1 patch every 24 hours for 7 days. To be used after 6 weeks of 14 mg patch. No current facility-administered medications for this visit. SYSTEM REVIEW NOT RELATED TO LIVER DISEASE OR REVIEWED ABOVE:  Constitution systems: Negative for fever, chills. Weight stable since last office visit. Eyes: Negative for visual changes. ENT: Negative for sore throat, painful swallowing. Respiratory: Negative for cough, hemoptysis. Positive for SOB, patient is inconsistent with administration of Ventolin. Cardiology: Negative for chest pain, palpitations. GI:  Negative for constipation or diarrhea. Occasional complaint of RUQ dull achy bruise feeling, helped with changes in position. Occasional post-prandial nausea  : Negative for urinary frequency, dysuria, hematuria, nocturia. Skin: Negative for rash. Hematology: Negative for easy bruising, blood clots. Musculo-skeletal: Negative for back pain, muscle pain, weakness. Neurologic: Negative for headaches, dizziness, vertigo. Memory problems ? related to HE. Psychology: Negative for anxiety, depression. FAMILY HISTORY:  The father  of heart disease.     The mother  of MVA but had elevated liver enzymes. There is no family history of liver disease. SOCIAL HISTORY:  The patient is . The patient has no children. The patient currently smokes 1 pack of tobacco daily. The patient has never consumed significant amounts of alcohol. 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 PHYSICAL EXAMINATION:  Visit Vitals  /56 (BP 1 Location: Right arm, BP Patient Position: Sitting)   Pulse 80   Temp 97.8 °F (36.6 °C) (Tympanic)   Resp 16   Ht 6' 1\" (1.854 m)   Wt 183 lb (83 kg)   SpO2 99%   BMI 24.14 kg/m²     General: No acute distress. Eyes: Sclera anicteric. ENT: No oral lesions. Thyroid normal.  Nodes: No adenopathy. Skin: No spider angiomata. No jaundice. Positive for palmar erythema. Respiratory: Lungs clear to auscultation. Cardiovascular: Regular heart rate. No murmurs. No JVD. Abdomen: Soft, mild tender to deep palpation of the LUQ, spleen tip palpable. Liver edge firm, easily palpable 2-3 CM BCM nontender. No obvious ascites. Extremities: Trace edema. No muscle wasting. No gross arthritic changes. Neurologic: Alert and oriented. Cranial nerves grossly intact. Fine asterixis.     LABORATORY STUDIES:  Liver [place] of [address] Units 10/10/2019 2019   WBC 3.4 - 10.8 x10E3/uL 5.6 5.2   ANC 1.4 - 7.0 x10E3/uL 2.6 2.7   HGB 13.0 - 17.7 g/dL 11.4 (L) 9.9 (L)    - 450 x10E3/uL 101 (L) 77 (LL)   INR 0.8 - 1.2 1.4 (H) 1.5 (H)   AST 0 - 40 IU/L 53 (H) 50 (H)   ALT 0 - 44 IU/L 32 34   Alk Phos 39 - 117 IU/L 150 (H) 112   Bili, Total 0.0 - 1.2 mg/dL 2.9 (H) 2.7 (H)   Bili, Direct 0.00 - 0.40 mg/dL 0.95 (H) 0.85 (H)   Albumin 3.5 - 5.5 g/dL 3.4 (L) 3.4 (L)   BUN 6 - 24 mg/dL 11 13   Creat 0.76 - 1.27 mg/dL 0.81 0.98   Na 134 - 144 mmol/L 143 141   K 3.5 - 5.2 mmol/L 4.7 4.4   Cl 96 - 106 mmol/L 107 (H) 107 (H)   CO2 20 - 29 mmol/L 26 21   Glucose 65 - 99 mg/dL 73 89     Cancer Screening Latest Ref Rng & Units 5/3/2019 2/22/2019 9/12/2018   AFP, Serum 0.0 - 8.0 ng/mL 3.4 3.6 4.3   AFP-L3% 0.0 - 9.9 % 9.7 8.3 8.4   x  SEROLOGIES:  5/2017. HBsurface antigen negative, anti-HBcore negative, anti-HBsurface negative, HCV RNA negative, iron saturation 29%, ASMA negative. Serologies Latest Ref Rng & Units 6/6/2017   Ferritin 30 - 400 ng/mL 39   Alpha-1 antitrypsin level 90 - 200 mg/dL 87 (L)     LIVER HISTOLOGY:  6/2017. [facility]. EkPa was 72.1. The results suggested a fibrosis level of F4. ENDOSCOPIC PROCEDURES:  5/2017. EGD by Dr Julianne Ibrahim. Esophageal varices. Banding performed. 8/2017. EGD performed by Dr Da Bach. Small esophageal varices. No banding performed. No gastric varices. Mild portal gastropathy. Repeat in 6 months.  2/2018. EGD performed by Dr Da Bach. Medium esophageal varices. Banding performed x 4. No gastric varices. Moderate portal gastropathy. 5/2018. EGD performed by Dr Da Bach. No esophageal varices. No gastric varices. Moderate portal gastropathy. Repeat 5/2019. RADIOLOGY:  11/22/2017. US of abdomen. No liver mass/lesion. 5/2018. Ultrasound of liver. Echogenic consistent with cirrhosis. No liver mass lesions. No dilated bile ducts. No ascites. 9/2019. CT scan abdomen with IV contrast.  Changes consistent with cirrhosis. No liver mass lesions. No dilated bile ducts. No ascites. OTHER TESTING:  Not available or performed    FOLLOW-UP:  All of the issues listed above in the Assessment and Plan were discussed with the patient. All questions were answered. The patient expressed a clear understanding of the above. [facility] 87 in 1 month.         MD David Gipson 13 of [address], [address] [place] [address] 22.  [phone]  [address] [facility]

## 2019-10-29 NOTE — PROGRESS NOTES
This note also relates to the following rows which could not be included:  PCO2 Lab Result (Last Filed Value) - Cannot attach notes to extension rows  PO2 on 100% O2 Lab Result (Last Filed Value) - Cannot attach notes to extension rows       10/29/19 0000   Liver Shunt Study Calculation   Liver Shunt Study 0   PAO2 673.3   Qs/Qt % (!) 24.7

## 2019-10-31 NOTE — PROCEDURES
[address]  PULMONARY FUNCTION TEST Name:  Valeria Duenas 
MR#:  [id number] :  1960 ACCOUNT #:  [de-identified] DATE OF SERVICE:  10/29/2019 REQUESTING PHYSICIAN:  Jayna Pedraza MD 
 
DIAGNOSIS:  Liver cirrhosis. ATS criteria for reproducibility and acceptability was met. SPIROMETRY:  FEV1/FVC ratio is 63, which is reduced. FEV1 is 2.61 L, which is 73% predicted, which is mildly reduced. FVC is 4.13 L, which is 81% predicted. This suggests mild obstruction. Flow volume loop is consistent with obstruction. INTERPRETATION:  Mild obstructive airway disease. ABG was measured on room air and 100% nonrebreather mask to assess for shunt. ABG on room air, pH 7.42, pCO2 of 35, paO2 83. Sats 97%. ABG on 100% nonrebreather, pH 7.41, pCO2 39, paO2 125, SpO2 99%. INTERPRETATION:  Qs/Qt is equal to 24.7%. Clinical correlation advised. Bolivar Gonzalez MD MA/ARNOLD_VIET/TIFFANY_P 
D:  10/30/2019 22:32 
T:  10/31/2019 9:33 
JOB #:  [id number] CC:  Jayna Pedraza MD

## 2019-11-15 NOTE — Clinical Note
12/7/19 Patient: Jones Armstrong YOB: 1960 Date of Visit: 11/15/2019 Eda Montero MD 
[address] [address] VIA Facsimile: [phone] Dear Eda Montero MD, Thank you for referring Mr. Regi Swann to [address] for evaluation. My notes for this consultation are attached. If you have questions, please do not hesitate to call me. I look forward to following your patient along with you. Sincerely, Tico Soria MD

## 2019-11-15 NOTE — PROGRESS NOTES
Identified pt with two pt identifiers(name and ). Reviewed record in preparation for visit and have obtained necessary documentation. Chief Complaint   Patient presents with    Cirrhosis Of Liver     f/u          Coordination of Care Questionnaire:  :   1) Have you been to an emergency room, urgent care, or hospitalized since your last visit? If yes, where when, and reason for visit? no       2. Have seen or consulted any other health care provider since your last visit? If yes, where when, and reason for visit? NO    Patient is accompanied by wife and brother; I have received verbal consent from Obed Page to discuss any/all medical information while they are present in the room.

## 2019-11-20 NOTE — TELEPHONE ENCOUNTER
Spoke to Dr. Sury Purdy office staff regarding referral to GI for colonoscopy. Faxed demographic and insurance information. Office will contact patient and make appointment (F: [phone]). Phone call placed to patient's listed number. Received VM belonging to \"Mariana,\" patient's wife. Verified HIPAA. Left message advising wife Dr. Steven Hatfield office will be contacting patient to schedule an appointment. NN name and contact number provided in case of questions.

## 2019-12-07 PROBLEM — J93.9 PNEUMOTHORAX: Status: RESOLVED | Noted: 2019-08-05 | Resolved: 2019-01-01

## 2019-12-07 PROBLEM — J90 PLEURAL EFFUSION: Status: RESOLVED | Noted: 2019-08-01 | Resolved: 2019-01-01

## 2019-12-07 NOTE — PROGRESS NOTES
[address] [place] [address], MD, MD Leticia Cotto PA-C Delphine Clare, [facility]-BC     Adriana Albarado [facility]   PAULO Cho-RUFINA [facility]       Jarad Deputado [address]    at [address], [address] [place] [address], [facility] 22.    [phone]    FAX: [address] [place] [address], [address] [facility] [address]    [phone]    FAX: [phone]       Patient Care Team:  Lydia Parkinson MD as PCP - General (Internal Medicine)  Deepika Aguilar MD (Gastroenterology)      Problem List  Date Reviewed: 12/7/2019          Codes Class Noted    S/P TIPS (transjugular intrahepatic portosystemic shunt) ICD-10-CM: O38.540  ICD-9-CM: V45.89  8/5/2019        Hepatic encephalopathy ([facility] 75.) ICD-10-CM: K72.90  ICD-9-CM: 572.2  8/1/2019        Ascites ICD-10-CM: R18.8  ICD-9-CM: 789.59  8/1/2019        Cirrhosis ([facility] ICD-10-CM: K74.60  ICD-9-CM: 571.5  6/6/2017        Esophageal varices ([facility] ICD-10-CM: I85.00  ICD-9-CM: 456.1  6/6/2017        Diabetes mellitus ([facility] ICD-10-CM: E11.9  ICD-9-CM: 250.00  8/0/3896        H/O umbilical hernia repair [id number]: Z98.890, Z87.19  ICD-9-CM: V15.29  6/6/2017        S/P appendectomy ICD-10-CM: Z90.49  ICD-9-CM: V45.89  6/6/2017              Gary Washington returns to the [address] for management of cryptogenic cirrhosis. The active problem list, all pertinent past medical history, medications, endoscopic studies, radiologic findings and laboratory findings related to the liver disorder were reviewed with the patient. The patient is a 63 y.o.  male who was found to have chronic liver disease and cirrhosis in 5/2017 when he developed variceal bleeding.     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 The patient has the following symptoms which are thought to be due to the liver disease:  fatigue,     The patient is not currently experiencing the following symptoms of liver disease:  problems concentrating, swelling of the abdomen, swelling of the lower extremities, hematemesis, hematochezia. The patient has limitations in functional activities which can be attributed to the liver disease. ASSESSMENT AND PLAN:  Cirrhosis  Cryptogenic cirrhosis. The diagnosis of cirrhosis is based upon Fiboscan, imaging, laboratory studies, complications of cirrhosis. AST is elevated. ALT is normal.  ALP is elevated. Liver function is normal.  The platelet count is depressed. The CTP is 7. Child class B. The MELD score is 15. Now that TIPS has been palced ZOEY has resolved and MELD score has declined from 21 to 15. Will proceed with liver tranasplant evaluaton. Ascites   Ascites has resolved following TIPS placement and with diuretics. Will remain on step 1 diuretics. The patient was counseled regarding the need to maintain sodium restriction and the types of foods containing high amounts of sodium to be avoided. Hepatic hydrothorax  This has resolved following TIPS placement. Lower extremity edema  Edema has resolved with current dose of diuretics. Will remain on the current dose of diuretics at step 1. The renal function is normal and edema should be able to be controled with diuretics. Screening for Esophageal varices   The patient has esophageal varices with prior bleeding. Varices have been banded   Varices have now been treated with TIPS. No further EGD is required to assess vareces is required at this time.     Hepatic encephalopathy Overt HE is controlled on current dose of lactulose TID. He is not having an diarrhea. There is no need to restrict dietary protein at this time. Pulmonary hypertension  ECHO shows mild pulmonary HTN with estimated PAP of 42 mm Hg. We thought this was due to fluid overload. Will need to repeat ECHO now that he has TIPS and no longer has fluid overload. .  Can delay tis until he undergoes formal LT evaluation after 10/1/2019. Thrombocytopenia   This is secondary to cirrhosis. There is no evidence of overt bleeding. No treatment is required. The platelet count is adequate for the patient to undergo procedures without the need for platelet transfusion or platelet growth factors. Screening for Hepatocellular Carcinoma  HCC screening has recently been performed and does not suggest [address] The next liver imaging study will be performed in 9/2019. COPD. Patient is inconsistent with inhaler use and continues to smoke 1 PPD. He will need to stop smoking if he wants to be considered for liver transplant. Treatment of other medical problems in patients with chronic liver disease  There are no contraindications for the patient to take most medications that are necessary for treatment of other medical issues. The patient has cirrhrosis and should avoid NSAIDs which are associated with a higher rate of developing ZOEY. Counseling for alcohol in patients with chronic liver disease  The patient was counseled regarding alcohol consumption and the effect of alcohol on chronic liver disease. The patient does not consume any significant amount of alcohol. Vaccinations   Vaccination for viral hepatitis A and B is recommended since the patient has no serologic evidence of previous exposure or vaccination with immunity. Routine vaccinations against other bacterial and viral agents can be performed as indicated.   Annual flu vaccination should be administered if indicated. ALLERGIES  Allergies   Allergen Reactions    Shellfish Derived Hives       MEDICATIONS  Current Outpatient Medications   Medication Sig    furosemide (LASIX) 40 mg tablet take 2 tablets BY MOUTH EVERY DAY    spironolactone (ALDACTONE) 100 mg tablet take 2 tablets BY MOUTH EVERY DAY    nicotine (NICODERM CQ) 14 mg/24 hr patch Apply 1 patch every 24 hours for 6 weeks.  nicotine (NICODERM CQ) 7 mg/24 hr Apply 1 patch every 24 hours for 7 days. To be used after 6 weeks of 14 mg patch.  lactulose (CHRONULAC) 10 gram/15 mL solution Take 45 mL by mouth three (3) times daily.  ondansetron (ZOFRAN ODT) 4 mg disintegrating tablet Take 1 Tab by mouth every eight (8) hours as needed for Nausea.  albuterol (PROVENTIL HFA, VENTOLIN HFA, PROAIR HFA) 90 mcg/actuation inhaler Take 1 Puff by inhalation every six (6) hours as needed for Wheezing. No current facility-administered medications for this visit. SYSTEM REVIEW NOT RELATED TO LIVER DISEASE OR REVIEWED ABOVE:  Constitution systems: Negative for fever, chills. Weight stable since last office visit. Eyes: Negative for visual changes. ENT: Negative for sore throat, painful swallowing. Respiratory: Negative for cough, hemoptysis. Positive for SOB, patient is inconsistent with administration of Ventolin. Cardiology: Negative for chest pain, palpitations. GI:  Negative for constipation or diarrhea. Occasional complaint of RUQ dull achy bruise feeling, helped with changes in position. Occasional post-prandial nausea  : Negative for urinary frequency, dysuria, hematuria, nocturia. Skin: Negative for rash. Hematology: Negative for easy bruising, blood clots. Musculo-skeletal: Negative for back pain, muscle pain, weakness. Neurologic: Negative for headaches, dizziness, vertigo. Memory problems ? related to HE. Psychology: Negative for anxiety, depression. FAMILY HISTORY:  The father  of heart disease.     The mother  of MVA but had elevated liver enzymes. There is no family history of liver disease. SOCIAL HISTORY:  The patient is . The patient has no children. The patient currently smokes 1 pack of tobacco daily. The patient has never consumed significant amounts of alcohol. 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 PHYSICAL EXAMINATION:  Visit Vitals  /57   Pulse 81   Temp 97.4 °F (36.3 °C) (Tympanic)   Resp 16   Ht 6' 1\" (1.854 m)   Wt 198 lb (89.8 kg)   SpO2 99%   BMI 26.12 kg/m²     General: No acute distress. Eyes: Sclera anicteric. ENT: No oral lesions. Thyroid normal.  Nodes: No adenopathy. Skin: No spider angiomata. No jaundice. Positive for palmar erythema. Respiratory: Lungs clear to auscultation. Cardiovascular: Regular heart rate. No murmurs. No JVD. Abdomen: Soft, mild tender to deep palpation of the LUQ, spleen tip palpable. Liver edge firm, easily palpable 2-3 CM BCM nontender. No obvious ascites. Extremities: Trace edema. No muscle wasting. No gross arthritic changes. Neurologic: Alert and oriented. Cranial nerves grossly intact. Fine asterixis.     LABORATORY STUDIES:  Liver [place] of [address] Units 11/15/2019   WBC 3.4 - 10.8 x10E3/uL 5.6   ANC 1.4 - 7.0 x10E3/uL 2.9   HGB 13.0 - 17.7 g/dL 11.3 (L)    - 450 x10E3/uL 74 (LL)   INR 0.8 - 1.2 1.5 (H)   AST 0 - 40 IU/L 54 (H)   ALT 0 - 44 IU/L 33   Alk Phos 39 - 117 IU/L 151 (H)   Bili, Total 0.0 - 1.2 mg/dL 2.2 (H)   Bili, Direct 0.00 - 0.40 mg/dL 0.90 (H)   Albumin 3.5 - 5.5 g/dL 2.7 (L)   BUN 6 - 24 mg/dL 10   Creat 0.76 - 1.27 mg/dL 0.87   Na 134 - 144 mmol/L 140   K 3.5 - 5.2 mmol/L 4.5   Cl 96 - 106 mmol/L 108 (H)   CO2 20 - 29 mmol/L 22   Glucose 65 - 99 mg/dL 170 (H)     Cancer Screening Latest Ref Rng & Units 11/15/2019 2019 5/3/2019   AFP, Serum 0.0 - 8.0 ng/mL 2.5 2.5 3.4   AFP-L3% 0.0 - 9.9 % Comment Comment 9.7 SEROLOGIES:  2017. HBsurface antigen negative, anti-HBcore negative, anti-HBsurface negative, HCV RNA negative, iron saturation 29%, ASMA negative. Serologies Latest Ref Rng & Units 2017   Ferritin 30 - 400 ng/mL 39   Alpha-1 antitrypsin level 90 - 200 mg/dL 87 (L)     LIVER HISTOLOGY:  2017. [facility]. EkPa was 72.1. The results suggested a fibrosis level of F4. ENDOSCOPIC PROCEDURES:  2017. EGD by Dr Shaniqua Mcrae. Esophageal varices. Banding performed. 2017. EGD performed by Dr Donna Longoria. Small esophageal varices. No banding performed. No gastric varices. Mild portal gastropathy. Repeat in 6 months.  2018. EGD performed by Dr Donna Longoria. Medium esophageal varices. Banding performed x 4. No gastric varices. Moderate portal gastropathy. 2018. EGD performed by Dr Donna Longroia. No esophageal varices. No gastric varices. Moderate portal gastropathy. Repeat 2019. RADIOLOGY:  2017. US of abdomen. No liver mass/lesion. 2018. Ultrasound of liver. Echogenic consistent with cirrhosis. No liver mass lesions. No dilated bile ducts. No ascites. 2019. CT scan abdomen with IV contrast.  Changes consistent with cirrhosis. No liver mass lesions. No dilated bile ducts. No ascites. OTHER TESTIN2019. Cardiac Nuclear Stress. No ischemia. LVEF 65%    FOLLOW-UP:  All of the issues listed above in the Assessment and Plan were discussed with the patient. All questions were answered. The patient expressed a clear understanding of the above. [facility] 87 in 1 month.         MD David Coronado [address] of [address], [facility] 22.  [phone]  [address] [facility]

## 2019-12-27 NOTE — PROGRESS NOTES
[address] [place] [address], MD, MD [place], PAOCTAVIO William, [facility]     Adriana Albarado, [facility]   Jack Dueñas P-RUFINA Barfield, [facility]       Jarad Shafer [address]ricks 136    at [address], [address] [place] [address], [facility] 22.    [phone]    FAX: [address] [place] [address]    at [address], [address]    [phone]    FAX: [phone]       Patient Care Team:  Sandie Chinchilla MD as PCP - General (Internal Medicine)  Rosetta Lin MD (Gastroenterology)      Problem List  Date Reviewed: 1/11/2020          Codes Class Noted    Hepatocellular carcinoma ([facility].) ICD-10-CM: C22.0  ICD-9-CM: 155.0  1/11/2020        S/P TIPS (transjugular intrahepatic portosystemic shunt) ICD-10-CM: Y68.897  ICD-9-CM: V45.89  8/5/2019        Hepatic encephalopathy ([facility] ICD-10-CM: K72.90  ICD-9-CM: 572.2  8/1/2019        Ascites ICD-10-CM: R18.8  ICD-9-CM: 789.59  8/1/2019        Cirrhosis ([facility] ICD-10-CM: K74.60  ICD-9-CM: 571.5  6/6/2017        Esophageal varices ([facility] ICD-10-CM: I85.00  ICD-9-CM: 456.1  6/6/2017        Diabetes mellitus ([facility] ICD-10-CM: E11.9  ICD-9-CM: 250.00  8/2/1471        H/O umbilical hernia repair ZRA-09-WR: Z98.890, Z87.19  ICD-9-CM: V15.29  6/6/2017        S/P appendectomy ICD-10-CM: Z90.49  ICD-9-CM: V45.89  6/6/2017              Robert Bolivar returns to the [place] [address] for management of cryptogenic cirrhosis. The active problem list, all pertinent past medical history, medications, endoscopic studies, radiologic findings and laboratory findings related to the liver disorder were reviewed with the patient. The patient is a 63 y.o.   male who was found to have chronic liver disease and cirrhosis in 5/2017 when he developed variceal bleeding. 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 The most recent imaging of the liver was MRI performed in 11/2019. Results suggest cirrhosis and a new 1.2 x 1.5 cm enhancing mass in the right lobe, segment 7. The patient has the following symptoms which are thought to be due to the liver disease:  fatigue,     The patient is not currently experiencing the following symptoms of liver disease:  problems concentrating, swelling of the abdomen, swelling of the lower extremities, hematemesis, hematochezia. The patient has limitations in functional activities which can be attributed to the liver disease. ASSESSMENT AND PLAN:  Cirrhosis  Cryptogenic cirrhosis. The diagnosis of cirrhosis is based upon Fiboscan, imaging, laboratory studies, complications of cirrhosis. AST is elevated. ALT is normal.  ALP is elevated. Liver function is normal.  The platelet count is depressed. The CTP is 7. Child class B. The MELD score is 15. Now that TIPS has been palced ZOEY has resolved and MELD score has declined from 21 to 15. Will proceed with liver tranasplant evaluaton. Hepatocellular carcinoma   The diagnosis was made in 11/2019 by dynamic MRI showing LIRADS-4. At diagnosis the [address] was 1 mass located in the Right Lobe, segment 7 and measured 1.2 x 1.5 cm. This was stage T1    Will hold off on treatment now, allow the lesion to increase in size to stage T2 and then ablate the lesion.   This would  Allow him to get MELD exception and move to higher place on the LT waiting list, equivalent of MELD 25, compared to the current MELD of 15    Will schedule for repeat dynamic MRI in 2/2020    Ascites   Ascites has resolved following TIPS placement and with diuretics. Will remain on step 1 diuretics. The patient was counseled regarding the need to maintain sodium restriction and the types of foods containing high amounts of sodium to be avoided. Hepatic hydrothorax  This has resolved following TIPS placement. Lower extremity edema  Edema has resolved with current dose of diuretics. Will remain on the current dose of diuretics at step 1. The renal function is normal and edema should be able to be controled with diuretics. Screening for Esophageal varices   The patient has esophageal varices with prior bleeding. Varices have been banded   Varices have now been treated with TIPS. No further EGD is required to assess vareces is required at this time. Hepatic encephalopathy   Overt HE is controlled on current dose of lactulose TID. He is not having an diarrhea. There is no need to restrict dietary protein at this time. Pulmonary hypertension  ECHO shows mild pulmonary HTN with estimated PAP of 42 mm Hg. We thought this was due to fluid overload. Will need to repeat ECHO now that he has TIPS and no longer has fluid overload. .  Can delay tis until he undergoes formal LT evaluation after 10/1/2019. Thrombocytopenia   This is secondary to cirrhosis. There is no evidence of overt bleeding. No treatment is required. The platelet count is adequate for the patient to undergo procedures without the need for platelet transfusion or platelet growth factors. Screening for Hepatocellular Carcinoma  HCC screening has recently been performed and does not suggest [address] The next liver imaging study will be performed in 9/2019. COPD. Patient is inconsistent with inhaler use and continues to smoke 1 PPD. He will need to stop smoking if he wants to be considered for liver transplant.     Treatment of other medical problems in patients with chronic liver disease  There are no contraindications for the patient to take most medications that are necessary for treatment of other medical issues. The patient has cirrhrosis and should avoid NSAIDs which are associated with a higher rate of developing ZOEY. Counseling for alcohol in patients with chronic liver disease  The patient was counseled regarding alcohol consumption and the effect of alcohol on chronic liver disease. The patient does not consume any significant amount of alcohol. Vaccinations   Vaccination for viral hepatitis A and B is recommended since the patient has no serologic evidence of previous exposure or vaccination with immunity. Routine vaccinations against other bacterial and viral agents can be performed as indicated. Annual flu vaccination should be administered if indicated. ALLERGIES  Allergies   Allergen Reactions    Shellfish Derived Hives       MEDICATIONS  Current Outpatient Medications   Medication Sig    furosemide (LASIX) 40 mg tablet take 2 tablets BY MOUTH EVERY DAY    spironolactone (ALDACTONE) 100 mg tablet take 2 tablets BY MOUTH EVERY DAY    nicotine (NICODERM CQ) 14 mg/24 hr patch Apply 1 patch every 24 hours for 6 weeks.  nicotine (NICODERM CQ) 7 mg/24 hr Apply 1 patch every 24 hours for 7 days. To be used after 6 weeks of 14 mg patch.  lactulose (CHRONULAC) 10 gram/15 mL solution Take 45 mL by mouth three (3) times daily.  albuterol (PROVENTIL HFA, VENTOLIN HFA, PROAIR HFA) 90 mcg/actuation inhaler Take 1 Puff by inhalation every six (6) hours as needed for Wheezing.  ondansetron (ZOFRAN ODT) 4 mg disintegrating tablet Take 1 Tab by mouth every eight (8) hours as needed for Nausea. No current facility-administered medications for this visit. SYSTEM REVIEW NOT RELATED TO LIVER DISEASE OR REVIEWED ABOVE:  Constitution systems: Negative for fever, chills. Weight stable since last office visit. Eyes: Negative for visual changes. ENT: Negative for sore throat, painful swallowing.    Respiratory: Negative for cough, hemoptysis. Positive for SOB, patient is inconsistent with administration of Ventolin. Cardiology: Negative for chest pain, palpitations. GI:  Negative for constipation or diarrhea. Occasional complaint of RUQ dull achy bruise feeling, helped with changes in position. Occasional post-prandial nausea  : Negative for urinary frequency, dysuria, hematuria, nocturia. Skin: Negative for rash. Hematology: Negative for easy bruising, blood clots. Musculo-skeletal: Negative for back pain, muscle pain, weakness. Neurologic: Negative for headaches, dizziness, vertigo. Memory problems ? related to HE. Psychology: Negative for anxiety, depression. FAMILY HISTORY:  The father  of heart disease. The mother  of MVA but had elevated liver enzymes. There is no family history of liver disease. SOCIAL HISTORY:  The patient is . The patient has no children. The patient currently smokes 1 pack of tobacco daily. The patient has never consumed significant amounts of alcohol. 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 PHYSICAL EXAMINATION:  Visit Vitals  /51 (BP 1 Location: Left arm, BP Patient Position: Sitting)   Pulse 77   Temp 99 °F (37.2 °C) (Tympanic)   Ht 6' 1\" (1.854 m)   Wt 198 lb 12.8 oz (90.2 kg)   SpO2 99%   BMI 26.23 kg/m²     General: No acute distress. Eyes: Sclera anicteric. ENT: No oral lesions. Thyroid normal.  Nodes: No adenopathy. Skin: No spider angiomata. No jaundice. Positive for palmar erythema. Respiratory: Lungs clear to auscultation. Cardiovascular: Regular heart rate. No murmurs. No JVD. Abdomen: Soft, mild tender to deep palpation of the LUQ, spleen tip palpable. Liver edge firm, easily palpable 2-3 CM BCM nontender. No obvious ascites. Extremities: Trace edema. No muscle wasting. No gross arthritic changes. Neurologic: Alert and oriented.   Cranial nerves grossly intact. Fine asterixis. LABORATORY STUDIES:  Liver [facility] of [address] [place] [address] & Units 12/27/2019 11/15/2019   WBC 3.4 - 10.8 x10E3/uL 5.9 5.6   ANC 1.4 - 7.0 x10E3/uL 2.9 2.9   HGB 13.0 - 17.7 g/dL 12.4 (L) 11.3 (L)    - 450 x10E3/uL 77 (LL) 74 (LL)   INR 0.8 - 1.2 1.4 (H) 1.5 (H)   AST 0 - 40 IU/L 59 (H) 54 (H)   ALT 0 - 44 IU/L 39 33   Alk Phos 39 - 117 IU/L 138 (H) 151 (H)   Bili, Total 0.0 - 1.2 mg/dL 2.3 (H) 2.2 (H)   Bili, Direct 0.00 - 0.40 mg/dL 1.03 (H) 0.90 (H)   Albumin 3.5 - 5.5 g/dL 2.7 (L) 2.7 (L)   BUN 6 - 24 mg/dL 12 10   Creat 0.76 - 1.27 mg/dL 0.98 0.87   Na 134 - 144 mmol/L 139 140   K 3.5 - 5.2 mmol/L 4.0 4.5   Cl 96 - 106 mmol/L 106 108 (H)   CO2 20 - 29 mmol/L 23 22   Glucose 65 - 99 mg/dL 145 (H) 170 (H)     Cancer Screening Latest Ref Rng & Units 12/27/2019 11/15/2019 7/23/2019   AFP, Serum 0.0 - 8.0 ng/mL 3.6 2.5 2.5   AFP-L3% 0.0 - 9.9 % 9.9 Comment Comment     SEROLOGIES:  5/2017. HBsurface antigen negative, anti-HBcore negative, anti-HBsurface negative, HCV RNA negative, iron saturation 29%, ASMA negative. Serologies Latest Ref Rng & Units 6/6/2017   Ferritin 30 - 400 ng/mL 39   Alpha-1 antitrypsin level 90 - 200 mg/dL 87 (L)     LIVER HISTOLOGY:  6/2017. FibroScan performed at [address]. EkPa was 72.1. The results suggested a fibrosis level of F4. ENDOSCOPIC PROCEDURES:  5/2017. EGD by Dr Cirilo Padilla. Esophageal varices. Banding performed. 8/2017. EGD performed by Dr Stephanie Esteves. Small esophageal varices. No banding performed. No gastric varices. Mild portal gastropathy. Repeat in 6 months.  2/2018. EGD performed by Dr Stephanie Esteves. Medium esophageal varices. Banding performed x 4. No gastric varices. Moderate portal gastropathy. 5/2018. EGD performed by Dr Stephanie Esteves. No esophageal varices. No gastric varices. Moderate portal gastropathy. Repeat 5/2019. RADIOLOGY:  11/22/2017. US of abdomen. No liver mass/lesion. 2018. Ultrasound of liver. Echogenic consistent with cirrhosis. No liver mass lesions. No dilated bile ducts. No ascites. 2019. CT scan abdomen with IV contrast.  Changes consistent with cirrhosis. No liver mass lesions. No dilated bile ducts. No ascites. 2019. Dynamic MRI of liver. Changes consistent with cirrhosis. Single enhancing liver mass with washout   in the right lobe, segment 7 measuring 1.2 x 1.5 cm. No ascites. Patent TIPS. OTHER TESTIN2019. Cardiac Nuclear Stress. No ischemia. LVEF 65%    FOLLOW-UP:  All of the issues listed above in the Assessment and Plan were discussed with the patient. All questions were answered. The patient expressed a clear understanding of the above.     Follow-up Fran Pratt 32 in 2 months which should be 1-2 weeks after the next imaging study      Debi Galeano MD  [facility] of [address], [address] [facility] 22.  [address] [place] [address]

## 2019-12-27 NOTE — PROGRESS NOTES
Willy Sanderson is a 63 y.o. male  Chief Complaint   Patient presents with    Follow-up     6 week f/u         Visit Vitals  /51 (BP 1 Location: Left arm, BP Patient Position: Sitting)   Pulse 77   Temp 99 °F (37.2 °C) (Tympanic)   Ht 6' 1\" (1.854 m)   Wt 198 lb 12.8 oz (90.2 kg)   SpO2 99%   BMI 26.23 kg/m²     3 most recent PHQ Screens 10/10/2019   Little interest or pleasure in doing things Not at all   Feeling down, depressed, irritable, or hopeless Not at all   Total Score PHQ 2 0     Learning Assessment 8/1/2019   PRIMARY LEARNER Patient   BARRIERS PRIMARY LEARNER NONE   CO-LEARNER CAREGIVER No   PRIMARY LANGUAGE ENGLISH   LEARNER PREFERENCE PRIMARY LISTENING   ANSWERED BY patient   RELATIONSHIP SELF     Abuse Screening Questionnaire 8/1/2019   Do you ever feel afraid of your partner? N   Are you in a relationship with someone who physically or mentally threatens you? N   Is it safe for you to go home? Y         1. Have you been to the ER, urgent care clinic since your last visit? Hospitalized since your last visit? No    2. Have you seen or consulted any other health care providers outside of the [address] [place] [address] since your last visit? Include any pap smears or colon screening.  No

## 2019-12-27 NOTE — Clinical Note
1/11/20 Patient: Dinorah Espino YOB: 1960 Date of Visit: 12/27/2019 Sanjay Carballo MD 
[address] [place] [address] VIA Facsimile: [phone] Dear Sanjay Carballo MD, Thank you for referring Mr. Esteban Menjivar to [address] [facility] for evaluation. My notes for this consultation are attached. If you have questions, please do not hesitate to call me. I look forward to following your patient along with you. Sincerely, Taj Armando MD

## 2020-01-01 ENCOUNTER — HOSPITAL ENCOUNTER (OUTPATIENT)
Dept: CT IMAGING | Age: 60
Discharge: HOME | End: 2020-04-01
Attending: INTERNAL MEDICINE
Payer: MEDICARE

## 2020-01-01 ENCOUNTER — OFFICE VISIT (OUTPATIENT)
Dept: HEMATOLOGY | Age: 60
End: 2020-01-01

## 2020-01-01 ENCOUNTER — PATIENT OUTREACH (OUTPATIENT)
Dept: HEMATOLOGY | Age: 60
End: 2020-01-01

## 2020-01-01 ENCOUNTER — HOSPITAL ENCOUNTER (OUTPATIENT)
Dept: NUCLEAR MEDICINE | Age: 60
Discharge: HOME | End: 2020-04-01
Attending: INTERNAL MEDICINE
Payer: MEDICARE

## 2020-01-01 ENCOUNTER — TELEPHONE (OUTPATIENT)
Dept: HEMATOLOGY | Age: 60
End: 2020-01-01

## 2020-01-01 ENCOUNTER — HOSPITAL ENCOUNTER (OUTPATIENT)
Dept: MRI IMAGING | Age: 60
Discharge: HOME | End: 2020-02-05
Attending: INTERNAL MEDICINE
Payer: MEDICARE

## 2020-01-01 ENCOUNTER — HOSPITAL ENCOUNTER (OUTPATIENT)
Dept: INTERVENTIONAL RADIOLOGY/VASCULAR | Age: 60
Discharge: HOME | End: 2020-03-23
Attending: RADIOLOGY | Admitting: RADIOLOGY
Payer: MEDICARE

## 2020-01-01 ENCOUNTER — APPOINTMENT (OUTPATIENT)
Dept: NUCLEAR MEDICINE | Age: 60
End: 2020-01-01
Attending: INTERNAL MEDICINE
Payer: MEDICARE

## 2020-01-01 ENCOUNTER — HOSPITAL ENCOUNTER (OUTPATIENT)
Dept: RADIATION THERAPY | Age: 60
Discharge: HOME | End: 2020-03-05
Payer: MEDICARE

## 2020-01-01 ENCOUNTER — HOSPITAL ENCOUNTER (OUTPATIENT)
Dept: MRI IMAGING | Age: 60
Discharge: HOME | End: 2020-06-17
Attending: INTERNAL MEDICINE
Payer: MEDICARE

## 2020-01-01 ENCOUNTER — HOSPITAL ENCOUNTER (OUTPATIENT)
Dept: INTERVENTIONAL RADIOLOGY/VASCULAR | Age: 60
Discharge: HOME | End: 2020-03-13
Attending: INTERNAL MEDICINE | Admitting: RADIOLOGY
Payer: MEDICARE

## 2020-01-01 ENCOUNTER — HOSPITAL ENCOUNTER (OUTPATIENT)
Dept: RADIATION THERAPY | Age: 60
Discharge: HOME | End: 2020-03-23
Payer: MEDICARE

## 2020-01-01 ENCOUNTER — HOSPITAL ENCOUNTER (OUTPATIENT)
Dept: INTERVENTIONAL RADIOLOGY/VASCULAR | Age: 60
Discharge: HOME | End: 2020-03-05
Attending: RADIOLOGY | Admitting: RADIOLOGY

## 2020-01-01 VITALS
SYSTOLIC BLOOD PRESSURE: 128 MMHG | TEMPERATURE: 98.1 F | OXYGEN SATURATION: 100 % | HEIGHT: 73 IN | WEIGHT: 200 LBS | BODY MASS INDEX: 26.51 KG/M2 | DIASTOLIC BLOOD PRESSURE: 60 MMHG | RESPIRATION RATE: 16 BRPM | HEART RATE: 86 BPM

## 2020-01-01 VITALS
TEMPERATURE: 98.6 F | SYSTOLIC BLOOD PRESSURE: 121 MMHG | DIASTOLIC BLOOD PRESSURE: 41 MMHG | WEIGHT: 200 LBS | RESPIRATION RATE: 18 BRPM | BODY MASS INDEX: 26.51 KG/M2 | HEART RATE: 91 BPM | HEIGHT: 73 IN | OXYGEN SATURATION: 97 %

## 2020-01-01 VITALS
HEART RATE: 81 BPM | RESPIRATION RATE: 18 BRPM | HEIGHT: 73 IN | TEMPERATURE: 98.2 F | DIASTOLIC BLOOD PRESSURE: 61 MMHG | OXYGEN SATURATION: 92 % | WEIGHT: 201.4 LBS | SYSTOLIC BLOOD PRESSURE: 130 MMHG | BODY MASS INDEX: 26.69 KG/M2

## 2020-01-01 VITALS
TEMPERATURE: 97.5 F | SYSTOLIC BLOOD PRESSURE: 135 MMHG | HEIGHT: 73 IN | WEIGHT: 196 LBS | HEART RATE: 85 BPM | DIASTOLIC BLOOD PRESSURE: 67 MMHG | BODY MASS INDEX: 25.98 KG/M2 | OXYGEN SATURATION: 97 %

## 2020-01-01 VITALS — BODY MASS INDEX: 25.99 KG/M2 | WEIGHT: 197 LBS

## 2020-01-01 DIAGNOSIS — K74.60 CIRRHOSIS OF LIVER WITHOUT ASCITES, UNSPECIFIED HEPATIC CIRRHOSIS TYPE (HCC): ICD-10-CM

## 2020-01-01 DIAGNOSIS — C22.0 HEPATOCELLULAR CARCINOMA (HCC): ICD-10-CM

## 2020-01-01 DIAGNOSIS — C78.7 LIVER METASTASES (HCC): ICD-10-CM

## 2020-01-01 DIAGNOSIS — C22.0 HEPATOCELLULAR CARCINOMA (HCC): Primary | ICD-10-CM

## 2020-01-01 DIAGNOSIS — R52 PAIN: Primary | ICD-10-CM

## 2020-01-01 DIAGNOSIS — K70.30 ALCOHOLIC CIRRHOSIS OF LIVER WITHOUT ASCITES (HCC): Primary | ICD-10-CM

## 2020-01-01 DIAGNOSIS — C22.0 LIVER CARCINOMA (HCC): ICD-10-CM

## 2020-01-01 LAB
AFP L3 MFR SERPL: 10.7 % (ref 0–9.9)
AFP L3 MFR SERPL: 9.2 % (ref 0–9.9)
AFP SERPL-MCNC: 3.6 NG/ML (ref 0–8)
AFP SERPL-MCNC: 4.2 NG/ML (ref 0–8)
ALBUMIN SERPL-MCNC: 2.3 G/DL (ref 3.5–5)
ALBUMIN SERPL-MCNC: 2.8 G/DL (ref 3.8–4.9)
ALBUMIN SERPL-MCNC: 2.8 G/DL (ref 3.8–4.9)
ALBUMIN/GLOB SERPL: 0.7 {RATIO} (ref 1.1–2.2)
ALP SERPL-CCNC: 138 U/L (ref 45–117)
ALP SERPL-CCNC: 160 IU/L (ref 39–117)
ALP SERPL-CCNC: 190 IU/L (ref 39–117)
ALT SERPL-CCNC: 43 IU/L (ref 0–44)
ALT SERPL-CCNC: 45 IU/L (ref 0–44)
ALT SERPL-CCNC: 50 U/L (ref 12–78)
AST SERPL-CCNC: 58 U/L (ref 15–37)
AST SERPL-CCNC: 63 IU/L (ref 0–40)
AST SERPL-CCNC: 64 IU/L (ref 0–40)
BASOPHILS # BLD AUTO: 0 X10E3/UL (ref 0–0.2)
BASOPHILS # BLD AUTO: 0 X10E3/UL (ref 0–0.2)
BASOPHILS # BLD: 0 K/UL (ref 0–0.1)
BASOPHILS NFR BLD AUTO: 1 %
BASOPHILS NFR BLD AUTO: 1 %
BASOPHILS NFR BLD: 1 % (ref 0–1)
BILIRUB DIRECT SERPL-MCNC: 0.8 MG/DL (ref 0–0.2)
BILIRUB DIRECT SERPL-MCNC: 0.82 MG/DL (ref 0–0.4)
BILIRUB DIRECT SERPL-MCNC: 1.34 MG/DL (ref 0–0.4)
BILIRUB SERPL-MCNC: 1.8 MG/DL (ref 0.2–1)
BILIRUB SERPL-MCNC: 1.9 MG/DL (ref 0–1.2)
BILIRUB SERPL-MCNC: 2.7 MG/DL (ref 0–1.2)
BUN SERPL-MCNC: 11 MG/DL (ref 6–24)
BUN SERPL-MCNC: 12 MG/DL (ref 6–24)
BUN/CREAT SERPL: 11 (ref 9–20)
BUN/CREAT SERPL: 14 (ref 9–20)
CALCIUM SERPL-MCNC: 8.1 MG/DL (ref 8.7–10.2)
CALCIUM SERPL-MCNC: 8.6 MG/DL (ref 8.7–10.2)
CHLORIDE SERPL-SCNC: 106 MMOL/L (ref 96–106)
CHLORIDE SERPL-SCNC: 109 MMOL/L (ref 96–106)
CO2 SERPL-SCNC: 20 MMOL/L (ref 20–29)
CO2 SERPL-SCNC: 21 MMOL/L (ref 20–29)
CREAT SERPL-MCNC: 0.88 MG/DL (ref 0.76–1.27)
CREAT SERPL-MCNC: 0.96 MG/DL (ref 0.76–1.27)
DIFFERENTIAL METHOD BLD: ABNORMAL
EOSINOPHIL # BLD AUTO: 0.3 X10E3/UL (ref 0–0.4)
EOSINOPHIL # BLD AUTO: 0.4 X10E3/UL (ref 0–0.4)
EOSINOPHIL # BLD: 0.3 K/UL (ref 0–0.4)
EOSINOPHIL NFR BLD AUTO: 6 %
EOSINOPHIL NFR BLD AUTO: 7 %
EOSINOPHIL NFR BLD: 6 % (ref 0–7)
ERYTHROCYTE [DISTWIDTH] IN BLOOD BY AUTOMATED COUNT: 14.3 % (ref 11.6–15.4)
ERYTHROCYTE [DISTWIDTH] IN BLOOD BY AUTOMATED COUNT: 14.6 % (ref 11.6–15.4)
ERYTHROCYTE [DISTWIDTH] IN BLOOD BY AUTOMATED COUNT: 16.3 % (ref 11.5–14.5)
GLOBULIN SER CALC-MCNC: 3.3 G/DL (ref 2–4)
GLUCOSE SERPL-MCNC: 118 MG/DL (ref 65–99)
GLUCOSE SERPL-MCNC: 153 MG/DL (ref 65–99)
HCT VFR BLD AUTO: 34.1 % (ref 36.6–50.3)
HCT VFR BLD AUTO: 36.6 % (ref 37.5–51)
HCT VFR BLD AUTO: 37.8 % (ref 37.5–51)
HGB BLD-MCNC: 11.5 G/DL (ref 12.1–17)
HGB BLD-MCNC: 12.6 G/DL (ref 13–17.7)
HGB BLD-MCNC: 13.4 G/DL (ref 13–17.7)
IMM GRANULOCYTES # BLD AUTO: 0 K/UL (ref 0–0.04)
IMM GRANULOCYTES # BLD AUTO: 0 X10E3/UL (ref 0–0.1)
IMM GRANULOCYTES # BLD AUTO: 0 X10E3/UL (ref 0–0.1)
IMM GRANULOCYTES NFR BLD AUTO: 0 %
IMM GRANULOCYTES NFR BLD AUTO: 0 %
IMM GRANULOCYTES NFR BLD AUTO: 0 % (ref 0–0.5)
INR PPP: 1.4 (ref 0.8–1.2)
INR PPP: 1.4 (ref 0.8–1.2)
LYMPHOCYTES # BLD AUTO: 0.9 X10E3/UL (ref 0.7–3.1)
LYMPHOCYTES # BLD AUTO: 1.7 X10E3/UL (ref 0.7–3.1)
LYMPHOCYTES # BLD: 1.2 K/UL (ref 0.8–3.5)
LYMPHOCYTES NFR BLD AUTO: 19 %
LYMPHOCYTES NFR BLD AUTO: 25 %
LYMPHOCYTES NFR BLD: 25 % (ref 12–49)
MCH RBC QN AUTO: 34.4 PG (ref 26–34)
MCH RBC QN AUTO: 35.2 PG (ref 26.6–33)
MCH RBC QN AUTO: 35.5 PG (ref 26.6–33)
MCHC RBC AUTO-ENTMCNC: 33.7 G/DL (ref 30–36.5)
MCHC RBC AUTO-ENTMCNC: 34.4 G/DL (ref 31.5–35.7)
MCHC RBC AUTO-ENTMCNC: 35.4 G/DL (ref 31.5–35.7)
MCV RBC AUTO: 102.1 FL (ref 80–99)
MCV RBC AUTO: 103 FL (ref 79–97)
MCV RBC AUTO: 99 FL (ref 79–97)
MONOCYTES # BLD AUTO: 0.6 X10E3/UL (ref 0.1–0.9)
MONOCYTES # BLD AUTO: 0.8 X10E3/UL (ref 0.1–0.9)
MONOCYTES # BLD: 0.6 K/UL (ref 0–1)
MONOCYTES NFR BLD AUTO: 12 %
MONOCYTES NFR BLD AUTO: 12 %
MONOCYTES NFR BLD: 14 % (ref 5–13)
MORPHOLOGY BLD-IMP: ABNORMAL
MORPHOLOGY BLD-IMP: ABNORMAL
NEUTROPHILS # BLD AUTO: 2.9 X10E3/UL (ref 1.4–7)
NEUTROPHILS # BLD AUTO: 3.9 X10E3/UL (ref 1.4–7)
NEUTROPHILS NFR BLD AUTO: 56 %
NEUTROPHILS NFR BLD AUTO: 61 %
NEUTS SEG # BLD: 2.5 K/UL (ref 1.8–8)
NEUTS SEG NFR BLD: 54 % (ref 32–75)
NRBC # BLD: 0 K/UL (ref 0–0.01)
NRBC BLD-RTO: 0 PER 100 WBC
PLATELET # BLD AUTO: 64 X10E3/UL (ref 150–450)
PLATELET # BLD AUTO: 66 X10E3/UL (ref 150–450)
PLATELET # BLD AUTO: 72 K/UL (ref 150–400)
PMV BLD AUTO: 10.6 FL (ref 8.9–12.9)
POTASSIUM SERPL-SCNC: 4 MMOL/L (ref 3.5–5.2)
POTASSIUM SERPL-SCNC: 4.2 MMOL/L (ref 3.5–5.2)
PROT SERPL-MCNC: 5.6 G/DL (ref 6.4–8.2)
PROT SERPL-MCNC: 5.6 G/DL (ref 6–8.5)
PROT SERPL-MCNC: 5.8 G/DL (ref 6–8.5)
PROTHROMBIN TIME: 14.3 SEC (ref 9.1–12)
PROTHROMBIN TIME: 15.1 SEC (ref 9.1–12)
RBC # BLD AUTO: 3.34 M/UL (ref 4.1–5.7)
RBC # BLD AUTO: 3.55 X10E6/UL (ref 4.14–5.8)
RBC # BLD AUTO: 3.81 X10E6/UL (ref 4.14–5.8)
SODIUM SERPL-SCNC: 140 MMOL/L (ref 134–144)
SODIUM SERPL-SCNC: 140 MMOL/L (ref 134–144)
WBC # BLD AUTO: 4.7 K/UL (ref 4.1–11.1)
WBC # BLD AUTO: 4.8 X10E3/UL (ref 3.4–10.8)
WBC # BLD AUTO: 6.9 X10E3/UL (ref 3.4–10.8)

## 2020-01-01 PROCEDURE — 37243 VASC EMBOLIZE/OCCLUDE ORGAN: CPT

## 2020-01-01 PROCEDURE — C1894 INTRO/SHEATH, NON-LASER: HCPCS

## 2020-01-01 PROCEDURE — C1887 CATHETER, GUIDING: HCPCS

## 2020-01-01 PROCEDURE — 74011000258 HC RX REV CODE- 258: Performed by: RADIOLOGY

## 2020-01-01 PROCEDURE — 74183 MRI ABD W/O CNTR FLWD CNTR: CPT

## 2020-01-01 PROCEDURE — 77030014021 HC SYR ANGI FIX LOK MRTM -A

## 2020-01-01 PROCEDURE — 80076 HEPATIC FUNCTION PANEL: CPT

## 2020-01-01 PROCEDURE — A9585 GADOBUTROL INJECTION: HCPCS | Performed by: INTERNAL MEDICINE

## 2020-01-01 PROCEDURE — 71260 CT THORAX DX C+: CPT

## 2020-01-01 PROCEDURE — 75726 ARTERY X-RAYS ABDOMEN: CPT

## 2020-01-01 PROCEDURE — 74011000258 HC RX REV CODE- 258: Performed by: INTERNAL MEDICINE

## 2020-01-01 PROCEDURE — 74011000250 HC RX REV CODE- 250: Performed by: RADIOLOGY

## 2020-01-01 PROCEDURE — 74011250636 HC RX REV CODE- 250/636: Performed by: RADIOLOGY

## 2020-01-01 PROCEDURE — C1769 GUIDE WIRE: HCPCS

## 2020-01-01 PROCEDURE — C2616 BRACHYTX, NON-STR,YTTRIUM-90: HCPCS

## 2020-01-01 PROCEDURE — 77790 RADIATION HANDLING: CPT

## 2020-01-01 PROCEDURE — 77030016711

## 2020-01-01 PROCEDURE — 85025 COMPLETE CBC W/AUTO DIFF WBC: CPT

## 2020-01-01 PROCEDURE — 74011250636 HC RX REV CODE- 250/636: Performed by: INTERNAL MEDICINE

## 2020-01-01 PROCEDURE — 36415 COLL VENOUS BLD VENIPUNCTURE: CPT

## 2020-01-01 PROCEDURE — 77470 SPECIAL RADIATION TREATMENT: CPT

## 2020-01-01 PROCEDURE — 77030009378 HC DEV TORQ OLCT F/GWIRE MANIP COOK -A

## 2020-01-01 PROCEDURE — 74011636320 HC RX REV CODE- 636/320: Performed by: RADIOLOGY

## 2020-01-01 PROCEDURE — C1760 CLOSURE DEV, VASC: HCPCS

## 2020-01-01 PROCEDURE — 77030019698 HC SYR ANGI MDLON MRTM -A

## 2020-01-01 PROCEDURE — 77300 RADIATION THERAPY DOSE PLAN: CPT

## 2020-01-01 PROCEDURE — 78306 BONE IMAGING WHOLE BODY: CPT

## 2020-01-01 PROCEDURE — A9540 TC99M MAA: HCPCS

## 2020-01-01 PROCEDURE — 77030021532 HC CATH ANGI DX IMPRS MRTM -B

## 2020-01-01 RX ORDER — LACTULOSE 10 G/15ML
30 SOLUTION ORAL; RECTAL 3 TIMES DAILY
Qty: 4050 ML | Refills: 5 | Status: SHIPPED | OUTPATIENT
Start: 2020-01-01

## 2020-01-01 RX ORDER — LIDOCAINE HYDROCHLORIDE 20 MG/ML
20 INJECTION, SOLUTION INFILTRATION; PERINEURAL ONCE
Status: COMPLETED | OUTPATIENT
Start: 2020-01-01 | End: 2020-01-01

## 2020-01-01 RX ORDER — SODIUM CHLORIDE 0.9 % (FLUSH) 0.9 %
10 SYRINGE (ML) INJECTION
Status: COMPLETED | OUTPATIENT
Start: 2020-01-01 | End: 2020-01-01

## 2020-01-01 RX ORDER — CYPROHEPTADINE HYDROCHLORIDE 4 MG/1
4 TABLET ORAL
Qty: 90 TAB | Refills: 3 | Status: SHIPPED | OUTPATIENT
Start: 2020-01-01

## 2020-01-01 RX ORDER — MIDAZOLAM HYDROCHLORIDE 1 MG/ML
5 INJECTION, SOLUTION INTRAMUSCULAR; INTRAVENOUS
Status: DISCONTINUED | OUTPATIENT
Start: 2020-01-01 | End: 2020-01-01

## 2020-01-01 RX ORDER — FENTANYL CITRATE 50 UG/ML
100 INJECTION, SOLUTION INTRAMUSCULAR; INTRAVENOUS
Status: DISCONTINUED | OUTPATIENT
Start: 2020-01-01 | End: 2020-01-01

## 2020-01-01 RX ORDER — SODIUM CHLORIDE 9 MG/ML
25 INJECTION, SOLUTION INTRAVENOUS CONTINUOUS
Status: DISCONTINUED | OUTPATIENT
Start: 2020-01-01 | End: 2020-01-01

## 2020-01-01 RX ORDER — TRAMADOL HYDROCHLORIDE 50 MG/1
50 TABLET ORAL
Qty: 60 TAB | Refills: 2 | OUTPATIENT
Start: 2020-01-01 | End: 2020-10-08

## 2020-01-01 RX ORDER — SPIRONOLACTONE 100 MG/1
100 TABLET, FILM COATED ORAL DAILY
Qty: 90 TAB | Refills: 4 | Status: SHIPPED | OUTPATIENT
Start: 2020-01-01

## 2020-01-01 RX ORDER — CEFAZOLIN SODIUM/WATER 2 G/20 ML
2 SYRINGE (ML) INTRAVENOUS ONCE
Status: COMPLETED | OUTPATIENT
Start: 2020-01-01 | End: 2020-01-01

## 2020-01-01 RX ORDER — ONDANSETRON 4 MG/1
4 TABLET, ORALLY DISINTEGRATING ORAL
Qty: 90 TAB | Refills: 6 | Status: SHIPPED | OUTPATIENT
Start: 2020-01-01

## 2020-01-01 RX ORDER — FUROSEMIDE 40 MG/1
40 TABLET ORAL DAILY
Qty: 90 TAB | Refills: 4 | Status: SHIPPED | OUTPATIENT
Start: 2020-01-01

## 2020-01-01 RX ORDER — ALENDRONATE SODIUM 70 MG/1
70 TABLET ORAL
Qty: 12 TAB | Refills: 3 | Status: SHIPPED | OUTPATIENT
Start: 2020-01-01

## 2020-01-01 RX ADMIN — MIDAZOLAM 0.5 MG: 1 INJECTION INTRAMUSCULAR; INTRAVENOUS at 09:08

## 2020-01-01 RX ADMIN — FENTANYL CITRATE 25 MCG: 50 INJECTION INTRAMUSCULAR; INTRAVENOUS at 09:37

## 2020-01-01 RX ADMIN — Medication 10 ML: at 11:32

## 2020-01-01 RX ADMIN — FENTANYL CITRATE 25 MCG: 50 INJECTION INTRAMUSCULAR; INTRAVENOUS at 09:40

## 2020-01-01 RX ADMIN — SODIUM CHLORIDE 100 ML: 900 INJECTION, SOLUTION INTRAVENOUS at 09:48

## 2020-01-01 RX ADMIN — IOPAMIDOL 100 ML: 612 INJECTION, SOLUTION INTRAVENOUS at 09:45

## 2020-01-01 RX ADMIN — SODIUM CHLORIDE 25 ML/HR: 900 INJECTION, SOLUTION INTRAVENOUS at 09:07

## 2020-01-01 RX ADMIN — Medication 2 G: at 08:05

## 2020-01-01 RX ADMIN — SODIUM CHLORIDE 25 ML/HR: 900 INJECTION, SOLUTION INTRAVENOUS at 07:53

## 2020-01-01 RX ADMIN — SODIUM CHLORIDE 100 ML: 900 INJECTION, SOLUTION INTRAVENOUS at 11:32

## 2020-01-01 RX ADMIN — MIDAZOLAM 1 MG: 1 INJECTION INTRAMUSCULAR; INTRAVENOUS at 13:18

## 2020-01-01 RX ADMIN — GADOBUTROL 8.5 ML: 604.72 INJECTION INTRAVENOUS at 11:32

## 2020-01-01 RX ADMIN — LIDOCAINE HYDROCHLORIDE 10 ML: 20 INJECTION, SOLUTION INFILTRATION; PERINEURAL at 13:20

## 2020-01-01 RX ADMIN — IOPAMIDOL 70 ML: 612 INJECTION, SOLUTION INTRAVENOUS at 09:25

## 2020-01-01 RX ADMIN — FENTANYL CITRATE 25 MCG: 50 INJECTION INTRAMUSCULAR; INTRAVENOUS at 13:18

## 2020-01-01 RX ADMIN — MIDAZOLAM 0.5 MG: 1 INJECTION INTRAMUSCULAR; INTRAVENOUS at 08:56

## 2020-01-01 RX ADMIN — Medication 2 G: at 12:56

## 2020-01-01 RX ADMIN — FENTANYL CITRATE 50 MCG: 50 INJECTION INTRAMUSCULAR; INTRAVENOUS at 14:40

## 2020-01-01 RX ADMIN — LIDOCAINE HYDROCHLORIDE 10 ML: 20 INJECTION, SOLUTION INFILTRATION; PERINEURAL at 09:00

## 2020-01-01 RX ADMIN — FENTANYL CITRATE 25 MCG: 50 INJECTION INTRAMUSCULAR; INTRAVENOUS at 08:56

## 2020-01-01 RX ADMIN — GADOBUTROL 9 ML: 604.72 INJECTION INTRAVENOUS at 11:17

## 2020-01-01 RX ADMIN — Medication 10 ML: at 09:47

## 2020-01-01 RX ADMIN — SODIUM CHLORIDE 100 ML: 900 INJECTION, SOLUTION INTRAVENOUS at 11:17

## 2020-01-01 RX ADMIN — FENTANYL CITRATE 25 MCG: 50 INJECTION INTRAMUSCULAR; INTRAVENOUS at 09:08

## 2020-01-01 RX ADMIN — IOPAMIDOL 126 ML: 612 INJECTION, SOLUTION INTRAVENOUS at 14:00

## 2020-01-11 PROBLEM — C22.0 HEPATOCELLULAR CARCINOMA (HCC): Status: ACTIVE | Noted: 2020-01-01

## 2020-02-27 NOTE — PROGRESS NOTES
[address] [place] [address] Jeff Harrison MD, MIRTHA ParrSMD Iesha Navarro PA-RUFINA Yeh, [facility] April S Per, [facility]   Claudio Dillon, FNP-C Rohan Gray, [facility] Jarad Deputado [address] 
  at [facility] 
  [address] [facility], [address] [facility], [address] 
  [phone] FAX: [address] [place] [address] Avenue 
  [facility] 
  [address], [address] [facility], [address] 
  [phone] FAX: [phone] Patient Care Team: 
Digna Ma MD as PCP - General (Internal Medicine) Kristen Sarmiento MD (Gastroenterology) Problem List  Date Reviewed: 1/11/2020 Codes Class Noted Hepatocellular carcinoma ([facility] ICD-10-CM: C22.0 ICD-9-CM: 155.0  1/11/2020 S/P TIPS (transjugular intrahepatic portosystemic shunt) ICD-10-CM: X31.119 ICD-9-CM: V45.89  8/5/2019 Hepatic encephalopathy ([facility] ICD-10-CM: K72.90 ICD-9-CM: 572.2  8/1/2019 Ascites ICD-10-CM: R18.8 ICD-9-CM: 789.59  8/1/2019 Cirrhosis ([facility] ICD-10-CM: K74.60 ICD-9-CM: 571.5  6/6/2017 Esophageal varices (HCC) ICD-10-CM: I85.00 ICD-9-CM: 456.1  6/6/2017 Diabetes mellitus ([facility] ICD-10-CM: E11.9 ICD-9-CM: 250.00  6/6/2017 H/O umbilical hernia repair VPR-27-PO: Z98.890, Z87.19 ICD-9-CM: V15.29  6/6/2017 S/P appendectomy ICD-10-CM: Z90.49 ICD-9-CM: V45.89  6/6/2017 Edenilson Daugherty [facility] the [facility] for management of cryptogenic cirrhosis. The active problem list, all pertinent past medical history, medications, endoscopic studies, radiologic findings and laboratory findings related to the liver disorder were reviewed with the patient. The patient is a 63 y.o.   male who was found to have chronic liver disease and cirrhosis in 5/2017 when he developed variceal bleeding. 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 The most recent imaging of the liver was MRI performed in 2/2020. Results suggest cirrhosis and a increase in size of the right lobe, segment 5 mass from 1.2 x 1.5 cm to 3.4 x 3.1 cm. This is enhancing mass, with washout and rim enhancement. LIRADS-5. There are small non-diagnostic enhancing lesions in segments 7 and 8. The patient has the following symptoms which are thought to be due to the liver disease:  fatigue, The patient is not currently experiencing the following symptoms of liver disease:  problems concentrating, swelling of the abdomen, swelling of the lower extremities, hematemesis, hematochezia. The patient has limitations in functional activities which can be attributed to the liver disease. ASSESSMENT AND PLAN: 
Cirrhosis Cryptogenic cirrhosis. The diagnosis of cirrhosis is based upon Fiboscan, imaging, laboratory studies, complications of cirrhosis. AST is elevated. ALT is normal.  ALP is elevated. Liver function is normal.  The platelet count is depressed. The CTP is 7. Child class B. The MELD score is 15. Now that TIPS has been placed ZOEY has resolved and MELD score has declined from 21 to 15. Will initiate liver transplant evaluation after ablation of the [address] We have discussed the liver transplant process, how patients are listed for liver transplant, the MELD system and various liver transplant centers. The patient would like to be seen at [place], [place] Hepatocellular carcinoma The diagnosis was made in 11/2019 by dynamic MRI showing LIRADS-4. At diagnosis the [address] was 1 mass located in the Right Lobe, segment 7 and measured 1.2 x 1.5 cm. This was stage T1 Repeat MRI in 2/2020 demonstrated increase size of the [address] in right lobe, segment 5 to 3.4 x 3.1 cm, LIRADS-5. This was stage 2. Will schedule for treatment with Y-90 TARE Will repeat MRI 1 month after Y-90. Ascites Ascites has resolved following TIPS placement and with diuretics. Will remain on step 1 diuretics. The patient was counseled regarding the need to maintain sodium restriction and the types of foods containing high amounts of sodium to be avoided. Hepatic hydrothorax This has resolved following TIPS placement. Lower extremity edema Edema has resolved with current dose of diuretics. Will remain on the current dose of diuretics at step 1. The renal function is normal and edema should be able to be controled with diuretics. Screening for Esophageal varices The patient has esophageal varices with prior bleeding. Varices have been banded Varices have now been treated with TIPS. No further EGD is required to assess vareces is required at this time. Hepatic encephalopathy Overt HE is controlled on current dose of lactulose TID. He is not having an diarrhea. There is no need to restrict dietary protein at this time. Pulmonary hypertension ECHO shows mild pulmonary HTN with estimated PAP of 42 mm Hg. We thought this was due to fluid overload. Will need to repeat ECHO now that he has TIPS and no longer has fluid overload. Roseanna Wheeler Can delay tis until he undergoes formal LT evaluation after 10/1/2019. Thrombocytopenia This is secondary to cirrhosis. There is no evidence of overt bleeding. No treatment is required. The platelet count is adequate for the patient to undergo procedures without the need for platelet transfusion or platelet growth factors. Screening for Hepatocellular Carcinoma [address] screening has recently been performed and does not suggest [address] The next liver imaging study will be performed in 9/2019. COPD. Patient is inconsistent with inhaler use and continues to smoke 1 PPD. He will need to stop smoking if he wants to be considered for liver transplant. Treatment of other medical problems in patients with chronic liver disease There are no contraindications for the patient to take most medications that are necessary for treatment of other medical issues. The patient has cirrhrosis and should avoid NSAIDs which are associated with a higher rate of developing ZOEY. Counseling for alcohol in patients with chronic liver disease The patient was counseled regarding alcohol consumption and the effect of alcohol on chronic liver disease. The patient does not consume any significant amount of alcohol. Vaccinations Vaccination for viral hepatitis A and B is recommended since the patient has no serologic evidence of previous exposure or vaccination with immunity. Routine vaccinations against other bacterial and viral agents can be performed as indicated. Annual flu vaccination should be administered if indicated. ALLERGIES Allergies Allergen Reactions  Shellfish Derived Hives MEDICATIONS Current Outpatient Medications Medication Sig  cyproheptadine (PERIACTIN) 4 mg tablet Take 1 Tab by mouth nightly as needed (itching).  lactulose (CHRONULAC) 10 gram/15 mL solution Take 45 mL by mouth three (3) times daily.  alendronate (FOSAMAX) 70 mg tablet Take 1 Tab by mouth every seven (7) days.  furosemide (LASIX) 40 mg tablet take 2 tablets BY MOUTH EVERY DAY  spironolactone (ALDACTONE) 100 mg tablet take 2 tablets BY MOUTH EVERY DAY  nicotine (NICODERM CQ) 14 mg/24 hr patch Apply 1 patch every 24 hours for 6 weeks.  nicotine (NICODERM CQ) 7 mg/24 hr Apply 1 patch every 24 hours for 7 days. To be used after 6 weeks of 14 mg patch.  albuterol (PROVENTIL HFA, VENTOLIN HFA, PROAIR HFA) 90 mcg/actuation inhaler Take 1 Puff by inhalation every six (6) hours as needed for Wheezing.  ondansetron (ZOFRAN ODT) 4 mg disintegrating tablet Take 1 Tab by mouth every eight (8) hours as needed for Nausea. No current facility-administered medications for this visit. SYSTEM REVIEW NOT RELATED TO LIVER DISEASE OR REVIEWED ABOVE: 
Constitution systems: Negative for fever, chills. Weight stable since last office visit. Eyes: Negative for visual changes. ENT: Negative for sore throat, painful swallowing. Respiratory: Negative for cough, hemoptysis. Positive for SOB, patient is inconsistent with administration of Ventolin. Cardiology: Negative for chest pain, palpitations. GI:  Negative for constipation or diarrhea. Occasional complaint of RUQ dull achy bruise feeling, helped with changes in position. Occasional post-prandial nausea : Negative for urinary frequency, dysuria, hematuria, nocturia. Skin: Negative for rash. Hematology: Negative for easy bruising, blood clots. Musculo-skeletal: Negative for back pain, muscle pain, weakness. Neurologic: Negative for headaches, dizziness, vertigo. Memory problems ? related to HE. Psychology: Negative for anxiety, depression. FAMILY HISTORY: 
The father  of heart disease. The mother  of MVA but had elevated liver enzymes. There is no family history of liver disease. SOCIAL HISTORY: 
The patient is . The patient has no children. The patient currently smokes 1 pack of tobacco daily. The patient has never consumed significant amounts of alcohol. 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 PHYSICAL EXAMINATION: 
Visit Vitals /67 (BP 1 Location: Left arm, BP Patient Position: Sitting) Pulse 85 Temp 97.5 °F (36.4 °C) (Tympanic) Ht 6' 1\" (1.854 m) Wt 196 lb (88.9 kg) SpO2 97% BMI 25.86 kg/m² General: No acute distress. Eyes: Sclera anicteric. ENT: No oral lesions. Thyroid normal. 
Nodes: No adenopathy. Skin: No spider angiomata. No jaundice. Positive for palmar erythema. Respiratory: Lungs clear to auscultation. Cardiovascular: Regular heart rate. No murmurs. No JVD. Abdomen: Soft, mild tender to deep palpation of the LUQ, spleen tip palpable. Liver edge firm, easily palpable 2-3 CM BCM nontender. No obvious ascites. Extremities: Trace edema. No muscle wasting. No gross arthritic changes. Neurologic: Alert and oriented. Cranial nerves grossly intact. Fine asterixis. LABORATORY STUDIES: 
Liver [place] [address] Units 2/27/2020 12/27/2019 WBC 3.4 - 10.8 x10E3/uL 6.9 5.9 ANC 1.4 - 7.0 x10E3/uL 3.9 2.9 HGB 13.0 - 17.7 g/dL 13.4 12.4 (L)  - 450 x10E3/uL 66 (LL) 77 (LL)  
INR 0.8 - 1.2 1.4 (H) 1.4 (H) AST 0 - 40 IU/L 63 (H) 59 (H) ALT 0 - 44 IU/L 45 (H) 39 Alk Phos 39 - 117 IU/L 160 (H) 138 (H) Bili, Total 0.0 - 1.2 mg/dL 1.9 (H) 2.3 (H) Bili, Direct 0.00 - 0.40 mg/dL 0.82 (H) 1.03 (H) Albumin 3.8 - 4.9 g/dL 2.8 (L) 2.7 (L) BUN 6 - 24 mg/dL 11 12 Creat 0.76 - 1.27 mg/dL 0.96 0.98 Na 134 - 144 mmol/L 140 139  
K 3.5 - 5.2 mmol/L 4.0 4.0 Cl 96 - 106 mmol/L 109 (H) 106 CO2 20 - 29 mmol/L 20 23 Glucose 65 - 99 mg/dL 153 (H) 145 (H) Cancer Screening Latest Ref Rng & Units 2/27/2020 12/27/2019 11/15/2019 AFP, Serum 0.0 - 8.0 ng/mL 4.2 3.6 2.5 AFP-L3% 0.0 - 9.9 % 9.2 9.9 Comment SEROLOGIES: 
5/2017. HBsurface antigen negative, anti-HBcore negative, anti-HBsurface negative, HCV RNA negative, iron saturation 29%, ASMA negative. Serologies Latest Ref Rng & Units 6/6/2017 Ferritin 30 - 400 ng/mL 39 Alpha-1 antitrypsin level 90 - 200 mg/dL 87 (L) LIVER HISTOLOGY: 
6/2017. FibroScan performed at [address] EkPa was 72.1. The results suggested a fibrosis level of F4. ENDOSCOPIC PROCEDURES: 
2017. EGD by Dr Jonathan Stephenson. Esophageal varices. Banding performed. 2017. EGD performed by Dr Lavon Don. Small esophageal varices. No banding performed. No gastric varices. Mild portal gastropathy. Repeat in 6 months. 
2018. EGD performed by Dr Lavon Don. Medium esophageal varices. Banding performed x 4. No gastric varices. Moderate portal gastropathy. 2018. EGD performed by Dr Lavon Don. No esophageal varices. No gastric varices. Moderate portal gastropathy. Repeat 2019. RADIOLOGY: 
2017. US of abdomen. No liver mass/lesion. 2018. Ultrasound of liver. Echogenic consistent with cirrhosis. No liver mass lesions. No dilated bile ducts. No ascites. 2019. CT scan abdomen with IV contrast.  Changes consistent with cirrhosis. No liver mass lesions. No dilated bile ducts. No ascites. 2019. Dynamic MRI of liver. Changes consistent with cirrhosis. Single enhancing liver mass with washout  
in the right lobe, segment 5 measuring 1.2 x 1.5 cm. No ascites. Patent TIPS. 2020. Dynamic MRI of liver. Changes consistent with cirrhosis. Single enhancing liver mass with washout And rim enhancement in the right lobe, segment 5 now measuring 3.4 x 3.1 cm. LIRADS-5. No ascites. Patent TIPS. OTHER TESTIN2019. Cardiac Nuclear Stress. No ischemia. LVEF 65% FOLLOW-UP: 
All of the issues listed above in the Assessment and Plan were discussed with the patient. All questions were answered. The patient expressed a clear understanding of the above. [address] in 2 months. This should be about 1 month after Y-90 and 1-2 weeks after the next imaging study MD Jarad Ward [facility] Center[facility] [address] [place] [address], [address] Patricia Reynolds  22. 
[phone] [address] [place] [address]

## 2020-02-27 NOTE — PROGRESS NOTES
Rosa Suresh is a 63 y.o. male Chief Complaint Patient presents with  Follow-up Visit Vitals /67 (BP 1 Location: Left arm, BP Patient Position: Sitting) Pulse 85 Temp 97.5 °F (36.4 °C) (Tympanic) Ht 6' 1\" (1.854 m) Wt 196 lb (88.9 kg) SpO2 97% BMI 25.86 kg/m² 3 most recent PHQ Screens 2/27/2020 Little interest or pleasure in doing things Not at all Feeling down, depressed, irritable, or hopeless Not at all Total Score PHQ 2 0 Learning Assessment 2/27/2020 PRIMARY LEARNER Patient BARRIERS PRIMARY LEARNER NONE  
CO-LEARNER CAREGIVER No  
PRIMARY LANGUAGE ENGLISH  
LEARNER PREFERENCE PRIMARY DEMONSTRATION  
ANSWERED BY patient RELATIONSHIP SELF Abuse Screening Questionnaire 2/27/2020 Do you ever feel afraid of your partner? Quitman Poles Are you in a relationship with someone who physically or mentally threatens you? Quitman Poles Is it safe for you to go home? Y  
 
 
 
1. Have you been to the ER, urgent care clinic since your last visit? Hospitalized since your last visit? Yes pt seen [facility] ER due to fracture back bone fracture on January 2020 
 
2. Have you seen or consulted any other health care providers outside of the [address] [place] [address] since your last visit? Include any pap smears or colon screening.  no

## 2020-02-27 NOTE — Clinical Note
3/9/20 Patient: Meri Wolf YOB: 1960 Date of Visit: 2/27/2020 Mainor Dno MD 
[address] [address] VIA Facsimile: [phone] Dear Mainor Don MD, Thank you for referring Mr. Gladys Ocasio to [address] for evaluation. My notes for this consultation are attached. If you have questions, please do not hesitate to call me. I look forward to following your patient along with you. Sincerely, Damian Tsai MD

## 2020-02-28 NOTE — TELEPHONE ENCOUNTER
Attempted to reach patient. Received VM belonging to patient's wife. Left message asking for a return call. NN name and contact information provided. Patient's wife returned call. Provided her with appointment information for consults with Interventional Radiology and Radiation Oncology. Advised her patient should check in at [facility] Admitting at 10:15 am on 3/6/20. Discussed that patient will need to ensure there is at least one full calendar day between bone scan and mapping appointments. Wife verbalized understanding.

## 2020-03-02 NOTE — TELEPHONE ENCOUNTER
Phone call placed to patient's PCP office (Dr. Domingo Lin). Left message on referral line voicemail indicating patient has liver cancer and is being sent to IR and rad onc for treatment. Asked for a return call if referrals and provider information is needed. Received VM from office representative reporting patient will need to use in-network providers. She stated there are no benefits for out-of-network providers.

## 2020-03-13 NOTE — PROGRESS NOTES
1705 pm- Discharge instructions reviewed with patient and wife with good understanding. IV discontinued. Patient taken to car with nurse and wife via wheelchair. Dressing to right groin remains dry and intact. No bleeding or oozing from site. Patient ambulated to  to void without difficulty  Prior to leaving. Patient discharged stable. Patient has tentative appointment to return for treatment on 3/23/20 @ 7 am . Dr. Rojas Silver to call patient next week with appointment details.

## 2020-03-13 NOTE — DISCHARGE INSTRUCTIONS
[address] [facility]  Department of Interventional Radiology  [phone]          Radiologist:  Dr. Peter Almonte    Date:  3/13/2020    Angiogram Procedure      Go home and rest and restrict your activity the next 24 - 48 hours, limiting the amount of walking you do. You have been given sedating medications, so do not drive or drink alcohol today. Resume your previous diet and medications and be sure to increase your fluid intake for the next 24 hours. You may take Tylenol, as directed on the label, for pain or discomfort. Avoid Ibuprofen (Advil, Motrin etc.) and Aspirin today as they may increase your risk of bleeding. Avoid heavy lifting (nothing greater than 5 pounds),  excessive bending, and pushing or pulling movements for one week. Then begin to advance your activity as tolerated. Be sure to follow up with your physician, and let him know how you are progressing. Keep your groin dressing clean and dry. Observe the site for any bleeding. If bleeding should occur from the site, apply firm direct pressure for 15 minutes. If the bleeding can not be stopped please seek emergency medical treatment. Someone will need to drive you while you continue to hold pressure. If you have minor leg discomfort, you may use Tylenol as directed on the bottle. If new severe leg pain, numbness or tingling occurs please seek emergency medical treatment. Keep your dressing clean and dry. You may change the dressing in 24 hours. Wash the area with soap and water. Do not soak or swim until the site has healed completely. If a MYNX/Angioseal closure device was use, you may feel a small lump under the dressing. Do not rub or massage it. It is collegen and will dissolve on its on over the next month. Be sure to follow up with your ordering physician as previously discussed.          Side effects of sedation medications and other medications used today have been reviewed. Notify us of nausea, itching, hives, dizziness, or anything else out of the ordinary. Should you experience any of these significant changes, please call [phone] between the hours of 7:30 am and 10 pm or [phone] after hours. After hours, ask the  to page the X-ray Technologist, and describe the problem to the technologist.       [address] [facility]    Angiography Discharge Instructions    General Information: This test is done to evaluate circulation in the extremities. In the case of a stenosis (narrowing) of the arteries, we can sometimes fix the problem either with a balloon, stent, or a combination. If there is a stenosis that we cannot fix or if there is no problem seen on the angiography study, then you will be able to go home today. If there is something that we can fix, you will be spending the night in the hospital.  If we do find a stenosis that we are not able to correct, then the study will be forwarded to the vascular surgeon, who can take you to the operating room to improve the circulation. You will be asked to lie flat on your back for 3-6 hours after the procedure to prevent bleeding complications. Sometimes the physician will use an arterial closure device that will decrease your recovery time. If this is used, your nurse will explain the difference in recovery. We have no way to determine if we can use a closure device until the procedure is started. We will let you know when you wake up after the procedure. Home Care Instructions: You can resume your regular diet. Do not shower, bathe, swim, drink alcohol, or make any important legal decisions in the next 48 hours. You may drive after 24 hours. Do not lift anything heavier than a gallon of milk for 5 days. Watch the site carefully for signs of infection, like fever, drainage, redness, and/or swelling.   If you take Glucophage (Metformin) for diabetes, do not take it for the next 48 hours. If you were asked to hold any blood thinners prior to the procedure, you may restart that medicine the day after the procedure is completed. Recline your car seat for the ride home. Call If: You should call your Physician and/or the Radiology Nurse if you have any signs of infection like fever, drainage, redness, and/or swelling. If the puncture site should ooze, please call. Also call if you have any pain, decreased sensation, numbness, tingling, swelling, or change in color to the affected extremity. SEEK IMMEDIATE MEDICAL CARE IF YOUR PUNCTURE SITE STARTS TO BLEED. APPLY ENOUGH FIRM PRESSURE TO THE SITE WITH THE TIPS OF YOUR FINGERS TO STOP THE BLEEDING. Arterial bleeding is a medical emergency and should be evaluated immediately. Follow-Up Instructions:  Please see your ordering doctor as he/she has requested.         Patient Signature:  Date: 3/13/2020  Discharging Nurse: Juancarlos Devlin RN

## 2020-03-13 NOTE — H&P
Interventional and Vascular Radiology History and Physical    Patient: Li Ards 63 y.o. male       Chief Complaint: No chief complaint on file. History of Present Illness: hepatoma     History:    Past Medical History:   Diagnosis Date    Chronic obstructive pulmonary disease ([facility]     Diabetes ([facility]     Liver disease      Family History   Problem Relation Age of Onset    Diabetes Mother     Heart Disease Father      Social History     Socioeconomic History    Marital status:      Spouse name: Not on file    Number of children: Not on file    Years of education: Not on file    Highest education level: Not on file   Occupational History    Not on file   Social Needs    Financial resource strain: Not on file    Food insecurity     Worry: Not on file     Inability: Not on file    Transportation needs     Medical: Not on file     Non-medical: Not on file   Tobacco Use    Smoking status: Former Smoker     Packs/day: 0.25    Smokeless tobacco: Never Used    Tobacco comment: February 2020 quit    Substance and Sexual Activity    Alcohol use: No    Drug use: No    Sexual activity: Not on file   Lifestyle    Physical activity     Days per week: Not on file     Minutes per session: Not on file    Stress: Not on file   Relationships    Social connections     Talks on phone: Not on file     Gets together: Not on file     Attends Tenriism service: Not on file     Active member of club or organization: Not on file     Attends meetings of clubs or organizations: Not on file     Relationship status: Not on file    Intimate partner violence     Fear of current or ex partner: Not on file     Emotionally abused: Not on file     Physically abused: Not on file     Forced sexual activity: Not on file   Other Topics Concern    Not on file   Social History Narrative    Not on file       Allergies:    Allergies   Allergen Reactions    Shellfish Derived Hives       Current Medications:  Current Facility-Administered Medications   Medication Dose Route Frequency    midazolam (VERSED) injection 5 mg  5 mg IntraVENous Multiple    fentaNYL citrate (PF) injection 100 mcg  100 mcg IntraVENous Multiple    0.9% sodium chloride infusion  25 mL/hr IntraVENous CONTINUOUS        Physical Exam:  Blood pressure 127/56, pulse 84, temperature 98.1 °F (36.7 °C), resp. rate 12, height 6' 1\" (1.854 m), weight 90.7 kg (200 lb), SpO2 99 %. LUNG: clear to auscultation bilaterally, HEART: regular rate and rhythm, S1, S2 normal, no murmur, click, rub or gallop      Alerts:    Hospital Problems  Date Reviewed: 1/11/2020    None          Laboratory:      Recent Labs     03/13/20  0950   HGB 11.5*   HCT 34.1*   WBC 4.7   PLT 72*         Plan of Care/Planned Procedure:  Risks, benefits, and alternatives reviewed with patient and he agrees to proceed with the procedure. Conscious sedation will be performed with IV fentanyl and versed.  Plan is for mapping y90 arteriogram       Emerson Maddox MD

## 2020-03-13 NOTE — PROGRESS NOTES
Pt arrives ambulatory to angio department accompanied by spouse for Angio procedure. All assessments completed and consent was reviewed. Education given was regarding procedure, conscious sedation, post-procedure care and  management/follow-up. Opportunity for questions was provided and all questions and concerns were addressed. Reviewed sedation plan to include medicating under conscious sedation using versed and fentanyl IV. Reviewed potential side effects with patient and possible interactions with patient's current meds Pt educated on moderate sedation and its purpose; medications and side effects described and patient verbalized understanding. Dr. Pierre Bey in to talk with patient and wife about procedure. Patient assessed and evaluated for procedure. Consent signed.

## 2020-03-23 NOTE — H&P
Interventional and Vascular Radiology History and Physical    Patient: Henry Almeida 63 y.o. male       Chief Complaint: No chief complaint on file. History of Present Illness: hepatoma     History:    Past Medical History:   Diagnosis Date    Chronic obstructive pulmonary disease ([facility]     Diabetes ([facility]     Liver disease      Family History   Problem Relation Age of Onset    Diabetes Mother     Heart Disease Father      Social History     Socioeconomic History    Marital status:      Spouse name: Not on file    Number of children: Not on file    Years of education: Not on file    Highest education level: Not on file   Occupational History    Not on file   Social Needs    Financial resource strain: Not on file    Food insecurity     Worry: Not on file     Inability: Not on file    Transportation needs     Medical: Not on file     Non-medical: Not on file   Tobacco Use    Smoking status: Former Smoker     Packs/day: 0.25    Smokeless tobacco: Never Used    Tobacco comment: February 2020 quit    Substance and Sexual Activity    Alcohol use: No    Drug use: No    Sexual activity: Not on file   Lifestyle    Physical activity     Days per week: Not on file     Minutes per session: Not on file    Stress: Not on file   Relationships    Social connections     Talks on phone: Not on file     Gets together: Not on file     Attends Zoroastrianism service: Not on file     Active member of club or organization: Not on file     Attends meetings of clubs or organizations: Not on file     Relationship status: Not on file    Intimate partner violence     Fear of current or ex partner: Not on file     Emotionally abused: Not on file     Physically abused: Not on file     Forced sexual activity: Not on file   Other Topics Concern    Not on file   Social History Narrative    Not on file       Allergies:    Allergies   Allergen Reactions    Shellfish Derived Hives       Current Medications:  No current facility-administered medications for this encounter. Physical Exam:  Blood pressure 121/41, pulse 91, temperature 98.6 °F (37 °C), resp. rate 18, height 6' 1\" (1.854 m), weight 90.7 kg (200 lb), SpO2 97 %. LUNG: clear to auscultation bilaterally, HEART: regular rate and rhythm, S1, S2 normal, no murmur, click, rub or gallop      Alerts:    Hospital Problems  Date Reviewed: 1/11/2020    None          Laboratory:    No results for input(s): HGB, HCT, WBC, PLT, INR, BUN, CREA, K, CRCLT, HGBEXT, HCTEXT, PLTEXT, INREXT in the last 72 hours. No lab exists for component: PTT, PT      Plan of Care/Planned Procedure:  Risks, benefits, and alternatives reviewed with patient and he agrees to proceed with the procedure. Conscious sedation will be performed with IV fentanyl and versed.  Plan is for y90 liver treatment       Gina Najera MD

## 2020-03-23 NOTE — PROGRESS NOTES
Pt arrives ambulatory to angio department accompanied by family for Angio procedure. All assessments completed and consent was reviewed. Education given was regarding procedure, conscious sedation, post-procedure care and  management/follow-up. Opportunity for questions was provided and all questions and concerns were addressed. Reviewed sedation plan to include medicating under conscious sedation using versed and fentanyl IV. Reviewed potential side effects with patient and possible interactions with patient's current meds Pt educated on moderate sedation and its purpose; medications and side effects described and patient verbalized understanding. Dr. Green Almonte in to talk with patient about procedure. Patient evaluated for procedure by Dr. Sisi Sauceda. Consent signed.

## 2020-03-23 NOTE — DISCHARGE INSTRUCTIONS
[address] [facility]  Department of Interventional Radiology  [phone]          Radiologist:  Dr. Mery Sanchez    Date:  3/23/2020    Arteriogram Procedure      Go home and rest and restrict your activity the next 24 - 48 hours, limiting the amount of walking you do. You have been given sedating medications, so do not drive or drink alcohol today. Resume your previous diet and medications and be sure to increase your fluid intake for the next 24 hours. You may take Tylenol, as directed on the label, for pain or discomfort. Avoid Ibuprofen (Advil, Motrin etc.) and Aspirin today as they may increase your risk of bleeding. Avoid heavy lifting (nothing greater than 5 pounds),  excessive bending, and pushing or pulling movements for one week. Then begin to advance your activity as tolerated. Be sure to follow up with your physician, and let him know how you are progressing. Keep your groin dressing clean and dry. Observe the site for any bleeding. If bleeding should occur from the site, apply firm direct pressure for 15 minutes. If the bleeding can not be stopped please seek emergency medical treatment. Someone will need to drive you while you continue to hold pressure. If you have minor leg discomfort, you may use Tylenol as directed on the bottle. If new severe leg pain, numbness or tingling occurs please seek emergency medical treatment. Keep your dressing clean and dry. You may change the dressing in 24 hours. Wash the area with soap and water. Do not soak or swim until the site has healed completely. If a MYNX/Angioseal closure device was use, you may feel a small lump under the dressing. Do not rub or massage it. It is collegen and will dissolve on its on over the next month. Be sure to follow up with your ordering physician as previously discussed.                   Side effects of sedation medications and other medications used today have been reviewed. Notify us of nausea, itching, hives, dizziness, or anything else out of the ordinary. Should you experience any of these significant changes, please call [phone] between the hours of 7:30 am and 10 pm or [phone] after hours. After hours, ask the  to page the X-ray Technologist, and describe the problem to the technologist.       [address] [facility]    Angiography Discharge Instructions    General Information: This test is done to evaluate circulation in the extremities. In the case of a stenosis (narrowing) of the arteries, we can sometimes fix the problem either with a balloon, stent, or a combination. If there is a stenosis that we cannot fix or if there is no problem seen on the angiography study, then you will be able to go home today. If there is something that we can fix, you will be spending the night in the hospital.  If we do find a stenosis that we are not able to correct, then the study will be forwarded to the vascular surgeon, who can take you to the operating room to improve the circulation. You will be asked to lie flat on your back for 3-6 hours after the procedure to prevent bleeding complications. Sometimes the physician will use an arterial closure device that will decrease your recovery time. If this is used, your nurse will explain the difference in recovery. We have no way to determine if we can use a closure device until the procedure is started. We will let you know when you wake up after the procedure. Home Care Instructions: You can resume your regular diet. Do not shower, bathe, swim, drink alcohol, or make any important legal decisions in the next 48 hours. You may drive after 24 hours. Do not lift anything heavier than a gallon of milk for 5 days. Watch the site carefully for signs of infection, like fever, drainage, redness, and/or swelling.   If you take Glucophage (Metformin) for diabetes, do not take it for the next 48 hours. If you were asked to hold any blood thinners prior to the procedure, you may restart that medicine the day after the procedure is completed. Recline your car seat for the ride home. Call If: You should call your Physician and/or the Radiology Nurse if you have any signs of infection like fever, drainage, redness, and/or swelling. If the puncture site should ooze, please call. Also call if you have any pain, decreased sensation, numbness, tingling, swelling, or change in color to the affected extremity. SEEK IMMEDIATE MEDICAL CARE IF YOUR PUNCTURE SITE STARTS TO BLEED. APPLY ENOUGH FIRM PRESSURE TO THE SITE WITH THE TIPS OF YOUR FINGERS TO STOP THE BLEEDING. Arterial bleeding is a medical emergency and should be evaluated immediately. Follow-Up Instructions:  Please see your ordering doctor as he/she has requested.         Patient Signature:  Date: 3/23/2020  Discharging Nurse: Lavinia Schuster RN

## 2020-03-23 NOTE — PROGRESS NOTES
1140 am- Discharge instructions reviewed with patient and wife with good understanding. Patient given copy of discharge instructions and signed hard copy placed with chart. IV removed. Patient taken to car via wheelchair with nurse at side. Patient discharged stable. Dressing to right groin site remains dry and intact.

## 2020-06-04 NOTE — PROGRESS NOTES
Phone call placed to patient. Received VM belonging to patient's wife, Ja Garcia. Left message advising her Dr. Reina Castorena wants patient to have a MRI prior to the follow up appointment this month. Advised wife scheduling will call her. NN name and contact information provided in case of questions.

## 2020-06-11 NOTE — TELEPHONE ENCOUNTER
Patient scheduled for  Monday 06.29.20 to see Dr. Cameron Epstein. Patient appointment moved to Thursday 07.02.20 as Dr. Cameron Epstein is not in the office on Monday's. Left detailed voice mail for patient to return call.

## 2020-07-01 NOTE — TELEPHONE ENCOUNTER
Patient called for Covid-19 screening prior to appointment on 7/2/2020. Detailed voicemail left on wife Noemi's phone.   Maria Elena Richards LPN

## 2020-07-02 NOTE — PROGRESS NOTES
Chief Complaint Patient presents with  Follow-up Patient complains of pain in the LLQ. Hernia. Patient states the pain can be unbearable. Visit Vitals /61 (BP 1 Location: Left arm, BP Patient Position: Sitting) Pulse 81 Temp 98.2 °F (36.8 °C) (Temporal) Resp 18 Ht 6' 1\" (1.854 m) Wt 201 lb 6.4 oz (91.4 kg) SpO2 92% BMI 26.57 kg/m² 3 most recent PHQ Screens 2/27/2020 Little interest or pleasure in doing things Not at all Feeling down, depressed, irritable, or hopeless Not at all Total Score PHQ 2 0 Abuse Screening Questionnaire 2/27/2020 Do you ever feel afraid of your partner? Aristeo Velez Are you in a relationship with someone who physically or mentally threatens you? Aristeo Velez Is it safe for you to go home? Tran Hernández Learning Assessment 2/27/2020 PRIMARY LEARNER Patient BARRIERS PRIMARY LEARNER NONE  
CO-LEARNER CAREGIVER No  
PRIMARY LANGUAGE ENGLISH  
LEARNER PREFERENCE PRIMARY DEMONSTRATION  
ANSWERED BY patient RELATIONSHIP SELF

## 2020-07-02 NOTE — PROGRESS NOTES
[address] [facility] [address] 137 [facility] Adela Ocampo MD, Sarkis Joshi [facility] MD Teresa Martell, PA-C Nash Hensley, [facility] April S Per, [facility]   Roman Somers, [facility] Armando Forrest, [facility] 136 
  at [address] 
  [address] [place] [address] Av, [address] Patricia Gonzalez  22. 
  [phone] FAX: [address] [place] [address] 
  [address], [address] 98 [facility], [address] 
  [phone] FAX: [phone] Patient Care Team: 
Arturo Armenta MD as PCP - General (Internal Medicine) Regis Jara MD (Gastroenterology) Problem List  Date Reviewed: 1/11/2020 Codes Class Noted Hepatocellular carcinoma ([facility] ICD-10-CM: C22.0 ICD-9-CM: 155.0  1/11/2020 S/P TIPS (transjugular intrahepatic portosystemic shunt) ICD-10-CM: B17.953 ICD-9-CM: V45.89  8/5/2019 Hepatic encephalopathy ([facility].) ICD-10-CM: K72.90 ICD-9-CM: 572.2  8/1/2019 Ascites ICD-10-CM: R18.8 ICD-9-CM: 789.59  8/1/2019 Cirrhosis ([facility] 75.) ICD-10-CM: K74.60 ICD-9-CM: 571.5  6/6/2017 Esophageal varices (HCC) ICD-10-CM: I85.00 ICD-9-CM: 456.1  6/6/2017 Diabetes mellitus ([facility] 75.) ICD-10-CM: E11.9 ICD-9-CM: 250.00  6/6/2017 H/O umbilical hernia repair EVR-[id number]: Z98.890, Z87.19 ICD-9-CM: V15.29  6/6/2017 S/P appendectomy ICD-10-CM: Z90.49 ICD-9-CM: V45.89  6/6/2017 Ochoa Patel returns to the [address] for management of cryptogenic cirrhosis. The active problem list, all pertinent past medical history, medications, endoscopic studies, radiologic findings and laboratory findings related to the liver disorder were reviewed with the patient. The patient is a 62 y.o.   male who was found to have chronic liver disease and cirrhosis in 5/2017 when he developed variceal bleeding. 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 He was found to have [address] on routine screening in 2/2020. The patient has the following symptoms which are thought to be due to the liver disease:  fatigue, pain in the ingunial hernia. The patient is not currently experiencing the following symptoms of liver disease:  problems concentrating, swelling of the abdomen, swelling of the lower extremities, hematemesis, hematochezia. The patient has limitations in functional activities which can be attributed to the liver disease. ASSESSMENT AND PLAN: 
Cirrhosis Cryptogenic cirrhosis. The diagnosis of cirrhosis is based upon Fiboscan, imaging, laboratory studies, complications of cirrhosis. AST is elevated. ALT is normal.  ALP is elevated. Liver function is normal.  The platelet count is depressed. The CTP is 7. Child class B. The MELD score is 14. Now that TIPS has been placed ZOEY has resolved and MELD score has declined from 21 to 15. The patient has completed liver transplant evaluation testing but has not been palced on LT waiting list.. The patient would like to be seen at [place], [place] He should probably be seen by the [facility] LT team prior to undergoing hernia repair just in case he develops hepatic decompensation. Will need to repeat ECHO now that no longer fluid overloaded to see if still has pulmonary HTN Hepatocellular carcinoma The diagnosis was made in 11/2019 by dynamic MRI showing LIRADS-4. At diagnosis the [address] was 1 mass located in the Right Lobe, segment 7 and measured 1.2 x 1.5 cm.  
This was stage T1 
 Repeat MRI in 2/2020 demonstrated increase size of the [address] in right lobe, segment 5 to 3.4 x 3.1 cm, LIRADS-5. This was stage 2. [address] was treated with Y-90 TARE in 3/2020. MRI performed in 6/2020 demonstrated no uptake at the site of [address] suggesting no viable HCC remains. The next MRI will be scheduled for 9/2020. Ascites Ascites has resolved following TIPS placement and with diuretics. Will remain on step 1 diuretics. The patient was counseled regarding the need to maintain sodium restriction and the types of foods containing high amounts of sodium to be avoided. Hepatic hydrothorax This has resolved following TIPS placement. Lower extremity edema Edema has resolved with current dose of diuretics. Will remain on the current dose of diuretics at step 1. The renal function is normal and edema should be able to be controled with diuretics. Screening for Esophageal varices The patient has esophageal varices with prior bleeding. Varices have been banded Varices have now been treated with TIPS. No further EGD is required to assess vareces is required at this time. Hepatic encephalopathy Overt HE is controlled on current dose of lactulose TID. He is not having an diarrhea. There is no need to restrict dietary protein at this time. Pulmonary hypertension ECHO shows mild pulmonary HTN with estimated PAP of 42 mm Hg. We thought this was due to fluid overload. Will need to repeat ECHO now that he has TIPS and no longer has fluid overload. Travis Aparicio Can delay this until he undergoes formal LT evaluation after 10/1/2019. Thrombocytopenia This is secondary to cirrhosis. There is no evidence of overt bleeding. No treatment is required. The platelet count is adequate for the patient to undergo procedures without the need for platelet transfusion or platelet growth factors. Inguninal hernia He has an inguinal henia on the left which he says protrudes and is very painful. Since CTP score is 7 and MELD score is 14 and portal HTN has been decompressed following TIPS it is feasible to consider hernia repair. Will refer to Dr Kami Vazquez COPD. Patient is inconsistent with inhaler use and continues to smoke 1 PPD. He will need to stop smoking if he wants to be considered for liver transplant. Treatment of other medical problems in patients with chronic liver disease There are no contraindications for the patient to take most medications that are necessary for treatment of other medical issues. The patient has cirrhrosis and should avoid NSAIDs which are associated with a higher rate of developing ZOEY. Counseling for alcohol in patients with chronic liver disease The patient was counseled regarding alcohol consumption and the effect of alcohol on chronic liver disease. The patient does not consume any significant amount of alcohol. Vaccinations Vaccination for viral hepatitis A and B is recommended since the patient has no serologic evidence of previous exposure or vaccination with immunity. Routine vaccinations against other bacterial and viral agents can be performed as indicated. Annual flu vaccination should be administered if indicated. ALLERGIES Allergies Allergen Reactions  Shellfish Derived Hives MEDICATIONS Current Outpatient Medications Medication Sig  
 spironolactone (ALDACTONE) 100 mg tablet Take 1 Tab by mouth daily.  furosemide (LASIX) 40 mg tablet Take 1 Tab by mouth daily.  cyproheptadine (PERIACTIN) 4 mg tablet Take 1 Tab by mouth nightly as needed (itching).  lactulose (CHRONULAC) 10 gram/15 mL solution Take 45 mL by mouth three (3) times daily.  alendronate (FOSAMAX) 70 mg tablet Take 1 Tab by mouth every seven (7) days.  nicotine (NICODERM CQ) 14 mg/24 hr patch Apply 1 patch every 24 hours for 6 weeks.   
 nicotine (NICODERM CQ) 7 mg/24 hr Apply 1 patch every 24 hours for 7 days. To be used after 6 weeks of 14 mg patch.  albuterol (PROVENTIL HFA, VENTOLIN HFA, PROAIR HFA) 90 mcg/actuation inhaler Take 1 Puff by inhalation every six (6) hours as needed for Wheezing.  ondansetron (ZOFRAN ODT) 4 mg disintegrating tablet Take 1 Tab by mouth every eight (8) hours as needed for Nausea. No current facility-administered medications for this visit. SYSTEM REVIEW NOT RELATED TO LIVER DISEASE OR REVIEWED ABOVE: 
Constitution systems: Negative for fever, chills. Weight stable since last office visit. Eyes: Negative for visual changes. ENT: Negative for sore throat, painful swallowing. Respiratory: Negative for cough, hemoptysis. Positive for SOB, patient is inconsistent with administration of Ventolin. Cardiology: Negative for chest pain, palpitations. GI:  Negative for constipation or diarrhea. Occasional complaint of RUQ dull achy bruise feeling, helped with changes in position. Occasional post-prandial nausea : Negative for urinary frequency, dysuria, hematuria, nocturia. Skin: Negative for rash. Hematology: Negative for easy bruising, blood clots. Musculo-skeletal: Negative for back pain, muscle pain, weakness. Neurologic: Negative for headaches, dizziness, vertigo. Memory problems ? related to HE. Psychology: Negative for anxiety, depression. FAMILY HISTORY: 
The father  of heart disease. The mother  of MVA but had elevated liver enzymes. There is no family history of liver disease. SOCIAL HISTORY: 
The patient is . The patient has no children. The patient currently smokes 1 pack of tobacco daily. The patient has never consumed significant amounts of alcohol. 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 PHYSICAL EXAMINATION: 
Visit Vitals /61 (BP 1 Location: Left arm, BP Patient Position: Sitting) Pulse 81 Temp 98.2 °F (36.8 °C) (Temporal) Resp 18 Ht 6' 1\" (1.854 m) Wt 201 lb 6.4 oz (91.4 kg) SpO2 92% BMI 26.57 kg/m² General: No acute distress. Eyes: Sclera anicteric. ENT: No oral lesions. Thyroid normal. 
Nodes: No adenopathy. Skin: No spider angiomata. No jaundice. Positive for palmar erythema. Respiratory: Lungs clear to auscultation. Cardiovascular: Regular heart rate. No murmurs. No JVD. Abdomen: Soft, mild tender to deep palpation of the LUQ, spleen tip palpable. Liver edge firm, easily palpable 2-3 CM BCM nontender. No obvious ascites. Extremities: Trace edema. No muscle wasting. No gross arthritic changes. Neurologic: Alert and oriented. Cranial nerves grossly intact. Fine asterixis. LABORATORY STUDIES: 
Liver [place] of [address]  [address] & Units 7/2/2020 3/13/2020 WBC 3.4 - 10.8 x10E3/uL 4.8 4.7 ANC 1.4 - 7.0 x10E3/uL 2.9 2.5 HGB 13.0 - 17.7 g/dL 12.6 (L) 11.5 (L)  - 450 x10E3/uL 64 (LL) 72 (L) INR 0.8 - 1.2 1.4 (H) AST 0 - 40 IU/L 64 (H) 58 (H) ALT 0 - 44 IU/L 43 50 Alk Phos 39 - 117 IU/L 190 (H) 138 (H) Bili, Total 0.0 - 1.2 mg/dL 2.7 (H) 1.8 (H) Bili, Direct 0.00 - 0.40 mg/dL 1.34 (H) 0.8 (H) Albumin 3.8 - 4.9 g/dL 2.8 (L) 2.3 (L) BUN 6 - 24 mg/dL 12 Creat 0.76 - 1.27 mg/dL 0.88 Na 134 - 144 mmol/L 140   
K 3.5 - 5.2 mmol/L 4.2 Cl 96 - 106 mmol/L 106 CO2 20 - 29 mmol/L 21 Glucose 65 - 99 mg/dL 118 (H) Cancer Screening Latest Ref Rng & Units 2/27/2020 12/27/2019 11/15/2019 AFP, Serum 0.0 - 8.0 ng/mL 4.2 3.6 2.5 AFP-L3% 0.0 - 9.9 % 9.2 9.9 Comment SEROLOGIES: 
5/2017. HBsurface antigen negative, anti-HBcore negative, anti-HBsurface negative, HCV RNA negative, iron saturation 29%, ASMA negative. Serologies Latest Ref Rng & Units 6/6/2017 Ferritin 30 - 400 ng/mL 39 Alpha-1 antitrypsin level 90 - 200 mg/dL 87 (L) LIVER HISTOLOGY: 
6/2017. FibroScan performed at [address]  [place] was 72.1.  The results suggested a fibrosis level of F4. ENDOSCOPIC PROCEDURES: 
2017. EGD by Dr Lavell Vasquez. Esophageal varices. Banding performed. 2017. EGD performed by Dr Zee Black. Small esophageal varices. No banding performed. No gastric varices. Mild portal gastropathy. Repeat in 6 months. 
2018. EGD performed by Dr Zee Black. Medium esophageal varices. Banding performed x 4. No gastric varices. Moderate portal gastropathy. 2018. EGD performed by Dr Zee Black. No esophageal varices. No gastric varices. Moderate portal gastropathy. Repeat 2019. RADIOLOGY: 
2017. US of abdomen. No liver mass/lesion. 2018. Ultrasound of liver. Echogenic consistent with cirrhosis. No liver mass lesions. No dilated bile ducts. No ascites. 2019. CT scan abdomen with IV contrast.  Changes consistent with cirrhosis. No liver mass lesions. No dilated bile ducts. No ascites. 2019. Dynamic MRI of liver. Changes consistent with cirrhosis. Single enhancing liver mass with washout  
in the right lobe, segment 5 measuring 1.2 x 1.5 cm. No ascites. Patent TIPS. 2020. Dynamic MRI of liver. Changes consistent with cirrhosis. Single enhancing liver mass with washout And rim enhancement in the right lobe, segment 5 now measuring 3.4 x 3.1 cm. LIRADS-5. No ascites. Patent TIPS. OTHER TESTIN2019. ECHO heart. LVEF 55-60%. Normal RV. Mild TR. Estimated PAP 42 mm Hg. 
2019. TIPS placement. HVPG reduced from 16 to 4 mmHg 10/2019. FEV1 73% of predicted, FEV1% 63% of predicted, FVC 81% of predicted. 10/2019. ABG on room air: 83/35/7.42;  ABG on 100% O2: 125/39/7.42; shunt fraction 24.7% 
2019. Cardiac Nuclear Stress. No ischemia. LVEF 65% FOLLOW-UP: 
All of the issues listed above in the Assessment and Plan were discussed with the patient. All questions were answered. The patient expressed a clear understanding of the above. [address] [address] in 2 months. This should be about 1 month after Y-90 and 1-2 weeks after the next imaging study MD Jarad Garber [address] [address], [address] 
[phone] [address] [place] [address]

## 2020-07-10 NOTE — TELEPHONE ENCOUNTER
Patient's wife called asking what patient is supposed to do about his pain from the hernia while he is waiting to have it repaired. Received VO for Tramadol from Dr. Amira Johnson. Called [facility] and left prescription information on pharmacy voicemail. Called patient's wife to make her aware. Received VM. Left her a message about new medication.

## 2020-09-29 NOTE — PROGRESS NOTES
Received message from patient's brother, Lexie Adair, wanting to make sure we knew patient was hospitalized at [facility]. Spoke to patient's brother and his wife, Maria Antonia Francois. Wife says patient went to the hospital on Friday due to back pain. She says he has a lumbar fracture and plan is to put in cement today. Per wife, patient is taking his medications, but he's confused. I asked wife to call me when he's discharged to schedule chest CT and follow up with Dr. Kings Barbosa.

## 2020-10-07 NOTE — TELEPHONE ENCOUNTER
Patient's brother, Salena Lemon, called to report patient's passing. He said patient was discharged from [facility] and went to a rehab facility. While there, patient fell 3 times. On the evening of 10/6 he hit his head and was taken to [facility]. Per Thierno's report, patient had a brain bleed and then he started having seizures. The seizures never stopped and patient  on 10/7. I thanked him for the call and offered our condolences. Updated Dr. Pauly Almeida and informed [facility] that patient's transplant evaluation can be closed due to his passing.

## 2020-10-14 ENCOUNTER — DOCUMENTATION ONLY (OUTPATIENT)
Dept: HEMATOLOGY | Age: 60
End: 2020-10-14

## 2023-07-09 NOTE — PROGRESS NOTES
[address] MD, Severo Toussaint, Taj Willingham MD Perfecto Se, PA-C Roma Genta, [facility]     Adriana Albarado, [facility]   Garyguille Glez, CELINE-RUFINA    Sreekanth Malcolm, [address], [address] Patricia Gonzalez  22.    [phone]    FAX: [address] [place] [address], [address]    [phone]    FAX: [phone]       HEPATOLOGY CONSULT NOTE  The patient is well known to me and regularly cared for at [address] [address]. He is a 63year old [place] male with cirrhosis that has been attributed to alcohol. He developed refractory ascites and hydrothorax and underwent TIPS placement in 8/2019. All ascites and edema has since resolved. He was brought to the ED by family today because of increased lethargy and confusion. Labs suggest he is dehydrated with Scr 1.3 and ammonia 78. I have reviewed the Emergency room note, Hospital admission note, Notes by all other physicians who have seen the patient during this hospitalization to date. I have reviewed the problem list and the reason for this hospitalization. I have reviewed the allergies and the medications the patient was taking at home prior to this hospitalization. ASSESSMENT AND PLAN:  Cirrhosis  Cryptogenic cirrhosis.     The diagnosis of cirrhosis is based upon Fiboscan, imaging, laboratory studies, complications of cirrhosis. AST is elevated.  ALT is normal.  ALP is elevated .  Liver function is depressed.  The platelet count is depressed.    The CTP is 7.  Child class B.  The MELD score is 21. The patient has a MELD score greater than 15 and has developed complications of cirrhosis.   Will initiate liver transplant evaluation testing. Medicare start 10/1. Will have tests scheduled for LT evaluation to start then and completed so he can be seen at [facility] LT center by end of October.     Ascites   Ascites has resolved following TIPS placement and with diuretics. Dehydration  Will hold diuretics  Will start IV albumin 25 gm Q6H    Screening for Esophageal varices   The patient has esophageal varices with prior bleeding.    Varices have resolved following TIPS.    Hepatic encephalopathy   Overt HE is intermittent on current doses of lactulose and Xifaxan. This was worse today and likely precipitated by dehydration. Will need to exclude infection. Blood and Urine cx. Will continue current dose of lactulose TID and xifaxan BID    Thrombocytopenia   This is secondary to cirrhosis. There is no evidence of overt bleeding.    No treatment is required. The platelet count is adequate for the patient to undergo procedures without the need for platelet transfusion or platelet growth factors.     Screening for Hepatocellular Carcinoma  HCC screening has recently been performed and does not suggest [address] next liver imaging study will be performed in 2019.       SYSTEM REVIEW:  Constitution systems: Lethargic. Eyes: Negative for visual changes. ENT: Negative for sore throat, painful swallowing. Respiratory: Negative for cough, hemoptysis, SOB. Cardiology: Negative for chest pain, palpitations. GI:  Negative for constipation or diarrhea. : Negative for urinary frequency, dysuria, hematuria, nocturia. Skin: Negative for rash. Hematology: Negative for easy bruising, blood clots. Musculo-skelatal: Negative for back pain, muscle pain, weakness. Neurologic: Negative for headaches, dizziness, vertigo, memory problems not related to HE.   Psychology: depression.         FAMILY HISTORY:  The father  of heart disease.    The mother  of MVA but had elevated liver enzymes.    There is no family history of liver disease.       SOCIAL HISTORY:  The patient is .    The patient has no children.     The patient currently smokes 1 pack of tobacco daily.    The patient has never consumed significant amounts of alcohol.    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       PHYSICAL EXAMINATION:  VS: per nursing note  General:  No acute distress. Eyes:  Sclera anicteric. ENT:  No oral lesions. Thyroid normal.  Nodes:  No adenopathy. Skin: spider angiomata. Respiratory:  Lungs clear to auscultation. Reduced breath sounds on the right about half way up. Cardiovascular: Regular heart rate. No JVD. Abdomen: Soft non-tender, Distended with obvious ascites. Extremities:  No lower extremity edema. Neurologic:  Alert and oriented. LABORATORY:  Results for Val Sadler (MRN [id number]) as of 9/13/2019 20:38   Ref. Range 8/13/2019 16:47 9/13/2019 16:33   WBC Latest Ref Range: 4.1 - 11.1 K/uL 12.4 (H) 9.1   HGB Latest Ref Range: 12.1 - 17.0 g/dL 11.3 (L) 12.1   PLATELET Latest Ref Range: 150 - 400 K/uL 116 (L) 112 (L)   INR Latest Ref Range: 0.9 - 1.1    1.4 (H)   Sodium Latest Ref Range: 136 - 145 mmol/L 134 (L) 140   Potassium Latest Ref Range: 3.5 - 5.1 mmol/L 4.2 4.3   Chloride Latest Ref Range: 97 - 108 mmol/L 104 110 (H)   CO2 Latest Ref Range: 21 - 32 mmol/L 22 20 (L)   Glucose Latest Ref Range: 65 - 100 mg/dL 147 (H) 190 (H)   BUN Latest Ref Range: 6 - 20 MG/DL 9 14   Creatinine Latest Ref Range: 0.70 - 1.30 MG/DL 1.05 1.30   Bilirubin, total Latest Ref Range: 0.2 - 1.0 MG/DL 3.6 (H) 3.3 (H)   Albumin Latest Ref Range: 3.5 - 5.0 g/dL 2.5 (L) 2.7 (L)   ALT (SGPT) Latest Ref Range: 12 - 78 U/L 35 66   AST Latest Ref Range: 15 - 37 U/L 60 (H) 62 (H)   Alk. phosphatase Latest Ref Range: 45 - 117 U/L 155 (H) 147 (H)   Ammonia Latest Ref Range: <32 UMOL/L 38 (H) 78 (H)       RADIOLOGY:  CT scan abdomen with IV contrast.  Changes consistent with cirrhosis.  1 cm hypodense lesion right lobe, segment 5. No dilated bile ducts. No ascites.   [address] [facility] 13 of [address], [address] [place] [address].  [phone]  [address] [facility] no

## 2023-07-17 NOTE — ED NOTES
RT Nebulizer Bronchodilator Protocol Note    There is a bronchodilator order in the chart from a provider indicating to follow the RT Bronchodilator Protocol and there is an Initiate RT Bronchodilator Protocol order as well (see protocol at bottom of note). CXR Findings:  XR CHEST PORTABLE    Result Date: 3/16/2022  Pulmonary vascular congestion/interstitial edema versus an atypical pneumonia. Right basilar atelectasis versus airspace disease. The findings from the last RT Protocol Assessment were as follows:  Smoking: Chronic pulmonary disease  Respiratory Pattern: Dyspnea on exertion or RR 21-25 bpm  Breath Sounds: Inspiratory and expiratory or bilateral wheezing and/or rhonchi  Cough: Strong, spontaneous, non-productive  Indication for Bronchodilator Therapy: Decreased or absent breath sounds,On home bronchodilators,Wheezing associated with pulm disorder  Bronchodilator Assessment Score: 10    Aerosolized bronchodilator medication orders have been revised according to the RT Nebulizer Bronchodilator Protocol below. Respiratory Therapist to perform RT Therapy Protocol Assessment initially then follow the protocol. Repeat RT Therapy Protocol Assessment PRN for score 0-3 or on second treatment, BID, and PRN for scores above 3. No Indications  adjust the frequency to every 6 hours PRN wheezing or bronchospasm, if no treatments needed after 48 hours then discontinue using Per Protocol order mode. If indication present, adjust the RT bronchodilator orders based on the Bronchodilator Assessment Score as indicated below. If a patient is on this medication at home then do not decrease Frequency below that used at home. 0-3  enter or revise RT bronchodilator order(s) to equivalent RT Bronchodilator order with Frequency of every 4 hours PRN for wheezing or increased work of breathing using Per Protocol order mode.        4-6  enter or revise RT Bronchodilator order(s) to two equivalent RT TRANSFER - OUT REPORT:    Verbal report given to Helene(name) on Duglas Yu  being transferred to N(unit) for routine progression of care       Report consisted of patients Situation, Background, Assessment and   Recommendations(SBAR). Information from the following report(s) SBAR, ED Summary and Cardiac Rhythm NSR was reviewed with the receiving nurse. Lines:   Peripheral IV 09/13/19 Right Antecubital (Active)   Site Assessment Clean, dry, & intact 9/13/2019  4:52 PM   Phlebitis Assessment 0 9/13/2019  4:52 PM   Infiltration Assessment 0 9/13/2019  4:52 PM   Dressing Status Clean, dry, & intact 9/13/2019  4:52 PM        Opportunity for questions and clarification was provided.       Patient transported with:   Monitor  Registered Nurse bronchodilator orders with one order with BID Frequency and one order with Frequency of every 4 hours PRN wheezing or increased work of breathing using Per Protocol order mode. 7-10  enter or revise RT Bronchodilator order(s) to two equivalent RT bronchodilator orders with one order with TID Frequency and one order with Frequency of every 4 hours PRN wheezing or increased work of breathing using Per Protocol order mode. 11-13  enter or revise RT Bronchodilator order(s) to one equivalent RT bronchodilator order with QID Frequency and an Albuterol order with Frequency of every 4 hours PRN wheezing or increased work of breathing using Per Protocol order mode. Pt. Would benefit from qid hhns    Greater than 13  enter or revise RT Bronchodilator order(s) to one equivalent RT bronchodilator order with every 4 hours Frequency and an Albuterol order with Frequency of every 2 hours PRN wheezing or increased work of breathing using Per Protocol order mode. RT to enter RT Home Evaluation for COPD & MDI Assessment order using Per Protocol order mode.     Electronically signed by Marlo Benjamin RCP on 3/18/2022 at 4:48 AM Curettage Text: The wound bed was treated with curettage after the biopsy was performed.

## 2024-11-13 NOTE — H&P
History & Physical 
 
Primary Care Provider: Marga Roman MD 
Source of Information: Patient History of Presenting Illness:  
Edith Moralez is a 63 y.o. male who presents with sob 63 y.o. male with past medical history significant for diabetes, COPD, and liver cirrhosis who presents from home via private vehicle with chief complaint of SOB. Patient reports a h/o liver cirrhosis with ascites requiring intermittent paracentesis. He follows with hepatologist Dr. Jabier Napier for this, and reports he is supposed to be on the liver transplant list. Patient's last paracentesis was 6 days ago at [facility], had 4L drained. Patient states since then, he has had gradually worsening SOB and increasing abdominal distention. He also endorses LLE swelling, dry cough, nausea, left sided abdominal pain, and lack of appetite. He has \"a little bit\" of chest pain. Sx not exerted by deep inspiration. Patient denies any fever or seizures. There are no other acute medical concerns at this time. 
   
 
Review of Systems: 
General: no fever, gaining weight, HPI HEENT: no headache, no vision changes, no nose discharge, no hearing changes RES: hpi, no cough, no sputum CVS: no cp, no palpitation. Muscular: no joint swelling, no muscle pain, no leg swelling Skin: no rash, no itching GI: HPI, no vomiting, no diarrhea : no dysuria, no hematuria Hemo: no gum bleeding, no petechial  
Neuro: no sensation changes, no focal weakness Endo: no polydipsia Psych: denied depression Past Medical History:  
Diagnosis Date  Chronic obstructive pulmonary disease ([facility] [address]  Diabetes ([facility] [address]  Liver disease Past Surgical History:  
Procedure Laterality Date [address]  HX GI Esophageal Varices, banded  HX HERNIA REPAIR  2015 Prior to Admission medications Medication Sig Start Date End Date Taking? Authorizing Provider furosemide (LASIX) 40 mg tablet Take 40 mg by mouth two (2) times a day. Yes Provider, Historical  
lactulose (CHRONULAC) 10 gram/15 mL solution Take 30 g by mouth three (3) times daily. Yes Provider, Historical  
spironolactone (ALDACTONE) 100 mg tablet Take 100 mg by mouth daily. Yes Provider, Historical  
albuterol (PROVENTIL HFA, VENTOLIN HFA, PROAIR HFA) 90 mcg/actuation inhaler Take  by inhalation as needed for Wheezing. Yes Provider, Historical  
ondansetron (ZOFRAN ODT) 4 mg disintegrating tablet Take 1 Tab by mouth every eight (8) hours as needed for Nausea. 4/18/19   Eleanor Gibbs MD  
 
Allergies Allergen Reactions  Shellfish Derived Hives Family History Problem Relation Age of Onset  Diabetes Mother  Heart Disease Father SOCIAL HISTORY: 
Patient resides: 
Independently x [facility]isted Living SNF With family care x Smoking history:  
None x Former Chronic Alcohol history:  
None x Social   
Chronic Ambulates:  
Independently x  
w/cane   
w/walker   
w/wc CODE STATUS: 
DNR Full Other Objective:  
 
Physical Exam:  
 
Visit Vitals /60 (BP 1 Location: Left arm, BP Patient Position: At rest) Pulse 80 Temp 98.1 °F (36.7 °C) Resp 14 SpO2 98% O2 Device: Room air General:  Alert, cooperative, no distress, appears stated age. Head:  Normocephalic, without obvious abnormality, atraumatic. Eyes:  Conjunctivae/corneas clear. PERRL, EOMs intact. Nose: Nares normal. Septum midline. Mucosa normal. No drainage or sinus tenderness. Throat: Lips, mucosa, and tongue normal. Teeth and gums normal.  
Neck: Supple, symmetrical, trachea midline, no adenopathy, thyroid: no enlargement/tenderness/nodules, no carotid bruit and no JVD. Back:   Symmetric, no curvature. ROM normal. No CVA tenderness. Lungs:   Decrease BS right side of lung, dullness on percussion. Left lung clear Chest wall:  No tenderness or deformity. Heart:  Regular rate and rhythm, S1, S2 normal, no murmur, click, rub or gallop. Abdomen:   Soft, disdented, + shifting dullness . Bowel sounds normal. No masses,  No organomegaly. Extremities: Extremities normal, atraumatic, no cyanosis or edema. Pulses: 2+ and symmetric all extremities. Skin: Skin color, texture, turgor normal. No rashes or lesions Neurologic: CNII-XII intact. No focal weakness Data Review:  
 
Recent Days: 
Recent Labs  
  06/13/19 
1115 WBC 8.2 HGB 12.4 HCT 36.4*  
* Recent Labs  
  06/13/19 
1115 * K 4.0  
 CO2 24 * BUN 16  
CREA 1.27  
CA 8.5 ALB 2.4* SGOT 59* ALT 57 INR 1.4* No results for input(s): PH, PCO2, PO2, HCO3, FIO2 in the last 72 hours. 24 Hour Results: 
Recent Results (from the past 24 hour(s)) EKG, 12 LEAD, INITIAL Collection Time: 06/13/19 11:04 AM  
Result Value Ref Range Ventricular Rate 88 BPM  
 Atrial Rate 88 BPM  
 P-R Interval 134 ms QRS Duration 76 ms  
 Q-T Interval 400 ms QTC Calculation (Bezet) 484 ms Calculated P Axis 50 degrees Calculated R Axis 1 degrees Calculated T Axis 0 degrees Diagnosis Normal sinus rhythm Prolonged QT No previous ECGs available Confirmed by Annemarie Edward MD, Winston Ventura ([id number]) on 6/13/2019 2:48:25 PM 
  
SAMPLES BEING HELD Collection Time: 06/13/19 11:15 AM  
Result Value Ref Range SAMPLES BEING HELD 1RED 1 TEJAL 1LAV 1PST COMMENT Add-on orders for these samples will be processed based on acceptable specimen integrity and analyte stability, which may vary by analyte. CBC WITH AUTOMATED DIFF Collection Time: 06/13/19 11:15 AM  
Result Value Ref Range WBC 8.2 4.1 - 11.1 K/uL  
 RBC 3.74 (L) 4.10 - 5.70 M/uL  
 HGB 12.4 12.1 - 17.0 g/dL HCT 36.4 (L) 36.6 - 50.3 % MCV 97.3 80.0 - 99.0 FL  
 MCH 33.2 26.0 - 34.0 PG  
 MCHC 34.1 30.0 - 36.5 g/dL RDW 16.0 (H) 11.5 - 14.5 % PLATELET 224 (L) 097 - 400 K/uL MPV 10.5 8.9 - 12.9 FL  
 NRBC 0.0 0  WBC ABSOLUTE NRBC 0.00 0.00 - 0.01 K/uL NEUTROPHILS 55 32 - 75 % LYMPHOCYTES 22 12 - 49 % MONOCYTES 20 (H) 5 - 13 % EOSINOPHILS 2 0 - 7 % BASOPHILS 0 0 - 1 % IMMATURE GRANULOCYTES 0 0.0 - 0.5 % ABS. NEUTROPHILS 4.5 1.8 - 8.0 K/UL  
 ABS. LYMPHOCYTES 1.8 0.8 - 3.5 K/UL  
 ABS. MONOCYTES 1.6 (H) 0.0 - 1.0 K/UL  
 ABS. EOSINOPHILS 0.2 0.0 - 0.4 K/UL  
 ABS. BASOPHILS 0.0 0.0 - 0.1 K/UL  
 ABS. IMM. GRANS. 0.0 0.00 - 0.04 K/UL  
 DF AUTOMATED    
LIPASE Collection Time: 06/13/19 11:15 AM  
Result Value Ref Range Lipase 267 73 - 856 U/L  
METABOLIC PANEL, COMPREHENSIVE Collection Time: 06/13/19 11:15 AM  
Result Value Ref Range Sodium 135 (L) 136 - 145 mmol/L Potassium 4.0 3.5 - 5.1 mmol/L Chloride 105 97 - 108 mmol/L  
 CO2 24 21 - 32 mmol/L Anion gap 6 5 - 15 mmol/L Glucose 109 (H) 65 - 100 mg/dL BUN 16 6 - 20 MG/DL Creatinine 1.27 0.70 - 1.30 MG/DL  
 BUN/Creatinine ratio 13 12 - 20 GFR est AA >60 >60 ml/min/1.73m2 GFR est non-AA 58 (L) >60 ml/min/1.73m2 Calcium 8.5 8.5 - 10.1 MG/DL Bilirubin, total 3.6 (H) 0.2 - 1.0 MG/DL  
 ALT (SGPT) 57 12 - 78 U/L  
 AST (SGOT) 59 (H) 15 - 37 U/L Alk. phosphatase 142 (H) 45 - 117 U/L Protein, total 6.2 (L) 6.4 - 8.2 g/dL Albumin 2.4 (L) 3.5 - 5.0 g/dL Globulin 3.8 2.0 - 4.0 g/dL A-G Ratio 0.6 (L) 1.1 - 2.2 NT-PRO BNP Collection Time: 06/13/19 11:15 AM  
Result Value Ref Range NT pro- <125 PG/ML  
PROTHROMBIN TIME + INR Collection Time: 06/13/19 11:15 AM  
Result Value Ref Range INR 1.4 (H) 0.9 - 1.1 Prothrombin time 13.8 (H) 9.0 - 11.1 sec TROPONIN I Collection Time: 06/13/19 11:15 AM  
Result Value Ref Range Troponin-I, Qt. <0.05 <0.05 ng/mL Imaging: Xr Chest Pa Lat Result Date: 6/13/2019 IMPRESSION: Right pleural effusion which is large and probable right lower lobe consolidation. Cta Chest W Or W Wo Cont Result Date: 6/13/2019 IMPRESSION: Severe emphysema Large right pleural effusion with right basilar subsegmental atelectasis. Cirrhosis with ascites. No evidence for pulmonary embolism. Assessment:  
 
Active Problems: 
  Large pleural effusion (6/13/2019) Plan: 1. 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 Would like to arrange IR for thoracentesis. Should not remove fluid more than 2 Ls at one time. May need increase diuretics or consider Tips in the future. Consult hepatologist  
2. Ascites: distended, had moderate ascites, would like to arrange paracentesis, prefer arrange this prior to the thoracentesis. Will give IV albumin and iv lasix for now. 3. Cirrhosis: continue lactulose Signed By: Sera Alonzo MD   
 June 13, 2019 0

## (undated) DEVICE — Device

## (undated) DEVICE — KIT IV STRT W CHLORAPREP PD 1ML

## (undated) DEVICE — Z DISCONTINUED NO SUB IDED SET EXTN W/ 4 W STPCOCK M SPIN LOK 36IN

## (undated) DEVICE — SET ADMIN 16ML TBNG L100IN 2 Y INJ SITE IV PIGGY BK DISP

## (undated) DEVICE — CANN NASAL O2 CAPNOGRAPHY AD -- FILTERLINE

## (undated) DEVICE — NEONATAL-ADULT SPO2 SENSOR: Brand: NELLCOR

## (undated) DEVICE — MULTIPLE BAND LIGATOR: Brand: SPEEDBAND SUPERVIEW SUPER 7

## (undated) DEVICE — BW-412T DISP COMBO CLEANING BRUSH: Brand: SINGLE USE COMBINATION CLEANING BRUSH

## (undated) DEVICE — NEEDLE HYPO 18GA L1.5IN PNK S STL HUB POLYPR SHLD REG BVL

## (undated) DEVICE — BAG BELONG PT PERS CLEAR HANDL

## (undated) DEVICE — 1200 GUARD II KIT W/5MM TUBE W/O VAC TUBE: Brand: GUARDIAN

## (undated) DEVICE — SET EXTN TBNG L BOR 4 W STPCOCK ST 32IN PRIMING VOL 6ML

## (undated) DEVICE — KENDALL RADIOLUCENT FOAM MONITORING ELECTRODE -RECTANGULAR SHAPE: Brand: KENDALL

## (undated) DEVICE — SYRINGE MED 20ML STD CLR PLAS LUERLOCK TIP N CTRL DISP

## (undated) DEVICE — ENDO CARRY-ON PROCEDURE KIT INCLUDES ENZYMATIC SPONGE, GAUZE, BIOHAZARD LABEL, TRAY, LUBRICANT, DIRTY SCOPE LABEL, WATER LABEL, TRAY, DRAWSTRING PAD, AND DEFENDO 4-PIECE KIT.: Brand: ENDO CARRY-ON PROCEDURE KIT

## (undated) DEVICE — BITE BLK ENDOSCP AD 54FR GRN POLYETH ENDOSCP W STRP SLD

## (undated) DEVICE — CATH IV AUTOGRD BC BLU 22GA 25 -- INSYTE

## (undated) DEVICE — SOLIDIFIER FLUID 3000 CC ABSORB